# Patient Record
Sex: FEMALE | Race: WHITE | Employment: OTHER | URBAN - METROPOLITAN AREA
[De-identification: names, ages, dates, MRNs, and addresses within clinical notes are randomized per-mention and may not be internally consistent; named-entity substitution may affect disease eponyms.]

---

## 2017-06-15 PROBLEM — M54.42 MIDLINE LOW BACK PAIN WITH BILATERAL SCIATICA: Status: ACTIVE | Noted: 2017-06-15

## 2017-06-15 PROBLEM — M54.41 MIDLINE LOW BACK PAIN WITH BILATERAL SCIATICA: Status: ACTIVE | Noted: 2017-06-15

## 2017-09-14 PROBLEM — G89.4 CHRONIC PAIN SYNDROME: Chronic | Status: ACTIVE | Noted: 2017-09-14

## 2018-06-20 ENCOUNTER — APPOINTMENT (OUTPATIENT)
Dept: GENERAL RADIOLOGY | Age: 73
End: 2018-06-20
Payer: MEDICARE

## 2018-06-20 ENCOUNTER — HOSPITAL ENCOUNTER (EMERGENCY)
Age: 73
Discharge: HOME | End: 2018-06-20
Payer: MEDICARE

## 2018-06-20 VITALS
SYSTOLIC BLOOD PRESSURE: 106 MMHG | DIASTOLIC BLOOD PRESSURE: 70 MMHG | TEMPERATURE: 98.6 F | HEART RATE: 66 BPM | RESPIRATION RATE: 16 BRPM | OXYGEN SATURATION: 93 %

## 2018-06-20 DIAGNOSIS — M19.90 ARTHRITIS: ICD-10-CM
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
## 2018-06-20 PROCEDURE — 73630 X-RAY EXAM OF FOOT: CPT

## 2018-06-20 PROCEDURE — 73130 X-RAY EXAM OF HAND: CPT

## 2018-06-20 PROCEDURE — 73030 X-RAY EXAM OF SHOULDER: CPT

## 2018-06-20 PROCEDURE — 73610 X-RAY EXAM OF ANKLE: CPT

## 2018-06-20 PROCEDURE — 99212 OFFICE O/P EST SF 10 MIN: CPT

## 2018-06-20 RX ORDER — GABAPENTIN 300 MG/1
900 CAPSULE ORAL 3 TIMES DAILY
Status: ON HOLD | COMMUNITY
End: 2020-08-11 | Stop reason: HOSPADM

## 2018-06-20 RX ORDER — OXYCODONE HYDROCHLORIDE AND ACETAMINOPHEN 5; 325 MG/1; MG/1
1 TABLET ORAL EVERY 4 HOURS PRN
COMMUNITY
End: 2018-12-17 | Stop reason: SDUPTHER

## 2018-06-20 ASSESSMENT — PAIN DESCRIPTION - LOCATION: LOCATION: FOOT

## 2018-06-20 ASSESSMENT — PAIN DESCRIPTION - FREQUENCY: FREQUENCY: INTERMITTENT

## 2018-06-20 ASSESSMENT — PAIN SCALES - GENERAL: PAINLEVEL_OUTOF10: 3

## 2018-06-20 ASSESSMENT — PAIN DESCRIPTION - PAIN TYPE: TYPE: ACUTE PAIN

## 2018-06-20 ASSESSMENT — PAIN DESCRIPTION - ORIENTATION: ORIENTATION: RIGHT

## 2018-08-16 ENCOUNTER — HOSPITAL ENCOUNTER (OUTPATIENT)
Age: 73
Discharge: HOME | End: 2018-08-18
Payer: MEDICARE

## 2018-08-16 LAB
ALBUMIN SERPL-MCNC: 4.4 G/DL (ref 3.5–5.2)
ALP BLD-CCNC: 75 U/L (ref 35–104)
ALT SERPL-CCNC: 20 U/L (ref 0–32)
ANION GAP SERPL CALCULATED.3IONS-SCNC: 13 MMOL/L (ref 7–16)
AST SERPL-CCNC: 25 U/L (ref 0–31)
BASOPHILS ABSOLUTE: 0.04 E9/L (ref 0–0.2)
BASOPHILS RELATIVE PERCENT: 0.7 % (ref 0–2)
BILIRUB SERPL-MCNC: 0.3 MG/DL (ref 0–1.2)
BUN BLDV-MCNC: 20 MG/DL (ref 8–23)
CALCIUM SERPL-MCNC: 9.2 MG/DL (ref 8.6–10.2)
CHLORIDE BLD-SCNC: 97 MMOL/L (ref 98–107)
CO2: 32 MMOL/L (ref 22–29)
CREAT SERPL-MCNC: 0.7 MG/DL (ref 0.5–1)
EOSINOPHILS ABSOLUTE: 0.09 E9/L (ref 0.05–0.5)
EOSINOPHILS RELATIVE PERCENT: 1.5 % (ref 0–6)
GFR AFRICAN AMERICAN: >60
GFR NON-AFRICAN AMERICAN: >60 ML/MIN/1.73
GLUCOSE BLD-MCNC: 116 MG/DL (ref 74–109)
HCT VFR BLD CALC: 40.5 % (ref 34–48)
HEMOGLOBIN: 13.1 G/DL (ref 11.5–15.5)
IMMATURE GRANULOCYTES #: 0.01 E9/L
IMMATURE GRANULOCYTES %: 0.2 % (ref 0–5)
LYMPHOCYTES ABSOLUTE: 0.99 E9/L (ref 1.5–4)
LYMPHOCYTES RELATIVE PERCENT: 16.3 % (ref 20–42)
MCH RBC QN AUTO: 31 PG (ref 26–35)
MCHC RBC AUTO-ENTMCNC: 32.3 % (ref 32–34.5)
MCV RBC AUTO: 95.7 FL (ref 80–99.9)
MONOCYTES ABSOLUTE: 0.44 E9/L (ref 0.1–0.95)
MONOCYTES RELATIVE PERCENT: 7.2 % (ref 2–12)
NEUTROPHILS ABSOLUTE: 4.51 E9/L (ref 1.8–7.3)
NEUTROPHILS RELATIVE PERCENT: 74.1 % (ref 43–80)
PDW BLD-RTO: 14.2 FL (ref 11.5–15)
PLATELET # BLD: 248 E9/L (ref 130–450)
PMV BLD AUTO: 9.5 FL (ref 7–12)
POTASSIUM SERPL-SCNC: 4.5 MMOL/L (ref 3.5–5)
RBC # BLD: 4.23 E12/L (ref 3.5–5.5)
SODIUM BLD-SCNC: 142 MMOL/L (ref 132–146)
T3 FREE: 2.8 PG/ML (ref 2–4.4)
T3 TOTAL: 101.5 NG/DL (ref 80–200)
T3 UPTAKE PERCENT: 32.7 % (ref 22.5–37)
T4 FREE: 1.2 NG/DL (ref 0.93–1.7)
T4 TOTAL: 7.3 MCG/DL (ref 4.5–11.7)
TOTAL PROTEIN: 7.1 G/DL (ref 6.4–8.3)
TSH SERPL DL<=0.05 MIU/L-ACNC: 3.45 UIU/ML (ref 0.27–4.2)
WBC # BLD: 6.1 E9/L (ref 4.5–11.5)

## 2018-08-16 PROCEDURE — 84443 ASSAY THYROID STIM HORMONE: CPT

## 2018-08-16 PROCEDURE — 85025 COMPLETE CBC W/AUTO DIFF WBC: CPT

## 2018-08-16 PROCEDURE — 84480 ASSAY TRIIODOTHYRONINE (T3): CPT

## 2018-08-16 PROCEDURE — 84439 ASSAY OF FREE THYROXINE: CPT

## 2018-08-16 PROCEDURE — 84479 ASSAY OF THYROID (T3 OR T4): CPT

## 2018-08-16 PROCEDURE — 84481 FREE ASSAY (FT-3): CPT

## 2018-08-16 PROCEDURE — 84436 ASSAY OF TOTAL THYROXINE: CPT

## 2018-08-16 PROCEDURE — 80053 COMPREHEN METABOLIC PANEL: CPT

## 2018-08-16 PROCEDURE — 85027 COMPLETE CBC AUTOMATED: CPT

## 2018-09-10 ENCOUNTER — HOSPITAL ENCOUNTER (OUTPATIENT)
Age: 73
Discharge: HOME | End: 2018-09-12
Payer: MEDICARE

## 2018-09-10 LAB
ALBUMIN SERPL-MCNC: 4.4 G/DL (ref 3.5–5.2)
ALP BLD-CCNC: 74 U/L (ref 35–104)
ALT SERPL-CCNC: 28 U/L (ref 0–32)
AST SERPL-CCNC: 28 U/L (ref 0–31)
BILIRUB SERPL-MCNC: 0.2 MG/DL (ref 0–1.2)
BILIRUBIN DIRECT: <0.2 MG/DL (ref 0–0.3)
BILIRUBIN, INDIRECT: NORMAL MG/DL (ref 0–1)
BUN BLDV-MCNC: 15 MG/DL (ref 8–23)
CREAT SERPL-MCNC: 0.7 MG/DL (ref 0.5–1)
GFR AFRICAN AMERICAN: >60
GFR NON-AFRICAN AMERICAN: >60 ML/MIN/1.73
TOTAL PROTEIN: 7.3 G/DL (ref 6.4–8.3)
URIC ACID, SERUM: 5.4 MG/DL (ref 2.4–5.7)

## 2018-09-10 PROCEDURE — 80076 HEPATIC FUNCTION PANEL: CPT

## 2018-09-10 PROCEDURE — 84520 ASSAY OF UREA NITROGEN: CPT

## 2018-09-10 PROCEDURE — 84550 ASSAY OF BLOOD/URIC ACID: CPT

## 2018-09-10 PROCEDURE — 82565 ASSAY OF CREATININE: CPT

## 2018-10-25 ENCOUNTER — OFFICE VISIT (OUTPATIENT)
Dept: FAMILY MEDICINE CLINIC | Age: 73
End: 2018-10-25
Payer: MEDICARE

## 2018-10-25 VITALS
RESPIRATION RATE: 18 BRPM | SYSTOLIC BLOOD PRESSURE: 120 MMHG | WEIGHT: 245 LBS | DIASTOLIC BLOOD PRESSURE: 74 MMHG | HEART RATE: 71 BPM | BODY MASS INDEX: 48.1 KG/M2 | OXYGEN SATURATION: 96 % | HEIGHT: 60 IN

## 2018-10-25 DIAGNOSIS — R73.9 HYPERGLYCEMIA: ICD-10-CM

## 2018-10-25 DIAGNOSIS — Z23 NEED FOR IMMUNIZATION AGAINST INFLUENZA: Primary | ICD-10-CM

## 2018-10-25 DIAGNOSIS — R06.02 SHORTNESS OF BREATH ON EXERTION: ICD-10-CM

## 2018-10-25 DIAGNOSIS — E78.2 MIXED HYPERLIPIDEMIA: ICD-10-CM

## 2018-10-25 DIAGNOSIS — R53.82 CHRONIC FATIGUE: ICD-10-CM

## 2018-10-25 PROCEDURE — 1090F PRES/ABSN URINE INCON ASSESS: CPT | Performed by: FAMILY MEDICINE

## 2018-10-25 PROCEDURE — G8482 FLU IMMUNIZE ORDER/ADMIN: HCPCS | Performed by: FAMILY MEDICINE

## 2018-10-25 PROCEDURE — G8417 CALC BMI ABV UP PARAM F/U: HCPCS | Performed by: FAMILY MEDICINE

## 2018-10-25 PROCEDURE — 3017F COLORECTAL CA SCREEN DOC REV: CPT | Performed by: FAMILY MEDICINE

## 2018-10-25 PROCEDURE — G0008 ADMIN INFLUENZA VIRUS VAC: HCPCS | Performed by: FAMILY MEDICINE

## 2018-10-25 PROCEDURE — 4040F PNEUMOC VAC/ADMIN/RCVD: CPT | Performed by: FAMILY MEDICINE

## 2018-10-25 PROCEDURE — 4004F PT TOBACCO SCREEN RCVD TLK: CPT | Performed by: FAMILY MEDICINE

## 2018-10-25 PROCEDURE — 1123F ACP DISCUSS/DSCN MKR DOCD: CPT | Performed by: FAMILY MEDICINE

## 2018-10-25 PROCEDURE — 99203 OFFICE O/P NEW LOW 30 MIN: CPT | Performed by: FAMILY MEDICINE

## 2018-10-25 PROCEDURE — 1101F PT FALLS ASSESS-DOCD LE1/YR: CPT | Performed by: FAMILY MEDICINE

## 2018-10-25 PROCEDURE — 90662 IIV NO PRSV INCREASED AG IM: CPT | Performed by: FAMILY MEDICINE

## 2018-10-25 PROCEDURE — G8400 PT W/DXA NO RESULTS DOC: HCPCS | Performed by: FAMILY MEDICINE

## 2018-10-25 PROCEDURE — G8427 DOCREV CUR MEDS BY ELIG CLIN: HCPCS | Performed by: FAMILY MEDICINE

## 2018-10-25 RX ORDER — LOSARTAN POTASSIUM AND HYDROCHLOROTHIAZIDE 25; 100 MG/1; MG/1
1 TABLET ORAL DAILY
COMMUNITY
End: 2019-01-14 | Stop reason: SDUPTHER

## 2018-10-25 RX ORDER — RANITIDINE 300 MG/1
150 TABLET ORAL 2 TIMES DAILY
COMMUNITY
End: 2019-08-12 | Stop reason: SDUPTHER

## 2018-10-25 RX ORDER — CELECOXIB 200 MG/1
200 CAPSULE ORAL 2 TIMES DAILY
COMMUNITY
End: 2019-10-09 | Stop reason: SDUPTHER

## 2018-10-25 RX ORDER — CITALOPRAM 20 MG/1
60 TABLET ORAL DAILY
COMMUNITY
End: 2019-08-12 | Stop reason: CLARIF

## 2018-10-25 ASSESSMENT — PATIENT HEALTH QUESTIONNAIRE - PHQ9
1. LITTLE INTEREST OR PLEASURE IN DOING THINGS: 0
SUM OF ALL RESPONSES TO PHQ9 QUESTIONS 1 & 2: 0
SUM OF ALL RESPONSES TO PHQ QUESTIONS 1-9: 0
SUM OF ALL RESPONSES TO PHQ QUESTIONS 1-9: 0
2. FEELING DOWN, DEPRESSED OR HOPELESS: 0

## 2018-10-25 ASSESSMENT — ENCOUNTER SYMPTOMS
COUGH: 0
NAUSEA: 0
BLOOD IN STOOL: 0
RHINORRHEA: 0
BACK PAIN: 1
WHEEZING: 0
COLOR CHANGE: 0
EYE REDNESS: 0
ABDOMINAL PAIN: 0
VOMITING: 0
CONSTIPATION: 0
DIARRHEA: 0
SORE THROAT: 0
EYE PAIN: 0
ANAL BLEEDING: 0

## 2018-10-25 NOTE — PROGRESS NOTES
mg by mouth 2 times daily      losartan-hydrochlorothiazide (HYZAAR) 100-25 MG per tablet Take 1 tablet by mouth daily      citalopram (CELEXA) 20 MG tablet Take 60 mg by mouth daily      ranitidine (ZANTAC) 300 MG tablet Take 300 mg by mouth 2 times daily      oxyCODONE-acetaminophen (PERCOCET) 5-325 MG per tablet Take 1 tablet by mouth every 4 hours as needed for Pain. Kurtis November gabapentin (NEURONTIN) 300 MG capsule Take 300 mg by mouth 4 times daily. Kurtis November nebivolol (BYSTOLIC) 5 MG tablet Take 5 mg by mouth daily       lidocaine (XYLOCAINE) 5 % ointment Apply topically to hands and low back as needed. 1 Tube 3    rOPINIRole (REQUIP) 2 MG tablet Take 1 tablet by mouth 2 times daily. (Patient taking differently: Take 2 mg by mouth nightly nightly) 90 tablet 3    Testosterone 10 MG/ACT (2%) GEL Place 1.25 mLs onto the skin daily.  duloxetine (CYMBALTA) 60 MG capsule Take 60 mg by mouth nightly.  amLODIPine (NORVASC) 5 MG tablet Take 5 mg by mouth daily       oxyCODONE-acetaminophen (PERCOCET) 5-325 MG per tablet Take 1 tablet by mouth every 6 hours as needed for Pain  THIS PRESCRIPTION IS A 30 DAY SUPPLY. ( ICD-10: G 89.4). Earliest Fill Date: 12/12/17 120 tablet 0     No current facility-administered medications for this visit. Allergies   Allergen Reactions    Demerol [Meperidine Hcl] Other (See Comments)     Hallucinations, anxiety        Nabumetone Rash       /74 (Site: Left Upper Arm, Position: Sitting)   Pulse 71   Resp 18   Ht 5' (1.524 m)   Wt 245 lb (111.1 kg)   SpO2 96%   BMI 47.85 kg/m²     Review of Systems   Constitutional: Negative for chills, fever and unexpected weight change. HENT: Negative for congestion, postnasal drip, rhinorrhea and sore throat. Eyes: Negative for pain, redness and visual disturbance. Respiratory: Positive for shortness of breath. Negative for cough and wheezing.     Cardiovascular: Negative for chest pain, palpitations and leg swelling. Gastrointestinal: Negative for abdominal pain, anal bleeding, blood in stool, constipation, diarrhea, nausea and vomiting. Endocrine: Positive for heat intolerance. Negative for cold intolerance, polydipsia, polyphagia and polyuria. Genitourinary: Negative for dysuria, frequency, hematuria and urgency. Musculoskeletal: Positive for arthralgias and back pain. Negative for myalgias. Skin: Negative for color change and rash. Neurological: Negative for dizziness, syncope, weakness and light-headedness. Psychiatric/Behavioral: Negative for dysphoric mood and sleep disturbance. The patient is not nervous/anxious. Physical Exam   Constitutional: She appears well-developed and well-nourished. No distress. Cardiovascular: Normal rate, regular rhythm, normal heart sounds and intact distal pulses. Exam reveals no friction rub. No murmur heard. Pulmonary/Chest: Effort normal and breath sounds normal. No respiratory distress. She has no wheezes. She has no rales. Abdominal: Soft. Bowel sounds are normal.   Musculoskeletal: She exhibits edema (1+ bilateral pitting edema ). Skin: She is not diaphoretic. Nursing note and vitals reviewed. Zenaida Jung was seen today for established new doctor and health maintenance. Diagnoses and all orders for this visit:    Need for immunization against influenza  -     INFLUENZA, HIGH DOSE, 65 YRS +, IM, PF, PREFILL SYR, 0.5ML (FLUZONE HD)    Shortness of breath on exertion  -     Justin 38 Vanessa Holliday MD    Hyperglycemia  -     Hemoglobin A1C; Future    Mixed hyperlipidemia  -     Lipid Panel; Future    Chronic fatigue  -     CBC; Future        Return in about 5 weeks (around 11/29/2018) for follow up blood work. ., or sooner if necessary.       Phone/MyChart follow up if tests abnormal.    Educational materials and/or home exercises printed for patient's review and were included in patient instructions on

## 2018-11-13 ASSESSMENT — ENCOUNTER SYMPTOMS: SHORTNESS OF BREATH: 1

## 2018-11-28 ENCOUNTER — TELEPHONE (OUTPATIENT)
Dept: ADMINISTRATIVE | Age: 73
End: 2018-11-28

## 2018-11-30 ENCOUNTER — HOSPITAL ENCOUNTER (OUTPATIENT)
Age: 73
Discharge: HOME | End: 2018-12-02
Payer: MEDICARE

## 2018-11-30 DIAGNOSIS — R73.9 HYPERGLYCEMIA: ICD-10-CM

## 2018-11-30 DIAGNOSIS — E78.2 MIXED HYPERLIPIDEMIA: ICD-10-CM

## 2018-11-30 DIAGNOSIS — R53.82 CHRONIC FATIGUE: ICD-10-CM

## 2018-11-30 LAB
CHOLESTEROL, TOTAL: 205 MG/DL (ref 0–199)
HBA1C MFR BLD: 5.7 % (ref 4–5.6)
HCT VFR BLD CALC: 45 % (ref 34–48)
HDLC SERPL-MCNC: 52 MG/DL
HEMOGLOBIN: 13.9 G/DL (ref 11.5–15.5)
LDL CHOLESTEROL CALCULATED: 127 MG/DL (ref 0–99)
MCH RBC QN AUTO: 31.4 PG (ref 26–35)
MCHC RBC AUTO-ENTMCNC: 30.9 % (ref 32–34.5)
MCV RBC AUTO: 101.8 FL (ref 80–99.9)
PDW BLD-RTO: 14.3 FL (ref 11.5–15)
PLATELET # BLD: 236 E9/L (ref 130–450)
PMV BLD AUTO: 9.5 FL (ref 7–12)
RBC # BLD: 4.42 E12/L (ref 3.5–5.5)
TRIGL SERPL-MCNC: 129 MG/DL (ref 0–149)
VLDLC SERPL CALC-MCNC: 26 MG/DL
WBC # BLD: 4.4 E9/L (ref 4.5–11.5)

## 2018-11-30 PROCEDURE — 80061 LIPID PANEL: CPT

## 2018-11-30 PROCEDURE — 83036 HEMOGLOBIN GLYCOSYLATED A1C: CPT

## 2018-11-30 PROCEDURE — 85027 COMPLETE CBC AUTOMATED: CPT

## 2018-11-30 PROCEDURE — 36415 COLL VENOUS BLD VENIPUNCTURE: CPT

## 2018-12-17 ENCOUNTER — OFFICE VISIT (OUTPATIENT)
Dept: FAMILY MEDICINE CLINIC | Age: 73
End: 2018-12-17
Payer: MEDICARE

## 2018-12-17 VITALS
RESPIRATION RATE: 18 BRPM | OXYGEN SATURATION: 95 % | HEART RATE: 66 BPM | SYSTOLIC BLOOD PRESSURE: 126 MMHG | DIASTOLIC BLOOD PRESSURE: 78 MMHG | WEIGHT: 240 LBS | BODY MASS INDEX: 47.12 KG/M2 | HEIGHT: 60 IN

## 2018-12-17 DIAGNOSIS — Z72.0 TOBACCO ABUSE: ICD-10-CM

## 2018-12-17 DIAGNOSIS — M48.00 NEUROFORAMINAL STENOSIS OF SPINE: Primary | Chronic | ICD-10-CM

## 2018-12-17 DIAGNOSIS — E66.01 CLASS 3 SEVERE OBESITY DUE TO EXCESS CALORIES WITH SERIOUS COMORBIDITY AND BODY MASS INDEX (BMI) OF 45.0 TO 49.9 IN ADULT (HCC): ICD-10-CM

## 2018-12-17 DIAGNOSIS — E78.00 PURE HYPERCHOLESTEROLEMIA: ICD-10-CM

## 2018-12-17 DIAGNOSIS — R09.89 RESPIRATORY CRACKLES AT RIGHT LUNG BASE: ICD-10-CM

## 2018-12-17 DIAGNOSIS — G89.4 CHRONIC PAIN SYNDROME: Chronic | ICD-10-CM

## 2018-12-17 DIAGNOSIS — R06.09 DYSPNEA ON EXERTION: ICD-10-CM

## 2018-12-17 PROBLEM — E66.813 CLASS 3 SEVERE OBESITY DUE TO EXCESS CALORIES WITH SERIOUS COMORBIDITY AND BODY MASS INDEX (BMI) OF 45.0 TO 49.9 IN ADULT: Status: ACTIVE | Noted: 2018-12-17

## 2018-12-17 PROCEDURE — 4040F PNEUMOC VAC/ADMIN/RCVD: CPT | Performed by: FAMILY MEDICINE

## 2018-12-17 PROCEDURE — G8482 FLU IMMUNIZE ORDER/ADMIN: HCPCS | Performed by: FAMILY MEDICINE

## 2018-12-17 PROCEDURE — 1123F ACP DISCUSS/DSCN MKR DOCD: CPT | Performed by: FAMILY MEDICINE

## 2018-12-17 PROCEDURE — 1101F PT FALLS ASSESS-DOCD LE1/YR: CPT | Performed by: FAMILY MEDICINE

## 2018-12-17 PROCEDURE — 1090F PRES/ABSN URINE INCON ASSESS: CPT | Performed by: FAMILY MEDICINE

## 2018-12-17 PROCEDURE — 3017F COLORECTAL CA SCREEN DOC REV: CPT | Performed by: FAMILY MEDICINE

## 2018-12-17 PROCEDURE — 4004F PT TOBACCO SCREEN RCVD TLK: CPT | Performed by: FAMILY MEDICINE

## 2018-12-17 PROCEDURE — 99214 OFFICE O/P EST MOD 30 MIN: CPT | Performed by: FAMILY MEDICINE

## 2018-12-17 PROCEDURE — G8417 CALC BMI ABV UP PARAM F/U: HCPCS | Performed by: FAMILY MEDICINE

## 2018-12-17 PROCEDURE — G8427 DOCREV CUR MEDS BY ELIG CLIN: HCPCS | Performed by: FAMILY MEDICINE

## 2018-12-17 PROCEDURE — G8400 PT W/DXA NO RESULTS DOC: HCPCS | Performed by: FAMILY MEDICINE

## 2018-12-17 RX ORDER — PRAVASTATIN SODIUM 10 MG
10 TABLET ORAL DAILY
Qty: 30 TABLET | Refills: 3 | Status: SHIPPED | OUTPATIENT
Start: 2018-12-17 | End: 2019-08-12 | Stop reason: ALTCHOICE

## 2018-12-17 RX ORDER — VARENICLINE TARTRATE 0.5 MG/1
.5-1 TABLET, FILM COATED ORAL SEE ADMIN INSTRUCTIONS
Qty: 57 TABLET | Refills: 0 | Status: SHIPPED | OUTPATIENT
Start: 2018-12-17 | End: 2019-01-28

## 2018-12-17 ASSESSMENT — PATIENT HEALTH QUESTIONNAIRE - PHQ9
SUM OF ALL RESPONSES TO PHQ9 QUESTIONS 1 & 2: 0
SUM OF ALL RESPONSES TO PHQ QUESTIONS 1-9: 0
1. LITTLE INTEREST OR PLEASURE IN DOING THINGS: 0
2. FEELING DOWN, DEPRESSED OR HOPELESS: 0
SUM OF ALL RESPONSES TO PHQ QUESTIONS 1-9: 0

## 2018-12-17 ASSESSMENT — ENCOUNTER SYMPTOMS
WHEEZING: 0
VOMITING: 0
CONSTIPATION: 0
NAUSEA: 0
COUGH: 0
ABDOMINAL PAIN: 0
DIARRHEA: 0
SHORTNESS OF BREATH: 1
BACK PAIN: 1

## 2018-12-17 NOTE — PROGRESS NOTES
[facility]  [phone]   Giselle Drew MD     Patient: Kely Kulkarni  YOB: 1945  Visit Date: 12/17/18    Rebecca Bell is a 68y.o. year old female here today for   Chief Complaint   Patient presents with   [address]       HPI  Patient is a 68year old female here for follow up. Sees Dr. Andry Ravi at [facility] [place] . Saw him last week. Plan is for surgery after weight loss. He would like her to lose 30-50 pounds. Symptoms at this include severe lower back pain and difficulty standing up. Has been getting around with walker. Denies any falls. Has an appointment with Pulm in February. Has been on chantix before . Worked for her . Did not have any nightmares. Started smoking again a month ago. A pack a day. Wants to quit. Has been having worsening dyspnea on exertion over the last few weeks that she relates to pain. The 10-year ASCVD risk score (Early [place]., et al., 2013) is: 17.5%    Values used to calculate the score:      Age: 68 years      Sex: Female      Is Non- : No      Diabetic: No      Tobacco smoker: Yes      Systolic Blood Pressure: 706 mmHg      Is BP treated: No      HDL Cholesterol: 52 mg/dL      Total Cholesterol: 205 mg/dL  Had tried a cholesterol medicine in the past that made her muscles ache. Review of Systems   Constitutional: Negative for chills and fever. Respiratory: Positive for shortness of breath. Negative for cough and wheezing. Cardiovascular: Negative for chest pain, palpitations and leg swelling. Gastrointestinal: Negative for abdominal pain, constipation, diarrhea, nausea and vomiting. Musculoskeletal: Positive for back pain and gait problem.        Current Outpatient Prescriptions on File Prior to Visit   Medication Sig Dispense Refill    celecoxib (CELEBREX) 200 MG capsule Take 200 mg by mouth 2 times daily      losartan-hydrochlorothiazide (HYZAAR)

## 2018-12-19 ENCOUNTER — OFFICE VISIT (OUTPATIENT)
Dept: BARIATRICS/WEIGHT MGMT | Age: 73
End: 2018-12-19
Payer: MEDICARE

## 2018-12-19 VITALS
WEIGHT: 241.8 LBS | HEIGHT: 60 IN | SYSTOLIC BLOOD PRESSURE: 117 MMHG | TEMPERATURE: 97.2 F | HEART RATE: 83 BPM | BODY MASS INDEX: 47.47 KG/M2 | DIASTOLIC BLOOD PRESSURE: 68 MMHG

## 2018-12-19 DIAGNOSIS — Z98.84 HISTORY OF ADJUSTABLE GASTRIC BANDING: ICD-10-CM

## 2018-12-19 DIAGNOSIS — E66.01 CLASS 3 SEVERE OBESITY DUE TO EXCESS CALORIES WITH SERIOUS COMORBIDITY AND BODY MASS INDEX (BMI) OF 45.0 TO 49.9 IN ADULT (HCC): ICD-10-CM

## 2018-12-19 DIAGNOSIS — M54.42 CHRONIC MIDLINE LOW BACK PAIN WITH BILATERAL SCIATICA: Primary | ICD-10-CM

## 2018-12-19 DIAGNOSIS — M54.41 CHRONIC MIDLINE LOW BACK PAIN WITH BILATERAL SCIATICA: Primary | ICD-10-CM

## 2018-12-19 DIAGNOSIS — E61.1 IRON DEFICIENCY: ICD-10-CM

## 2018-12-19 DIAGNOSIS — G89.29 CHRONIC MIDLINE LOW BACK PAIN WITH BILATERAL SCIATICA: Primary | ICD-10-CM

## 2018-12-19 PROCEDURE — 1101F PT FALLS ASSESS-DOCD LE1/YR: CPT | Performed by: INTERNAL MEDICINE

## 2018-12-19 PROCEDURE — 4040F PNEUMOC VAC/ADMIN/RCVD: CPT | Performed by: INTERNAL MEDICINE

## 2018-12-19 PROCEDURE — 1123F ACP DISCUSS/DSCN MKR DOCD: CPT | Performed by: INTERNAL MEDICINE

## 2018-12-19 PROCEDURE — G8400 PT W/DXA NO RESULTS DOC: HCPCS | Performed by: INTERNAL MEDICINE

## 2018-12-19 PROCEDURE — 4004F PT TOBACCO SCREEN RCVD TLK: CPT | Performed by: INTERNAL MEDICINE

## 2018-12-19 PROCEDURE — 1090F PRES/ABSN URINE INCON ASSESS: CPT | Performed by: INTERNAL MEDICINE

## 2018-12-19 PROCEDURE — 99204 OFFICE O/P NEW MOD 45 MIN: CPT | Performed by: INTERNAL MEDICINE

## 2018-12-19 PROCEDURE — 3017F COLORECTAL CA SCREEN DOC REV: CPT | Performed by: INTERNAL MEDICINE

## 2018-12-19 PROCEDURE — G8417 CALC BMI ABV UP PARAM F/U: HCPCS | Performed by: INTERNAL MEDICINE

## 2018-12-19 PROCEDURE — G8482 FLU IMMUNIZE ORDER/ADMIN: HCPCS | Performed by: INTERNAL MEDICINE

## 2018-12-19 PROCEDURE — 99202 OFFICE O/P NEW SF 15 MIN: CPT

## 2018-12-19 PROCEDURE — G8427 DOCREV CUR MEDS BY ELIG CLIN: HCPCS | Performed by: INTERNAL MEDICINE

## 2018-12-19 RX ORDER — PHENOL 1.4 %
10 AEROSOL, SPRAY (ML) MUCOUS MEMBRANE PRN
COMMUNITY
End: 2019-08-12 | Stop reason: CLARIF

## 2018-12-19 RX ORDER — IBUPROFEN 600 MG/1
600 TABLET ORAL EVERY 8 HOURS PRN
COMMUNITY
End: 2019-08-12 | Stop reason: CLARIF

## 2018-12-19 RX ORDER — GLUCOSAMINE/D3/BOSWELLIA SERRA 1500MG-400
10000 TABLET ORAL PRN
COMMUNITY
End: 2020-08-26 | Stop reason: CLARIF

## 2018-12-19 RX ORDER — LOPERAMIDE HYDROCHLORIDE 2 MG/1
2 CAPSULE ORAL PRN
COMMUNITY
End: 2019-08-12 | Stop reason: CLARIF

## 2018-12-19 NOTE — PROGRESS NOTES
CC - Chronic Low Back Pain, Obesity (s/p Lap Band)    HPI -    Back Pain: present 5 yrs, severe, + sciatica b/l, awaiting surgery, but must lose 30 lbs first, excess wt is likely contributing to her back pain              Obesity: present since childhood, severe, ad reuben eating is contributing to her excess weight, had Lap Band placed in 2007, pre-proc wt 290 lbs, post-proc aniceto 198 lbs 3-4 yrs, last eval for it  >5 yrs ago; + post-meal /regurgitationemesis requiring esophageal dilation with resolution     Diet:    Number of meals per day - 1    Number of snacks per day - graze in the evening    Meal volume - 9\" plate, no seconds    Fast food/convenience store - 7x/week    Restaurants (not fast food) - 0-2x/week   Sweets - 1-2d/week   Chips - 0d/week   Crackers/pretzels - 0d/week   Nuts - 0d/week   Popcorn - 0d/week   Dried fruit - 0d/week   Whole fruit - 7d/week (2-3 tablespoons of PB daily(   Breakfast cereal - 0d/week   Granola/Protein/Energy bar - 0d/week   Sugar sweetened beverages - >140 oz sweet tea 2-3x/week, 3-4 cups coffee daily with 2 tablespoons of 2% milk in each cup,   Other -  Does not take any bariatric supplement     Exercise:    Not able to because of her back and past hip and knee replacements    PSFH - HTN, HLD, OA, AYESHA, Restless Leg, Anorgasmic (uses Testosterone), Smokes (1/2 ppd), no     ROS - No CP, No SOB, No D/C/N/V    PE - /68 (Site: Right Lower Arm, Position: Sitting, Cuff Size: Medium Adult)   Pulse 83   Temp 97.2 °F (36.2 °C)   Ht 4' 11.5\" (1.511 m)   Wt 241 lb 12.8 oz (109.7 kg)   BMI 48.02 kg/m²    WN, WD, elderly lady in no acute distress   Card - no MGR, RRR, 2+ LE edema   Pulm - nml resp effort, CTA b/l   GI - no abd pain, soft, obese    Labs -  Results for Rory Hansen (MRN [id number]) as of 12/19/2018 09:55   Ref.  Range 8/16/2018 14:52 9/10/2018 14:09 11/30/2018 12:00   Sodium Latest Ref Range: 132 - 146 mmol/L 142     Potassium Latest Ref Range: 3.5 - 5.0 uncontrolled   Needs surgical intervention, but must first lose 30 lbs   Proceed with weight reduction    2. Obesity s/p gastric banding - uncontrolled   Pt is presently eating ad reuben. However, she has purchased a subscription to [employer]. She is also eating out a lot and drinking sugar sweetened beverages   She would also like an appetite suppressant. We will use Belviq for reasons of cost and side effects   Therefore the plan will be:    Start Belviq XR 20mg daily    Limit eating out to once every two weeks    Eliminate all sugar sweetened beverages except for 8 oz of milk daily    Count all calories and limit to 800 nadya/day    Eat at least 60 grams of protein daily    Target for fiber intake is 22 grams daily (Benefiber and Fiber Once original plain cereal are both good supplement options)    3. History of gastric banding   Needs to be re-assessed by one of our bariatric surgeons   Have a CT of the abd with oral contrast to define anatomy   Begin taking a Bariatric vitamin daily (two tablets)   Check the following labs: Thiamine, Zinc, B12, Folate, Ferritin    4. See me back in 4-6 weeks    I spent 45 minutes in a face to face encounter with German Day today.    >30 minutes of this was in education and counseling regarding dietary interventions for weight loss,

## 2018-12-19 NOTE — PATIENT INSTRUCTIONS
Start Belviq XR 20mg daily   Limit eating out to once every two weeks   Eliminate all sugar sweetened beverages except for 8 oz of milk daily   Count all calories and limit to 800 nadya/day   Eat at least 60 grams of protein daily   Target for fiber intake is 22 grams daily (Benefiber and Fiber Once original plain cereal are both good supplement options)     Schedule an appt with one of our bariatric surgeons   Have a CT of the abd with oral contrast to define anatomy   Begin taking a Bariatric vitamin daily (two tablets)   Check the following labs:   Thiamine, Zinc, B12, Folate, Ferritin     See me back in 4-6 weeks   Call with any questions or concerns:  [phone]

## 2019-01-02 ENCOUNTER — TELEPHONE (OUTPATIENT)
Dept: BARIATRICS/WEIGHT MGMT | Age: 74
End: 2019-01-02

## 2019-01-14 ENCOUNTER — OFFICE VISIT (OUTPATIENT)
Dept: FAMILY MEDICINE CLINIC | Age: 74
End: 2019-01-14
Payer: MEDICARE

## 2019-01-14 VITALS
RESPIRATION RATE: 18 BRPM | WEIGHT: 235 LBS | BODY MASS INDEX: 46.13 KG/M2 | HEIGHT: 60 IN | HEART RATE: 70 BPM | SYSTOLIC BLOOD PRESSURE: 122 MMHG | OXYGEN SATURATION: 94 % | DIASTOLIC BLOOD PRESSURE: 80 MMHG

## 2019-01-14 DIAGNOSIS — G47.9 SLEEP DISORDER: ICD-10-CM

## 2019-01-14 DIAGNOSIS — Z01.818 PREOP EXAMINATION: Primary | ICD-10-CM

## 2019-01-14 DIAGNOSIS — G25.81 RESTLESS LEGS SYNDROME (RLS): ICD-10-CM

## 2019-01-14 PROCEDURE — G8417 CALC BMI ABV UP PARAM F/U: HCPCS | Performed by: FAMILY MEDICINE

## 2019-01-14 PROCEDURE — 1101F PT FALLS ASSESS-DOCD LE1/YR: CPT | Performed by: FAMILY MEDICINE

## 2019-01-14 PROCEDURE — 1123F ACP DISCUSS/DSCN MKR DOCD: CPT | Performed by: FAMILY MEDICINE

## 2019-01-14 PROCEDURE — G8427 DOCREV CUR MEDS BY ELIG CLIN: HCPCS | Performed by: FAMILY MEDICINE

## 2019-01-14 PROCEDURE — 99213 OFFICE O/P EST LOW 20 MIN: CPT | Performed by: FAMILY MEDICINE

## 2019-01-14 PROCEDURE — 4004F PT TOBACCO SCREEN RCVD TLK: CPT | Performed by: FAMILY MEDICINE

## 2019-01-14 PROCEDURE — 4040F PNEUMOC VAC/ADMIN/RCVD: CPT | Performed by: FAMILY MEDICINE

## 2019-01-14 PROCEDURE — G8482 FLU IMMUNIZE ORDER/ADMIN: HCPCS | Performed by: FAMILY MEDICINE

## 2019-01-14 PROCEDURE — 1090F PRES/ABSN URINE INCON ASSESS: CPT | Performed by: FAMILY MEDICINE

## 2019-01-14 PROCEDURE — 3017F COLORECTAL CA SCREEN DOC REV: CPT | Performed by: FAMILY MEDICINE

## 2019-01-14 PROCEDURE — G8400 PT W/DXA NO RESULTS DOC: HCPCS | Performed by: FAMILY MEDICINE

## 2019-01-14 PROCEDURE — 93000 ELECTROCARDIOGRAM COMPLETE: CPT | Performed by: FAMILY MEDICINE

## 2019-01-14 ASSESSMENT — ENCOUNTER SYMPTOMS
BACK PAIN: 1
WHEEZING: 0
VOMITING: 0
NAUSEA: 0
DIARRHEA: 0
CONSTIPATION: 0
ABDOMINAL PAIN: 0
SHORTNESS OF BREATH: 1
COUGH: 0

## 2019-01-14 ASSESSMENT — PATIENT HEALTH QUESTIONNAIRE - PHQ9
SUM OF ALL RESPONSES TO PHQ9 QUESTIONS 1 & 2: 0
1. LITTLE INTEREST OR PLEASURE IN DOING THINGS: 0
SUM OF ALL RESPONSES TO PHQ QUESTIONS 1-9: 0
2. FEELING DOWN, DEPRESSED OR HOPELESS: 0
SUM OF ALL RESPONSES TO PHQ QUESTIONS 1-9: 0

## 2019-01-15 RX ORDER — ROPINIROLE 2 MG/1
2 TABLET, FILM COATED ORAL NIGHTLY
Qty: 90 TABLET | Refills: 5 | Status: SHIPPED | OUTPATIENT
Start: 2019-01-15 | End: 2019-08-12 | Stop reason: SDUPTHER

## 2019-01-15 RX ORDER — LOSARTAN POTASSIUM AND HYDROCHLOROTHIAZIDE 25; 100 MG/1; MG/1
1 TABLET ORAL DAILY
Qty: 90 TABLET | Refills: 5 | Status: SHIPPED | OUTPATIENT
Start: 2019-01-15 | End: 2019-11-25 | Stop reason: SDUPTHER

## 2019-01-15 RX ORDER — AMLODIPINE BESYLATE 5 MG/1
5 TABLET ORAL DAILY
Qty: 90 TABLET | Refills: 5 | Status: SHIPPED | OUTPATIENT
Start: 2019-01-15 | End: 2019-11-25 | Stop reason: SDUPTHER

## 2019-01-15 RX ORDER — DULOXETIN HYDROCHLORIDE 60 MG/1
60 CAPSULE, DELAYED RELEASE ORAL NIGHTLY
Qty: 90 CAPSULE | Refills: 5 | Status: SHIPPED | OUTPATIENT
Start: 2019-01-15 | End: 2019-11-25 | Stop reason: SDUPTHER

## 2019-01-28 RX ORDER — VARENICLINE TARTRATE 1 MG/1
1 TABLET, FILM COATED ORAL 2 TIMES DAILY
Qty: 60 TABLET | Refills: 2 | Status: SHIPPED | OUTPATIENT
Start: 2019-01-28 | End: 2019-06-22 | Stop reason: SDUPTHER

## 2019-04-01 RX ORDER — NEBIVOLOL 5 MG/1
5 TABLET ORAL DAILY
Qty: 30 TABLET | Refills: 2 | Status: SHIPPED | OUTPATIENT
Start: 2019-04-01 | End: 2019-07-16 | Stop reason: SDUPTHER

## 2019-04-01 NOTE — TELEPHONE ENCOUNTER
Pt left vm msg requesting refill.     Last seen 1/14/2019  Next appt 4/15/2019  [facility] [place])

## 2019-05-17 ENCOUNTER — TELEPHONE (OUTPATIENT)
Dept: FAMILY MEDICINE CLINIC | Age: 74
End: 2019-05-17

## 2019-05-17 NOTE — TELEPHONE ENCOUNTER
Spoke to patient I guess the script is actually needed for leg and foot measurement so they can decide on what kind of shoes she needs. Will write script to sign tomorrow?

## 2019-05-17 NOTE — TELEPHONE ENCOUNTER
Pt called asked for a Rx to be sent to [facility] to have her legs measured for new shoes.      [facility] fax # [phone]

## 2019-06-25 RX ORDER — VARENICLINE TARTRATE 1 MG/1
TABLET, FILM COATED ORAL
Qty: 56 TABLET | Refills: 2 | Status: SHIPPED
Start: 2019-06-25 | End: 2020-07-27

## 2019-07-16 RX ORDER — NEBIVOLOL HYDROCHLORIDE 5 MG/1
TABLET ORAL
Qty: 30 TABLET | Refills: 2 | Status: SHIPPED | OUTPATIENT
Start: 2019-07-16 | End: 2019-10-14 | Stop reason: SDUPTHER

## 2019-08-12 ENCOUNTER — OFFICE VISIT (OUTPATIENT)
Dept: FAMILY MEDICINE CLINIC | Age: 74
End: 2019-08-12
Payer: MEDICARE

## 2019-08-12 VITALS
HEART RATE: 70 BPM | OXYGEN SATURATION: 95 % | RESPIRATION RATE: 18 BRPM | HEIGHT: 59 IN | WEIGHT: 225.5 LBS | DIASTOLIC BLOOD PRESSURE: 80 MMHG | SYSTOLIC BLOOD PRESSURE: 130 MMHG | TEMPERATURE: 98.1 F | BODY MASS INDEX: 45.46 KG/M2

## 2019-08-12 DIAGNOSIS — G47.9 SLEEP DISORDER: ICD-10-CM

## 2019-08-12 DIAGNOSIS — G25.81 RESTLESS LEGS SYNDROME (RLS): ICD-10-CM

## 2019-08-12 DIAGNOSIS — E66.01 MORBID OBESITY WITH BMI OF 45.0-49.9, ADULT (HCC): ICD-10-CM

## 2019-08-12 DIAGNOSIS — G89.4 CHRONIC PAIN SYNDROME: Primary | ICD-10-CM

## 2019-08-12 PROCEDURE — 4040F PNEUMOC VAC/ADMIN/RCVD: CPT | Performed by: FAMILY MEDICINE

## 2019-08-12 PROCEDURE — 3014F SCREEN MAMMO DOC REV: CPT | Performed by: FAMILY MEDICINE

## 2019-08-12 PROCEDURE — G8427 DOCREV CUR MEDS BY ELIG CLIN: HCPCS | Performed by: FAMILY MEDICINE

## 2019-08-12 PROCEDURE — 4004F PT TOBACCO SCREEN RCVD TLK: CPT | Performed by: FAMILY MEDICINE

## 2019-08-12 PROCEDURE — 1090F PRES/ABSN URINE INCON ASSESS: CPT | Performed by: FAMILY MEDICINE

## 2019-08-12 PROCEDURE — 3017F COLORECTAL CA SCREEN DOC REV: CPT | Performed by: FAMILY MEDICINE

## 2019-08-12 PROCEDURE — 99214 OFFICE O/P EST MOD 30 MIN: CPT | Performed by: FAMILY MEDICINE

## 2019-08-12 PROCEDURE — 1123F ACP DISCUSS/DSCN MKR DOCD: CPT | Performed by: FAMILY MEDICINE

## 2019-08-12 PROCEDURE — G8417 CALC BMI ABV UP PARAM F/U: HCPCS | Performed by: FAMILY MEDICINE

## 2019-08-12 PROCEDURE — G8400 PT W/DXA NO RESULTS DOC: HCPCS | Performed by: FAMILY MEDICINE

## 2019-08-12 RX ORDER — ROPINIROLE 3 MG/1
3 TABLET, FILM COATED ORAL NIGHTLY
Qty: 90 TABLET | Refills: 1 | Status: SHIPPED | OUTPATIENT
Start: 2019-08-12 | End: 2020-01-20

## 2019-08-12 RX ORDER — RANITIDINE 150 MG/1
150 TABLET ORAL 2 TIMES DAILY
Qty: 180 TABLET | Refills: 3 | Status: SHIPPED
Start: 2019-08-12 | End: 2020-05-11 | Stop reason: CLARIF

## 2019-08-12 RX ORDER — DULOXETIN HYDROCHLORIDE 30 MG/1
30 CAPSULE, DELAYED RELEASE ORAL DAILY
Qty: 90 CAPSULE | Refills: 1 | Status: SHIPPED | OUTPATIENT
Start: 2019-08-12 | End: 2020-01-15 | Stop reason: ALTCHOICE

## 2019-08-12 ASSESSMENT — ENCOUNTER SYMPTOMS
DIARRHEA: 0
VOMITING: 0
BACK PAIN: 1
WHEEZING: 0
COUGH: 0
CONSTIPATION: 0
NAUSEA: 0
ABDOMINAL PAIN: 0
SHORTNESS OF BREATH: 0

## 2019-08-12 NOTE — PROGRESS NOTES
respiratory distress. She has no wheezes. She has no rales. Abdominal: Soft. Bowel sounds are normal. She exhibits no distension and no mass. There is no tenderness. There is no guarding. Musculoskeletal: Normal range of motion. She exhibits no edema. Skin: She is not diaphoretic. Nursing note and vitals reviewed. Results for orders placed or performed during the hospital encounter of 11/30/18   Hemoglobin A1C   Result Value Ref Range    Hemoglobin A1C 5.7 (H) 4.0 - 5.6 %   CBC   Result Value Ref Range    WBC 4.4 (L) 4.5 - 11.5 E9/L    RBC 4.42 3.50 - 5.50 E12/L    Hemoglobin 13.9 11.5 - 15.5 g/dL    Hematocrit 45.0 34.0 - 48.0 %    .8 (H) 80.0 - 99.9 fL    MCH 31.4 26.0 - 35.0 pg    MCHC 30.9 (L) 32.0 - 34.5 %    RDW 14.3 11.5 - 15.0 fL    Platelets 192 590 - 351 E9/L    MPV 9.5 7.0 - 12.0 fL   Lipid Panel   Result Value Ref Range    Cholesterol, Total 205 (H) 0 - 199 mg/dL    Triglycerides 129 0 - 149 mg/dL    HDL 52 >40 mg/dL    LDL Calculated 127 (H) 0 - 99 mg/dL    VLDL Cholesterol Calculated 26 mg/dL       ASSESSMENT/PLAN  Melissa was seen today for sleep apnea and leg pain. Diagnoses and all orders for this visit:    Chronic pain syndrome  -     DULoxetine (CYMBALTA) 30 MG extended release capsule; Take 1 capsule by mouth daily  - cymbalta increased to 90mg. celexa was discontinued. Morbid obesity with BMI of 45.0-49.9, adult ([facility] [address]   - Working on losing weight. Is down 16 pounds     Restless legs syndrome (RLS)  -     rOPINIRole (REQUIP) 3 MG tablet; Take 1 tablet by mouth nightly nightly    Sleep disorder  -     rOPINIRole (REQUIP) 3 MG tablet; Take 1 tablet by mouth nightly nightly    Other orders  -     ranitidine (ZANTAC) 150 MG tablet; Take 1 tablet by mouth 2 times daily            Phone/MyChart follow up if tests abnormal.    Return in about 6 weeks (around 9/23/2019) for sleep and mood . or sooner if necessary. I have reviewed myfindings and recommendations with Melissa. Walter Spangler.  Penelope Hopper M.D

## 2019-09-18 ENCOUNTER — TELEPHONE (OUTPATIENT)
Dept: FAMILY MEDICINE CLINIC | Age: 74
End: 2019-09-18

## 2019-09-23 ENCOUNTER — OFFICE VISIT (OUTPATIENT)
Dept: FAMILY MEDICINE CLINIC | Age: 74
End: 2019-09-23
Payer: MEDICARE

## 2019-09-23 VITALS
RESPIRATION RATE: 16 BRPM | HEART RATE: 68 BPM | BODY MASS INDEX: 45.56 KG/M2 | HEIGHT: 59 IN | WEIGHT: 226 LBS | SYSTOLIC BLOOD PRESSURE: 130 MMHG | TEMPERATURE: 97.7 F | DIASTOLIC BLOOD PRESSURE: 78 MMHG | OXYGEN SATURATION: 92 %

## 2019-09-23 DIAGNOSIS — G25.81 RESTLESS LEGS SYNDROME (RLS): ICD-10-CM

## 2019-09-23 DIAGNOSIS — M48.00 NEUROFORAMINAL STENOSIS OF SPINE: ICD-10-CM

## 2019-09-23 DIAGNOSIS — N39.41 URGE INCONTINENCE OF URINE: ICD-10-CM

## 2019-09-23 DIAGNOSIS — L98.9 SKIN LESION: ICD-10-CM

## 2019-09-23 DIAGNOSIS — Z86.39 HISTORY OF HYPOTHYROIDISM: Primary | ICD-10-CM

## 2019-09-23 DIAGNOSIS — G89.4 CHRONIC PAIN SYNDROME: ICD-10-CM

## 2019-09-23 DIAGNOSIS — G47.9 SLEEP DISORDER: ICD-10-CM

## 2019-09-23 PROCEDURE — 1123F ACP DISCUSS/DSCN MKR DOCD: CPT | Performed by: FAMILY MEDICINE

## 2019-09-23 PROCEDURE — G8400 PT W/DXA NO RESULTS DOC: HCPCS | Performed by: FAMILY MEDICINE

## 2019-09-23 PROCEDURE — 90653 IIV ADJUVANT VACCINE IM: CPT | Performed by: FAMILY MEDICINE

## 2019-09-23 PROCEDURE — 3017F COLORECTAL CA SCREEN DOC REV: CPT | Performed by: FAMILY MEDICINE

## 2019-09-23 PROCEDURE — 4004F PT TOBACCO SCREEN RCVD TLK: CPT | Performed by: FAMILY MEDICINE

## 2019-09-23 PROCEDURE — 1090F PRES/ABSN URINE INCON ASSESS: CPT | Performed by: FAMILY MEDICINE

## 2019-09-23 PROCEDURE — 99214 OFFICE O/P EST MOD 30 MIN: CPT | Performed by: FAMILY MEDICINE

## 2019-09-23 PROCEDURE — G0008 ADMIN INFLUENZA VIRUS VAC: HCPCS | Performed by: FAMILY MEDICINE

## 2019-09-23 PROCEDURE — G8427 DOCREV CUR MEDS BY ELIG CLIN: HCPCS | Performed by: FAMILY MEDICINE

## 2019-09-23 PROCEDURE — 0509F URINE INCON PLAN DOCD: CPT | Performed by: FAMILY MEDICINE

## 2019-09-23 PROCEDURE — 3014F SCREEN MAMMO DOC REV: CPT | Performed by: FAMILY MEDICINE

## 2019-09-23 PROCEDURE — 4040F PNEUMOC VAC/ADMIN/RCVD: CPT | Performed by: FAMILY MEDICINE

## 2019-09-23 PROCEDURE — G8417 CALC BMI ABV UP PARAM F/U: HCPCS | Performed by: FAMILY MEDICINE

## 2019-09-23 ASSESSMENT — ENCOUNTER SYMPTOMS
BACK PAIN: 1
VOMITING: 0
CONSTIPATION: 0
DIARRHEA: 0
SHORTNESS OF BREATH: 0
WHEEZING: 0
COUGH: 0
ABDOMINAL PAIN: 0
NAUSEA: 0

## 2019-09-23 NOTE — PROGRESS NOTES
disorder   - Improved with increased dose of requip     Urge incontinence of urine  -     mirabegron (MYRBETRIQ) 25 MG TB24; Take 1 tablet by mouth daily  - Advised to follow up with neurosurgery asap as this has worsened since back surgery. Restless legs syndrome (RLS)  -     CBC; Future  - Improved. Neuroforaminal stenosis of spine  -     Comprehensive Metabolic Panel; Future  -     CBC; Future    Skin lesion  -     AFL - Lorna Pena MD, Dermatology, [place]    Other orders  -     INFLUENZA, TRIV, INACTIVATED, SUBUNIT, ADJUVANTED, 65 YRS AND OLDER, IM, PREFILL SYR, 0.5ML (FLUAD TRIV)            Phone/MyChart follow up if tests abnormal.    Return in about 2 months (around 11/23/2019) for arthritis . or sooner if necessary. I have reviewed myfindings and recommendations with Melissa. Dimple Low.  Senait Santiago M.D

## 2019-10-11 RX ORDER — CELECOXIB 200 MG/1
200 CAPSULE ORAL 2 TIMES DAILY
Qty: 60 CAPSULE | Refills: 0 | Status: ON HOLD
Start: 2019-10-11 | End: 2020-08-11 | Stop reason: HOSPADM

## 2019-10-17 RX ORDER — NEBIVOLOL HYDROCHLORIDE 5 MG/1
TABLET ORAL
Qty: 30 TABLET | Refills: 2 | Status: SHIPPED | OUTPATIENT
Start: 2019-10-17 | End: 2019-11-25 | Stop reason: SDUPTHER

## 2019-10-28 ENCOUNTER — HOSPITAL ENCOUNTER (OUTPATIENT)
Age: 74
Discharge: HOME | End: 2019-10-30
Payer: MEDICARE

## 2019-10-28 DIAGNOSIS — G25.81 RESTLESS LEGS SYNDROME (RLS): ICD-10-CM

## 2019-10-28 DIAGNOSIS — M48.00 NEUROFORAMINAL STENOSIS OF SPINE: ICD-10-CM

## 2019-10-28 DIAGNOSIS — Z86.39 HISTORY OF HYPOTHYROIDISM: ICD-10-CM

## 2019-10-28 LAB
ALBUMIN SERPL-MCNC: 4.5 G/DL (ref 3.5–5.2)
ALP BLD-CCNC: 84 U/L (ref 35–104)
ALT SERPL-CCNC: 27 U/L (ref 0–32)
ANION GAP SERPL CALCULATED.3IONS-SCNC: 9 MMOL/L (ref 7–16)
AST SERPL-CCNC: 35 U/L (ref 0–31)
BILIRUB SERPL-MCNC: 0.3 MG/DL (ref 0–1.2)
BUN BLDV-MCNC: 20 MG/DL (ref 8–23)
C-REACTIVE PROTEIN: 0.6 MG/DL (ref 0–0.4)
CALCIUM SERPL-MCNC: 9.1 MG/DL (ref 8.6–10.2)
CHLORIDE BLD-SCNC: 97 MMOL/L (ref 98–107)
CO2: 34 MMOL/L (ref 22–29)
CREAT SERPL-MCNC: 0.7 MG/DL (ref 0.5–1)
GFR AFRICAN AMERICAN: >60
GFR NON-AFRICAN AMERICAN: >60 ML/MIN/1.73
GLUCOSE BLD-MCNC: 97 MG/DL (ref 74–99)
HCT VFR BLD CALC: 45 % (ref 34–48)
HEMOGLOBIN: 13.7 G/DL (ref 11.5–15.5)
MCH RBC QN AUTO: 30.7 PG (ref 26–35)
MCHC RBC AUTO-ENTMCNC: 30.4 % (ref 32–34.5)
MCV RBC AUTO: 100.9 FL (ref 80–99.9)
PDW BLD-RTO: 14.8 FL (ref 11.5–15)
PLATELET # BLD: 249 E9/L (ref 130–450)
PMV BLD AUTO: 9.7 FL (ref 7–12)
POTASSIUM SERPL-SCNC: 4.6 MMOL/L (ref 3.5–5)
RBC # BLD: 4.46 E12/L (ref 3.5–5.5)
RHEUMATOID FACTOR: 18 IU/ML (ref 0–13)
SODIUM BLD-SCNC: 140 MMOL/L (ref 132–146)
TOTAL CK: 520 U/L (ref 20–180)
TOTAL PROTEIN: 7.6 G/DL (ref 6.4–8.3)
TSH SERPL DL<=0.05 MIU/L-ACNC: 1.75 UIU/ML (ref 0.27–4.2)
WBC # BLD: 5.9 E9/L (ref 4.5–11.5)

## 2019-10-28 PROCEDURE — 86431 RHEUMATOID FACTOR QUANT: CPT

## 2019-10-28 PROCEDURE — 36415 COLL VENOUS BLD VENIPUNCTURE: CPT

## 2019-10-28 PROCEDURE — 86140 C-REACTIVE PROTEIN: CPT

## 2019-10-28 PROCEDURE — 82550 ASSAY OF CK (CPK): CPT

## 2019-10-28 PROCEDURE — 85651 RBC SED RATE NONAUTOMATED: CPT

## 2019-10-28 PROCEDURE — 86038 ANTINUCLEAR ANTIBODIES: CPT

## 2019-10-28 PROCEDURE — 85027 COMPLETE CBC AUTOMATED: CPT

## 2019-10-28 PROCEDURE — 84443 ASSAY THYROID STIM HORMONE: CPT

## 2019-10-28 PROCEDURE — 80053 COMPREHEN METABOLIC PANEL: CPT

## 2019-10-28 PROCEDURE — 86200 CCP ANTIBODY: CPT

## 2019-10-29 LAB
ANTI-NUCLEAR ANTIBODY (ANA): NEGATIVE
SEDIMENTATION RATE, ERYTHROCYTE: 11 MM/HR (ref 0–20)

## 2019-10-30 LAB — CCP IGG ANTIBODIES: 5 UNITS (ref 0–19)

## 2019-11-25 ENCOUNTER — OFFICE VISIT (OUTPATIENT)
Dept: FAMILY MEDICINE CLINIC | Age: 74
End: 2019-11-25
Payer: MEDICARE

## 2019-11-25 VITALS
SYSTOLIC BLOOD PRESSURE: 138 MMHG | HEIGHT: 59 IN | RESPIRATION RATE: 18 BRPM | BODY MASS INDEX: 45.36 KG/M2 | WEIGHT: 225 LBS | DIASTOLIC BLOOD PRESSURE: 84 MMHG | HEART RATE: 84 BPM

## 2019-11-25 DIAGNOSIS — M79.671 RIGHT FOOT PAIN: ICD-10-CM

## 2019-11-25 DIAGNOSIS — Z87.891 PERSONAL HISTORY OF TOBACCO USE: Primary | ICD-10-CM

## 2019-11-25 DIAGNOSIS — N39.41 URGE INCONTINENCE OF URINE: ICD-10-CM

## 2019-11-25 DIAGNOSIS — G47.33 OSA (OBSTRUCTIVE SLEEP APNEA): ICD-10-CM

## 2019-11-25 DIAGNOSIS — Z86.39 HISTORY OF HYPOTHYROIDISM: ICD-10-CM

## 2019-11-25 DIAGNOSIS — G89.4 CHRONIC PAIN SYNDROME: ICD-10-CM

## 2019-11-25 PROCEDURE — 1123F ACP DISCUSS/DSCN MKR DOCD: CPT | Performed by: FAMILY MEDICINE

## 2019-11-25 PROCEDURE — G8400 PT W/DXA NO RESULTS DOC: HCPCS | Performed by: FAMILY MEDICINE

## 2019-11-25 PROCEDURE — 99213 OFFICE O/P EST LOW 20 MIN: CPT | Performed by: FAMILY MEDICINE

## 2019-11-25 PROCEDURE — G8417 CALC BMI ABV UP PARAM F/U: HCPCS | Performed by: FAMILY MEDICINE

## 2019-11-25 PROCEDURE — G9899 SCRN MAM PERF RSLTS DOC: HCPCS | Performed by: FAMILY MEDICINE

## 2019-11-25 PROCEDURE — 3017F COLORECTAL CA SCREEN DOC REV: CPT | Performed by: FAMILY MEDICINE

## 2019-11-25 PROCEDURE — G0296 VISIT TO DETERM LDCT ELIG: HCPCS | Performed by: FAMILY MEDICINE

## 2019-11-25 PROCEDURE — 1090F PRES/ABSN URINE INCON ASSESS: CPT | Performed by: FAMILY MEDICINE

## 2019-11-25 PROCEDURE — 0509F URINE INCON PLAN DOCD: CPT | Performed by: FAMILY MEDICINE

## 2019-11-25 PROCEDURE — 4040F PNEUMOC VAC/ADMIN/RCVD: CPT | Performed by: FAMILY MEDICINE

## 2019-11-25 PROCEDURE — 4004F PT TOBACCO SCREEN RCVD TLK: CPT | Performed by: FAMILY MEDICINE

## 2019-11-25 PROCEDURE — G8482 FLU IMMUNIZE ORDER/ADMIN: HCPCS | Performed by: FAMILY MEDICINE

## 2019-11-25 PROCEDURE — G8427 DOCREV CUR MEDS BY ELIG CLIN: HCPCS | Performed by: FAMILY MEDICINE

## 2019-11-25 RX ORDER — AMLODIPINE BESYLATE 5 MG/1
5 TABLET ORAL DAILY
Qty: 90 TABLET | Refills: 5 | Status: SHIPPED
Start: 2019-11-25 | End: 2020-12-10

## 2019-11-25 RX ORDER — DULOXETIN HYDROCHLORIDE 60 MG/1
60 CAPSULE, DELAYED RELEASE ORAL NIGHTLY
Qty: 90 CAPSULE | Refills: 5 | Status: SHIPPED | OUTPATIENT
Start: 2019-11-25 | End: 2020-02-04 | Stop reason: SDUPTHER

## 2019-11-25 RX ORDER — LOSARTAN POTASSIUM AND HYDROCHLOROTHIAZIDE 25; 100 MG/1; MG/1
1 TABLET ORAL DAILY
Qty: 90 TABLET | Refills: 5 | Status: SHIPPED
Start: 2019-11-25 | End: 2020-02-17

## 2019-11-25 RX ORDER — NEBIVOLOL 5 MG/1
TABLET ORAL
Qty: 90 TABLET | Refills: 5 | Status: SHIPPED | OUTPATIENT
Start: 2019-11-25 | End: 2020-01-23

## 2019-11-25 ASSESSMENT — PATIENT HEALTH QUESTIONNAIRE - PHQ9
2. FEELING DOWN, DEPRESSED OR HOPELESS: 0
SUM OF ALL RESPONSES TO PHQ QUESTIONS 1-9: 0
SUM OF ALL RESPONSES TO PHQ QUESTIONS 1-9: 0
SUM OF ALL RESPONSES TO PHQ9 QUESTIONS 1 & 2: 0
1. LITTLE INTEREST OR PLEASURE IN DOING THINGS: 0

## 2019-11-25 ASSESSMENT — ENCOUNTER SYMPTOMS
ABDOMINAL PAIN: 0
VOMITING: 0
BACK PAIN: 1
COUGH: 0
SHORTNESS OF BREATH: 0
CONSTIPATION: 0
NAUSEA: 0
DIARRHEA: 0
WHEEZING: 0

## 2019-12-12 ENCOUNTER — OFFICE VISIT (OUTPATIENT)
Dept: PODIATRY | Age: 74
End: 2019-12-12
Payer: MEDICARE

## 2019-12-12 VITALS
WEIGHT: 222 LBS | DIASTOLIC BLOOD PRESSURE: 86 MMHG | SYSTOLIC BLOOD PRESSURE: 151 MMHG | HEART RATE: 85 BPM | HEIGHT: 57 IN | BODY MASS INDEX: 47.9 KG/M2

## 2019-12-12 DIAGNOSIS — M19.071 PRIMARY OSTEOARTHRITIS OF RIGHT FOOT: ICD-10-CM

## 2019-12-12 DIAGNOSIS — M79.671 RIGHT FOOT PAIN: ICD-10-CM

## 2019-12-12 DIAGNOSIS — M21.621 BUNIONETTE OF RIGHT FOOT: Primary | ICD-10-CM

## 2019-12-12 DIAGNOSIS — R26.2 DIFFICULTY WALKING: ICD-10-CM

## 2019-12-12 PROCEDURE — G8417 CALC BMI ABV UP PARAM F/U: HCPCS | Performed by: PODIATRIST

## 2019-12-12 PROCEDURE — G8482 FLU IMMUNIZE ORDER/ADMIN: HCPCS | Performed by: PODIATRIST

## 2019-12-12 PROCEDURE — G8400 PT W/DXA NO RESULTS DOC: HCPCS | Performed by: PODIATRIST

## 2019-12-12 PROCEDURE — 99203 OFFICE O/P NEW LOW 30 MIN: CPT | Performed by: PODIATRIST

## 2019-12-12 PROCEDURE — 4040F PNEUMOC VAC/ADMIN/RCVD: CPT | Performed by: PODIATRIST

## 2019-12-12 PROCEDURE — G9899 SCRN MAM PERF RSLTS DOC: HCPCS | Performed by: PODIATRIST

## 2019-12-12 PROCEDURE — 1090F PRES/ABSN URINE INCON ASSESS: CPT | Performed by: PODIATRIST

## 2019-12-12 PROCEDURE — 4004F PT TOBACCO SCREEN RCVD TLK: CPT | Performed by: PODIATRIST

## 2019-12-12 PROCEDURE — 3017F COLORECTAL CA SCREEN DOC REV: CPT | Performed by: PODIATRIST

## 2019-12-12 PROCEDURE — G8427 DOCREV CUR MEDS BY ELIG CLIN: HCPCS | Performed by: PODIATRIST

## 2019-12-12 PROCEDURE — 1123F ACP DISCUSS/DSCN MKR DOCD: CPT | Performed by: PODIATRIST

## 2019-12-17 ENCOUNTER — OFFICE VISIT (OUTPATIENT)
Dept: FAMILY MEDICINE CLINIC | Age: 74
End: 2019-12-17
Payer: MEDICARE

## 2019-12-17 VITALS
SYSTOLIC BLOOD PRESSURE: 124 MMHG | DIASTOLIC BLOOD PRESSURE: 76 MMHG | OXYGEN SATURATION: 92 % | WEIGHT: 224 LBS | HEART RATE: 65 BPM | HEIGHT: 62 IN | TEMPERATURE: 98 F | RESPIRATION RATE: 14 BRPM | BODY MASS INDEX: 41.22 KG/M2

## 2019-12-17 DIAGNOSIS — J06.9 UPPER RESPIRATORY INFECTION WITH COUGH AND CONGESTION: Primary | ICD-10-CM

## 2019-12-17 DIAGNOSIS — H66.001 NON-RECURRENT ACUTE SUPPURATIVE OTITIS MEDIA OF RIGHT EAR WITHOUT SPONTANEOUS RUPTURE OF TYMPANIC MEMBRANE: ICD-10-CM

## 2019-12-17 DIAGNOSIS — J40 BRONCHITIS: ICD-10-CM

## 2019-12-17 PROCEDURE — 99213 OFFICE O/P EST LOW 20 MIN: CPT | Performed by: NURSE PRACTITIONER

## 2019-12-17 PROCEDURE — 96372 THER/PROPH/DIAG INJ SC/IM: CPT | Performed by: NURSE PRACTITIONER

## 2019-12-17 RX ORDER — METHYLPREDNISOLONE ACETATE 40 MG/ML
40 INJECTION, SUSPENSION INTRA-ARTICULAR; INTRALESIONAL; INTRAMUSCULAR; SOFT TISSUE ONCE
Status: DISCONTINUED | OUTPATIENT
Start: 2019-12-17 | End: 2019-12-17

## 2019-12-17 RX ORDER — DOXYCYCLINE HYCLATE 100 MG/1
100 CAPSULE ORAL 2 TIMES DAILY
Qty: 20 CAPSULE | Refills: 0 | Status: SHIPPED | OUTPATIENT
Start: 2019-12-17 | End: 2019-12-27

## 2019-12-17 RX ORDER — METHYLPREDNISOLONE 4 MG/1
TABLET ORAL
Qty: 1 KIT | Refills: 0 | Status: SHIPPED | OUTPATIENT
Start: 2019-12-17 | End: 2020-01-10

## 2019-12-17 RX ORDER — TRIAMCINOLONE ACETONIDE 40 MG/ML
40 INJECTION, SUSPENSION INTRA-ARTICULAR; INTRAMUSCULAR ONCE
Status: COMPLETED | OUTPATIENT
Start: 2019-12-17 | End: 2019-12-17

## 2019-12-17 RX ADMIN — TRIAMCINOLONE ACETONIDE 40 MG: 40 INJECTION, SUSPENSION INTRA-ARTICULAR; INTRAMUSCULAR at 11:37

## 2020-01-10 ENCOUNTER — OFFICE VISIT (OUTPATIENT)
Dept: FAMILY MEDICINE CLINIC | Age: 75
End: 2020-01-10
Payer: MEDICARE

## 2020-01-10 VITALS
WEIGHT: 218 LBS | HEIGHT: 60 IN | HEART RATE: 70 BPM | SYSTOLIC BLOOD PRESSURE: 126 MMHG | RESPIRATION RATE: 16 BRPM | TEMPERATURE: 98.3 F | OXYGEN SATURATION: 96 % | BODY MASS INDEX: 42.8 KG/M2 | DIASTOLIC BLOOD PRESSURE: 74 MMHG

## 2020-01-10 PROCEDURE — G8400 PT W/DXA NO RESULTS DOC: HCPCS | Performed by: PHYSICIAN ASSISTANT

## 2020-01-10 PROCEDURE — 3017F COLORECTAL CA SCREEN DOC REV: CPT | Performed by: PHYSICIAN ASSISTANT

## 2020-01-10 PROCEDURE — 1090F PRES/ABSN URINE INCON ASSESS: CPT | Performed by: PHYSICIAN ASSISTANT

## 2020-01-10 PROCEDURE — G8427 DOCREV CUR MEDS BY ELIG CLIN: HCPCS | Performed by: PHYSICIAN ASSISTANT

## 2020-01-10 PROCEDURE — G8417 CALC BMI ABV UP PARAM F/U: HCPCS | Performed by: PHYSICIAN ASSISTANT

## 2020-01-10 PROCEDURE — 99213 OFFICE O/P EST LOW 20 MIN: CPT | Performed by: PHYSICIAN ASSISTANT

## 2020-01-10 PROCEDURE — 4040F PNEUMOC VAC/ADMIN/RCVD: CPT | Performed by: PHYSICIAN ASSISTANT

## 2020-01-10 PROCEDURE — G8482 FLU IMMUNIZE ORDER/ADMIN: HCPCS | Performed by: PHYSICIAN ASSISTANT

## 2020-01-10 PROCEDURE — 4004F PT TOBACCO SCREEN RCVD TLK: CPT | Performed by: PHYSICIAN ASSISTANT

## 2020-01-10 PROCEDURE — G9899 SCRN MAM PERF RSLTS DOC: HCPCS | Performed by: PHYSICIAN ASSISTANT

## 2020-01-10 PROCEDURE — 1123F ACP DISCUSS/DSCN MKR DOCD: CPT | Performed by: PHYSICIAN ASSISTANT

## 2020-01-10 RX ORDER — BENZONATATE 200 MG/1
200 CAPSULE ORAL 3 TIMES DAILY PRN
Qty: 15 CAPSULE | Refills: 0 | Status: SHIPPED
Start: 2020-01-10 | End: 2020-05-11 | Stop reason: ALTCHOICE

## 2020-01-10 RX ORDER — PREDNISONE 10 MG/1
TABLET ORAL
Qty: 30 TABLET | Refills: 0 | Status: SHIPPED | OUTPATIENT
Start: 2020-01-10 | End: 2020-01-15 | Stop reason: ALTCHOICE

## 2020-01-10 RX ORDER — ALBUTEROL SULFATE 90 UG/1
2 AEROSOL, METERED RESPIRATORY (INHALATION)
Qty: 1 INHALER | Refills: 0 | Status: SHIPPED
Start: 2020-01-10 | End: 2020-11-18

## 2020-01-14 ENCOUNTER — TELEPHONE (OUTPATIENT)
Dept: FAMILY MEDICINE CLINIC | Age: 75
End: 2020-01-14

## 2020-01-20 RX ORDER — ROPINIROLE 3 MG/1
TABLET, FILM COATED ORAL
Qty: 90 TABLET | Refills: 0 | Status: SHIPPED
Start: 2020-01-20 | End: 2020-04-07

## 2020-01-21 ENCOUNTER — ANESTHESIA EVENT (OUTPATIENT)
Dept: OPERATING ROOM | Age: 75
End: 2020-01-21
Payer: MEDICARE

## 2020-01-21 ASSESSMENT — ENCOUNTER SYMPTOMS: SHORTNESS OF BREATH: 1

## 2020-01-21 ASSESSMENT — LIFESTYLE VARIABLES: SMOKING_STATUS: 1

## 2020-01-21 NOTE — ANESTHESIA PRE PROCEDURE
Department of Anesthesiology  Preprocedure Note       Name:  Sd Enriquez   Age:  69 y.o.  :  1945                                          MRN:  [id number]         Date:  2020      Surgeon: Ravinder Fuller):  Candice Lehman DO    Procedure: RADIOFREQUENCY, SACROILIAC JOINT RIGHT AT S1, S2, S3 (Right )    Medications prior to admission:   Prior to Admission medications    Medication Sig Start Date End Date Taking? Authorizing Provider   albuterol sulfate  (90 Base) MCG/ACT inhaler Inhale 2 puffs into the lungs every 4-6 hours as needed for Wheezing or Shortness of Breath 1/10/20  Yes Elena Bolanos PA-C   benzonatate (TESSALON) 200 MG capsule Take 1 capsule by mouth 3 times daily as needed for Cough 1/10/20  Yes Elena Bolanos PA-C   celecoxib (CELEBREX) 200 MG capsule Take 1 capsule by mouth 2 times daily  Patient taking differently: Take 200 mg by mouth 2 times daily 24 hr hold prior to surgery 10/11/19 1/15/20 Yes Sheri Garcia MD   tiotropium (Marilynne Going) 18 MCG inhalation capsule Inhale 1 capsule into the lungs daily  Patient taking differently: Inhale 18 mcg into the lungs nightly  18  Yes Sheri Garcia MD   gabapentin (NEURONTIN) 300 MG capsule Take 900 mg by mouth 3 times daily. Yes Historical Provider, MD   oxyCODONE-acetaminophen (PERCOCET) 5-325 MG per tablet Take 1 tablet by mouth every 6 hours as needed for Pain  THIS PRESCRIPTION IS A 30 DAY SUPPLY. ( ICD-10: G 89.4). Earliest Fill Date: 12/12/17 12/12/17 1/15/20 Yes VAIBHAV Chu - CNP   Testosterone 10 MG/ACT (2%) GEL Place 1.25 mLs onto the skin daily.      Yes Historical Provider, MD   rOPINIRole (REQUIP) 3 MG tablet TAKE 1 TABLET BY MOUTH  NIGHTLY 20   Cheri East PA-C   losartan-hydrochlorothiazide (HYZAAR) 100-25 MG per tablet Take 1 tablet by mouth daily 19   Sheri Garcia MD   nebivolol (BYSTOLIC) 5 MG tablet take 1 tablet by mouth once daily  Patient taking differently: take 1 tablet by mouth once daily AM 11/25/19   Tia Thompson MD   amLODIPine (NORVASC) 5 MG tablet Take 1 tablet by mouth daily 11/25/19   Tia Thompson MD   DULoxetine (CYMBALTA) 60 MG extended release capsule Take 1 capsule by mouth nightly  Patient taking differently: Take 90 mg by mouth nightly  11/25/19   Tia Thompson MD   Handicap Placard MISC by Does not apply route Unable to ambulate more than 40 feet without difficulty  Expires 11/25/2024 11/25/19   Tia Thompson MD   mirabegron (MYRBETRIQ) 25 MG TB24 Take 1 tablet by mouth daily 9/23/19   Tia Thompson MD   ranitidine (ZANTAC) 150 MG tablet Take 1 tablet by mouth 2 times daily 8/12/19   Tia Thompson MD   CHANTIX CONTINUING MONTH NOEMI 1 MG tablet take 1 tablet by mouth twice a day  Patient taking differently: Did not start 6/25/19   Tia Thompson MD   Biotin 60458 MCG TABS Take 10,000 mcg by mouth as needed    Historical Provider, MD   diclofenac sodium 1 % GEL Apply 2 g topically as needed for Pain    Historical Provider, MD   lidocaine (XYLOCAINE) 5 % ointment Apply topically to hands and low back as needed. 2/29/16   Aaron Otero, APRN - CNP       Current medications:    No current facility-administered medications for this encounter.       Current Outpatient Medications   Medication Sig Dispense Refill    albuterol sulfate  (90 Base) MCG/ACT inhaler Inhale 2 puffs into the lungs every 4-6 hours as needed for Wheezing or Shortness of Breath 1 Inhaler 0    benzonatate (TESSALON) 200 MG capsule Take 1 capsule by mouth 3 times daily as needed for Cough 15 capsule 0    celecoxib (CELEBREX) 200 MG capsule Take 1 capsule by mouth 2 times daily (Patient taking differently: Take 200 mg by mouth 2 times daily 24 hr hold prior to surgery) 60 capsule 0    tiotropium (SPIRIVA HANDIHALER) 18 MCG inhalation capsule Inhale 1 capsule into the lungs daily (Patient taking differently: Inhale 18 mcg into the lungs nightly ) 90 capsule 1    gabapentin (NEURONTIN) 300 MG capsule Take 900 mg by mouth 3 times daily.  oxyCODONE-acetaminophen (PERCOCET) 5-325 MG per tablet Take 1 tablet by mouth every 6 hours as needed for Pain  THIS PRESCRIPTION IS A 30 DAY SUPPLY. ( ICD-10: G 89.4). Earliest Fill Date: 12/12/17 120 tablet 0    Testosterone 10 MG/ACT (2%) GEL Place 1.25 mLs onto the skin daily.  rOPINIRole (REQUIP) 3 MG tablet TAKE 1 TABLET BY MOUTH  NIGHTLY 90 tablet 0    losartan-hydrochlorothiazide (HYZAAR) 100-25 MG per tablet Take 1 tablet by mouth daily 90 tablet 5    nebivolol (BYSTOLIC) 5 MG tablet take 1 tablet by mouth once daily (Patient taking differently: take 1 tablet by mouth once daily AM) 90 tablet 5    amLODIPine (NORVASC) 5 MG tablet Take 1 tablet by mouth daily 90 tablet 5    DULoxetine (CYMBALTA) 60 MG extended release capsule Take 1 capsule by mouth nightly (Patient taking differently: Take 90 mg by mouth nightly ) 90 capsule 5    Handicap Placard MISC by Does not apply route Unable to ambulate more than 40 feet without difficulty  Expires 11/25/2024 1 each 0    mirabegron (MYRBETRIQ) 25 MG TB24 Take 1 tablet by mouth daily 30 tablet 3    ranitidine (ZANTAC) 150 MG tablet Take 1 tablet by mouth 2 times daily 180 tablet 3    CHANTIX CONTINUING MONTH NOEMI 1 MG tablet take 1 tablet by mouth twice a day (Patient taking differently: Did not start) 56 tablet 2    Biotin 07415 MCG TABS Take 10,000 mcg by mouth as needed      diclofenac sodium 1 % GEL Apply 2 g topically as needed for Pain      lidocaine (XYLOCAINE) 5 % ointment Apply topically to hands and low back as needed. 1 Tube 3       Allergies:     Allergies   Allergen Reactions    Demerol [Meperidine Hcl] Other (See Comments)     Hallucinations, anxiety        Nabumetone Hives       Problem List:    Patient Active Problem List   Diagnosis Code    Osteoarthritis of hip M16.9    Hip pain M25.559    Periprosthetic fracture around diverticulosis    COSMETIC SURGERY Bilateral     blephoroplasty    EYE SURGERY Bilateral     cataract extraction    HAND SURGERY      CTS B/L    HYSTERECTOMY      JOINT REPLACEMENT  03/04/07    [place] Knees. [place].  Hips    KNEE ARTHROSCOPY Bilateral     LAP BAND  2007    NERVE BLOCK Left 8/19/15    sacroiliac joint #1    NERVE BLOCK  8 19 15    lumbar epidural #1    NERVE BLOCK N/A 9/9/15    lumbar epidural nerve block #3    NERVE BLOCK N/A 10 26 2015    paravertebral facet bilateral lumbar #1    NERVE BLOCK Bilateral 11 2 15    lumb facet #2    NERVE BLOCK Bilateral 11/09/15    paravertebral facet block lumbar #3    NERVE BLOCK Bilateral 04 13 2016    Bilateral sacroiliac joint injection #1    NERVE BLOCK Bilateral 08/31/2016    SI injection #2    NERVE BLOCK Bilateral 10/03/2016    si inj #3    OTHER SURGICAL HISTORY      upper and lower dental implants  except 6 teeth in  front    OTHER SURGICAL HISTORY Right 4/15/2015    right ethmoidectomymaaxillary antrostomies frontal recess exploration    OTHER SURGICAL HISTORY Right 12 30 2015    Right lumbar radiofrequency    OTHER SURGICAL HISTORY Left 2 8 16    lumb radiofreq    OTHER SURGICAL HISTORY Left 11/21/2016    lateral sacral radiofrequency    OTHER SURGICAL HISTORY Right 01/09/2017    radiofrequency    OTHER SURGICAL HISTORY Right 02/08/2017    lumbar facet radiofreq    OTHER SURGICAL HISTORY Left 03/27/2017    lumbar radiofrequency     OTHER SURGICAL HISTORY Left 05/24/2017    SI joint radiofrequency    OVARY REMOVAL  1975    right    REVISION TOTAL HIP ARTHROPLASTY  3/26/2012    orif left hip revision of femoral stem    TOTAL HIP ARTHROPLASTY  MARCH 2012    TOTAL LEFT HIP ARTHROPLASTY WITH PLATELET GEL       Social History:    Social History     Tobacco Use    Smoking status: Current Every Day Smoker     Packs/day: 1.00     Years: 31.00     Pack years: 31.00     Types: Cigarettes    Smokeless tobacco: Never Used    Tobacco comment:

## 2020-01-22 ENCOUNTER — HOSPITAL ENCOUNTER (OUTPATIENT)
Dept: OPERATING ROOM | Age: 75
Setting detail: OUTPATIENT SURGERY
Discharge: HOME | End: 2020-01-22
Attending: ANESTHESIOLOGY
Payer: MEDICARE

## 2020-01-22 ENCOUNTER — ANESTHESIA (OUTPATIENT)
Dept: OPERATING ROOM | Age: 75
End: 2020-01-22
Payer: MEDICARE

## 2020-01-22 ENCOUNTER — HOSPITAL ENCOUNTER (OUTPATIENT)
Age: 75
Setting detail: OUTPATIENT SURGERY
Discharge: HOME | End: 2020-01-22
Attending: ANESTHESIOLOGY | Admitting: ANESTHESIOLOGY
Payer: MEDICARE

## 2020-01-22 VITALS
DIASTOLIC BLOOD PRESSURE: 67 MMHG | BODY MASS INDEX: 47.03 KG/M2 | HEIGHT: 57 IN | WEIGHT: 218 LBS | OXYGEN SATURATION: 94 % | HEART RATE: 65 BPM | SYSTOLIC BLOOD PRESSURE: 115 MMHG | RESPIRATION RATE: 16 BRPM | TEMPERATURE: 98 F

## 2020-01-22 VITALS
RESPIRATION RATE: 9 BRPM | OXYGEN SATURATION: 97 % | DIASTOLIC BLOOD PRESSURE: 87 MMHG | SYSTOLIC BLOOD PRESSURE: 139 MMHG

## 2020-01-22 PROCEDURE — 3600000015 HC SURGERY LEVEL 5 ADDTL 15MIN: Performed by: ANESTHESIOLOGY

## 2020-01-22 PROCEDURE — 2580000003 HC RX 258: Performed by: ANESTHESIOLOGY

## 2020-01-22 PROCEDURE — 2709999900 HC NON-CHARGEABLE SUPPLY: Performed by: ANESTHESIOLOGY

## 2020-01-22 PROCEDURE — 3600000005 HC SURGERY LEVEL 5 BASE: Performed by: ANESTHESIOLOGY

## 2020-01-22 PROCEDURE — C1713 ANCHOR/SCREW BN/BN,TIS/BN: HCPCS | Performed by: ANESTHESIOLOGY

## 2020-01-22 PROCEDURE — 7100000011 HC PHASE II RECOVERY - ADDTL 15 MIN: Performed by: ANESTHESIOLOGY

## 2020-01-22 PROCEDURE — 3700000001 HC ADD 15 MINUTES (ANESTHESIA): Performed by: ANESTHESIOLOGY

## 2020-01-22 PROCEDURE — 6360000002 HC RX W HCPCS: Performed by: NURSE ANESTHETIST, CERTIFIED REGISTERED

## 2020-01-22 PROCEDURE — 3700000000 HC ANESTHESIA ATTENDED CARE: Performed by: ANESTHESIOLOGY

## 2020-01-22 PROCEDURE — 6360000002 HC RX W HCPCS: Performed by: ANESTHESIOLOGY

## 2020-01-22 PROCEDURE — 2500000003 HC RX 250 WO HCPCS: Performed by: ANESTHESIOLOGY

## 2020-01-22 PROCEDURE — 7100000010 HC PHASE II RECOVERY - FIRST 15 MIN: Performed by: ANESTHESIOLOGY

## 2020-01-22 PROCEDURE — 3209999900 FLUORO FOR SURGICAL PROCEDURES

## 2020-01-22 RX ORDER — CEFAZOLIN SODIUM 2 G/50ML
2 SOLUTION INTRAVENOUS ONCE
Status: COMPLETED | OUTPATIENT
Start: 2020-01-22 | End: 2020-01-22

## 2020-01-22 RX ORDER — LIDOCAINE HYDROCHLORIDE 10 MG/ML
INJECTION, SOLUTION EPIDURAL; INFILTRATION; INTRACAUDAL; PERINEURAL PRN
Status: DISCONTINUED | OUTPATIENT
Start: 2020-01-22 | End: 2020-01-22 | Stop reason: ALTCHOICE

## 2020-01-22 RX ORDER — FENTANYL CITRATE 50 UG/ML
INJECTION, SOLUTION INTRAMUSCULAR; INTRAVENOUS PRN
Status: DISCONTINUED | OUTPATIENT
Start: 2020-01-22 | End: 2020-01-22 | Stop reason: SDUPTHER

## 2020-01-22 RX ORDER — MIDAZOLAM HYDROCHLORIDE 1 MG/ML
INJECTION INTRAMUSCULAR; INTRAVENOUS PRN
Status: DISCONTINUED | OUTPATIENT
Start: 2020-01-22 | End: 2020-01-22 | Stop reason: SDUPTHER

## 2020-01-22 RX ORDER — LIDOCAINE HYDROCHLORIDE 20 MG/ML
INJECTION, SOLUTION EPIDURAL; INFILTRATION; INTRACAUDAL; PERINEURAL PRN
Status: DISCONTINUED | OUTPATIENT
Start: 2020-01-22 | End: 2020-01-22 | Stop reason: ALTCHOICE

## 2020-01-22 RX ORDER — SODIUM CHLORIDE, SODIUM LACTATE, POTASSIUM CHLORIDE, CALCIUM CHLORIDE 600; 310; 30; 20 MG/100ML; MG/100ML; MG/100ML; MG/100ML
INJECTION, SOLUTION INTRAVENOUS CONTINUOUS
Status: DISCONTINUED | OUTPATIENT
Start: 2020-01-22 | End: 2020-01-22 | Stop reason: HOSPADM

## 2020-01-22 RX ADMIN — FENTANYL CITRATE 50 MCG: 50 INJECTION, SOLUTION INTRAMUSCULAR; INTRAVENOUS at 13:06

## 2020-01-22 RX ADMIN — MIDAZOLAM 1 MG: 1 INJECTION INTRAMUSCULAR; INTRAVENOUS at 13:03

## 2020-01-22 RX ADMIN — MIDAZOLAM 1 MG: 1 INJECTION INTRAMUSCULAR; INTRAVENOUS at 13:06

## 2020-01-22 RX ADMIN — CEFAZOLIN SODIUM 2 G: 2 SOLUTION INTRAVENOUS at 13:02

## 2020-01-22 RX ADMIN — SODIUM CHLORIDE, POTASSIUM CHLORIDE, SODIUM LACTATE AND CALCIUM CHLORIDE: 600; 310; 30; 20 INJECTION, SOLUTION INTRAVENOUS at 11:06

## 2020-01-22 ASSESSMENT — PULMONARY FUNCTION TESTS
PIF_VALUE: 1

## 2020-01-22 ASSESSMENT — PAIN DESCRIPTION - DESCRIPTORS: DESCRIPTORS: BURNING;ACHING;THROBBING

## 2020-01-22 ASSESSMENT — PAIN SCALES - GENERAL
PAINLEVEL_OUTOF10: 0

## 2020-01-22 ASSESSMENT — PAIN - FUNCTIONAL ASSESSMENT
PAIN_FUNCTIONAL_ASSESSMENT: PREVENTS OR INTERFERES SOME ACTIVE ACTIVITIES AND ADLS
PAIN_FUNCTIONAL_ASSESSMENT: 0-10

## 2020-01-22 NOTE — ANESTHESIA POSTPROCEDURE EVALUATION
Department of Anesthesiology  Postprocedure Note    Patient: Ellen Capone  MRN: [id number]  YOB: 1945  Date of evaluation: 1/22/2020  Time:  2:02 PM     Procedure Summary     Date:  01/22/20 Room / Location:  [address] [place] [address] 04 / [address]    Anesthesia Start:  4695 Anesthesia Stop:  8239    Procedure:  RADIOFREQUENCY, SACROILIAC JOINT RIGHT AT S1, S2, S3 (Right Back) Diagnosis:  (OTHER SPONDYLOSIS WITH RADICULOPATHY, LUMBOSACRAL REGION)    Surgeon:  Trey Lebron DO Responsible Provider:  Viral Dutta MD    Anesthesia Type:  MAC ASA Status:  3          Anesthesia Type: MAC    Doris Phase I: Doris Score: 10    Doris Phase II: Doris Score: 9    Last vitals: Reviewed and per EMR flowsheets.        Anesthesia Post Evaluation    Patient location during evaluation: PACU  Patient participation: complete - patient participated  Level of consciousness: awake  Pain score: 0  Airway patency: patent  Nausea & Vomiting: no nausea and no vomiting  Complications: no  Cardiovascular status: hemodynamically stable  Respiratory status: acceptable  Hydration status: euvolemic

## 2020-01-22 NOTE — H&P
Update History & Physical    The patient's History and Physical of 01/10/2020 was reviewed with the patient and there were no significant changes. I examined the patient and there were no significant changes from the previous History and Physical.    Plan: The risk, benefits, expected outcome, and alternative to the recommended procedure have been discussed with the patient. Patient understands and wants to proceed with the procedure.       Electronically signed by Rei Zavala DO on 1/22/20 at 10:45 AM

## 2020-01-22 NOTE — OP NOTE
Trina Peacock    1/22/2020    Preoperative Diagnoses: Sacral Spondylosis, lumbosacral disc degeneration     Postoperative Diagnoses: Same     Procedure Performed: Therapeutic radiofrequency neurolysis of the Right sacroiliac joint/nerves under direct fluoroscopic guidance. Anesthesia: Local with monitored anesthesia care (MAC). Complications:  None. Estimated blood loss: None. Brief History:     Mirtha Martin comes in today  to [address] [place] [address] operating for the above procedure. She was again explained this procedure in great detail including the potential complications and did request that we proceed. Mirtha Martin has had problems with pain in the Right sacroiliac joint that has been unreeling despite conservative treatment including physical therapy, nonsteroidal antiinflammatory medications, analgesics, muscle relaxants, etc.    She subsequently underwent a series a diagnostic and therapeutic Right sacroiliac joint injections with local anesthetic steroid done under direct fluoroscopic guidance and had excellent relief. Indeed, the temporary relief she received was close to 100% for several days after the procedure. Unfortunately, that pain has now returned and she presented today for the radiofrequency neurolysis of the Right sacroiliac joint and its associated S1, S2, S3 nerves in an effort to try and help provide longer term relief. Mirtha Martin was given intravenous antibiotics preoperatively. Please see the medical record. Her complete History & Physical examination was reviewed and it is unchanged. Mirtha Martin was seen by the department of anesthesia as they are required to be present throughout the procedure to provide her with adequate analgesia and sedation so that she does not move during this very delicate procedure and at the same time keeping her fully awake and oriented at all times. Please see the anesthesia record for further details.     Description of Procedure:

## 2020-03-31 RX ORDER — DULOXETIN HYDROCHLORIDE 30 MG/1
30 CAPSULE, DELAYED RELEASE ORAL DAILY
Qty: 90 CAPSULE | Refills: 0 | Status: SHIPPED
Start: 2020-03-31 | End: 2020-07-09

## 2020-04-07 RX ORDER — ROPINIROLE 3 MG/1
TABLET, FILM COATED ORAL
Qty: 90 TABLET | Refills: 0 | Status: SHIPPED
Start: 2020-04-07 | End: 2020-07-12

## 2020-05-04 NOTE — TELEPHONE ENCOUNTER
Patient is requesting refill of Chantix.         Last seen 11/25/2019  Next appt 6/22/2020  [facility]

## 2020-05-06 RX ORDER — VARENICLINE TARTRATE 1 MG/1
TABLET, FILM COATED ORAL
Qty: 56 TABLET | Refills: 2 | OUTPATIENT
Start: 2020-05-06

## 2020-05-11 ENCOUNTER — OFFICE VISIT (OUTPATIENT)
Dept: FAMILY MEDICINE CLINIC | Age: 75
End: 2020-05-11
Payer: MEDICARE

## 2020-05-11 ENCOUNTER — HOSPITAL ENCOUNTER (OUTPATIENT)
Age: 75
Discharge: HOME | End: 2020-05-13
Payer: MEDICARE

## 2020-05-11 VITALS
OXYGEN SATURATION: 97 % | DIASTOLIC BLOOD PRESSURE: 80 MMHG | HEART RATE: 84 BPM | RESPIRATION RATE: 18 BRPM | BODY MASS INDEX: 47.39 KG/M2 | WEIGHT: 219 LBS | SYSTOLIC BLOOD PRESSURE: 124 MMHG

## 2020-05-11 LAB
ALBUMIN SERPL-MCNC: 4.4 G/DL (ref 3.5–5.2)
ALP BLD-CCNC: 68 U/L (ref 35–104)
ALT SERPL-CCNC: 19 U/L (ref 0–32)
ANION GAP SERPL CALCULATED.3IONS-SCNC: 11 MMOL/L (ref 7–16)
AST SERPL-CCNC: 25 U/L (ref 0–31)
BILIRUB SERPL-MCNC: 0.3 MG/DL (ref 0–1.2)
BUN BLDV-MCNC: 18 MG/DL (ref 8–23)
CALCIUM SERPL-MCNC: 9.5 MG/DL (ref 8.6–10.2)
CHLORIDE BLD-SCNC: 99 MMOL/L (ref 98–107)
CHOLESTEROL, TOTAL: 175 MG/DL (ref 0–199)
CO2: 34 MMOL/L (ref 22–29)
CREAT SERPL-MCNC: 0.8 MG/DL (ref 0.5–1)
FERRITIN: 79 NG/ML
GFR AFRICAN AMERICAN: >60
GFR NON-AFRICAN AMERICAN: >60 ML/MIN/1.73
GLUCOSE BLD-MCNC: 87 MG/DL (ref 74–99)
HCT VFR BLD CALC: 47.8 % (ref 34–48)
HDLC SERPL-MCNC: 42 MG/DL
HEMOGLOBIN: 14.2 G/DL (ref 11.5–15.5)
IRON SATURATION: 30 % (ref 15–50)
IRON: 113 MCG/DL (ref 37–145)
LDL CHOLESTEROL CALCULATED: 112 MG/DL (ref 0–99)
MCH RBC QN AUTO: 29.6 PG (ref 26–35)
MCHC RBC AUTO-ENTMCNC: 29.7 % (ref 32–34.5)
MCV RBC AUTO: 99.6 FL (ref 80–99.9)
PDW BLD-RTO: 17.2 FL (ref 11.5–15)
PLATELET # BLD: 232 E9/L (ref 130–450)
PMV BLD AUTO: 9.4 FL (ref 7–12)
POTASSIUM SERPL-SCNC: 5 MMOL/L (ref 3.5–5)
RBC # BLD: 4.8 E12/L (ref 3.5–5.5)
SODIUM BLD-SCNC: 144 MMOL/L (ref 132–146)
TOTAL IRON BINDING CAPACITY: 372 MCG/DL (ref 250–450)
TOTAL PROTEIN: 7.3 G/DL (ref 6.4–8.3)
TRIGL SERPL-MCNC: 105 MG/DL (ref 0–149)
VLDLC SERPL CALC-MCNC: 21 MG/DL
WBC # BLD: 5.2 E9/L (ref 4.5–11.5)

## 2020-05-11 PROCEDURE — 4040F PNEUMOC VAC/ADMIN/RCVD: CPT | Performed by: FAMILY MEDICINE

## 2020-05-11 PROCEDURE — 36415 COLL VENOUS BLD VENIPUNCTURE: CPT

## 2020-05-11 PROCEDURE — 3023F SPIROM DOC REV: CPT | Performed by: FAMILY MEDICINE

## 2020-05-11 PROCEDURE — 4004F PT TOBACCO SCREEN RCVD TLK: CPT | Performed by: FAMILY MEDICINE

## 2020-05-11 PROCEDURE — 85027 COMPLETE CBC AUTOMATED: CPT

## 2020-05-11 PROCEDURE — 1090F PRES/ABSN URINE INCON ASSESS: CPT | Performed by: FAMILY MEDICINE

## 2020-05-11 PROCEDURE — 99214 OFFICE O/P EST MOD 30 MIN: CPT | Performed by: FAMILY MEDICINE

## 2020-05-11 PROCEDURE — G8427 DOCREV CUR MEDS BY ELIG CLIN: HCPCS | Performed by: FAMILY MEDICINE

## 2020-05-11 PROCEDURE — 83550 IRON BINDING TEST: CPT

## 2020-05-11 PROCEDURE — 80061 LIPID PANEL: CPT

## 2020-05-11 PROCEDURE — 83540 ASSAY OF IRON: CPT

## 2020-05-11 PROCEDURE — 1123F ACP DISCUSS/DSCN MKR DOCD: CPT | Performed by: FAMILY MEDICINE

## 2020-05-11 PROCEDURE — G9899 SCRN MAM PERF RSLTS DOC: HCPCS | Performed by: FAMILY MEDICINE

## 2020-05-11 PROCEDURE — G8400 PT W/DXA NO RESULTS DOC: HCPCS | Performed by: FAMILY MEDICINE

## 2020-05-11 PROCEDURE — 3017F COLORECTAL CA SCREEN DOC REV: CPT | Performed by: FAMILY MEDICINE

## 2020-05-11 PROCEDURE — 82728 ASSAY OF FERRITIN: CPT

## 2020-05-11 PROCEDURE — 80053 COMPREHEN METABOLIC PANEL: CPT

## 2020-05-11 PROCEDURE — G8926 SPIRO NO PERF OR DOC: HCPCS | Performed by: FAMILY MEDICINE

## 2020-05-11 PROCEDURE — G8417 CALC BMI ABV UP PARAM F/U: HCPCS | Performed by: FAMILY MEDICINE

## 2020-05-11 ASSESSMENT — PATIENT HEALTH QUESTIONNAIRE - PHQ9
SUM OF ALL RESPONSES TO PHQ QUESTIONS 1-9: 0
SUM OF ALL RESPONSES TO PHQ QUESTIONS 1-9: 0
SUM OF ALL RESPONSES TO PHQ9 QUESTIONS 1 & 2: 0
1. LITTLE INTEREST OR PLEASURE IN DOING THINGS: 0
2. FEELING DOWN, DEPRESSED OR HOPELESS: 0

## 2020-05-11 ASSESSMENT — ENCOUNTER SYMPTOMS
DIARRHEA: 0
BACK PAIN: 1
COUGH: 0
VOMITING: 0
CONSTIPATION: 0
NAUSEA: 0
WHEEZING: 0
SHORTNESS OF BREATH: 0
ABDOMINAL PAIN: 0

## 2020-05-11 NOTE — PROGRESS NOTES
TAKE 1 TABLET BY MOUTH  NIGHTLY 90 tablet 0    DULoxetine (CYMBALTA) 30 MG extended release capsule TAKE 1 CAPSULE BY MOUTH  DAILY 90 capsule 0    losartan-hydrochlorothiazide (HYZAAR) 100-25 MG per tablet take 1 tablet by mouth once daily 90 tablet 0    nebivolol (BYSTOLIC) 5 MG tablet take 1 tablet by mouth once daily AM 90 tablet 0    albuterol sulfate  (90 Base) MCG/ACT inhaler Inhale 2 puffs into the lungs every 4-6 hours as needed for Wheezing or Shortness of Breath 1 Inhaler 0    amLODIPine (NORVASC) 5 MG tablet Take 1 tablet by mouth daily 90 tablet 5    Handicap Placard MISC by Does not apply route Unable to ambulate more than 40 feet without difficulty  Expires 11/25/2024 1 each 0    mirabegron (MYRBETRIQ) 25 MG TB24 Take 1 tablet by mouth daily 30 tablet 3    CHANTIX CONTINUING MONTH NOEMI 1 MG tablet take 1 tablet by mouth twice a day (Patient taking differently: Did not start) 56 tablet 2    Biotin 82094 MCG TABS Take 10,000 mcg by mouth as needed      diclofenac sodium 1 % GEL Apply 2 g topically as needed for Pain      tiotropium (SPIRIVA HANDIHALER) 18 MCG inhalation capsule Inhale 1 capsule into the lungs daily (Patient taking differently: Inhale 18 mcg into the lungs nightly ) 90 capsule 1    gabapentin (NEURONTIN) 300 MG capsule Take 900 mg by mouth 3 times daily.  lidocaine (XYLOCAINE) 5 % ointment Apply topically to hands and low back as needed. 1 Tube 3    Testosterone 10 MG/ACT (2%) GEL Place 1.25 mLs onto the skin daily.  celecoxib (CELEBREX) 200 MG capsule Take 1 capsule by mouth 2 times daily (Patient taking differently: Take 200 mg by mouth 2 times daily 24 hr hold prior to surgery) 60 capsule 0    oxyCODONE-acetaminophen (PERCOCET) 5-325 MG per tablet Take 1 tablet by mouth every 6 hours as needed for Pain  THIS PRESCRIPTION IS A 30 DAY SUPPLY. ( ICD-10: G 89.4).  Earliest Fill Date: 12/12/17 120 tablet 0     No current facility-administered medications on

## 2020-05-18 ENCOUNTER — TELEPHONE (OUTPATIENT)
Dept: SLEEP CENTER | Age: 75
End: 2020-05-18

## 2020-05-28 ENCOUNTER — TELEPHONE (OUTPATIENT)
Dept: SLEEP CENTER | Age: 75
End: 2020-05-28

## 2020-06-01 ENCOUNTER — HOSPITAL ENCOUNTER (OUTPATIENT)
Dept: SLEEP CENTER | Age: 75
Discharge: HOME | End: 2020-06-01
Payer: MEDICARE

## 2020-06-01 ENCOUNTER — TELEPHONE (OUTPATIENT)
Dept: SLEEP CENTER | Age: 75
End: 2020-06-01

## 2020-06-01 PROCEDURE — 95810 POLYSOM 6/> YRS 4/> PARAM: CPT

## 2020-06-01 PROCEDURE — 95810 POLYSOM 6/> YRS 4/> PARAM: CPT | Performed by: INTERNAL MEDICINE

## 2020-06-01 PROCEDURE — 2700000000 HC OXYGEN THERAPY PER DAY

## 2020-06-02 ENCOUNTER — TELEPHONE (OUTPATIENT)
Dept: CASE MANAGEMENT | Age: 75
End: 2020-06-02

## 2020-06-03 ENCOUNTER — HOSPITAL ENCOUNTER (OUTPATIENT)
Dept: CT IMAGING | Age: 75
Discharge: HOME | End: 2020-06-03
Payer: MEDICARE

## 2020-06-03 ENCOUNTER — HOSPITAL ENCOUNTER (OUTPATIENT)
Dept: PULMONOLOGY | Age: 75
Discharge: HOME | End: 2020-06-03
Payer: MEDICARE

## 2020-06-03 ENCOUNTER — HOSPITAL ENCOUNTER (OUTPATIENT)
Dept: MAMMOGRAPHY | Age: 75
Discharge: HOME | End: 2020-06-05
Payer: MEDICARE

## 2020-06-03 PROCEDURE — G0297 LDCT FOR LUNG CA SCREEN: HCPCS

## 2020-06-03 PROCEDURE — 94729 DIFFUSING CAPACITY: CPT

## 2020-06-03 PROCEDURE — 94726 PLETHYSMOGRAPHY LUNG VOLUMES: CPT

## 2020-06-03 PROCEDURE — 94060 EVALUATION OF WHEEZING: CPT

## 2020-06-04 ENCOUNTER — TELEPHONE (OUTPATIENT)
Dept: CASE MANAGEMENT | Age: 75
End: 2020-06-04

## 2020-06-04 NOTE — TELEPHONE ENCOUNTER
No call, encounter opened to process CT Lung Screening. CT Lung Screen: 6/3/2020  Impression   1. No suspicious pulmonary nodules.       RECOMMENDATION:   Per ACR Lung-RADS Version 1.0       Category 1, Negative (No nodules and definitely benign   nodules). Management:Continue annual lung screening with LDCT in 12   months.(probability of malignancy <1%). Pack years: 32    Social History     Tobacco Use  Smoking Status: Current Every Day Smoker    Start Date:    Quit Date:    Types: Cigarettes   Packs/Day: 1.00   Years: 31.00   Pack Years: 31   Smokeless Tobacco: Never Used         Results letter sent to patient via my chart or mailed.      [facility]

## 2020-06-10 NOTE — PROGRESS NOTES
[address]                               SLEEP STUDY REPORT    PATIENT NAME: Carmen Whipple                    :        1945  MED REC NO:   [id number]                            ROOM:  ACCOUNT NO:   [de-identified]                           ADMIT DATE: 2020  PROVIDER:     Justice Jara MD    DATE OF STUDY:  2020    FULL-NIGHT POLYSOMNOGRAM REPORT    LOCATION:  [address] [place] [address]. REFERRING PROVIDER:  Mamta Cherry MD    AGE:  69 years. SEX:  Female. HEIGHT:  4 feet 9 inches. WEIGHT:  219 pounds. BMI:  47.4 kg/m2. NECK CIRCUMFERENCE:  17.5 inches. SYMPTOMS:  Extreme fatigue, kicking legs, restless legs, difficulty  falling/staying sleep, napping during the day, and drowsy driving. The  [place] Sleepiness Scale was 17/24 (scores above or equal to 10 are  suggestive of hypersomnolence). INDICATIONS:  To re-evaluate severity of obstructive sleep apnea. MEDICAL HISTORY:  Morbid obesity, hypertension, depression, chronic  pain, GERD, restless legs syndrome, history of breast cancer, and  reported history of obstructive sleep apnea (the patient's last  diagnostic study over 10 years ago, no records available; the patient  reports wearing BiPAP 19/15 cm of water at home with supplemental oxygen  bled in at unknown volume). MEDICATIONS:  Losartan-hydrochlorothiazide, Norvasc, Bystolic, Protonix,  Celebrex, gabapentin, Cymbalta, ReQuip, Percocet, testosterone cream.    DESCRIPTION:  This full-night polysomnogram consisted of EEG, EOG, EMG  and 2-lead ECG monitoring. Oronasal airflow (nasal pressure transducer  and thermistor), chest and abdominal efforts by respiratory inductive  plethysmography or polyvinylidene fluoride sensor, and pulse oximetry  were monitored as well.   Hypopneas were scored as at least a 30%  reduction in the amplitude of the semiquantitative flow given the opportunity to sleep upright in a recliner per her  request, given significant leg movements, but this did not improve her  ability to initiate sleep. EEG:  No abnormal epileptiform activity was observed. ECG:  The electrocardiogram documented normal sinus rhythm. The average  heart rate during sleep was 69 beats per minute. PERIODIC LIMB MOVEMENTS:  No significant periodic limb movements were  noted during the brief sleep window, although the patient reported  significant symptoms in her legs while awake. EMG/VIDEO MONITORING:  Loss of REM atonia, bruxism and parasomnias were  not observed. IMPRESSION:  1. This study was limited by severely reduced sleep efficiency of just  19%, with only 66 minutes of total sleep achieved. The patient had  difficulty initiating sleep in part due to lack of BiPAP use. 2.  Significant hypoxia was noted while the patient was awake and at  rest.  Supplemental oxygen therapy at 2 liters per minute was added and  maintained for the duration of the study. 3.  The patient was noted to be restless and complained of symptoms in  her legs while awake and trying to initiate sleep. RECOMMENDATIONS:  1. This patient has a reported history of obstructive sleep apnea, but  has not had an updated diagnostic AHI in at least 10 years. This is  required for insurance in order to qualify her for a new device. If the patient can locate the results of her original study, given   the relaxed regulations during the COVID-19 pandemic, Medicare may allow   the prescription of a new device without achieving an updated AHI. If not, the patient should consider returning to the lab to attempt   another polysomnogram to reassess her baseline AHI. 2.  The patient should continue to utilize supplemental oxygen therapy  with sleep, given the noted hypoxia on this study. 3.  Clinical correlation of the restless legs symptoms observed during  the study is advised.   The patient

## 2020-06-18 ENCOUNTER — TELEPHONE (OUTPATIENT)
Dept: FAMILY MEDICINE CLINIC | Age: 75
End: 2020-06-18

## 2020-06-18 NOTE — TELEPHONE ENCOUNTER
Fabiola Shoemaker called asking if you saw her sleep study. She said she doesn't believe her machine is working proprerly. She is always tired. I told her I do see the results in the Media and would ask you to review them.

## 2020-07-01 RX ORDER — ROPINIROLE 3 MG/1
TABLET, FILM COATED ORAL
Qty: 90 TABLET | Refills: 0 | OUTPATIENT
Start: 2020-07-01

## 2020-07-09 RX ORDER — DULOXETIN HYDROCHLORIDE 30 MG/1
30 CAPSULE, DELAYED RELEASE ORAL DAILY
Qty: 90 CAPSULE | Refills: 0 | Status: ON HOLD
Start: 2020-07-09 | End: 2020-08-11 | Stop reason: HOSPADM

## 2020-07-12 RX ORDER — ROPINIROLE 3 MG/1
TABLET, FILM COATED ORAL
Qty: 90 TABLET | Refills: 0 | Status: SHIPPED
Start: 2020-07-12 | End: 2020-08-26 | Stop reason: CLARIF

## 2020-07-22 ENCOUNTER — OFFICE VISIT (OUTPATIENT)
Dept: FAMILY MEDICINE CLINIC | Age: 75
End: 2020-07-22
Payer: MEDICARE

## 2020-07-22 VITALS
HEIGHT: 60 IN | HEART RATE: 80 BPM | BODY MASS INDEX: 41.43 KG/M2 | RESPIRATION RATE: 20 BRPM | DIASTOLIC BLOOD PRESSURE: 80 MMHG | WEIGHT: 211 LBS | TEMPERATURE: 97.8 F | SYSTOLIC BLOOD PRESSURE: 130 MMHG | OXYGEN SATURATION: 88 %

## 2020-07-22 PROCEDURE — 4040F PNEUMOC VAC/ADMIN/RCVD: CPT | Performed by: FAMILY MEDICINE

## 2020-07-22 PROCEDURE — G8427 DOCREV CUR MEDS BY ELIG CLIN: HCPCS | Performed by: FAMILY MEDICINE

## 2020-07-22 PROCEDURE — 3017F COLORECTAL CA SCREEN DOC REV: CPT | Performed by: FAMILY MEDICINE

## 2020-07-22 PROCEDURE — 1090F PRES/ABSN URINE INCON ASSESS: CPT | Performed by: FAMILY MEDICINE

## 2020-07-22 PROCEDURE — 4004F PT TOBACCO SCREEN RCVD TLK: CPT | Performed by: FAMILY MEDICINE

## 2020-07-22 PROCEDURE — G8926 SPIRO NO PERF OR DOC: HCPCS | Performed by: FAMILY MEDICINE

## 2020-07-22 PROCEDURE — 3023F SPIROM DOC REV: CPT | Performed by: FAMILY MEDICINE

## 2020-07-22 PROCEDURE — 99214 OFFICE O/P EST MOD 30 MIN: CPT | Performed by: FAMILY MEDICINE

## 2020-07-22 PROCEDURE — G8400 PT W/DXA NO RESULTS DOC: HCPCS | Performed by: FAMILY MEDICINE

## 2020-07-22 PROCEDURE — G8417 CALC BMI ABV UP PARAM F/U: HCPCS | Performed by: FAMILY MEDICINE

## 2020-07-22 PROCEDURE — G9899 SCRN MAM PERF RSLTS DOC: HCPCS | Performed by: FAMILY MEDICINE

## 2020-07-22 PROCEDURE — 1123F ACP DISCUSS/DSCN MKR DOCD: CPT | Performed by: FAMILY MEDICINE

## 2020-07-22 RX ORDER — DOXYCYCLINE HYCLATE 100 MG
100 TABLET ORAL 2 TIMES DAILY
Qty: 14 TABLET | Refills: 0 | Status: SHIPPED | OUTPATIENT
Start: 2020-07-22 | End: 2020-07-29

## 2020-07-22 RX ORDER — PREDNISONE 20 MG/1
40 TABLET ORAL DAILY
Qty: 10 TABLET | Refills: 0 | Status: SHIPPED | OUTPATIENT
Start: 2020-07-22 | End: 2020-07-27

## 2020-07-22 ASSESSMENT — ENCOUNTER SYMPTOMS
COUGH: 1
BACK PAIN: 1
CONSTIPATION: 0
DIARRHEA: 0
VOMITING: 0
SHORTNESS OF BREATH: 1
ABDOMINAL PAIN: 0
NAUSEA: 0
WHEEZING: 0

## 2020-07-22 NOTE — PROGRESS NOTES
[facility]  [phone]   Fredy Denton MD     Patient: Jalen Clayton  YOB: 1945  Visit Date: 7/22/20    Francy Croft is a 69y.o. year old female here today for   Chief Complaint   Patient presents with    COPD    Nicotine Dependence     wants to quit smoking request chantix       HPI  Patient is 69year old female here to discuss worsening sob, vladimir and nicotine dependence. Getting echo on Friday. Was not able to sleep during sleep study because did not have cpap machine. Cannot sleep without it. But not feeling rested from . Denies any chest pain. Can walk about 4-5 hours and then gets short of breath. Has an appointment with lung doctor Aug 24. This year has been feeling more short of breath. Has some mild leg swelling when she sits with feet down for too long. Just had an EGD and colonoscopy 3 months ago. Has barrets esophagus . Dr Meenu Steel. Had mammogram at [facility] . Goes to [facility] on Friday. Ok to get pneumonia shot   Cant due DEXA scan       Double vision has resolved. Meldomitilaa Samson and hit her head . June 18. Got up to the go to the bathroom and fell . No loss of consciousness . No falls since then. Has had a lot of sinus drainage and has increased phlegm. Review of Systems   Constitutional: Negative for chills and fever. Respiratory: Positive for cough and shortness of breath. Negative for wheezing. Cardiovascular: Negative for chest pain, palpitations and leg swelling. Gastrointestinal: Negative for abdominal pain, constipation, diarrhea, nausea and vomiting. Musculoskeletal: Positive for back pain and gait problem.        Current Outpatient Medications on File Prior to Visit   Medication Sig Dispense Refill    rOPINIRole (REQUIP) 3 MG tablet TAKE 1 TABLET BY MOUTH  NIGHTLY 90 tablet 0    DULoxetine (CYMBALTA) 30 MG extended release capsule TAKE 1 CAPSULE BY MOUTH  DAILY 90 capsule 0  DULoxetine (CYMBALTA) 60 MG extended release capsule TAKE 1 CAPSULE BY MOUTH  NIGHTLY 90 capsule 2    losartan-hydrochlorothiazide (HYZAAR) 100-25 MG per tablet take 1 tablet by mouth once daily 90 tablet 0    nebivolol (BYSTOLIC) 5 MG tablet take 1 tablet by mouth once daily AM 90 tablet 0    albuterol sulfate  (90 Base) MCG/ACT inhaler Inhale 2 puffs into the lungs every 4-6 hours as needed for Wheezing or Shortness of Breath 1 Inhaler 0    amLODIPine (NORVASC) 5 MG tablet Take 1 tablet by mouth daily 90 tablet 5    celecoxib (CELEBREX) 200 MG capsule Take 1 capsule by mouth 2 times daily (Patient taking differently: Take 200 mg by mouth 2 times daily 24 hr hold prior to surgery) 60 capsule 0    diclofenac sodium 1 % GEL Apply 2 g topically as needed for Pain      gabapentin (NEURONTIN) 300 MG capsule Take 900 mg by mouth 3 times daily.  lidocaine (XYLOCAINE) 5 % ointment Apply topically to hands and low back as needed. 1 Tube 3    Handicap Placard MISC by Does not apply route Unable to ambulate more than 40 feet without difficulty  Expires 11/25/2024 1 each 0    mirabegron (MYRBETRIQ) 25 MG TB24 Take 1 tablet by mouth daily (Patient not taking: Reported on 7/22/2020) 30 tablet 3    Biotin 93376 MCG TABS Take 10,000 mcg by mouth as needed      oxyCODONE-acetaminophen (PERCOCET) 5-325 MG per tablet Take 1 tablet by mouth every 6 hours as needed for Pain  THIS PRESCRIPTION IS A 30 DAY SUPPLY. ( ICD-10: G 89.4). Earliest Fill Date: 12/12/17 120 tablet 0    Testosterone 10 MG/ACT (2%) GEL Place 1.25 mLs onto the skin daily. No current facility-administered medications on file prior to visit. Allergies   Allergen Reactions    Demerol [Meperidine Hcl] Other (See Comments)     Hallucinations, anxiety        Nabumetone Hives       Past medical, surgical, socialand/or family history reviewed, updated as needed, and are non-contributory (unless otherwise stated).   Medications, allergies, and problem list also reviewed and updated as needed in patient's record. Wt Readings from Last 3 Encounters:   07/22/20 211 lb (95.7 kg)   05/11/20 219 lb (99.3 kg)   01/22/20 218 lb (98.9 kg)                   /80   Pulse 80   Temp 97.8 °F (36.6 °C) (Temporal)   Resp 20   Ht 5' (1.524 m)   Wt 211 lb (95.7 kg)   SpO2 (!) 88% Comment: ambulating  BMI 41.21 kg/m²        Physical Exam  Vitals signs and nursing note reviewed. Constitutional:       General: She is not in acute distress. Appearance: She is well-developed. She is not diaphoretic. Cardiovascular:      Rate and Rhythm: Normal rate and regular rhythm. Heart sounds: Normal heart sounds. No murmur. No friction rub. Pulmonary:      Effort: Pulmonary effort is normal. No respiratory distress. Breath sounds: No wheezing or rales. Comments: Crackles right lung base. Otherwise clear. Abdominal:      General: Bowel sounds are normal. There is no distension. Palpations: Abdomen is soft. There is no mass. Tenderness: There is no abdominal tenderness. There is no guarding. Musculoskeletal: Normal range of motion.        Results for orders placed or performed during the hospital encounter of 05/11/20   IRON AND TIBC   Result Value Ref Range    Iron 113 37 - 145 mcg/dL    TIBC 372 250 - 450 mcg/dL    Iron Saturation 30 15 - 50 %   FERRITIN   Result Value Ref Range    Ferritin 79 ng/mL   COMPREHENSIVE METABOLIC PANEL   Result Value Ref Range    Sodium 144 132 - 146 mmol/L    Potassium 5.0 3.5 - 5.0 mmol/L    Chloride 99 98 - 107 mmol/L    CO2 34 (H) 22 - 29 mmol/L    Anion Gap 11 7 - 16 mmol/L    Glucose 87 74 - 99 mg/dL    BUN 18 8 - 23 mg/dL    CREATININE 0.8 0.5 - 1.0 mg/dL    GFR Non-African American >60 >=60 mL/min/1.73    GFR African American >60     Calcium 9.5 8.6 - 10.2 mg/dL    Total Protein 7.3 6.4 - 8.3 g/dL    Alb 4.4 3.5 - 5.2 g/dL    Total Bilirubin 0.3 0.0 - 1.2 mg/dL    Alkaline Phosphatase 68 35 - 104 U/L    ALT 19 0 - 32 U/L    AST 25 0 - 31 U/L   CBC   Result Value Ref Range    WBC 5.2 4.5 - 11.5 E9/L    RBC 4.80 3.50 - 5.50 E12/L    Hemoglobin 14.2 11.5 - 15.5 g/dL    Hematocrit 47.8 34.0 - 48.0 %    MCV 99.6 80.0 - 99.9 fL    MCH 29.6 26.0 - 35.0 pg    MCHC 29.7 (L) 32.0 - 34.5 %    RDW 17.2 (H) 11.5 - 15.0 fL    Platelets 203 333 - 501 E9/L    MPV 9.4 7.0 - 12.0 fL   LIPID PANEL   Result Value Ref Range    Cholesterol, Total 175 0 - 199 mg/dL    Triglycerides 105 0 - 149 mg/dL    HDL 42 >40 mg/dL    LDL Calculated 112 (H) 0 - 99 mg/dL    VLDL Cholesterol Calculated 21 mg/dL       ASSESSMENT/PLAN  Melissa was seen today for copd and nicotine dependence. Diagnoses and all orders for this visit:    AYESHA (obstructive sleep apnea)  -     Sleep Study with PAP Titration; Future    Pulmonary emphysema, unspecified emphysema type (HCC)  -     tiotropium (SPIRIVA RESPIMAT) 2.5 MCG/ACT AERS inhaler; Inhale 2 puffs into the lungs daily    Tobacco abuse  -     varenicline (CHANTIX) 0.5 MG tablet; Take 1-2 tablets by mouth See Admin Instructions 0.5mg DAILY for 3 days followed by 0.5mg TWICE DAILY for 4 days followed by 1mg TWICE DAILY    COPD exacerbation (HCC)  -     predniSONE (DELTASONE) 20 MG tablet; Take 2 tablets by mouth daily for 5 days  -     doxycycline hyclate (VIBRA-TABS) 100 MG tablet; Take 1 tablet by mouth 2 times daily for 7 days    Respiratory crackles at right lung base  -     XR CHEST STANDARD (2 VW); Future            Phone/MyChart follow up if tests abnormal.    Return in about 1 month (around 8/22/2020). or sooner if necessary. I have reviewed myfindings and recommendations with Melissa. Grady Rosario.  Onseimo Gallagher M.D

## 2020-07-23 ENCOUNTER — TELEPHONE (OUTPATIENT)
Dept: FAMILY MEDICINE CLINIC | Age: 75
End: 2020-07-23

## 2020-07-23 NOTE — TELEPHONE ENCOUNTER
I called [facility] patient has a bipap machine and they will fax sleep study from 2017 they have on file. [address] said portable oxygen was on hold until she had office notes stating she needs and uses oxygen. So they just need office notes faxed to them to complete order.

## 2020-07-24 ENCOUNTER — HOSPITAL ENCOUNTER (OUTPATIENT)
Dept: NON INVASIVE DIAGNOSTICS | Age: 75
Discharge: HOME | End: 2020-07-24
Payer: MEDICARE

## 2020-07-24 LAB
LV EF: 60 %
LVEF MODALITY: NORMAL

## 2020-07-24 PROCEDURE — 93306 TTE W/DOPPLER COMPLETE: CPT

## 2020-07-27 ENCOUNTER — TELEPHONE (OUTPATIENT)
Dept: FAMILY MEDICINE CLINIC | Age: 75
End: 2020-07-27

## 2020-07-27 RX ORDER — VARENICLINE TARTRATE 1 MG/1
TABLET, FILM COATED ORAL
Qty: 56 TABLET | Refills: 2 | OUTPATIENT
Start: 2020-07-27

## 2020-07-27 RX ORDER — VARENICLINE TARTRATE 0.5 MG/1
.5-1 TABLET, FILM COATED ORAL SEE ADMIN INSTRUCTIONS
Qty: 57 TABLET | Refills: 0 | Status: SHIPPED
Start: 2020-07-27 | End: 2020-08-26 | Stop reason: CLARIF

## 2020-07-27 NOTE — TELEPHONE ENCOUNTER
Called and spoke to patient regarding echo. Will refer to cardiology given mitral valve regurgitation and pulmonary hypertension. Wen Martin can you check on oxygen from [address].

## 2020-07-27 NOTE — TELEPHONE ENCOUNTER
Patient phoned stating that would like to know results of the echo. Patient states that also has requested refill of chantix and never got any refills. States that she asked for them because she wants to quit smoking again.

## 2020-07-28 ENCOUNTER — TELEPHONE (OUTPATIENT)
Dept: FAMILY MEDICINE CLINIC | Age: 75
End: 2020-07-28

## 2020-07-29 NOTE — TELEPHONE ENCOUNTER
Patient just needs piece that connects to her cpap . Please order correct piece and I will sign today .

## 2020-07-29 NOTE — TELEPHONE ENCOUNTER
Pt called back and was informed of referrals and that their offices will call her to schedule appts.

## 2020-07-29 NOTE — TELEPHONE ENCOUNTER
Spoke to [facility] said they went out yesterday because patient said she thought something was wrong with concentrator and has given the patient the bleeding adaptor for oxygen.

## 2020-07-29 NOTE — TELEPHONE ENCOUNTER
Spoke to [facility] and they were confused on what is needed because orders are for portable oxygen because they called patient and she was just requesting a bleeding piece for her for cpap. In order for them to bill insurance for portable oxygen she has to have daytime oxygen walking test done in the office and sent to them.

## 2020-08-02 PROBLEM — E66.01 MORBID OBESITY WITH BMI OF 45.0-49.9, ADULT (HCC): Status: ACTIVE | Noted: 2020-08-02

## 2020-08-02 PROBLEM — J43.9 PULMONARY EMPHYSEMA (HCC): Status: ACTIVE | Noted: 2020-08-02

## 2020-08-02 PROBLEM — G47.33 OSA (OBSTRUCTIVE SLEEP APNEA): Status: ACTIVE | Noted: 2020-08-02

## 2020-08-05 ENCOUNTER — TELEPHONE (OUTPATIENT)
Dept: FAMILY MEDICINE CLINIC | Age: 75
End: 2020-08-05

## 2020-08-05 ENCOUNTER — HOSPITAL ENCOUNTER (EMERGENCY)
Age: 75
Discharge: HOME | DRG: 189 | End: 2020-08-05
Attending: EMERGENCY MEDICINE
Payer: MEDICARE

## 2020-08-05 ENCOUNTER — APPOINTMENT (OUTPATIENT)
Dept: CT IMAGING | Age: 75
DRG: 189 | End: 2020-08-05
Payer: MEDICARE

## 2020-08-05 VITALS
HEART RATE: 76 BPM | WEIGHT: 209 LBS | SYSTOLIC BLOOD PRESSURE: 104 MMHG | DIASTOLIC BLOOD PRESSURE: 65 MMHG | TEMPERATURE: 97.3 F | OXYGEN SATURATION: 86 % | HEIGHT: 60 IN | BODY MASS INDEX: 41.03 KG/M2 | RESPIRATION RATE: 22 BRPM

## 2020-08-05 PROCEDURE — 70486 CT MAXILLOFACIAL W/O DYE: CPT

## 2020-08-05 PROCEDURE — 70450 CT HEAD/BRAIN W/O DYE: CPT

## 2020-08-05 PROCEDURE — 99284 EMERGENCY DEPT VISIT MOD MDM: CPT

## 2020-08-05 PROCEDURE — 6370000000 HC RX 637 (ALT 250 FOR IP): Performed by: EMERGENCY MEDICINE

## 2020-08-05 RX ORDER — HYDROCODONE BITARTRATE AND ACETAMINOPHEN 7.5; 325 MG/1; MG/1
1 TABLET ORAL ONCE
Status: COMPLETED | OUTPATIENT
Start: 2020-08-05 | End: 2020-08-05

## 2020-08-05 RX ORDER — IPRATROPIUM BROMIDE AND ALBUTEROL SULFATE 2.5; .5 MG/3ML; MG/3ML
3 SOLUTION RESPIRATORY (INHALATION) ONCE
Status: DISCONTINUED | OUTPATIENT
Start: 2020-08-05 | End: 2020-08-05 | Stop reason: HOSPADM

## 2020-08-05 RX ADMIN — HYDROCODONE BITARTRATE AND ACETAMINOPHEN 1 TABLET: 7.5; 325 TABLET ORAL at 17:22

## 2020-08-05 ASSESSMENT — PAIN SCALES - GENERAL
PAINLEVEL_OUTOF10: 10
PAINLEVEL_OUTOF10: 3

## 2020-08-05 ASSESSMENT — ENCOUNTER SYMPTOMS
RHINORRHEA: 0
SHORTNESS OF BREATH: 0
NAUSEA: 0
SORE THROAT: 0
BACK PAIN: 0
PHOTOPHOBIA: 1
VOMITING: 0
DIARRHEA: 0
ABDOMINAL PAIN: 0
COUGH: 0

## 2020-08-05 ASSESSMENT — PAIN DESCRIPTION - LOCATION: LOCATION: HEAD

## 2020-08-05 ASSESSMENT — PAIN DESCRIPTION - PAIN TYPE: TYPE: ACUTE PAIN

## 2020-08-05 ASSESSMENT — PAIN DESCRIPTION - ORIENTATION: ORIENTATION: RIGHT

## 2020-08-05 ASSESSMENT — PAIN DESCRIPTION - DESCRIPTORS: DESCRIPTORS: SHARP

## 2020-08-05 NOTE — TELEPHONE ENCOUNTER
Called patient to discuss leaving the ER prior to testing being done. . No answer.  LMOM for patient to call back with best number and time to reach them

## 2020-08-05 NOTE — ED PROVIDER NOTES
Tanya Aceves is a 69 y.o. female with a PMHx significant for hypertension, hyperlipidemia, who presents for evaluation of fall, double vision, beginning 1 week ago. The complaint has been persistent, moderate in severity, and worsened by nothing. The patient states that she fell a week ago in her sleep. She notes she started waking up as she was falling down and her head on the hardwood floor. She denies any LOC, she is not on blood thinners. She notes over the past week she has noticed worsening double vision. She describes it as seeing double of what she is looking at, only when she is concentrating on small lettering. She does not have double vision on larger items. The history is provided by the patient and medical records. Review of Systems   Constitutional: Negative for chills, diaphoresis and fever. HENT: Negative for congestion, rhinorrhea and sore throat. Eyes: Positive for photophobia and visual disturbance. Respiratory: Negative for cough and shortness of breath. Cardiovascular: Negative for chest pain. Gastrointestinal: Negative for abdominal pain, diarrhea, nausea and vomiting. Genitourinary: Negative for dysuria and urgency. Musculoskeletal: Negative for back pain. Skin: Negative for rash. Neurological: Positive for headaches. Negative for dizziness, light-headedness and numbness. Physical Exam  Vitals signs and nursing note reviewed. Constitutional:       General: She is not in acute distress. Appearance: Normal appearance. She is well-developed. She is not ill-appearing. HENT:      Head: Normocephalic. Comments: Ecchymosis noted to the front face and around the bilateral eyes. Hematoma to the right scalp. There is no hemotympanum, there is no wound. Eyes:      General:         Right eye: No discharge. Left eye: No discharge. Extraocular Movements: Extraocular movements intact.       Conjunctiva/sclera: Conjunctivae normal. Neck:      Musculoskeletal: Normal range of motion and neck supple. Cardiovascular:      Rate and Rhythm: Normal rate and regular rhythm. Heart sounds: Normal heart sounds. Pulmonary:      Effort: Pulmonary effort is normal. No respiratory distress. Breath sounds: Examination of the right-lower field reveals decreased breath sounds. Examination of the left-lower field reveals decreased breath sounds. Decreased breath sounds present. Abdominal:      General: There is no distension. Palpations: Abdomen is soft. There is no mass. Tenderness: There is no abdominal tenderness. Musculoskeletal: Normal range of motion. General: No swelling, tenderness or deformity. Skin:     General: Skin is warm. Coloration: Skin is not jaundiced. Findings: No rash. Neurological:      General: No focal deficit present. Mental Status: She is alert and oriented to person, place, and time. Cranial Nerves: No cranial nerve deficit. Coordination: Coordination normal.          Procedures     MDM     ED Course as of Aug 05 1750   Wed Aug 05, 2020   [id number] ATTENDING PROVIDER ATTESTATION:     I have personally performed and/or participated in the history, exam, medical decision making, and procedures and agree with all pertinent clinical information unless otherwise noted. I have also reviewed and agree with the past medical, family and social history unless otherwise noted. I have discussed this patient in detail with the resident, and provided the instruction and education regarding patient here, states she fell a week ago and hit her head and face on the ground, complains of bruising and some double vision now, symptoms of been present since the incident although she states her friends finally got upset with her made her come in. She has had no general headache. No neck pain. No back pain.   No chest pain, palpitations or shortness of breath, she states she excellently rolled out of bed. Denies blood thinner use. No arm or leg pain or injuries and no extremity numbness, tingling, paresthesias or weakness. .  My findings/plan: Patient with diffuse bruising with yellowish edges to her forehead periorbital regions bilaterally and nasal bridge with some bruising to the right forehead superiorly as well. Her pupils are equal, round and reactive to light, her extraocular muscles are intact and equal bilaterally. She has general tenderness to the right forehead and nasal bridge area. No nasal bleeding at this time. No cervical, thoracic lumbar spine tenderness. Lungs slightly diminished bilaterally but equal, heart rate regular, abdomen nontender. Arms legs are neurovascular intact with no sign of acute bony or joint injury. [NC]   1540 Patient standing up at bedside waiting to go to CT. Her pulse ox has been noted and discussed with her, she does not wear oxygen at home but states her oxygen has been gradually going downhill, she has COPD and her pulmonologist as well as her heart doctor all aware of this and she has multiple tests ongoing and still scheduled for further evaluation of this and does not want it evaluated here, she states she is breathing at her baseline. Currently she has generally diminished breath sounds with no respiratory distress and mild scattered and diffuse expiratory wheezing. [NC]   0019 Patient has had bilateral eye surgery in the past, states she has had bilateral cataracts fixed. [NC]   [id number] Dr. Pop Horton calls, states the patient's oxygen has been low but not quite as low as this, she does have follow-up with cardiology but not for  a week or so.   Does ask that we do a little bit of a work-up on her.    [NC]   5768 Discussion with the patient regarding her primary care physician's request, patient states that she has an appointment tomorrow and does not want to miss it and does have an appoint with cardiology in a few days, she does admit she has been gradually getting worse for weeks. She is willing to stay let us have a chance to do somewhat of work-up on her but states she does not want to stay overnight because of appointments she has coming up including tomorrow. She does states she has nebulizer machine at home but does not use it because all of her medicine is old. Does relate history of heart valve calcification or insufficiency that her cardiologist knows of.    [NC]   [phone] . [NC]   4095 After discussion with the patient and her agreement to let me do a work-up on her, I just want to recheck on her after the nurses stated that she did not want a work-up, she is no longer in the room and has left, she is not in the department. She has left without work-up completed. I did not get a chance to speak with her about AMA. [NC]      ED Course User Index  [NC] 1150 [facility], DO        ED Course as of Aug 05 1750   Wed Aug 05, 2020   0044 ATTENDING PROVIDER ATTESTATION:     I have personally performed and/or participated in the history, exam, medical decision making, and procedures and agree with all pertinent clinical information unless otherwise noted. I have also reviewed and agree with the past medical, family and social history unless otherwise noted. I have discussed this patient in detail with the resident, and provided the instruction and education regarding patient here, states she fell a week ago and hit her head and face on the ground, complains of bruising and some double vision now, symptoms of been present since the incident although she states her friends finally got upset with her made her come in. She has had no general headache. No neck pain. No back pain. No chest pain, palpitations or shortness of breath, she states she excellently rolled out of bed. Denies blood thinner use. No arm or leg pain or injuries and no extremity numbness, tingling, paresthesias or weakness.   .  My findings/plan: Patient with diffuse bruising with yellowish edges to her forehead periorbital regions bilaterally and nasal bridge with some bruising to the right forehead superiorly as well. Her pupils are equal, round and reactive to light, her extraocular muscles are intact and equal bilaterally. She has general tenderness to the right forehead and nasal bridge area. No nasal bleeding at this time. No cervical, thoracic lumbar spine tenderness. Lungs slightly diminished bilaterally but equal, heart rate regular, abdomen nontender. Arms legs are neurovascular intact with no sign of acute bony or joint injury. [NC]   1540 Patient standing up at bedside waiting to go to CT. Her pulse ox has been noted and discussed with her, she does not wear oxygen at home but states her oxygen has been gradually going downhill, she has COPD and her pulmonologist as well as her heart doctor all aware of this and she has multiple tests ongoing and still scheduled for further evaluation of this and does not want it evaluated here, she states she is breathing at her baseline. Currently she has generally diminished breath sounds with no respiratory distress and mild scattered and diffuse expiratory wheezing. [NC]   4733 Patient has had bilateral eye surgery in the past, states she has had bilateral cataracts fixed. [NC]   450 [id number] Dr. Shakira Amaro calls, states the patient's oxygen has been low but not quite as low as this, she does have follow-up with cardiology but not for  a week or so. Does ask that we do a little bit of a work-up on her.    [NC]   3078 Discussion with the patient regarding her primary care physician's request, patient states that she has an appointment tomorrow and does not want to miss it and does have an appoint with cardiology in a few days, she does admit she has been gradually getting worse for weeks.   She is willing to stay let us have a chance to do somewhat of work-up on her but states she does not want to stay overnight because of appointments she has coming up including tomorrow. She does states she has nebulizer machine at home but does not use it because all of her medicine is old. Does relate history of heart valve calcification or insufficiency that her cardiologist knows of.    [NC]   [phone] . [NC]   3634 After discussion with the patient and her agreement to let me do a work-up on her, I just want to recheck on her after the nurses stated that she did not want a work-up, she is no longer in the room and has left, she is not in the department. She has left without work-up completed. I did not get a chance to speak with her about AMA. [NC]      ED Course User Index  [NC] Eamon Chatman, DO       --------------------------------------------- PAST HISTORY ---------------------------------------------  Past Medical History:  has a past medical history of Adverse effect of anesthetic, Arthritis, Bronchitis, Cancer ([address] Diverticula of colon, Hyperlipidemia, Hypertension, Pneumonia, Restless leg syndrome, Sleep apnea, and Thyroid disease. Past Surgical History:  has a past surgical history that includes Appendectomy; Hand surgery; Hysterectomy; Knee arthroscopy (Bilateral); Lap Band (2007); Ovary removal (1975); other surgical history; Cholecystectomy (2009); Colonoscopy (2011); Total hip arthroplasty [facility] 2012); Revision total hip arthroplasty (3/26/2012); Eye surgery (Bilateral); other surgical history (Right, 4/15/2015); Nerve Block (Left, 8/19/15); Nerve Block (8 19 15); Nerve Block (N/A, 9/9/15); Nerve Block (N/A, 10 26 2015); Nerve Block (Bilateral, 11 2 15); Nerve Block (Bilateral, 11/09/15); other surgical history (Right, 12 30 2015); other surgical history (Left, 2 8 16); Nerve Block (Bilateral, 04 13 2016); Nerve Block (Bilateral, 08/31/2016); Nerve Block (Bilateral, 10/03/2016);  Breast surgery (Left, 03/2016); other surgical history (Left, 11/21/2016); other surgical history (Right, 01/09/2017); other surgical history (Right, 02/08/2017); other surgical history (Left, 03/27/2017); other surgical history (Left, 05/24/2017); Cosmetic surgery (Bilateral); joint replacement (03/04/07); other surgical history (Right, 01/22/2020); and Nerve Surgery (Right, 1/22/2020). Social History:  reports that she has been smoking cigarettes. She has a 31.00 pack-year smoking history. She has never used smokeless tobacco. She reports previous alcohol use of about 1.0 standard drinks of alcohol per week. She reports that she does not use drugs. Family History: family history includes Diabetes in her father; Heart Failure in her mother; Other in her brother; Parkinsonism in her father. The patients home medications have been reviewed. Allergies: Demerol [meperidine hcl] and Nabumetone    -------------------------------------------------- RESULTS -------------------------------------------------  Labs:  No results found for this visit on 08/05/20. Radiology:  CT Head WO Contrast   Final Result   Focal soft tissue edema overlying the right frontal calvarium. No calvarial   fractures. Visualized orbits are unremarkable. CT FACIAL BONES WO CONTRAST   Final Result   No acute traumatic injury of the facial bones. XR CHEST (2 VW)    (Results Pending)       ------------------------- NURSING NOTES AND VITALS REVIEWED ---------------------------  Date / Time Roomed:  8/5/2020  2:45 PM  ED Bed Assignment:  22/22    The nursing notes within the ED encounter and vital signs as below have been reviewed. /65   Pulse 76   Temp 97.3 °F (36.3 °C) (Temporal)   Resp 22   Ht 5' (1.524 m)   Wt 209 lb (94.8 kg)   SpO2 (!) 86%   BMI 40.82 kg/m²   Oxygen Saturation Interpretation: Abnormal      I did have an opportunity to speak with the patient following the results of her CT.   She will follow-up with her ophthalmologist however for her visual disturbance for the last week following her head injury. She also will continue her follow-up with her family doctors as well as her cardiologist.  She had agreed to let me work-up however then left before the work-up was complete before I could speak with her about leaving with hypoxia or AMA discussions. New Prescriptions    No medications on file       Diagnosis:  1. Injury of head, initial encounter    2. Visual disturbance    3. Hypoxia        Disposition:  Patient's disposition: left before treatment complete, no chance to talk to her about AMA or risks and benefits. Patient's condition is stable.            Wm Marshall,   08/05/20 [id number]

## 2020-08-05 NOTE — TELEPHONE ENCOUNTER
Patient called stating she fell about a week ago and hit her head, did not seek medical treatment. Patient stated she had double vision yesterday and today when she touches her head, it is very painful. I advised patient to go to the ED to be evaluated. I advised patient not to drive and asked if someone could drive her. Patient was agreeable and stated she will have someone take her. Msg routed for informational purposes.

## 2020-08-05 NOTE — ED NOTES
Patient has assistive device with her from home. She is up with a steady gait independently without it. She has remained without change since arrival to ER.      Osiel Reyes RN  08/05/20 3606

## 2020-08-05 NOTE — ED NOTES
visual acuity test :    Rt eye 20/15 -2  Lt eye 20/20 -2  Both eyes 20/20 -0      Natanael Hebert, FLYNN  08/05/20 [id number]

## 2020-08-05 NOTE — ED NOTES
To ct scan via wheelchair. Patient had fallen previously and did not seek medical attention, she is here today because her vision has \"changed\". + ecchymotic areas face/eyes, forehead, temporal area, and B/L  She called her PCP and not an ophthalmologist. Her PCP advised her to come to the ER to be evaluated. She has been alert, orientated, and ambulatory since arrival. Me and the physician team did note that her oxygen saturation on R/A is low 80's. She is asymptomatic. She stated she is to see a \"lung doctor this month on the 17 th for further testing\".       Jessie Pierre RN  08/05/20 5741

## 2020-08-05 NOTE — TELEPHONE ENCOUNTER
I called patient is in the ER for fall. She reports her oxygen Is 76% but breathing at baseline. I called ER provider and spoke to Dr. Connie Otero. He reported that oxygen is in lower 80s . I explained that this is not normal for patient and that she has had two falls out of bed since June. Last visit oxygen did not go below 88%. I am concerned that patient may need to be admitted for further workup of this worsening hypoxia. Dr. Connie Otero with plan to discuss this with patient.

## 2020-08-06 ENCOUNTER — HOSPITAL ENCOUNTER (INPATIENT)
Age: 75
LOS: 5 days | Discharge: NURSING, REHAB OR LONG-TERM CARE | DRG: 189 | End: 2020-08-11
Attending: EMERGENCY MEDICINE | Admitting: INTERNAL MEDICINE
Payer: MEDICARE

## 2020-08-06 ENCOUNTER — APPOINTMENT (OUTPATIENT)
Dept: CT IMAGING | Age: 75
DRG: 189 | End: 2020-08-06
Payer: MEDICARE

## 2020-08-06 ENCOUNTER — APPOINTMENT (OUTPATIENT)
Dept: GENERAL RADIOLOGY | Age: 75
DRG: 189 | End: 2020-08-06
Payer: MEDICARE

## 2020-08-06 PROBLEM — J96.90 RESPIRATORY FAILURE (HCC): Status: ACTIVE | Noted: 2020-08-06

## 2020-08-06 LAB
ALBUMIN SERPL-MCNC: 4 G/DL (ref 3.5–5.2)
ALP BLD-CCNC: 67 U/L (ref 35–104)
ALT SERPL-CCNC: 15 U/L (ref 0–32)
ANION GAP SERPL CALCULATED.3IONS-SCNC: 9 MMOL/L (ref 7–16)
APTT: 32.1 SEC (ref 24.5–35.1)
AST SERPL-CCNC: 29 U/L (ref 0–31)
B.E.: 6.4 MMOL/L (ref -3–3)
B.E.: 7.8 MMOL/L (ref -3–3)
B.E.: 8.1 MMOL/L (ref -3–3)
BASOPHILS ABSOLUTE: 0.03 E9/L (ref 0–0.2)
BASOPHILS RELATIVE PERCENT: 0.6 % (ref 0–2)
BILIRUB SERPL-MCNC: 0.6 MG/DL (ref 0–1.2)
BUN BLDV-MCNC: 22 MG/DL (ref 8–23)
CALCIUM SERPL-MCNC: 8.9 MG/DL (ref 8.6–10.2)
CHLORIDE BLD-SCNC: 93 MMOL/L (ref 98–107)
CO2: 36 MMOL/L (ref 22–29)
COHB: 10.5 % (ref 0–1.5)
COHB: 6.5 % (ref 0–1.5)
COHB: 8 % (ref 0–1.5)
COMMENT: ABNORMAL
CREAT SERPL-MCNC: 0.8 MG/DL (ref 0.5–1)
CRITICAL: ABNORMAL
DATE ANALYZED: ABNORMAL
DATE OF COLLECTION: ABNORMAL
EKG ATRIAL RATE: 88 BPM
EKG P AXIS: 32 DEGREES
EKG P-R INTERVAL: 178 MS
EKG Q-T INTERVAL: 380 MS
EKG QRS DURATION: 86 MS
EKG QTC CALCULATION (BAZETT): 459 MS
EKG R AXIS: 31 DEGREES
EKG T AXIS: 6 DEGREES
EKG VENTRICULAR RATE: 88 BPM
EOSINOPHILS ABSOLUTE: 0.21 E9/L (ref 0.05–0.5)
EOSINOPHILS RELATIVE PERCENT: 4.3 % (ref 0–6)
FIO2: 50 %
FIO2: 50 %
GFR AFRICAN AMERICAN: >60
GFR NON-AFRICAN AMERICAN: >60 ML/MIN/1.73
GLUCOSE BLD-MCNC: 150 MG/DL (ref 74–99)
HCO3: 36.4 MMOL/L (ref 22–26)
HCO3: 37.1 MMOL/L (ref 22–26)
HCO3: 37.4 MMOL/L (ref 22–26)
HCT VFR BLD CALC: 46.2 % (ref 34–48)
HEMOGLOBIN: 14.7 G/DL (ref 11.5–15.5)
HHB: 2.9 % (ref 0–5)
HHB: 5.8 % (ref 0–5)
HHB: 6.9 % (ref 0–5)
IMMATURE GRANULOCYTES #: 0.01 E9/L
IMMATURE GRANULOCYTES %: 0.2 % (ref 0–5)
INR BLD: 1.1
LAB: ABNORMAL
LACTIC ACID, SEPSIS: 0.8 MMOL/L (ref 0.5–1.9)
LYMPHOCYTES ABSOLUTE: 0.96 E9/L (ref 1.5–4)
LYMPHOCYTES RELATIVE PERCENT: 19.8 % (ref 20–42)
Lab: ABNORMAL
MAGNESIUM: 2 MG/DL (ref 1.6–2.6)
MCH RBC QN AUTO: 30.9 PG (ref 26–35)
MCHC RBC AUTO-ENTMCNC: 31.8 % (ref 32–34.5)
MCV RBC AUTO: 97.3 FL (ref 80–99.9)
METHB: 0.2 % (ref 0–1.5)
METHB: 0.3 % (ref 0–1.5)
METHB: 0.4 % (ref 0–1.5)
MODE: ABNORMAL
MONOCYTES ABSOLUTE: 0.46 E9/L (ref 0.1–0.95)
MONOCYTES RELATIVE PERCENT: 9.5 % (ref 2–12)
NEUTROPHILS ABSOLUTE: 3.18 E9/L (ref 1.8–7.3)
NEUTROPHILS RELATIVE PERCENT: 65.6 % (ref 43–80)
O2 CONTENT: 17.3 ML/DL
O2 CONTENT: 18.6 ML/DL
O2 CONTENT: 18.8 ML/DL
O2 SATURATION: 92.3 % (ref 92–98.5)
O2 SATURATION: 93.8 % (ref 92–98.5)
O2 SATURATION: 96.8 % (ref 92–98.5)
O2HB: 82.4 % (ref 94–97)
O2HB: 87.4 % (ref 94–97)
O2HB: 88.7 % (ref 94–97)
OPERATOR ID: 9689
OPERATOR ID: 9689
OPERATOR ID: ABNORMAL
PATIENT TEMP: 37
PATIENT TEMP: 37 C
PATIENT TEMP: 37 C
PCO2: 72.1 MMHG (ref 35–45)
PCO2: 76.7 MMHG (ref 35–45)
PCO2: 78.7 MMHG (ref 35–45)
PDW BLD-RTO: 14.6 FL (ref 11.5–15)
PEEP/CPAP: 6 CMH2O
PFO2: 1.5 MMHG/%
PFO2: 1.96 MMHG/%
PH BLOOD GAS: 7.28 (ref 7.35–7.45)
PH BLOOD GAS: 7.31 (ref 7.35–7.45)
PH BLOOD GAS: 7.33 (ref 7.35–7.45)
PLATELET # BLD: 182 E9/L (ref 130–450)
PMV BLD AUTO: 9.3 FL (ref 7–12)
PO2: 65 MMHG (ref 75–100)
PO2: 75.2 MMHG (ref 75–100)
PO2: 98.2 MMHG (ref 75–100)
POTASSIUM SERPL-SCNC: 4.1 MMOL/L (ref 3.5–5)
PRO-BNP: 949 PG/ML (ref 0–125)
PROTHROMBIN TIME: 12 SEC (ref 9.3–12.4)
PS: 16 CMH20
RBC # BLD: 4.75 E12/L (ref 3.5–5.5)
RI(T): 1.62
RI(T): 2.4
SODIUM BLD-SCNC: 138 MMOL/L (ref 132–146)
SOURCE, BLOOD GAS: ABNORMAL
THB: 14.9 G/DL (ref 11.5–16.5)
THB: 15 G/DL (ref 11.5–16.5)
THB: 15.1 G/DL (ref 11.5–16.5)
TIME ANALYZED: 1633
TIME ANALYZED: 1859
TIME ANALYZED: 2110
TOTAL PROTEIN: 6.9 G/DL (ref 6.4–8.3)
TROPONIN: 0.03 NG/ML (ref 0–0.03)
WBC # BLD: 4.9 E9/L (ref 4.5–11.5)

## 2020-08-06 PROCEDURE — 87040 BLOOD CULTURE FOR BACTERIA: CPT

## 2020-08-06 PROCEDURE — 2000000000 HC ICU R&B

## 2020-08-06 PROCEDURE — 85730 THROMBOPLASTIN TIME PARTIAL: CPT

## 2020-08-06 PROCEDURE — 99284 EMERGENCY DEPT VISIT MOD MDM: CPT

## 2020-08-06 PROCEDURE — U0002 COVID-19 LAB TEST NON-CDC: HCPCS

## 2020-08-06 PROCEDURE — 83605 ASSAY OF LACTIC ACID: CPT

## 2020-08-06 PROCEDURE — 94640 AIRWAY INHALATION TREATMENT: CPT

## 2020-08-06 PROCEDURE — 96374 THER/PROPH/DIAG INJ IV PUSH: CPT

## 2020-08-06 PROCEDURE — 6360000004 HC RX CONTRAST MEDICATION: Performed by: RADIOLOGY

## 2020-08-06 PROCEDURE — 6370000000 HC RX 637 (ALT 250 FOR IP): Performed by: EMERGENCY MEDICINE

## 2020-08-06 PROCEDURE — 93010 ELECTROCARDIOGRAM REPORT: CPT | Performed by: INTERNAL MEDICINE

## 2020-08-06 PROCEDURE — 83735 ASSAY OF MAGNESIUM: CPT

## 2020-08-06 PROCEDURE — 71275 CT ANGIOGRAPHY CHEST: CPT

## 2020-08-06 PROCEDURE — 2700000000 HC OXYGEN THERAPY PER DAY

## 2020-08-06 PROCEDURE — 93005 ELECTROCARDIOGRAM TRACING: CPT | Performed by: EMERGENCY MEDICINE

## 2020-08-06 PROCEDURE — 80053 COMPREHEN METABOLIC PANEL: CPT

## 2020-08-06 PROCEDURE — 94660 CPAP INITIATION&MGMT: CPT

## 2020-08-06 PROCEDURE — 85025 COMPLETE CBC W/AUTO DIFF WBC: CPT

## 2020-08-06 PROCEDURE — 85610 PROTHROMBIN TIME: CPT

## 2020-08-06 PROCEDURE — 84484 ASSAY OF TROPONIN QUANT: CPT

## 2020-08-06 PROCEDURE — 6360000002 HC RX W HCPCS: Performed by: EMERGENCY MEDICINE

## 2020-08-06 PROCEDURE — 82805 BLOOD GASES W/O2 SATURATION: CPT

## 2020-08-06 PROCEDURE — 83880 ASSAY OF NATRIURETIC PEPTIDE: CPT

## 2020-08-06 PROCEDURE — 5A09457 ASSISTANCE WITH RESPIRATORY VENTILATION, 24-96 CONSECUTIVE HOURS, CONTINUOUS POSITIVE AIRWAY PRESSURE: ICD-10-PCS | Performed by: EMERGENCY MEDICINE

## 2020-08-06 PROCEDURE — 71046 X-RAY EXAM CHEST 2 VIEWS: CPT

## 2020-08-06 PROCEDURE — 94664 DEMO&/EVAL PT USE INHALER: CPT

## 2020-08-06 RX ORDER — ACETAMINOPHEN 325 MG/1
650 TABLET ORAL EVERY 4 HOURS PRN
Status: DISCONTINUED | OUTPATIENT
Start: 2020-08-06 | End: 2020-08-11 | Stop reason: HOSPADM

## 2020-08-06 RX ORDER — PANTOPRAZOLE SODIUM 40 MG/1
40 TABLET, DELAYED RELEASE ORAL
Status: DISCONTINUED | OUTPATIENT
Start: 2020-08-07 | End: 2020-08-11 | Stop reason: HOSPADM

## 2020-08-06 RX ORDER — METHYLPREDNISOLONE SODIUM SUCCINATE 125 MG/2ML
125 INJECTION, POWDER, LYOPHILIZED, FOR SOLUTION INTRAMUSCULAR; INTRAVENOUS ONCE
Status: COMPLETED | OUTPATIENT
Start: 2020-08-06 | End: 2020-08-06

## 2020-08-06 RX ORDER — SODIUM CHLORIDE 0.9 % (FLUSH) 0.9 %
10 SYRINGE (ML) INJECTION PRN
Status: DISCONTINUED | OUTPATIENT
Start: 2020-08-06 | End: 2020-08-11 | Stop reason: HOSPADM

## 2020-08-06 RX ORDER — GABAPENTIN 300 MG/1
900 CAPSULE ORAL 3 TIMES DAILY
Status: DISCONTINUED | OUTPATIENT
Start: 2020-08-07 | End: 2020-08-07

## 2020-08-06 RX ORDER — SODIUM CHLORIDE 0.9 % (FLUSH) 0.9 %
10 SYRINGE (ML) INJECTION EVERY 12 HOURS SCHEDULED
Status: DISCONTINUED | OUTPATIENT
Start: 2020-08-06 | End: 2020-08-11 | Stop reason: HOSPADM

## 2020-08-06 RX ORDER — IPRATROPIUM BROMIDE AND ALBUTEROL SULFATE 2.5; .5 MG/3ML; MG/3ML
2 SOLUTION RESPIRATORY (INHALATION) ONCE
Status: COMPLETED | OUTPATIENT
Start: 2020-08-06 | End: 2020-08-06

## 2020-08-06 RX ORDER — LOSARTAN POTASSIUM 50 MG/1
50 TABLET ORAL DAILY
Status: DISCONTINUED | OUTPATIENT
Start: 2020-08-07 | End: 2020-08-11 | Stop reason: HOSPADM

## 2020-08-06 RX ORDER — SODIUM CHLORIDE 0.9 % (FLUSH) 0.9 %
SYRINGE (ML) INJECTION
Status: DISPENSED
Start: 2020-08-06 | End: 2020-08-07

## 2020-08-06 RX ORDER — NEBIVOLOL 5 MG/1
5 TABLET ORAL DAILY
Status: DISCONTINUED | OUTPATIENT
Start: 2020-08-07 | End: 2020-08-11 | Stop reason: HOSPADM

## 2020-08-06 RX ORDER — DOXYCYCLINE HYCLATE 100 MG/1
100 CAPSULE ORAL ONCE
Status: COMPLETED | OUTPATIENT
Start: 2020-08-06 | End: 2020-08-06

## 2020-08-06 RX ORDER — LOSARTAN POTASSIUM AND HYDROCHLOROTHIAZIDE 12.5; 5 MG/1; MG/1
1 TABLET ORAL DAILY
Status: DISCONTINUED | OUTPATIENT
Start: 2020-08-07 | End: 2020-08-06 | Stop reason: CLARIF

## 2020-08-06 RX ORDER — HYDROCHLOROTHIAZIDE 12.5 MG/1
12.5 TABLET ORAL DAILY
Status: DISCONTINUED | OUTPATIENT
Start: 2020-08-07 | End: 2020-08-07

## 2020-08-06 RX ORDER — AMLODIPINE BESYLATE 5 MG/1
5 TABLET ORAL DAILY
Status: DISCONTINUED | OUTPATIENT
Start: 2020-08-07 | End: 2020-08-11 | Stop reason: HOSPADM

## 2020-08-06 RX ORDER — HYDROCODONE BITARTRATE AND ACETAMINOPHEN 7.5; 325 MG/1; MG/1
1 TABLET ORAL EVERY 6 HOURS PRN
Status: DISCONTINUED | OUTPATIENT
Start: 2020-08-06 | End: 2020-08-07

## 2020-08-06 RX ADMIN — IOPAMIDOL 75 ML: 755 INJECTION, SOLUTION INTRAVENOUS at 19:11

## 2020-08-06 RX ADMIN — IPRATROPIUM BROMIDE AND ALBUTEROL SULFATE 2 AMPULE: .5; 3 SOLUTION RESPIRATORY (INHALATION) at 16:00

## 2020-08-06 RX ADMIN — METHYLPREDNISOLONE SODIUM SUCCINATE 125 MG: 125 INJECTION, POWDER, FOR SOLUTION INTRAMUSCULAR; INTRAVENOUS at 19:27

## 2020-08-06 RX ADMIN — DOXYCYCLINE HYCLATE 100 MG: 100 CAPSULE ORAL at 20:06

## 2020-08-06 RX ADMIN — IPRATROPIUM BROMIDE AND ALBUTEROL SULFATE 2 AMPULE: .5; 3 SOLUTION RESPIRATORY (INHALATION) at 19:24

## 2020-08-06 ASSESSMENT — ENCOUNTER SYMPTOMS
COUGH: 0
VOMITING: 0
BACK PAIN: 0
CHEST TIGHTNESS: 0
SORE THROAT: 0
DIARRHEA: 0
ABDOMINAL DISTENTION: 0
WHEEZING: 0
ABDOMINAL PAIN: 0
NAUSEA: 0
SHORTNESS OF BREATH: 1

## 2020-08-06 ASSESSMENT — PAIN SCALES - GENERAL: PAINLEVEL_OUTOF10: 0

## 2020-08-06 NOTE — LETTER
Beneficiary Notification Letter  BPCI Advanced     Your Doctor or Jones,    We wanted to let you know that your health care provider, [address], has volunteered to take part in our [facility] for [facility] & Medicaid Services (CMS) Bundled Payments for [address] (BPCI Advanced). This doesnt change your Medicare rights or benefits and you dont need to do anything. What are bundled payments? A bundled payment combines, or bundles together, payments that Medicare makes to your health care providers for the many different kinds of medical services you might get in a specific time period. In BPCI Advanced, this time period could include a hospital  inpatient stay or outpatient procedure, plus 90 days. Why would Medicare bundle payments? Bundled payments are thought of as a value-based way to pay because health care  providers are responsible for both the quality and cost of medical care they give. This is  a relatively new way of paying health care providers compared to thefee-for-service  way Medicare has traditionally paid, where providers are paid separately for each  service they provide. Bundled payments encourage these providers to work together to  provide better, more coordinated care during your hospital stay, or outpatient procedure,  and through your recovery. What does BPCI Advance mean for me? Youre more likely to get even better care when hospitals, doctors, and other health care  providers work together. In BPCI Advanced, hospitals, doctors, and other health care  providers may be rewarded for providing better, more coordinated health care. Medicare  will watch BPCI Advanced participants closely to make sure that you and other patients  keep getting efficient, high quality care. What do I need to know about BPCI Advanced?   Whats most important for you to know is that your Medicare rights and benefits wont  change because your health care provider is participating in [address] [place] [address]. Medicare  will keep covering all of your medically necessary services. Even though Medicare will pay your doctor in a different way under BPCI Advanced,  how much you have to pay wont change. Health care providers and suppliers who  are enrolled in Medicare will submit their Medicare claims like they always have. Youll have all the same Medicare rights and protections, including the right to  choose which hospital, doctor, or other health care provider you see. If you dont want to  get care from a health care provider whos participating in [address] [place] [address], then youll  have to choose a different health care provider whos not participating in the Model. How can I give feedback about my health care? Medicare might ask you to take a voluntary survey about the services and care you  received from [facility] during your hospital stay or outpatient procedure and for a specific period of time afterwards. You can decide whether you want to take the voluntary survey, but if you do, itll help Medicare make BPCI Advanced and the care of other Medicare patients better. If you have concerns or complaints about your care, you can:   · Talk to your doctor or health care provider. · Contact your Beneficiary and Family Centered Care Quality Improvement   Organization [facility]). You can get your [employer]-QIOs phone number at   Medicare.gov/contacts or by calling 1-800-MEDICARE. TTY users can call   6-917.320.2150. Where can I learn more about BPCI Advanced? Learn more about BPCI Advanced at https://innovation.cms.gov/initiatives/bpci-advanced/:  · A list of all the hospitals and physician group practices in the country participating   in [address] [place] [address].   · All of the inpatient and outpatient Clinical Episodes that are currently included   under BPCI Advanced. A Clinical Episode is a grouping of medical conditions or   diagnoses that are included in the [id number] [place] [address].

## 2020-08-06 NOTE — ED PROVIDER NOTES
Constitutional:       General: She is not in acute distress. Appearance: She is well-developed. She is not ill-appearing, toxic-appearing or diaphoretic. HENT:      Head: Normocephalic and atraumatic. Comments: Old appearing bruising to the forehead and periorbital facial regions. Seen yesterday, CT results reviewed. Eyes:      General: No scleral icterus. Extraocular Movements: Extraocular movements intact. Conjunctiva/sclera: Conjunctivae normal.      Pupils: Pupils are equal, round, and reactive to light. Neck:      Musculoskeletal: Normal range of motion and neck supple. Normal range of motion. No neck rigidity, injury, pain with movement, spinous process tenderness or muscular tenderness. Trachea: Trachea and phonation normal. No tracheal tenderness or tracheal deviation. Cardiovascular:      Rate and Rhythm: Normal rate and regular rhythm. Heart sounds: Normal heart sounds. No murmur. Pulmonary:      Effort: Pulmonary effort is normal. No respiratory distress. Breath sounds: No stridor, decreased air movement or transmitted upper airway sounds. Decreased breath sounds and wheezing present. No rhonchi or rales. Comments: Generally diminished breath sounds with mild scattered wheezing. No respiratory distress. Chest:      Chest wall: No tenderness. Abdominal:      General: Bowel sounds are normal. There is no distension. Palpations: Abdomen is soft. Tenderness: There is no abdominal tenderness. There is no right CVA tenderness, left CVA tenderness, guarding or rebound. Musculoskeletal:         General: No swelling, tenderness, deformity or signs of injury. Right lower leg: No edema. Left lower leg: No edema. Comments: No pretibial edema or calf pain. Lymphadenopathy:      Cervical: No cervical adenopathy. Skin:     General: Skin is warm and dry. Coloration: Skin is not jaundiced or pale. Findings: Bruising present.  No erythema or rash. Neurological:      General: No focal deficit present. Mental Status: She is alert and oriented to person, place, and time. GCS: GCS eye subscore is 4. GCS verbal subscore is 5. GCS motor subscore is 6. Cranial Nerves: Cranial nerves are intact. No cranial nerve deficit. Sensory: Sensation is intact. Motor: Motor function is intact. Coordination: Coordination is intact. Coordination normal.          Procedures     [facility]     ED Course as of Aug 06 2150   Thu Aug 06, 2020   1859 Patient laying in the bed on BiPAP in no acute distress. [NC]   2033 Case discussed with Dr. Brooke Urena, detailed review given, he will admit the patient but to request we redraw blood gases shortly and if steady or improving will put her in the [facility] but if any worsening we will use the intensive care unit. [NC]   2118 Patient laying the bed in no distress on BiPAP. Her eyes are closed but she is awake, she opens her eyes and converses with me and is in no acute distress. She is not complaining of shortness of breath and when I asked her she does not states she is short of breath. [NC]   2142 Case discussed with Dr. Iantha Severs for critical care, detailed overview given, ABGs discussed, trends are discussed, patient's physical exam, current condition, overall alertness and generally well-appearing are discussed as well as her CT findings. He is comfortable with the patient going to 6 floor although will consult if PCP requests ICU. Believes this is chronic in nature and related to her right elevated hemidiaphragm and for functioning of the diaphragm on that side. [NC]   2148 Case discussed with Dr. Brooke Urena, detailed overview given, he will admit the patient and would like her in the ICU.     [NC]      ED Course User Index  [NC] Donis John, DO          EKG Interpretation    Interpreted by emergency department physician    Rhythm: normal sinus   Rate: 88  Axis: normal  Ectopy: none  Conduction: normal  ST Segments: normal  T Waves: normal  Q Waves: none    Clinical Impression: no acute changes    Annita Altman     ED Course as of Aug 06 2150   Thu Aug 06, 2020   1859 Patient laying in the bed on BiPAP in no acute distress. [NC]   2033 Case discussed with Dr. Heather Lai, detailed review given, he will admit the patient but to request we redraw blood gases shortly and if steady or improving will put her in the [facility] but if any worsening we will use the intensive care unit. [NC]   2118 Patient laying the bed in no distress on BiPAP. Her eyes are closed but she is awake, she opens her eyes and converses with me and is in no acute distress. She is not complaining of shortness of breath and when I asked her she does not states she is short of breath. [NC]   2142 Case discussed with Dr. Mariam Fernandez for critical care, detailed overview given, ABGs discussed, trends are discussed, patient's physical exam, current condition, overall alertness and generally well-appearing are discussed as well as her CT findings. He is comfortable with the patient going to 6 floor although will consult if PCP requests ICU. Believes this is chronic in nature and related to her right elevated hemidiaphragm and for functioning of the diaphragm on that side. [NC]   2148 Case discussed with Dr. Heather Lai, detailed overview given, he will admit the patient and would like her in the ICU. [NC]      ED Course User Index  [NC] Sherrie Chatman DO       --------------------------------------------- PAST HISTORY ---------------------------------------------  Past Medical History:  has a past medical history of Adverse effect of anesthetic, Arthritis, Bronchitis, Cancer ([facility] [address] Diverticula of colon, Hyperlipidemia, Hypertension, Pneumonia, Restless leg syndrome, Sleep apnea, and Thyroid disease. Past Surgical History:  has a past surgical history that includes Appendectomy; Hand surgery;  Hysterectomy; Knee arthroscopy (Bilateral); Lap Band (2007); Ovary removal (1975); other surgical history; Cholecystectomy (2009); Colonoscopy (2011); Total hip arthroplasty [facility] 2012); Revision total hip arthroplasty (3/26/2012); Eye surgery (Bilateral); other surgical history (Right, 4/15/2015); Nerve Block (Left, 8/19/15); Nerve Block (8 19 15); Nerve Block (N/A, 9/9/15); Nerve Block (N/A, 10 26 2015); Nerve Block (Bilateral, 11 2 15); Nerve Block (Bilateral, 11/09/15); other surgical history (Right, 12 30 2015); other surgical history (Left, 2 8 16); Nerve Block (Bilateral, 04 13 2016); Nerve Block (Bilateral, 08/31/2016); Nerve Block (Bilateral, 10/03/2016); Breast surgery (Left, 03/2016); other surgical history (Left, 11/21/2016); other surgical history (Right, 01/09/2017); other surgical history (Right, 02/08/2017); other surgical history (Left, 03/27/2017); other surgical history (Left, 05/24/2017); Cosmetic surgery (Bilateral); joint replacement (03/04/07); other surgical history (Right, 01/22/2020); and Nerve Surgery (Right, 1/22/2020). Social History:  reports that she has been smoking cigarettes. She has a 31.00 pack-year smoking history. She has never used smokeless tobacco. She reports previous alcohol use of about 1.0 standard drinks of alcohol per week. She reports that she does not use drugs. Family History: family history includes Diabetes in her father; Heart Failure in her mother; Other in her brother; Parkinsonism in her father. The patients home medications have been reviewed.     Allergies: Demerol [meperidine hcl] and Nabumetone    -------------------------------------------------- RESULTS -------------------------------------------------    Lab  Results for orders placed or performed during the hospital encounter of 08/06/20   CBC Auto Differential   Result Value Ref Range    WBC 4.9 4.5 - 11.5 E9/L    RBC 4.75 3.50 - 5.50 E12/L    Hemoglobin 14.7 11.5 - 15.5 g/dL    Hematocrit 46.2 34.0 - 48.0 % MCV 97.3 80.0 - 99.9 fL    MCH 30.9 26.0 - 35.0 pg    MCHC 31.8 (L) 32.0 - 34.5 %    RDW 14.6 11.5 - 15.0 fL    Platelets 861 399 - 539 E9/L    MPV 9.3 7.0 - 12.0 fL    Neutrophils % 65.6 43.0 - 80.0 %    Immature Granulocytes % 0.2 0.0 - 5.0 %    Lymphocytes % 19.8 (L) 20.0 - 42.0 %    Monocytes % 9.5 2.0 - 12.0 %    Eosinophils % 4.3 0.0 - 6.0 %    Basophils % 0.6 0.0 - 2.0 %    Neutrophils Absolute 3.18 1.80 - 7.30 E9/L    Immature Granulocytes # 0.01 E9/L    Lymphocytes Absolute 0.96 (L) 1.50 - 4.00 E9/L    Monocytes Absolute 0.46 0.10 - 0.95 E9/L    Eosinophils Absolute 0.21 0.05 - 0.50 E9/L    Basophils Absolute 0.03 0.00 - 0.20 E9/L   Comprehensive Metabolic Panel   Result Value Ref Range    Sodium 138 132 - 146 mmol/L    Potassium 4.1 3.5 - 5.0 mmol/L    Chloride 93 (L) 98 - 107 mmol/L    CO2 36 (H) 22 - 29 mmol/L    Anion Gap 9 7 - 16 mmol/L    Glucose 150 (H) 74 - 99 mg/dL    BUN 22 8 - 23 mg/dL    CREATININE 0.8 0.5 - 1.0 mg/dL    GFR Non-African American >60 >=60 mL/min/1.73    GFR African American >60     Calcium 8.9 8.6 - 10.2 mg/dL    Total Protein 6.9 6.4 - 8.3 g/dL    Alb 4.0 3.5 - 5.2 g/dL    Total Bilirubin 0.6 0.0 - 1.2 mg/dL    Alkaline Phosphatase 67 35 - 104 U/L    ALT 15 0 - 32 U/L    AST 29 0 - 31 U/L   Protime-INR   Result Value Ref Range    Protime 12.0 9.3 - 12.4 sec    INR 1.1    APTT   Result Value Ref Range    aPTT 32.1 24.5 - 35.1 sec   Brain Natriuretic Peptide   Result Value Ref Range    Pro- (H) 0 - 125 pg/mL   Troponin   Result Value Ref Range    Troponin 0.03 0.00 - 0.03 ng/mL   Magnesium   Result Value Ref Range    Magnesium 2.0 1.6 - 2.6 mg/dL   Blood Gas, Arterial   Result Value Ref Range    Date Analyzed 20200806     Time Analyzed 1633     Source: Blood Arterial     pH, Blood Gas 7.329 (L) 7.350 - 7.450    PCO2 72.1 (HH) 35.0 - 45.0 mmHg    PO2 65.0 (L) 75.0 - 100.0 mmHg    HCO3 37.1 (H) 22.0 - 26.0 mmol/L    B.E. 8.1 (H) -3.0 - 3.0 mmol/L    O2 Sat 92.3 92.0 - 98.5 %    O2Hb 82.4 (L) 94.0 - 97.0 %    COHb 10.5 (H) 0.0 - 1.5 %    MetHb 0.2 0.0 - 1.5 %    O2 Content 17.3 mL/dL    HHb 6.9 (H) 0.0 - 5.0 %    tHb (est) 14.9 11.5 - 16.5 g/dL    Mode NC- 3 L     Comment Readback critical PCO2 to Livier Deng RN     Date Of Collection      Time Collected      Pt Temp 37      ID [id number]     Lab [id number]     Critical(s) Notified Called to S. Emerson Schaumann RN    Blood Gas, Arterial   Result Value Ref Range    Date Analyzed 20200806     Time Analyzed 1859     Source: Blood Arterial     pH, Blood Gas 7.306 (L) 7.350 - 7.450    PCO2 76.7 (HH) 35.0 - 45.0 mmHg    PO2 98.2 75.0 - 100.0 mmHg    HCO3 37.4 (H) 22.0 - 26.0 mmol/L    B.E. 7.8 (H) -3.0 - 3.0 mmol/L    O2 Sat 96.8 92.0 - 98.5 %    PO2/FIO2 1.96 mmHg/%    RI(T) 1.62     O2Hb 88.7 (L) 94.0 - 97.0 %    COHb 8.0 (H) 0.0 - 1.5 %    MetHb 0.4 0.0 - 1.5 %    O2 Content 18.8 mL/dL    HHb 2.9 0.0 - 5.0 %    tHb (est) 15.0 11.5 - 16.5 g/dL    Mode BILEVEL     FIO2 50.0 %    Comment called to [address] [place] [address]   with readback.      Date Of Collection      Time Collected      Pt Temp 37.0 C     ID [id number]     Lab [id number]     Critical(s) Notified Called to    Lactate, Sepsis   Result Value Ref Range    Lactic Acid, Sepsis 0.8 0.5 - 1.9 mmol/L   Blood Gas, Arterial   Result Value Ref Range    Date Analyzed 20200806     Time Analyzed 2110     Source: Blood Arterial     pH, Blood Gas 7.283 (L) 7.350 - 7.450    PCO2 78.7 (HH) 35.0 - 45.0 mmHg    PO2 75.2 75.0 - 100.0 mmHg    HCO3 36.4 (H) 22.0 - 26.0 mmol/L    B.E. 6.4 (H) -3.0 - 3.0 mmol/L    O2 Sat 93.8 92.0 - 98.5 %    PO2/FIO2 1.50 mmHg/%    RI(T) 2.40     O2Hb 87.4 (L) 94.0 - 97.0 %    COHb 6.5 (H) 0.0 - 1.5 %    MetHb 0.3 0.0 - 1.5 %    O2 Content 18.6 mL/dL    HHb 5.8 (H) 0.0 - 5.0 %    tHb (est) 15.1 11.5 - 16.5 g/dL    Mode BILEVEL     FIO2 50.0 %    Peep/Cpap 6.0 cmH2O    PS 16 cmH20    Comment Called to Livier Deng RN with readback     Date Of Collection      Time Collected      Pt Temp 37.0 C  ID [id number]     Lab 22722     Critical(s) Notified . No Critical Values    EKG 12 Lead   Result Value Ref Range    Ventricular Rate 88 BPM    Atrial Rate 88 BPM    P-R Interval 178 ms    QRS Duration 86 ms    Q-T Interval 380 ms    QTc Calculation (Bazett) 459 ms    P Axis 32 degrees    R Axis 31 degrees    T Axis 6 degrees       Radiology  CTA CHEST W CONTRAST   Final Result   1. There is no evidence of a pulmonary embolus. 2. Chronic significant elevation the right hemidiaphragm. 3. Alveolar opacification within the right lung base. The airspace disease   given the appearance is favored to represent pneumonia rather than   compression atelectasis. 4. Ectasias of the pulmonary artery. The finding can be seen with pulmonary   hypertension. 5. Cardiomegaly         XR CHEST (2 VW)   Final Result   1. There is no evidence of failure or pneumonia. 2. Chronic elevation of the right hemidiaphragm. ------------------------- NURSING NOTES AND VITALS REVIEWED ---------------------------  Date / Time Roomed:  8/6/2020  3:41 PM  ED Bed Assignment:  08/08    The nursing notes within the ED encounter and vital signs as below have been reviewed. Patient Vitals for the past 24 hrs:   BP Temp Temp src Pulse Resp SpO2 Height Weight   08/06/20 1924 -- -- -- -- 16 96 % -- --   08/06/20 1547 -- -- -- -- 24 97 % -- --   08/06/20 1539 132/83 -- -- 92 24 (!) 76 % 5' (1.524 m) 210 lb (95.3 kg)   08/06/20 1537 -- 97 °F (36.1 °C) Infrared -- -- -- -- --       Oxygen Saturation Interpretation: Normal          I have spoken with the patient and discussed todays results, in addition to providing specific details for the plan of care and counseling regarding the diagnosis and prognosis.   Their questions are answered at this time and they are agreeable with the plan.      ---------------------------------   This patient's ED course included: a personal history and physicial examination, re-evaluation prior to

## 2020-08-06 NOTE — TELEPHONE ENCOUNTER
I called patient back and she plans on going back to hospital this afternoon due to hypoxia . Is having trouble lying down flat and needs oxygen at that time.

## 2020-08-07 ENCOUNTER — APPOINTMENT (OUTPATIENT)
Dept: CT IMAGING | Age: 75
DRG: 189 | End: 2020-08-07
Payer: MEDICARE

## 2020-08-07 LAB
ANION GAP SERPL CALCULATED.3IONS-SCNC: 11 MMOL/L (ref 7–16)
B.E.: 3.4 MMOL/L (ref -3–3)
BUN BLDV-MCNC: 15 MG/DL (ref 8–23)
C-REACTIVE PROTEIN: 0.9 MG/DL (ref 0–0.4)
CALCIUM SERPL-MCNC: 9 MG/DL (ref 8.6–10.2)
CHLORIDE BLD-SCNC: 93 MMOL/L (ref 98–107)
CO2: 34 MMOL/L (ref 22–29)
COHB: 4.7 % (ref 0–1.5)
CREAT SERPL-MCNC: 0.6 MG/DL (ref 0.5–1)
CRITICAL: ABNORMAL
D DIMER: 405 NG/ML DDU
DATE ANALYZED: ABNORMAL
DATE OF COLLECTION: ABNORMAL
FERRITIN: 36 NG/ML
FIO2: 40 %
GFR AFRICAN AMERICAN: >60
GFR NON-AFRICAN AMERICAN: >60 ML/MIN/1.73
GLUCOSE BLD-MCNC: 156 MG/DL (ref 74–99)
HCO3: 31.2 MMOL/L (ref 22–26)
HHB: 5.1 % (ref 0–5)
LAB: ABNORMAL
LACTATE DEHYDROGENASE: 351 U/L (ref 135–214)
LACTIC ACID, SEPSIS: 1.2 MMOL/L (ref 0.5–1.9)
Lab: ABNORMAL
METHB: 0.3 % (ref 0–1.5)
MODE: ABNORMAL
O2 CONTENT: 20.2 ML/DL
O2 SATURATION: 94.6 % (ref 92–98.5)
O2HB: 89.9 % (ref 94–97)
OPERATOR ID: 9689
PATIENT TEMP: 37 C
PCO2: 59.9 MMHG (ref 35–45)
PEEP/CPAP: 6 CMH2O
PFO2: 1.93 MMHG/%
PH BLOOD GAS: 7.33 (ref 7.35–7.45)
PO2: 77.3 MMHG (ref 75–100)
POTASSIUM SERPL-SCNC: 4.2 MMOL/L (ref 3.5–5)
PS: 18 CMH20
RI(T): 1.67
SARS-COV-2, NAAT: NOT DETECTED
SEDIMENTATION RATE, ERYTHROCYTE: 2 MM/HR (ref 0–20)
SODIUM BLD-SCNC: 138 MMOL/L (ref 132–146)
SOURCE, BLOOD GAS: ABNORMAL
THB: 16 G/DL (ref 11.5–16.5)
TIME ANALYZED: 43

## 2020-08-07 PROCEDURE — 6360000002 HC RX W HCPCS: Performed by: INTERNAL MEDICINE

## 2020-08-07 PROCEDURE — 2580000003 HC RX 258: Performed by: INTERNAL MEDICINE

## 2020-08-07 PROCEDURE — 36415 COLL VENOUS BLD VENIPUNCTURE: CPT

## 2020-08-07 PROCEDURE — 51702 INSERT TEMP BLADDER CATH: CPT

## 2020-08-07 PROCEDURE — 36600 WITHDRAWAL OF ARTERIAL BLOOD: CPT

## 2020-08-07 PROCEDURE — 87081 CULTURE SCREEN ONLY: CPT

## 2020-08-07 PROCEDURE — 80048 BASIC METABOLIC PNL TOTAL CA: CPT

## 2020-08-07 PROCEDURE — 6370000000 HC RX 637 (ALT 250 FOR IP): Performed by: INTERNAL MEDICINE

## 2020-08-07 PROCEDURE — 2500000003 HC RX 250 WO HCPCS: Performed by: INTERNAL MEDICINE

## 2020-08-07 PROCEDURE — 83605 ASSAY OF LACTIC ACID: CPT

## 2020-08-07 PROCEDURE — 82728 ASSAY OF FERRITIN: CPT

## 2020-08-07 PROCEDURE — 83615 LACTATE (LD) (LDH) ENZYME: CPT

## 2020-08-07 PROCEDURE — 2000000000 HC ICU R&B

## 2020-08-07 PROCEDURE — 94660 CPAP INITIATION&MGMT: CPT

## 2020-08-07 PROCEDURE — 87088 URINE BACTERIA CULTURE: CPT

## 2020-08-07 PROCEDURE — 85378 FIBRIN DEGRADE SEMIQUANT: CPT

## 2020-08-07 PROCEDURE — 70450 CT HEAD/BRAIN W/O DYE: CPT

## 2020-08-07 PROCEDURE — 94640 AIRWAY INHALATION TREATMENT: CPT

## 2020-08-07 PROCEDURE — 85651 RBC SED RATE NONAUTOMATED: CPT

## 2020-08-07 PROCEDURE — 82805 BLOOD GASES W/O2 SATURATION: CPT

## 2020-08-07 PROCEDURE — 87450 HC DIRECT STREP B ANTIGEN: CPT

## 2020-08-07 PROCEDURE — 86140 C-REACTIVE PROTEIN: CPT

## 2020-08-07 RX ORDER — DULOXETIN HYDROCHLORIDE 60 MG/1
60 CAPSULE, DELAYED RELEASE ORAL DAILY
Status: DISCONTINUED | OUTPATIENT
Start: 2020-08-07 | End: 2020-08-11 | Stop reason: HOSPADM

## 2020-08-07 RX ORDER — PANTOPRAZOLE SODIUM 40 MG/1
40 TABLET, DELAYED RELEASE ORAL DAILY
COMMUNITY
End: 2020-11-18

## 2020-08-07 RX ORDER — FERROUS SULFATE 325(65) MG
325 TABLET ORAL
Status: DISCONTINUED | OUTPATIENT
Start: 2020-08-08 | End: 2020-08-11 | Stop reason: HOSPADM

## 2020-08-07 RX ORDER — OXYCODONE AND ACETAMINOPHEN 7.5; 325 MG/1; MG/1
1 TABLET ORAL EVERY 6 HOURS PRN
Status: ON HOLD | COMMUNITY
End: 2020-08-11 | Stop reason: HOSPADM

## 2020-08-07 RX ORDER — FUROSEMIDE 10 MG/ML
20 INJECTION INTRAMUSCULAR; INTRAVENOUS ONCE
Status: COMPLETED | OUTPATIENT
Start: 2020-08-07 | End: 2020-08-07

## 2020-08-07 RX ORDER — IPRATROPIUM BROMIDE AND ALBUTEROL SULFATE 2.5; .5 MG/3ML; MG/3ML
1 SOLUTION RESPIRATORY (INHALATION)
Status: DISCONTINUED | OUTPATIENT
Start: 2020-08-07 | End: 2020-08-11 | Stop reason: HOSPADM

## 2020-08-07 RX ORDER — HYDROCODONE BITARTRATE AND ACETAMINOPHEN 5; 325 MG/1; MG/1
1 TABLET ORAL EVERY 6 HOURS PRN
Status: DISCONTINUED | OUTPATIENT
Start: 2020-08-07 | End: 2020-08-11 | Stop reason: HOSPADM

## 2020-08-07 RX ORDER — HYDRALAZINE HYDROCHLORIDE 20 MG/ML
5 INJECTION INTRAMUSCULAR; INTRAVENOUS EVERY 4 HOURS PRN
Status: DISCONTINUED | OUTPATIENT
Start: 2020-08-07 | End: 2020-08-11 | Stop reason: HOSPADM

## 2020-08-07 RX ORDER — METHYLPREDNISOLONE SODIUM SUCCINATE 40 MG/ML
40 INJECTION, POWDER, LYOPHILIZED, FOR SOLUTION INTRAMUSCULAR; INTRAVENOUS EVERY 8 HOURS
Status: DISCONTINUED | OUTPATIENT
Start: 2020-08-07 | End: 2020-08-08

## 2020-08-07 RX ADMIN — PANTOPRAZOLE SODIUM 40 MG: 40 TABLET, DELAYED RELEASE ORAL at 08:03

## 2020-08-07 RX ADMIN — METHYLPREDNISOLONE SODIUM SUCCINATE 40 MG: 40 INJECTION, POWDER, FOR SOLUTION INTRAMUSCULAR; INTRAVENOUS at 08:05

## 2020-08-07 RX ADMIN — LOSARTAN POTASSIUM 50 MG: 50 TABLET, FILM COATED ORAL at 00:05

## 2020-08-07 RX ADMIN — FUROSEMIDE 20 MG: 10 INJECTION, SOLUTION INTRAMUSCULAR; INTRAVENOUS at 10:58

## 2020-08-07 RX ADMIN — METHYLPREDNISOLONE SODIUM SUCCINATE 40 MG: 40 INJECTION, POWDER, FOR SOLUTION INTRAMUSCULAR; INTRAVENOUS at 16:43

## 2020-08-07 RX ADMIN — Medication 10 ML: at 20:00

## 2020-08-07 RX ADMIN — AMLODIPINE BESYLATE 5 MG: 5 TABLET ORAL at 00:05

## 2020-08-07 RX ADMIN — CEFTRIAXONE SODIUM 2 G: 2 INJECTION, POWDER, FOR SOLUTION INTRAMUSCULAR; INTRAVENOUS at 23:40

## 2020-08-07 RX ADMIN — ENOXAPARIN SODIUM 40 MG: 40 INJECTION SUBCUTANEOUS at 08:04

## 2020-08-07 RX ADMIN — ROPINIROLE HYDROCHLORIDE 3 MG: 2 TABLET, FILM COATED ORAL at 00:05

## 2020-08-07 RX ADMIN — GABAPENTIN 900 MG: 300 CAPSULE ORAL at 08:03

## 2020-08-07 RX ADMIN — DOXYCYCLINE 100 MG: 100 INJECTION, POWDER, LYOPHILIZED, FOR SOLUTION INTRAVENOUS at 08:02

## 2020-08-07 RX ADMIN — CEFTRIAXONE SODIUM 2 G: 2 INJECTION, POWDER, FOR SOLUTION INTRAMUSCULAR; INTRAVENOUS at 00:56

## 2020-08-07 RX ADMIN — GABAPENTIN 900 MG: 300 CAPSULE ORAL at 00:05

## 2020-08-07 RX ADMIN — ROPINIROLE HYDROCHLORIDE 3 MG: 2 TABLET, FILM COATED ORAL at 19:54

## 2020-08-07 RX ADMIN — IPRATROPIUM BROMIDE AND ALBUTEROL SULFATE 1 AMPULE: .5; 3 SOLUTION RESPIRATORY (INHALATION) at 08:59

## 2020-08-07 RX ADMIN — IPRATROPIUM BROMIDE AND ALBUTEROL SULFATE 1 AMPULE: .5; 3 SOLUTION RESPIRATORY (INHALATION) at 15:37

## 2020-08-07 RX ADMIN — Medication 10 ML: at 08:04

## 2020-08-07 RX ADMIN — HYDROCHLOROTHIAZIDE 12.5 MG: 12.5 TABLET ORAL at 00:04

## 2020-08-07 RX ADMIN — DOXYCYCLINE 100 MG: 100 INJECTION, POWDER, LYOPHILIZED, FOR SOLUTION INTRAVENOUS at 19:53

## 2020-08-07 RX ADMIN — DULOXETINE HYDROCHLORIDE 60 MG: 60 CAPSULE, DELAYED RELEASE ORAL at 08:03

## 2020-08-07 RX ADMIN — NEBIVOLOL HYDROCHLORIDE 5 MG: 5 TABLET ORAL at 00:05

## 2020-08-07 RX ADMIN — IPRATROPIUM BROMIDE AND ALBUTEROL SULFATE 1 AMPULE: .5; 3 SOLUTION RESPIRATORY (INHALATION) at 19:09

## 2020-08-07 RX ADMIN — ROFLUMILAST 500 MCG: 500 TABLET ORAL at 16:43

## 2020-08-07 RX ADMIN — IPRATROPIUM BROMIDE 0.5 MG: 0.5 SOLUTION RESPIRATORY (INHALATION) at 02:24

## 2020-08-07 RX ADMIN — METHYLPREDNISOLONE SODIUM SUCCINATE 40 MG: 40 INJECTION, POWDER, FOR SOLUTION INTRAMUSCULAR; INTRAVENOUS at 23:40

## 2020-08-07 ASSESSMENT — PAIN SCALES - GENERAL
PAINLEVEL_OUTOF10: 0

## 2020-08-07 NOTE — PROGRESS NOTES
Pulmonary/Critical Care Progress Note    We are following patient for acute respiratory failure, respiratory acidosis, pneumonia, likely nonfunctioning right hemidiaphragm, COPD, grade 2 diastolic dysfunction, pulmonary hypertension, systemic hypertension, likely obstructive sleep apnea    SUBJECTIVE:  The patient is a 78-year-old female who is been smoking at least 50 years at a rate of 1 pack/day. She has been feeling more short of breath as of late and went to her primary doctor who referred her to the emergency room. She was supposed to be seen by a cardiologist as well. When she arrived in the emergency room, she was sent for multiple chest x-rays blood gases and a CT angiogram of the chest, the latter of which showed enlarged pulmonary arteries, significant elevation of the right hemidiaphragm, and a right lower lobe basilar infiltrate consistent with pneumonia. I have looked at the chest x-ray which is suggestive of mild heart failure secondary to diastolic dysfunction as described on an echo of approximately 2 weeks ago. Pulmonary artery pressures are significantly elevated on that exam.    The patient was started on doxycycline and ceftriaxone, placed on a BiPAP mask and admitted to the intensive care unit.     MEDICATIONS:   DULoxetine  60 mg Oral Daily    methylPREDNISolone  40 mg Intravenous Q8H    ipratropium-albuterol  1 ampule Inhalation Q4H WA    furosemide  20 mg Intravenous Once    sodium chloride flush  10 mL Intravenous 2 times per day    enoxaparin  40 mg Subcutaneous Daily    cefTRIAXone (ROCEPHIN) IV  2 g Intravenous Q24H    doxycycline (VIBRAMYCIN) IV  100 mg Intravenous Q12H    nebivolol  5 mg Oral Daily    amLODIPine  5 mg Oral Daily    rOPINIRole  3 mg Oral Nightly    pantoprazole  40 mg Oral QAM AC    gabapentin  900 mg Oral TID    losartan  50 mg Oral Daily       sodium chloride flush, acetaminophen, HYDROcodone-acetaminophen      REVIEW OF SYSTEMS:  Constitutional: 11.5 - 15.5 g/dL Final   05/11/2020 14.2 11.5 - 15.5 g/dL Final   10/28/2019 13.7 11.5 - 15.5 g/dL Final     Hematocrit   Date Value Ref Range Status   08/06/2020 46.2 34.0 - 48.0 % Final   05/11/2020 47.8 34.0 - 48.0 % Final   10/28/2019 45.0 34.0 - 48.0 % Final     MCV   Date Value Ref Range Status   08/06/2020 97.3 80.0 - 99.9 fL Final   05/11/2020 99.6 80.0 - 99.9 fL Final   10/28/2019 100.9 (H) 80.0 - 99.9 fL Final     Platelets   Date Value Ref Range Status   08/06/2020 182 130 - 450 E9/L Final   05/11/2020 232 130 - 450 E9/L Final   10/28/2019 249 130 - 450 E9/L Final     Sodium   Date Value Ref Range Status   08/07/2020 138 132 - 146 mmol/L Final   08/06/2020 138 132 - 146 mmol/L Final   05/11/2020 144 132 - 146 mmol/L Final     Potassium   Date Value Ref Range Status   08/07/2020 4.2 3.5 - 5.0 mmol/L Final   08/06/2020 4.1 3.5 - 5.0 mmol/L Final   05/11/2020 5.0 3.5 - 5.0 mmol/L Final     Chloride   Date Value Ref Range Status   08/07/2020 93 (L) 98 - 107 mmol/L Final   08/06/2020 93 (L) 98 - 107 mmol/L Final   05/11/2020 99 98 - 107 mmol/L Final     CO2   Date Value Ref Range Status   08/07/2020 34 (H) 22 - 29 mmol/L Final   08/06/2020 36 (H) 22 - 29 mmol/L Final   05/11/2020 34 (H) 22 - 29 mmol/L Final     BUN   Date Value Ref Range Status   08/07/2020 15 8 - 23 mg/dL Final   08/06/2020 22 8 - 23 mg/dL Final   05/11/2020 18 8 - 23 mg/dL Final     CREATININE   Date Value Ref Range Status   08/07/2020 0.6 0.5 - 1.0 mg/dL Final   08/06/2020 0.8 0.5 - 1.0 mg/dL Final   05/11/2020 0.8 0.5 - 1.0 mg/dL Final     Glucose   Date Value Ref Range Status   08/07/2020 156 (H) 74 - 99 mg/dL Final   08/06/2020 150 (H) 74 - 99 mg/dL Final   05/11/2020 87 74 - 99 mg/dL Final   03/27/2012 125 (H) 70 - 110 mg/dL Final   03/25/2012 112 (H) 70 - 110 mg/dL Final   03/08/2012 143 (H) 70 - 110 mg/dL Final     Calcium   Date Value Ref Range Status   08/07/2020 9.0 8.6 - 10.2 mg/dL Final   08/06/2020 8.9 8.6 - 10.2 mg/dL Final 05/11/2020 9.5 8.6 - 10.2 mg/dL Final     Total Protein   Date Value Ref Range Status   08/06/2020 6.9 6.4 - 8.3 g/dL Final   05/11/2020 7.3 6.4 - 8.3 g/dL Final   10/28/2019 7.6 6.4 - 8.3 g/dL Final     Albumin   Date Value Ref Range Status   03/25/2012 4.3 3.2 - 4.8 g/dL Final   02/21/2012 4.4 3.2 - 4.8 g/dL Final   08/16/2011 4.6 3.2 - 4.8 g/dL Final     Alb   Date Value Ref Range Status   08/06/2020 4.0 3.5 - 5.2 g/dL Final   05/11/2020 4.4 3.5 - 5.2 g/dL Final   10/28/2019 4.5 3.5 - 5.2 g/dL Final     Total Bilirubin   Date Value Ref Range Status   08/06/2020 0.6 0.0 - 1.2 mg/dL Final   05/11/2020 0.3 0.0 - 1.2 mg/dL Final   10/28/2019 0.3 0.0 - 1.2 mg/dL Final     Alkaline Phosphatase   Date Value Ref Range Status   08/06/2020 67 35 - 104 U/L Final   05/11/2020 68 35 - 104 U/L Final   10/28/2019 84 35 - 104 U/L Final     AST   Date Value Ref Range Status   08/06/2020 29 0 - 31 U/L Final   05/11/2020 25 0 - 31 U/L Final   10/28/2019 35 (H) 0 - 31 U/L Final     ALT   Date Value Ref Range Status   08/06/2020 15 0 - 32 U/L Final   05/11/2020 19 0 - 32 U/L Final   10/28/2019 27 0 - 32 U/L Final     GFR Non-   Date Value Ref Range Status   08/07/2020 >60 >=60 mL/min/1.73 Final     Comment:     Chronic Kidney Disease: less than 60 ml/min/1.73 sq.m. Kidney Failure: less than 15 ml/min/1.73 sq.m. Results valid for patients 18 years and older. 08/06/2020 >60 >=60 mL/min/1.73 Final     Comment:     Chronic Kidney Disease: less than 60 ml/min/1.73 sq.m. Kidney Failure: less than 15 ml/min/1.73 sq.m. Results valid for patients 18 years and older. 05/11/2020 >60 >=60 mL/min/1.73 Final     Comment:     Chronic Kidney Disease: less than 60 ml/min/1.73 sq.m. Kidney Failure: less than 15 ml/min/1.73 sq.m. Results valid for patients 18 years and older.        GFR    Date Value Ref Range Status   08/07/2020 >60  Final   08/06/2020 >60  Final   05/11/2020 >60 plans with the patient and individuals accompanying the patient to this visit. They had the opportunity to ask questions about the proposed management plans and to have those questions answered. This patient has a high probability of sudden, clinically significant deterioration, which requires the highest level of physician preparedness to intervene urgently. I managed/supervised life or organ supporting interventions that required frequent physician assessment. I devoted my full attention to the direct care of this patient for the amount of time indicated below. Time I spent with the family or surrogate(s) is included only if the patient was incapable of providing the necessary information or participating in medical decisions - Time devoted to teaching and to any procedures I billed separately is not included.     CRITICAL CARE TIME:  34 minutes    Electronically signed by Jorge Duron MD on 8/7/2020 at 10:03 AM

## 2020-08-07 NOTE — H&P
[address]                              HISTORY AND PHYSICAL    PATIENT NAME: Ehsan Saavedra                    :        1945  MED REC NO:   [id number]                            ROOM:       03  ACCOUNT NO:   [de-identified]                           ADMIT DATE: 2020  PROVIDER:     Ruth Lange DO    CHIEF COMPLAINT AND HISTORY OF CHIEF COMPLAINT:  This is a 71-year-old  white female who was admitted to [address] [place] [address]. The patient  presented to the hospital here through the emergency room last evening  on 2020. The patient presented to the hospital here for  evaluation of hypoxemia. The patient initially presented to the  hospital here the day before on 2020, at which time she complained  of shortness of breath. The patient at that time left before her  treatment and workup was completed. She contacted her family doctor,  Dr. Terry Bay today, at which time, she encouraged her to come into the  hospital because of low saturation. Outpatient appointment has been set up. She does have a known history of mitral valve prolapse and does have  exertional dyspnea. The patient apparently presented to the emergency  room here and was seen and evaluated. It was noted at this time that  her O2 sat was diminished and arterial blood gas at that time did show  hypocapnic hypoxemic respiratory failure. The patient at this time with  the addition of BiPAP, did show improvement in the oxygenation, but  worsening acidosis. Because of the above findings, she was admitted to  the hospital to the intensive care unit for close observation. Consultation was obtained with the intensivist.  The patient was seen  the following morning. She was doing better. There was improvement  noted in arterial blood gas, although abnormality was present.   From a  cardiac standpoint of view, the patient currently respiratory failure, multifactorial in  nature, complicated by probably underlying moderate-to-severe chronic  obstructive pulmonary diseases secondary to cigarette smoking plus  possible component of alveolar hypoventilation secondary to medication  with possible diaphragmatic impairment of the right hemidiaphragm. 2.  Restless legs syndrome, probably secondary to obstructive sleep  apnea. 3.  Probable obstructive sleep apnea. 4.  Obesity, secondary to excessive caloric intake. 5.  Essential hypertension. 6.  History of cigarette smoking. 7.  Arthritis. 8.  Chronic pain syndrome. 9.  History of breast cancer, on testosterone suppression. 10. Recent fall with periorbital contusion. PLAN AND RECOMMENDATION:  At this time, currently the patient does  appear to be stable. She has been admitted to the hospital to the  intensive care unit because of hypercapnic hypoxemic respiratory failure. I am sure the patient does have some underlying COPD secondary to  cigarette smoking. We have encouraged her to stop smoking. Steroids  will be employed along with aggressive bronchodilator. The patient does  have multiple medication. We could also have alveloar hypo-ventilation due to  that of gabapentin. This will be discontinued at this time and the  patient is currently on ReQuip for a restless leg syndrome. The patient  is also complaining of restless leg syndrome. Her ferritin level  appeared to be on low limits of normal.  Supplemental iron will be  given. The patient will need to get a formal sleep study performed upon  discharge. In the meantime, since she is a CO2 retainer, we will  attempt to keep the O2 saturation between 88 to 92. We will adjust her  previous medication and make further changes. Because of her recent  fall and the periorbital ecchymosis, I have obtained a CAT scan of the  head. Otherwise, she appeared to be stable. We will continue to follow  with the intensivist at this time. Intensivist/pulmonologist will make  further recommendation as clinically indicated. We have encouraged her  to stop smoking. I will make further adjustment including DVT  prophylaxis and further change as clinically indicated.   Possible sniff test to   evaluate diaphragmatic excursion        MARTA ARTEAGA DO    D: 08/07/2020 14:20:54       T: 08/07/2020 15:43:49     [email or web address]  Job#: [id number]     Doc#: [id number]    CC:

## 2020-08-07 NOTE — PROGRESS NOTES
Per new verbal order from Dr Angelica Sethi per Dr Lynette Galvin, drop patients fio2 to 40%. Per jaspal Matta for patient sat to be in the 90s.

## 2020-08-07 NOTE — CARE COORDINATION
CM Note: 8/7/2020 at 10:34am: COVID test negative - test done on 8/7. Pt currently in ICU. CM talked to pt for transition of care. Pt states she lives alone in a a one level condo with 2 steps to enter. States she fell about a week ago. States her children live out of state. She states she is pretty independent with her ADL's and uses a walker for ambulation. States she does drive. She states no hx of home oxygen, but does have a nebulizer. DME: [facility], [place], operated by [facility], shower bench. States hx of [facility] after hip surgery, but does not recall the name of the agency. Hx of going to [facility]. PCP: Dr. Leroy Monsalve. PCP: [facility] in [place]. Mrs. Cielo Julian states she drove herself here to the hospital and her car is in the parking lot. States her friends can come and  her car. She states her plan would be to return home when discharged. PT/OT may be helpful when the pt is able to help with discharge planning.  Terry Ellis RN

## 2020-08-08 ENCOUNTER — APPOINTMENT (OUTPATIENT)
Dept: GENERAL RADIOLOGY | Age: 75
DRG: 189 | End: 2020-08-08
Payer: MEDICARE

## 2020-08-08 PROBLEM — J44.1 COPD EXACERBATION (HCC): Status: ACTIVE | Noted: 2020-08-08

## 2020-08-08 LAB
ALBUMIN SERPL-MCNC: 3.9 G/DL (ref 3.5–5.2)
ALP BLD-CCNC: 68 U/L (ref 35–104)
ALT SERPL-CCNC: 13 U/L (ref 0–32)
ANION GAP SERPL CALCULATED.3IONS-SCNC: 10 MMOL/L (ref 7–16)
AST SERPL-CCNC: 25 U/L (ref 0–31)
B.E.: 8.4 MMOL/L (ref -3–3)
BASOPHILS ABSOLUTE: 0 E9/L (ref 0–0.2)
BASOPHILS RELATIVE PERCENT: 0 % (ref 0–2)
BILIRUB SERPL-MCNC: 0.5 MG/DL (ref 0–1.2)
BUN BLDV-MCNC: 22 MG/DL (ref 8–23)
CALCIUM SERPL-MCNC: 9 MG/DL (ref 8.6–10.2)
CHLORIDE BLD-SCNC: 90 MMOL/L (ref 98–107)
CO2: 33 MMOL/L (ref 22–29)
COHB: 0.9 % (ref 0–1.5)
CREAT SERPL-MCNC: 0.5 MG/DL (ref 0.5–1)
CRITICAL: ABNORMAL
DATE ANALYZED: ABNORMAL
DATE OF COLLECTION: ABNORMAL
EOSINOPHILS ABSOLUTE: 0 E9/L (ref 0.05–0.5)
EOSINOPHILS RELATIVE PERCENT: 0 % (ref 0–6)
FIO2: 40 %
GFR AFRICAN AMERICAN: >60
GFR NON-AFRICAN AMERICAN: >60 ML/MIN/1.73
GLUCOSE BLD-MCNC: 143 MG/DL (ref 74–99)
HCO3: 35.3 MMOL/L (ref 22–26)
HCT VFR BLD CALC: 52.2 % (ref 34–48)
HEMOGLOBIN: 16.5 G/DL (ref 11.5–15.5)
HHB: 3.9 % (ref 0–5)
L. PNEUMOPHILA SEROGP 1 UR AG: NORMAL
LAB: ABNORMAL
LYMPHOCYTES ABSOLUTE: 0.36 E9/L (ref 1.5–4)
LYMPHOCYTES RELATIVE PERCENT: 6.1 % (ref 20–42)
Lab: ABNORMAL
MAGNESIUM: 2 MG/DL (ref 1.6–2.6)
MCH RBC QN AUTO: 29.6 PG (ref 26–35)
MCHC RBC AUTO-ENTMCNC: 31.6 % (ref 32–34.5)
MCV RBC AUTO: 93.5 FL (ref 80–99.9)
METHB: 0.5 % (ref 0–1.5)
MODE: ABNORMAL
MONOCYTES ABSOLUTE: 0.24 E9/L (ref 0.1–0.95)
MONOCYTES RELATIVE PERCENT: 4.3 % (ref 2–12)
MRSA CULTURE ONLY: NORMAL
NEUTROPHILS ABSOLUTE: 5.4 E9/L (ref 1.8–7.3)
NEUTROPHILS RELATIVE PERCENT: 89.6 % (ref 43–80)
O2 CONTENT: 23.7 ML/DL
O2 SATURATION: 96 % (ref 92–98.5)
O2HB: 94.7 % (ref 94–97)
OPERATOR ID: 901
PATIENT TEMP: 37
PCO2: 56 MMHG (ref 35–45)
PDW BLD-RTO: 14.6 FL (ref 11.5–15)
PEEP/CPAP: 6 CMH2O
PFO2: 2.15 MMHG/%
PH BLOOD GAS: 7.42 (ref 7.35–7.45)
PHOSPHORUS: 3 MG/DL (ref 2.5–4.5)
PLATELET # BLD: 213 E9/L (ref 130–450)
PMV BLD AUTO: 9.7 FL (ref 7–12)
PO2: 86.1 MMHG (ref 75–100)
POTASSIUM SERPL-SCNC: 3.8 MMOL/L (ref 3.5–5)
PS: 18 CMH20
RBC # BLD: 5.58 E12/L (ref 3.5–5.5)
SODIUM BLD-SCNC: 133 MMOL/L (ref 132–146)
SOURCE, BLOOD GAS: ABNORMAL
STREP PNEUMONIAE ANTIGEN, URINE: NORMAL
THB: 17.8 G/DL (ref 11.5–16.5)
TIME ANALYZED: 521
TOTAL PROTEIN: 7.3 G/DL (ref 6.4–8.3)
WBC # BLD: 6 E9/L (ref 4.5–11.5)

## 2020-08-08 PROCEDURE — 82805 BLOOD GASES W/O2 SATURATION: CPT

## 2020-08-08 PROCEDURE — 36600 WITHDRAWAL OF ARTERIAL BLOOD: CPT

## 2020-08-08 PROCEDURE — 6360000002 HC RX W HCPCS: Performed by: INTERNAL MEDICINE

## 2020-08-08 PROCEDURE — 84100 ASSAY OF PHOSPHORUS: CPT

## 2020-08-08 PROCEDURE — 71045 X-RAY EXAM CHEST 1 VIEW: CPT

## 2020-08-08 PROCEDURE — 80053 COMPREHEN METABOLIC PANEL: CPT

## 2020-08-08 PROCEDURE — 94660 CPAP INITIATION&MGMT: CPT

## 2020-08-08 PROCEDURE — 6370000000 HC RX 637 (ALT 250 FOR IP): Performed by: INTERNAL MEDICINE

## 2020-08-08 PROCEDURE — 83735 ASSAY OF MAGNESIUM: CPT

## 2020-08-08 PROCEDURE — 2580000003 HC RX 258: Performed by: INTERNAL MEDICINE

## 2020-08-08 PROCEDURE — 94640 AIRWAY INHALATION TREATMENT: CPT

## 2020-08-08 PROCEDURE — 2700000000 HC OXYGEN THERAPY PER DAY

## 2020-08-08 PROCEDURE — 85025 COMPLETE CBC W/AUTO DIFF WBC: CPT

## 2020-08-08 PROCEDURE — 2000000000 HC ICU R&B

## 2020-08-08 PROCEDURE — 2500000003 HC RX 250 WO HCPCS: Performed by: INTERNAL MEDICINE

## 2020-08-08 RX ORDER — DOXYCYCLINE HYCLATE 100 MG/1
100 CAPSULE ORAL EVERY 12 HOURS SCHEDULED
Status: DISCONTINUED | OUTPATIENT
Start: 2020-08-08 | End: 2020-08-11 | Stop reason: HOSPADM

## 2020-08-08 RX ORDER — METHYLPREDNISOLONE SODIUM SUCCINATE 40 MG/ML
40 INJECTION, POWDER, LYOPHILIZED, FOR SOLUTION INTRAMUSCULAR; INTRAVENOUS EVERY 12 HOURS
Status: DISCONTINUED | OUTPATIENT
Start: 2020-08-09 | End: 2020-08-09

## 2020-08-08 RX ADMIN — IPRATROPIUM BROMIDE AND ALBUTEROL SULFATE 1 AMPULE: .5; 3 SOLUTION RESPIRATORY (INHALATION) at 12:57

## 2020-08-08 RX ADMIN — ROFLUMILAST 500 MCG: 500 TABLET ORAL at 11:27

## 2020-08-08 RX ADMIN — AMLODIPINE BESYLATE 5 MG: 5 TABLET ORAL at 11:27

## 2020-08-08 RX ADMIN — DOXYCYCLINE 100 MG: 100 INJECTION, POWDER, LYOPHILIZED, FOR SOLUTION INTRAVENOUS at 12:42

## 2020-08-08 RX ADMIN — NEBIVOLOL HYDROCHLORIDE 5 MG: 5 TABLET ORAL at 11:27

## 2020-08-08 RX ADMIN — IPRATROPIUM BROMIDE AND ALBUTEROL SULFATE 1 AMPULE: .5; 3 SOLUTION RESPIRATORY (INHALATION) at 09:35

## 2020-08-08 RX ADMIN — Medication 10 ML: at 20:20

## 2020-08-08 RX ADMIN — ROPINIROLE HYDROCHLORIDE 3 MG: 2 TABLET, FILM COATED ORAL at 20:20

## 2020-08-08 RX ADMIN — IPRATROPIUM BROMIDE AND ALBUTEROL SULFATE 1 AMPULE: .5; 3 SOLUTION RESPIRATORY (INHALATION) at 17:23

## 2020-08-08 RX ADMIN — FERROUS SULFATE TAB 325 MG (65 MG ELEMENTAL FE) 325 MG: 325 (65 FE) TAB at 11:28

## 2020-08-08 RX ADMIN — PANTOPRAZOLE SODIUM 40 MG: 40 TABLET, DELAYED RELEASE ORAL at 11:27

## 2020-08-08 RX ADMIN — IPRATROPIUM BROMIDE AND ALBUTEROL SULFATE 1 AMPULE: .5; 3 SOLUTION RESPIRATORY (INHALATION) at 06:24

## 2020-08-08 RX ADMIN — METHYLPREDNISOLONE SODIUM SUCCINATE 40 MG: 40 INJECTION, POWDER, FOR SOLUTION INTRAMUSCULAR; INTRAVENOUS at 11:28

## 2020-08-08 RX ADMIN — LOSARTAN POTASSIUM 50 MG: 50 TABLET, FILM COATED ORAL at 11:28

## 2020-08-08 RX ADMIN — DOXYCYCLINE HYCLATE 100 MG: 100 CAPSULE ORAL at 20:20

## 2020-08-08 RX ADMIN — DULOXETINE HYDROCHLORIDE 60 MG: 60 CAPSULE, DELAYED RELEASE ORAL at 11:27

## 2020-08-08 RX ADMIN — ENOXAPARIN SODIUM 40 MG: 40 INJECTION SUBCUTANEOUS at 11:28

## 2020-08-08 RX ADMIN — Medication 10 ML: at 11:28

## 2020-08-08 ASSESSMENT — PAIN SCALES - GENERAL
PAINLEVEL_OUTOF10: 0

## 2020-08-08 NOTE — PROGRESS NOTES
Assessment and Plan  Patient is a 69 y.o. female with the following medical Problems:   1. Acute on chronic hypoxic and hypercapnic respiratory failure  2. History of recurrent falls  3. Acute exacerbation of COPD  4. Metabolic encephalopathy  5. Moderate pulmonary hypertension  6. Morbid obesity  7. Suspected obstructive sleep apnea  8. Right lower lobe pneumonia versus atelectasis    Plan of care:  1 switch BiPAP to nasal cannula targeting an O2 saturation of 88 to 94%. 2.  Continue with Solu-Medrol and DuoNeb  3. Antibiotic as per primary MD  4. Blood pressure control  5. Encourage ambulation  6. Incentive spirometer   7. patient is stable to be transferred to [address] [place] [address] if okay by primary care physician    History of Present Illness:   Patient is a 69-year-old woman with above-mentioned medical problems who was admitted with acute exacerbation of COPD and acute on chronic hypoxic and hypercapnic respiratory failure. Patient has been on BiPAP, however, she is awake and communicating this morning. She was transitioned to nasal cannula and tolerated it very well. She has no fever, chills, rigors. She denies any respiratory complaint.     Past Medical History:  Past Medical History:   Diagnosis Date    Adverse effect of anesthetic     difficulty waking up if lying flat post op    Arthritis     Bronchitis     Cancer ([address]     breast left    Diverticula of colon 2011    Hyperlipidemia     Hypertension     Pneumonia     Restless leg syndrome     Sleep apnea     CPAP    Thyroid disease         Family History:   Family History   Problem Relation Age of Onset    Heart Failure Mother     Parkinsonism Father     Diabetes Father     Other Brother         PVD       Allergies:         Demerol [meperidine hcl] and Nabumetone    Social history:  Social History     Socioeconomic History    Marital status:      Spouse name: Not on file    Number of children: Not on file    Years of education: Not 5-325 MG per tablet 1 tablet, 1 tablet, Oral, Q6H PRN, Xu White DO    Roflumilast (DALIRESP) tablet 500 mcg, 500 mcg, Oral, Daily, Xu White DO, 500 mcg at 08/08/20 1127    ferrous sulfate (IRON 325) tablet 325 mg, 325 mg, Oral, Daily with breakfast, Sascha White DO, 325 mg at 08/08/20 1128    hydrALAZINE (APRESOLINE) injection 5 mg, 5 mg, Intravenous, Q4H PRN, Lala Suggs MD    sodium chloride flush 0.9 % injection 10 mL, 10 mL, Intravenous, 2 times per day, Sascha White DO, 10 mL at 08/08/20 1128    sodium chloride flush 0.9 % injection 10 mL, 10 mL, Intravenous, PRN, Xu White DO    acetaminophen (TYLENOL) tablet 650 mg, 650 mg, Oral, Q4H PRN, Sascha White DO    enoxaparin (LOVENOX) injection 40 mg, 40 mg, Subcutaneous, Daily, Sascha White DO, 40 mg at 08/08/20 1128    cefTRIAXone (ROCEPHIN) 2 g in sterile water 20 mL IV syringe, 2 g, Intravenous, Q24H, Kamini Goyal MD, 2 g at 08/07/20 2340    doxycycline (VIBRAMYCIN) 100 mg in dextrose 5 % 100 mL IVPB, 100 mg, Intravenous, Q12H, Kamini Goyal MD, Stopped at 08/07/20 2101    nebivolol (BYSTOLIC) tablet 5 mg, 5 mg, Oral, Daily, Kamini Goyal MD, 5 mg at 08/08/20 1127    amLODIPine (NORVASC) tablet 5 mg, 5 mg, Oral, Daily, Kamini Goyal MD, 5 mg at 08/08/20 1127    rOPINIRole (REQUIP) tablet 3 mg, 3 mg, Oral, Nightly, Kamini Goyal MD, 3 mg at 08/07/20 1954    pantoprazole (PROTONIX) tablet 40 mg, 40 mg, Oral, QAM AC, Kamini Goyal MD, 40 mg at 08/08/20 1127    losartan (COZAAR) tablet 50 mg, 50 mg, Oral, Daily, 50 mg at 08/08/20 1128 **AND** [DISCONTINUED] hydrochlorothiazide (HYDRODIURIL) tablet 12.5 mg, 12.5 mg, Oral, Daily, Kamini Goyal MD, 12.5 mg at 08/07/20 0004    Review of Systems:   General: denies weight gain, denies loss of appetite, fever, chills, night sweats.   HEENT: denies headaches, dizziness, head trauma, visual changes, eye pain, tinnitus, nosebleeds, hoarseness or throat pain    Respiratory: denies chest pain, dyspnea, cough and hemoptysis  Cardiovascular: denies orthopnea, paroxysmal nocturnal dyspnea, leg swelling, and previous heart attack. Gastrointestinal: denies pain, nausea vomiting, diarrhea, constipation, melena or bleeding. Genitourinary: denies hematuria, frequency, urgency or dysuria  Neurology: denies syncope, seizures, paralysis, paraesthesia   Endocrine: denies polyuria, polydipsia, skin or hair changes, and heat or cold intolerance  Musculoskeletal: denies joint pain, swelling, arthritis or myalgia  Hematologic: denies bleeding, adenopathy and easy bruising  Skin: denies rashes and skin discoloration  Psychiatry: denies depression    Physical Exam:   Vital Signs:  BP (!) 147/93   Pulse 76   Temp 99.5 °F (37.5 °C) (Bladder)   Resp 18   Ht 5' (1.524 m)   Wt 212 lb 1.3 oz (96.2 kg)   SpO2 99%   BMI 41.42 kg/m²     Input/Output: In: 100   Out: 595     Oxygen requirements: BiPAP    Ventilator Information:  Vent Information  Skin Assessment: Clean, dry, & intact  FiO2 : 40 %  SpO2: 99 %  SpO2/FiO2 ratio: 247.5  Mask Type: Full face mask  Mask Size: Large    General appearance: not in pain or distress, in no respiratory distress    HEENT: Atraumatic/normocephalic, EOMI, MYLES, pharynx clear, moist mucosa, redness of the uvula appreciated,   Neck: Supple, no jugular venous distension, lymphadenopathy, thyromegaly or carotid bruits  Chest: Decreased breath sounds, no wheezing, no crackles and no tenderness over ribs   Cardiovascular: Normal S1 , S2, regular rate and rhythm, no murmur, rub or gallop  Abdomen: Normal sounds present, soft, lax with no tenderness, no hepatosplenomegaly, and no masses  Extremities: No edema. Pulses are equally present.    Skin: intact, no rashes   Neurologic: Alert and oriented x 3, No focal deficit     Investigations:  Labs, radiological imaging and cardiac work up were

## 2020-08-08 NOTE — PROGRESS NOTES
Internal Medicine Progress Note    Beth Casas. Alan Dumont., & [address] [facility] [address] Alan Dumont., F.A.C.O.I. Ailyn Blackwell D.O., F.A.C.O.I. Primary Care Physician: Latricia Haley MD   Admitting Physician:  Asher Reid DO  Admission date and time: 8/6/2020  3:41 PM    Room:  [place] [address]  Admitting diagnosis: Respiratory failure [facility]) [J96.90]    Patient Name: Mona Chavez  MRN: [id number]    Date of Service: 8/8/2020     Subjective:    Steve Torrez is a 69 y. o.  female who was seen and examined today,8/8/2020, at the bedside. She does seem to be breathing easier as well on BiPAP nocturnally. O2 sat still remain variable on supplemental O2 between 2 and 4 L of oxygen. The patient denies other complaints although she still appears somnolent at time. Results the CAT scan yesterday showed no intracranial pathology. The patient does appear stable for transfer to [address] [place] [address]    No family present during my examination. Review of System:   Constitutional:   Denies fever or chills, weight loss or gain, fatigue or malaise. HEENT:   Denies ear pain, sore throat, sinus or eye problems. Cardiovascular:   Denies any chest pain, irregular heartbeats, or palpitations. Respiratory:   Improved shortness of breath  Gastrointestinal:   Denies nausea, vomiting, diarrhea, or constipation. Denies any abdominal pain. Genitourinary:    Denies any urgency, frequency, hematuria. Voiding  without difficulty. Extremities:   Denies lower extremity swelling, edema or cyanosis. Neurology:    Denies any headache or focal neurological deficits, generalized weakness and somnolent   Psch:   Denies being anxious or depressed. Musculoskeletal:    Denies  myalgias, joint complaints or back pain. Integumentary:   Denies any rashes, ulcers, or excoriations. Denies bruising. Hematologic/Lymphatic:  Denies bruising or bleeding. Physical Exam:  I/O this shift:  In: 260 [P.O.:240;  I.V.:20]  Out: - Intake/Output Summary (Last 24 hours) at 8/8/2020 1325  Last data filed at 8/8/2020 1000  Gross per 24 hour   Intake 360 ml   Output 870 ml   Net -510 ml   I/O last 3 completed shifts: In: 310 [P.O.:100; I.V.:10; IV Piggyback:200]  Out: 4264 [Urine:2685]  Patient Vitals for the past 96 hrs (Last 3 readings):   Weight   08/06/20 2230 212 lb 1.3 oz (96.2 kg)   08/06/20 1539 210 lb (95.3 kg)       Vital Signs:   Blood pressure (!) 163/89, pulse 84, temperature 99.3 °F (37.4 °C), temperature source Core, resp. rate 19, height 5' (1.524 m), weight 212 lb 1.3 oz (96.2 kg), SpO2 97 %, not currently breastfeeding. Ary Gold is a 69 y. o.  female who is alert, responsive, oriented to person, place, and time. General appearance:   Well preserved, alert, no distress. Head:  Normocephalic. No masses, lesions or tenderness. Eyes:  PERRLA. EOMI. Sclera clear. Buccal mucosa moist.  Periorbital ecchymosis  ENT:  Ears normal. Mucosa normal.  Nasal cannula in place  Neck:    Supple. Trachea midline. No thyromegaly. No JVD. No bruits. Heart:    Rhythm regular. Rate controlled. No murmurs. Lungs:    Diminished posteriorly with few wheezing. Abdomen:   Soft. Non-tender. Non-distended. Bowel sounds positive. No organomegaly or masses. No pain on palpation. Obese  Extremities:    Peripheral pulses present. No peripheral edema. No ulcers. No cyanosis. No clubbing. Neurologic:    Alert x 3. No focal deficit. Cranial nerves grossly intact. No focal weakness. Alert but easily falls to sleep  Psych:   Behavior is normal. Mood appears normal. Speech is not rapid and/or pressured. Musculoskeletal:   Spine ROM normal. Muscular strength intact. Gait not assessed. Skin:    No rashes  Skin normal color and texture.   Genitalia/Breast:  Soto catheter      Allergy:  Allergies   Allergen Reactions    Demerol [Meperidine Hcl] Other (See Comments)     Hallucinations, anxiety        Nabumetone Hives Medication:  Scheduled Meds:   DULoxetine  60 mg Oral Daily    methylPREDNISolone  40 mg Intravenous Q8H    ipratropium-albuterol  1 ampule Inhalation Q4H WA    Roflumilast  500 mcg Oral Daily    ferrous sulfate  325 mg Oral Daily with breakfast    sodium chloride flush  10 mL Intravenous 2 times per day    enoxaparin  40 mg Subcutaneous Daily    cefTRIAXone (ROCEPHIN) IV  2 g Intravenous Q24H    doxycycline (VIBRAMYCIN) IV  100 mg Intravenous Q12H    nebivolol  5 mg Oral Daily    amLODIPine  5 mg Oral Daily    rOPINIRole  3 mg Oral Nightly    pantoprazole  40 mg Oral QAM AC    losartan  50 mg Oral Daily     Continuous Infusions:    Objective Data:  CBC:   Recent Labs     08/06/20  1555 08/08/20  0501   WBC 4.9 6.0   HGB 14.7 16.5*    213            BMP:    Recent Labs     08/06/20  1555 08/07/20  0519 08/08/20  0501    138 133   K 4.1 4.2 3.8   CL 93* 93* 90*   CO2 36* 34* 33*   BUN 22 15 22   CREATININE 0.8 0.6 0.5   GLUCOSE 150* 156* 143*     CMP:    Lab Results   Component Value Date     08/08/2020    K 3.8 08/08/2020    CL 90 08/08/2020    CO2 33 08/08/2020    BUN 22 08/08/2020    CREATININE 0.5 08/08/2020    GFRAA >60 08/08/2020    LABGLOM >60 08/08/2020    GLUCOSE 143 08/08/2020    GLUCOSE 125 03/27/2012    PROT 7.3 08/08/2020    LABALBU 3.9 08/08/2020    LABALBU 4.3 03/25/2012    CALCIUM 9.0 08/08/2020    BILITOT 0.5 08/08/2020    ALKPHOS 68 08/08/2020    AST 25 08/08/2020    ALT 13 08/08/2020     Hepatic:   Recent Labs     08/06/20  1555 08/08/20  0501   AST 29 25   ALT 15 13   BILITOT 0.6 0.5   ALKPHOS 67 68     Troponin:   Recent Labs     08/06/20  1555   TROPONINI 0.03     BNP: No results for input(s): BNP in the last 72 hours. Lipids: No results for input(s): CHOL, HDL in the last 72 hours.     Invalid input(s): LDLCALCU  ABGs: No results found for: PHART, PO2ART, LXD9WBY  INR:   Recent Labs     08/06/20  1555   INR 1.1   PROTIME 12.0     RAD: Xr Chest (2 Vw)    Result Date: 8/6/2020  EXAMINATION: TWO XRAY VIEWS OF THE CHEST 8/6/2020 3:58 pm COMPARISON: Previous CT scan of 06/03/2020 and chest x-ray of 07/22/2020. HISTORY: ORDERING SYSTEM PROVIDED HISTORY: hypoxia TECHNOLOGIST PROVIDED HISTORY: Reason for exam:->hypoxia FINDINGS: The heart is mildly enlarged. There are no findings of failure. There is chronic elevation of the right hemidiaphragm. There is no right or left lung infiltrate. There is no pleural effusion. 1. There is no evidence of failure or pneumonia. 2. Chronic elevation of the right hemidiaphragm. Ct Head Wo Contrast    Result Date: 8/7/2020  EXAMINATION: CT OF THE HEAD WITHOUT CONTRAST  8/7/2020 12:21 pm TECHNIQUE: CT of the head was performed without the administration of intravenous contrast. Dose modulation, iterative reconstruction, and/or weight based adjustment of the mA/kV was utilized to reduce the radiation dose to as low as reasonably achievable. COMPARISON: None. HISTORY: ORDERING SYSTEM PROVIDED HISTORY: recent fall TECHNOLOGIST PROVIDED HISTORY: Reason for exam:->recent fall Has a \"code stroke\" or \"stroke alert\" been called? ->No FINDINGS: BRAIN/VENTRICLES: There is no acute intracranial hemorrhage, mass effect or midline shift. No abnormal extra-axial fluid collection. The gray-white differentiation is maintained without evidence of an acute infarct. There is no evidence of hydrocephalus. ORBITS: The visualized portion of the orbits demonstrate no acute abnormality. SINUSES: The visualized paranasal sinuses and mastoid air cells demonstrate no acute abnormality. SOFT TISSUES/SKULL: There is no fracture of the calvarium. Note is made of a small soft tissue contusion superior to the right orbit. There is no fracture of the calvarium. No acute intracranial abnormality. Specifically, there is no acute intracranial hemorrhage Small right supraorbital soft tissue contusion.      Cta Chest W Contrast    Result Date: 8/6/2020  EXAMINATION: CTA OF THE CHEST 8/6/2020 6:09 pm TECHNIQUE: CTA of the chest was performed after the administration of intravenous contrast.  Multiplanar reformatted images are provided for review. MIP images are provided for review. Dose modulation, iterative reconstruction, and/or weight based adjustment of the mA/kV was utilized to reduce the radiation dose to as low as reasonably achievable. COMPARISON: None. HISTORY: ORDERING SYSTEM PROVIDED HISTORY: rule out PE TECHNOLOGIST PROVIDED HISTORY: Reason for exam:->rule out PE FINDINGS: Pulmonary Arteries: Pulmonary arteries are adequately opacified for evaluation. No evidence of intraluminal filling defect to suggest pulmonary embolism. The main pulmonary artery is prominent which can be seen with pulmonary hypertension. . Mediastinum: No evidence of mediastinal lymphadenopathy. The heart is enlarged. There is no pericardial effusion. .  There is no acute abnormality of the thoracic aorta. Lungs/pleura: There is chronic elevation of the right hemidiaphragm. There is alveolar consolidation seen within the posterior basal segment of the right lung. While this could represent atelectasis secondary to the chronic elevation the right hemidiaphragm the intermittent consolidation would favor that of pneumonia. The left lung is clear. Upper Abdomen: Limited images of the upper abdomen are unremarkable. Soft Tissues/Bones: No acute bone or soft tissue abnormality. 1. There is no evidence of a pulmonary embolus. 2. Chronic significant elevation the right hemidiaphragm. 3. Alveolar opacification within the right lung base. The airspace disease given the appearance is favored to represent pneumonia rather than compression atelectasis. 4. Ectasias of the pulmonary artery. The finding can be seen with pulmonary hypertension.  5. Cardiomegaly     Blood Culture:   Recent Labs     08/06/20 1953   BC 24 Hours no growth   ,   Recent Labs     08/06/20 1953 diaphragmatic excursion when able to stand  · Physical occupational therapy and pulmonary rehab  · Discharge planning--an outpatient sleep study    40 minutes of critical care time was spent with the patient. This includes chart review, , reviewing rhythm strips, and discussion with those consultants involved in the patient's care. I reviewed the patient's past medical, surgical history and medication. Patient's medications were reviewed/continued/adjusted. Labs as ordered. Please see orders for further plan of care. Rhythm strips reviewed as well as consultant recommendations/notes and/or discussion. I reviewed the  course of events since last visit. More than 50% of my  time was spent at the bedside counseling and/or coordination of care with the patient and/or family with face to face contact. This time was spent reviewing notes and laboratory data, instructing and counseling the patient. Time I spent with the family or surrogate(s) is included only if the patient was incapable of providing the necessary information or participating in medical decisionsI also discussed the differential diagnosis and all of the proposed management plans with the patient and individuals accompanying the patient. Buddie Stage requires this high level of physician care and nursing in the ICU due the complexity of decision management and chance of rapid decline or death. I am ready available for decision making and intervention. I reviewed the relevant imaging studies and available reports. I also discussed the differential diagnosis and all of the proposed management plans with the patient and individuals accompanying the patient to this visit. I reviewed the relevant imaging studies and available reports. Sascha White DO, F.A.[email or web address].   On 8/8/2020  1:25 PM

## 2020-08-09 ENCOUNTER — APPOINTMENT (OUTPATIENT)
Dept: GENERAL RADIOLOGY | Age: 75
DRG: 189 | End: 2020-08-09
Payer: MEDICARE

## 2020-08-09 LAB
ALBUMIN SERPL-MCNC: 3.9 G/DL (ref 3.5–5.2)
ALP BLD-CCNC: 62 U/L (ref 35–104)
ALT SERPL-CCNC: 16 U/L (ref 0–32)
ANION GAP SERPL CALCULATED.3IONS-SCNC: 11 MMOL/L (ref 7–16)
AST SERPL-CCNC: 27 U/L (ref 0–31)
B.E.: 10.9 MMOL/L (ref -3–3)
BASOPHILS ABSOLUTE: 0.01 E9/L (ref 0–0.2)
BASOPHILS RELATIVE PERCENT: 0.1 % (ref 0–2)
BILIRUB SERPL-MCNC: 0.6 MG/DL (ref 0–1.2)
BUN BLDV-MCNC: 26 MG/DL (ref 8–23)
CALCIUM SERPL-MCNC: 9.1 MG/DL (ref 8.6–10.2)
CHLORIDE BLD-SCNC: 91 MMOL/L (ref 98–107)
CO2: 34 MMOL/L (ref 22–29)
COHB: 1.1 % (ref 0–1.5)
CREAT SERPL-MCNC: 0.6 MG/DL (ref 0.5–1)
CRITICAL: ABNORMAL
DATE ANALYZED: ABNORMAL
DATE OF COLLECTION: ABNORMAL
EOSINOPHILS ABSOLUTE: 0.09 E9/L (ref 0.05–0.5)
EOSINOPHILS RELATIVE PERCENT: 0.8 % (ref 0–6)
GFR AFRICAN AMERICAN: >60
GFR NON-AFRICAN AMERICAN: >60 ML/MIN/1.73
GLUCOSE BLD-MCNC: 110 MG/DL (ref 74–99)
HCO3: 37.4 MMOL/L (ref 22–26)
HCT VFR BLD CALC: 49.7 % (ref 34–48)
HEMOGLOBIN: 15.8 G/DL (ref 11.5–15.5)
HHB: 7.4 % (ref 0–5)
IMMATURE GRANULOCYTES #: 0.04 E9/L
IMMATURE GRANULOCYTES %: 0.4 % (ref 0–5)
LAB: ABNORMAL
LYMPHOCYTES ABSOLUTE: 0.74 E9/L (ref 1.5–4)
LYMPHOCYTES RELATIVE PERCENT: 6.7 % (ref 20–42)
Lab: ABNORMAL
MCH RBC QN AUTO: 30 PG (ref 26–35)
MCHC RBC AUTO-ENTMCNC: 31.8 % (ref 32–34.5)
MCV RBC AUTO: 94.3 FL (ref 80–99.9)
METHB: 0.4 % (ref 0–1.5)
MODE: ABNORMAL
MONOCYTES ABSOLUTE: 0.8 E9/L (ref 0.1–0.95)
MONOCYTES RELATIVE PERCENT: 7.2 % (ref 2–12)
NEUTROPHILS ABSOLUTE: 9.36 E9/L (ref 1.8–7.3)
NEUTROPHILS RELATIVE PERCENT: 84.8 % (ref 43–80)
O2 CONTENT: 21.6 ML/DL
O2 SATURATION: 92.5 % (ref 92–98.5)
O2HB: 91.1 % (ref 94–97)
OPERATOR ID: ABNORMAL
PATIENT TEMP: 37
PCO2: 54.6 MMHG (ref 35–45)
PDW BLD-RTO: 14.7 FL (ref 11.5–15)
PH BLOOD GAS: 7.45 (ref 7.35–7.45)
PLATELET # BLD: 211 E9/L (ref 130–450)
PMV BLD AUTO: 9.6 FL (ref 7–12)
PO2: 64.7 MMHG (ref 75–100)
POTASSIUM SERPL-SCNC: 3.3 MMOL/L (ref 3.5–5)
RBC # BLD: 5.27 E12/L (ref 3.5–5.5)
SODIUM BLD-SCNC: 136 MMOL/L (ref 132–146)
SOURCE, BLOOD GAS: ABNORMAL
THB: 16.9 G/DL (ref 11.5–16.5)
TIME ANALYZED: 538
TOTAL PROTEIN: 7 G/DL (ref 6.4–8.3)
URINE CULTURE, ROUTINE: NORMAL
WBC # BLD: 11 E9/L (ref 4.5–11.5)

## 2020-08-09 PROCEDURE — 6360000002 HC RX W HCPCS: Performed by: INTERNAL MEDICINE

## 2020-08-09 PROCEDURE — 97165 OT EVAL LOW COMPLEX 30 MIN: CPT

## 2020-08-09 PROCEDURE — 85025 COMPLETE CBC W/AUTO DIFF WBC: CPT

## 2020-08-09 PROCEDURE — 2700000000 HC OXYGEN THERAPY PER DAY

## 2020-08-09 PROCEDURE — 94660 CPAP INITIATION&MGMT: CPT

## 2020-08-09 PROCEDURE — 36600 WITHDRAWAL OF ARTERIAL BLOOD: CPT

## 2020-08-09 PROCEDURE — 97530 THERAPEUTIC ACTIVITIES: CPT

## 2020-08-09 PROCEDURE — 2580000003 HC RX 258: Performed by: INTERNAL MEDICINE

## 2020-08-09 PROCEDURE — 82805 BLOOD GASES W/O2 SATURATION: CPT

## 2020-08-09 PROCEDURE — 97161 PT EVAL LOW COMPLEX 20 MIN: CPT | Performed by: PHYSICAL THERAPIST

## 2020-08-09 PROCEDURE — 80053 COMPREHEN METABOLIC PANEL: CPT

## 2020-08-09 PROCEDURE — 97116 GAIT TRAINING THERAPY: CPT | Performed by: PHYSICAL THERAPIST

## 2020-08-09 PROCEDURE — 6370000000 HC RX 637 (ALT 250 FOR IP): Performed by: INTERNAL MEDICINE

## 2020-08-09 PROCEDURE — 94640 AIRWAY INHALATION TREATMENT: CPT

## 2020-08-09 PROCEDURE — 2000000000 HC ICU R&B

## 2020-08-09 PROCEDURE — 71045 X-RAY EXAM CHEST 1 VIEW: CPT

## 2020-08-09 RX ORDER — METHYLPREDNISOLONE 4 MG/1
24 TABLET ORAL ONCE
Status: COMPLETED | OUTPATIENT
Start: 2020-08-09 | End: 2020-08-09

## 2020-08-09 RX ORDER — METHYLPREDNISOLONE 4 MG/1
4 TABLET ORAL
Status: DISCONTINUED | OUTPATIENT
Start: 2020-08-10 | End: 2020-08-11 | Stop reason: HOSPADM

## 2020-08-09 RX ORDER — METHYLPREDNISOLONE 4 MG/1
8 TABLET ORAL NIGHTLY
Status: COMPLETED | OUTPATIENT
Start: 2020-08-10 | End: 2020-08-10

## 2020-08-09 RX ORDER — METHYLPREDNISOLONE 4 MG/1
4 TABLET ORAL NIGHTLY
Status: DISCONTINUED | OUTPATIENT
Start: 2020-08-11 | End: 2020-08-11 | Stop reason: HOSPADM

## 2020-08-09 RX ADMIN — ROFLUMILAST 500 MCG: 500 TABLET ORAL at 08:36

## 2020-08-09 RX ADMIN — PANTOPRAZOLE SODIUM 40 MG: 40 TABLET, DELAYED RELEASE ORAL at 05:37

## 2020-08-09 RX ADMIN — Medication 10 ML: at 21:09

## 2020-08-09 RX ADMIN — DOXYCYCLINE HYCLATE 100 MG: 100 CAPSULE ORAL at 08:36

## 2020-08-09 RX ADMIN — METHYLPREDNISOLONE 24 MG: 4 TABLET ORAL at 14:35

## 2020-08-09 RX ADMIN — LOSARTAN POTASSIUM 50 MG: 50 TABLET, FILM COATED ORAL at 08:36

## 2020-08-09 RX ADMIN — IPRATROPIUM BROMIDE AND ALBUTEROL SULFATE 1 AMPULE: .5; 3 SOLUTION RESPIRATORY (INHALATION) at 09:47

## 2020-08-09 RX ADMIN — IPRATROPIUM BROMIDE AND ALBUTEROL SULFATE 1 AMPULE: .5; 3 SOLUTION RESPIRATORY (INHALATION) at 17:16

## 2020-08-09 RX ADMIN — METHYLPREDNISOLONE SODIUM SUCCINATE 40 MG: 40 INJECTION, POWDER, FOR SOLUTION INTRAMUSCULAR; INTRAVENOUS at 04:25

## 2020-08-09 RX ADMIN — ENOXAPARIN SODIUM 40 MG: 40 INJECTION SUBCUTANEOUS at 08:37

## 2020-08-09 RX ADMIN — AMLODIPINE BESYLATE 5 MG: 5 TABLET ORAL at 08:36

## 2020-08-09 RX ADMIN — HYDRALAZINE HYDROCHLORIDE 5 MG: 20 INJECTION INTRAMUSCULAR; INTRAVENOUS at 05:40

## 2020-08-09 RX ADMIN — IPRATROPIUM BROMIDE AND ALBUTEROL SULFATE 1 AMPULE: .5; 3 SOLUTION RESPIRATORY (INHALATION) at 06:34

## 2020-08-09 RX ADMIN — NEBIVOLOL HYDROCHLORIDE 5 MG: 5 TABLET ORAL at 08:37

## 2020-08-09 RX ADMIN — CEFTRIAXONE SODIUM 2 G: 2 INJECTION, POWDER, FOR SOLUTION INTRAMUSCULAR; INTRAVENOUS at 00:00

## 2020-08-09 RX ADMIN — Medication 10 ML: at 08:37

## 2020-08-09 RX ADMIN — DOXYCYCLINE HYCLATE 100 MG: 100 CAPSULE ORAL at 21:08

## 2020-08-09 RX ADMIN — ROPINIROLE HYDROCHLORIDE 3 MG: 2 TABLET, FILM COATED ORAL at 21:09

## 2020-08-09 RX ADMIN — HYDROCODONE BITARTRATE AND ACETAMINOPHEN 1 TABLET: 5; 325 TABLET ORAL at 16:54

## 2020-08-09 RX ADMIN — FERROUS SULFATE TAB 325 MG (65 MG ELEMENTAL FE) 325 MG: 325 (65 FE) TAB at 08:36

## 2020-08-09 RX ADMIN — DULOXETINE HYDROCHLORIDE 60 MG: 60 CAPSULE, DELAYED RELEASE ORAL at 08:36

## 2020-08-09 RX ADMIN — IPRATROPIUM BROMIDE AND ALBUTEROL SULFATE 1 AMPULE: .5; 3 SOLUTION RESPIRATORY (INHALATION) at 13:35

## 2020-08-09 ASSESSMENT — PAIN SCALES - GENERAL
PAINLEVEL_OUTOF10: 0
PAINLEVEL_OUTOF10: 5
PAINLEVEL_OUTOF10: 0
PAINLEVEL_OUTOF10: 0

## 2020-08-09 ASSESSMENT — PAIN DESCRIPTION - ORIENTATION: ORIENTATION: MID

## 2020-08-09 ASSESSMENT — PAIN DESCRIPTION - FREQUENCY: FREQUENCY: INTERMITTENT

## 2020-08-09 ASSESSMENT — PAIN DESCRIPTION - LOCATION: LOCATION: BACK

## 2020-08-09 ASSESSMENT — PAIN DESCRIPTION - ONSET: ONSET: GRADUAL

## 2020-08-09 ASSESSMENT — PAIN DESCRIPTION - DESCRIPTORS: DESCRIPTORS: ACHING

## 2020-08-09 ASSESSMENT — PAIN DESCRIPTION - PAIN TYPE: TYPE: CHRONIC PAIN

## 2020-08-09 NOTE — PROGRESS NOTES
Physical Therapy    Physical Therapy Initial Evaluation    Room #:  IC03/IC03-01  Patient Name: Josefa Denton  YOB: 1945  MRN: [id number]    Referring Provider: Uli Cosme DO     Date of Service: 8/9/2020    Evaluating Physical Therapist:  Chanelle Barba, PT  Lic. # [id number]      Diagnosis:   Respiratory failure ([facility] [J96.90]   Admitted with  shortness of breath  And fall one week ago; pneumonia  hypocapnic hypoxemic respiratory failure.     Patient Active Problem List   Diagnosis    Osteoarthritis of hip    Hip pain    Periprosthetic fracture around internal prosthetic left hip joint (HCC)    Aseptic loosening of prosthetic hip (HCC)    Sleep disorder    Restless legs syndrome (RLS)    Numbness and tingling    Trigger point of extremity    Primary osteoarthritis of right foot    Right foot pain    Shoulder contusion    Shoulder sprain    Shoulder pain    Osteoarthritis of lumbar spine    Spondylolisthesis, grade 1    Neuroforaminal stenosis of spine    Degenerative arthritis of lumbar spine    Lumbar spinal stenosis    Neuropathy involving both lower extremities    Protruded lumbar disc    Lumbar radiculopathy    Facet syndrome, lumbar (HCC)    Status post total hip replacement, left    Sacroiliitis (HCC)    Malignant neoplasm of central portion of left female breast (HCC)    Midline low back pain with bilateral sciatica    Chronic pain syndrome    Class 3 severe obesity due to excess calories with serious comorbidity and body mass index (BMI) of 45.0 to 49.9 in adult (HCC)    Dyspnea on exertion    Tobacco abuse    Pure hypercholesterolemia    Bunionette of right foot    Difficulty walking    Morbid obesity with BMI of 45.0-49.9, adult (HCC)    Pulmonary emphysema (HCC)    AYESHA (obstructive sleep apnea)    Respiratory failure (HCC)    COPD exacerbation ([facility]        Tentative placement recommendation: Subacute rehab    Equipment recommendation: Patient has needed equipment       Prior Level of Function: Patient ambulated independently with rollator (4 wheeled walker with seat and brakes)   Rehab Potential: good  for baseline    Past medical history:   Past Medical History:   Diagnosis Date    Adverse effect of anesthetic     difficulty waking up if lying flat post op    Arthritis     Bronchitis     Cancer ([address]     breast left    Diverticula of colon 2011    Hyperlipidemia     Hypertension     Pneumonia     Restless leg syndrome     Sleep apnea     CPAP    Thyroid disease      Past Surgical History:   Procedure Laterality Date    APPENDECTOMY      BREAST SURGERY Left 03/2016    lumpectomy for cancer with chemo & radiation last chemo in july     CHOLECYSTECTOMY  2009    lap    COLONOSCOPY  2011    diverticulosis    COSMETIC SURGERY Bilateral     blephoroplasty    EYE SURGERY Bilateral     cataract extraction    HAND SURGERY      CTS B/L    HYSTERECTOMY      JOINT REPLACEMENT  03/04/07    [place] Knees. [place].  Hips    KNEE ARTHROSCOPY Bilateral     LAP BAND  2007    NERVE BLOCK Left 8/19/15    sacroiliac joint #1    NERVE BLOCK  8 19 15    lumbar epidural #1    NERVE BLOCK N/A 9/9/15    lumbar epidural nerve block #3    NERVE BLOCK N/A 10 26 2015    paravertebral facet bilateral lumbar #1    NERVE BLOCK Bilateral 11 2 15    lumb facet #2    NERVE BLOCK Bilateral 11/09/15    paravertebral facet block lumbar #3    NERVE BLOCK Bilateral 04 13 2016    Bilateral sacroiliac joint injection #1    NERVE BLOCK Bilateral 08/31/2016    SI injection #2    NERVE BLOCK Bilateral 10/03/2016    si inj #3    NERVE SURGERY Right 1/22/2020    RADIOFREQUENCY, SACROILIAC JOINT RIGHT AT S1, S2, S3 performed by Serena Edmond DO at [facility]      upper and lower dental implants  except 6 teeth in  front    OTHER SURGICAL HISTORY Right 4/15/2015    right ethmoidectomymaaxillary antrostomies frontal recess exploration    OTHER increase safety and independence with bed mobility, balance, functional transfers, and functional mobility. Treatment:  Patient practiced and was instructed in the following treatment: Sat edge of bed 5 minutes with Minimal assist of 1 to increase dynamic sitting balance and activity tolerance. d/t impulsiveness           At end of session, patient in bed with alarm call light and phone within reach,   all lines and tubes intact, nursing notified. Patient would benefit from continued skilled Physical Therapy to improve functional independence and quality of life. Patient's/ family goals   home        Patient and or family understand(s) diagnosis, prognosis, and plan of care. PLAN:    Physical Therapy care will be provided in accordance with the objectives noted above. Exercises and functional mobility practice will be used as well as appropriate assistive devices or modalities to obtain goals. Patient and family education will also be administered as needed. Frequency of treatments: Patient will be seen    daily  for therapeutic exercise, functional retraining, endurance activities, balance exercises, family and patient education. Time in  223/306  Time out  239/330    Total Treatment Time  30 minutes    Evaluation time includes thorough review of current medical information, gathering information on past medical history/social history and prior level of function, completion of standardized testing/informal observation of tasks, assessment of data, and development of Plan of care and goals.      CPT codes:  Low Complexity PT evaluation (14768)  Gait Training (97472) 30 minutes 2 unit(s)    Harinder Alamo PT

## 2020-08-09 NOTE — CARE COORDINATION
DAMIR Note: 8/9/2020 at 2:03pm: SW consult for SNF. COVID test negative - test done on 8/7. CM talked with pt and her daughter Robert Trejo at the bedside. Pt remains in ICU. Pt states she lives alone in a condo. Both state that she does not have home oxygen. Daughter states her mom drove herself here to the hospital and her car is in the parking lot. PT / OT has been consulted but have not seen as yet. Will await their recommendations. Both pt and daughter Robert Trejo state if SNF is recommended, she would like to go to [facility] as she has been there before. Referral called and left on VM of Germaine Bowman - nurse liason at [facility], to have her follow along and also informed her that we are waiting PT / OT evals. Would need to see if [facility] can accept. For SNF placement - PRECERT NOT NEEDED. Would need a signed YONG and HENS. Unit CM / SW to follow up tomorrow.  Barbara Gonsales RN

## 2020-08-09 NOTE — PROGRESS NOTES
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 Her wheezing has almost disappeared; therefore, we will decrease her steroids further and perhaps place her on a Medrol Dosepak. The patient's arterial blood gases are as probably as good as they will be in terms of ventilatory capacity. There is adequate renal compensation for her persistent and probably long-term hypercapnia. She will need to get up in a chair and appears stable for transfer to the monitored floor. She will continue to need some physical and Occupational Therapy. Blood pressures under reasonable control on a combination of amlodipine, losartan and nebivolol. MEDICATIONS:   methylPREDNISolone  40 mg Intravenous Q12H    doxycycline hyclate  100 mg Oral 2 times per day    DULoxetine  60 mg Oral Daily    ipratropium-albuterol  1 ampule Inhalation Q4H WA    Roflumilast  500 mcg Oral Daily    ferrous sulfate  325 mg Oral Daily with breakfast    sodium chloride flush  10 mL Intravenous 2 times per day    enoxaparin  40 mg Subcutaneous Daily    cefTRIAXone (ROCEPHIN) IV  2 g Intravenous Q24H    nebivolol  5 mg Oral Daily    amLODIPine  5 mg Oral Daily    rOPINIRole  3 mg Oral Nightly    pantoprazole  40 mg Oral QAM AC    losartan  50 mg Oral Daily       HYDROcodone 5 mg - acetaminophen, hydrALAZINE, sodium chloride flush, acetaminophen      REVIEW OF SYSTEMS:  Constitutional: Denies fever, weight loss, night sweats, and fatigue  Skin: Denies pigmentation, dark lesions, and rashes   HEENT: Denies hearing loss, tinnitus, ear drainage, epistaxis, sore throat, and hoarseness.   Cardiovascular: Denies palpitations, chest pain, and chest pressure. Respiratory: Denies cough, dyspnea at rest, hemoptysis, apnea, and choking. Gastrointestinal: Denies nausea, vomiting, poor appetite, diarrhea, heartburn or reflux  Genitourinary: Denies dysuria, frequency, urgency or hematuria  Musculoskeletal: Denies myalgias, muscle weakness, and bone pain  Neurological: Denies dizziness, vertigo, headache, and focal weakness  Psychological: Denies anxiety and depression  Endocrine: Denies heat intolerance and cold intolerance  Hematopoietic/Lymphatic: Denies bleeding problems and blood transfusions    OBJECTIVE:  Vitals:    08/09/20 0700   BP: (!) 163/101   Pulse: 82   Resp: 19   Temp:    SpO2: 92%     FiO2 : 40 %  O2 Flow Rate (L/min): 2 L/min  O2 Device: Nasal cannula    PHYSICAL EXAM:  Constitutional: No fever, chills, diaphoresis  Skin: No skin rash, no skin breakdown  HEENT: Unremarkable  Neck: No JVD, lymphadenopathy, thyromegaly  Cardiovascular: S1, S2 normal.  No S3 murmurs rubs present  Respiratory: Clear to auscultation bilaterally. Much improved since I last saw her 2 days ago  Gastrointestinal: Soft, obese, nontender  Genitourinary: No CVA tenderness  Extremities: No clubbing, cyanosis, or edema  Neurological: Awake, alert, oriented x3. No evidence of focal motor or sensory deficits  Psychological: In good spirits.   Appropriate affect    LABS:  WBC   Date Value Ref Range Status   08/09/2020 11.0 4.5 - 11.5 E9/L Final   08/08/2020 6.0 4.5 - 11.5 E9/L Final   08/06/2020 4.9 4.5 - 11.5 E9/L Final     Hemoglobin   Date Value Ref Range Status   08/09/2020 15.8 (H) 11.5 - 15.5 g/dL Final   08/08/2020 16.5 (H) 11.5 - 15.5 g/dL Final   08/06/2020 14.7 11.5 - 15.5 g/dL Final     Hematocrit   Date Value Ref Range Status   08/09/2020 49.7 (H) 34.0 - 48.0 % Final   08/08/2020 52.2 (H) 34.0 - 48.0 % Final   08/06/2020 46.2 34.0 - 48.0 % Final     MCV   Date Value Ref Range Status   08/09/2020 94.3 80.0 - 99.9 fL Final   08/08/2020 93.5 80.0 - 99.9 fL Final   08/06/2020 97.3 80.0 - 99.9 fL Final     Platelets   Date Value Ref Range Status   08/09/2020 211 130 - 450 E9/L Final   08/08/2020 213 130 - 450 E9/L Final   08/06/2020 182 130 - 450 E9/L Final     Sodium   Date Value Ref Range Status   08/09/2020 136 132 - 146 mmol/L Final   08/08/2020 133 132 - 146 mmol/L Final   08/07/2020 138 132 - 146 mmol/L Final     Potassium   Date Value Ref Range Status   08/09/2020 3.3 (L) 3.5 - 5.0 mmol/L Final   08/08/2020 3.8 3.5 - 5.0 mmol/L Final   08/07/2020 4.2 3.5 - 5.0 mmol/L Final     Chloride   Date Value Ref Range Status   08/09/2020 91 (L) 98 - 107 mmol/L Final   08/08/2020 90 (L) 98 - 107 mmol/L Final   08/07/2020 93 (L) 98 - 107 mmol/L Final     CO2   Date Value Ref Range Status   08/09/2020 34 (H) 22 - 29 mmol/L Final   08/08/2020 33 (H) 22 - 29 mmol/L Final   08/07/2020 34 (H) 22 - 29 mmol/L Final     BUN   Date Value Ref Range Status   08/09/2020 26 (H) 8 - 23 mg/dL Final   08/08/2020 22 8 - 23 mg/dL Final   08/07/2020 15 8 - 23 mg/dL Final     CREATININE   Date Value Ref Range Status   08/09/2020 0.6 0.5 - 1.0 mg/dL Final   08/08/2020 0.5 0.5 - 1.0 mg/dL Final   08/07/2020 0.6 0.5 - 1.0 mg/dL Final     Glucose   Date Value Ref Range Status   08/09/2020 110 (H) 74 - 99 mg/dL Final   08/08/2020 143 (H) 74 - 99 mg/dL Final   08/07/2020 156 (H) 74 - 99 mg/dL Final   03/27/2012 125 (H) 70 - 110 mg/dL Final   03/25/2012 112 (H) 70 - 110 mg/dL Final   03/08/2012 143 (H) 70 - 110 mg/dL Final     Calcium   Date Value Ref Range Status   08/09/2020 9.1 8.6 - 10.2 mg/dL Final   08/08/2020 9.0 8.6 - 10.2 mg/dL Final   08/07/2020 9.0 8.6 - 10.2 mg/dL Final     Total Protein   Date Value Ref Range Status   08/09/2020 7.0 6.4 - 8.3 g/dL Final   08/08/2020 7.3 6.4 - 8.3 g/dL Final   08/06/2020 6.9 6.4 - 8.3 g/dL Final     Albumin   Date Value Ref Range Status   03/25/2012 4.3 3.2 - 4.8 g/dL Final   02/21/2012 4.4 3.2 - 4.8 g/dL Final   08/16/2011 4.6 3.2 - 4.8 g/dL Final Alb   Date Value Ref Range Status   08/09/2020 3.9 3.5 - 5.2 g/dL Final   08/08/2020 3.9 3.5 - 5.2 g/dL Final   08/06/2020 4.0 3.5 - 5.2 g/dL Final     Total Bilirubin   Date Value Ref Range Status   08/09/2020 0.6 0.0 - 1.2 mg/dL Final   08/08/2020 0.5 0.0 - 1.2 mg/dL Final   08/06/2020 0.6 0.0 - 1.2 mg/dL Final     Alkaline Phosphatase   Date Value Ref Range Status   08/09/2020 62 35 - 104 U/L Final   08/08/2020 68 35 - 104 U/L Final   08/06/2020 67 35 - 104 U/L Final     AST   Date Value Ref Range Status   08/09/2020 27 0 - 31 U/L Final   08/08/2020 25 0 - 31 U/L Final   08/06/2020 29 0 - 31 U/L Final     ALT   Date Value Ref Range Status   08/09/2020 16 0 - 32 U/L Final   08/08/2020 13 0 - 32 U/L Final   08/06/2020 15 0 - 32 U/L Final     GFR Non-   Date Value Ref Range Status   08/09/2020 >60 >=60 mL/min/1.73 Final     Comment:     Chronic Kidney Disease: less than 60 ml/min/1.73 sq.m. Kidney Failure: less than 15 ml/min/1.73 sq.m. Results valid for patients 18 years and older. 08/08/2020 >60 >=60 mL/min/1.73 Final     Comment:     Chronic Kidney Disease: less than 60 ml/min/1.73 sq.m. Kidney Failure: less than 15 ml/min/1.73 sq.m. Results valid for patients 18 years and older. 08/07/2020 >60 >=60 mL/min/1.73 Final     Comment:     Chronic Kidney Disease: less than 60 ml/min/1.73 sq.m. Kidney Failure: less than 15 ml/min/1.73 sq.m. Results valid for patients 18 years and older.        GFR    Date Value Ref Range Status   08/09/2020 >60  Final   08/08/2020 >60  Final   08/07/2020 >60  Final     Magnesium   Date Value Ref Range Status   08/08/2020 2.0 1.6 - 2.6 mg/dL Final   08/06/2020 2.0 1.6 - 2.6 mg/dL Final     Phosphorus   Date Value Ref Range Status   08/08/2020 3.0 2.5 - 4.5 mg/dL Final     Recent Labs     08/09/20  0538   PH 7.453*   PO2 64.7*   PCO2 54.6*   HCO3 37.4*   BE 10.9*   O2SAT 92.5       RADIOLOGY:  XR CHEST PORTABLE Final Result   No significant interval change. XR CHEST PORTABLE   Final Result   Hypoaeration with vascular crowding. CT HEAD WO CONTRAST   Final Result   No acute intracranial abnormality. Specifically, there is no acute   intracranial hemorrhage      Small right supraorbital soft tissue contusion. CTA CHEST W CONTRAST   Final Result   1. There is no evidence of a pulmonary embolus. 2. Chronic significant elevation the right hemidiaphragm. 3. Alveolar opacification within the right lung base. The airspace disease   given the appearance is favored to represent pneumonia rather than   compression atelectasis. 4. Ectasias of the pulmonary artery. The finding can be seen with pulmonary   hypertension. 5. Cardiomegaly         XR CHEST (2 VW)   Final Result   1. There is no evidence of failure or pneumonia. 2. Chronic elevation of the right hemidiaphragm. XR CHEST PORTABLE    (Results Pending)   FL SNIFF TEST    (Results Pending)           PROBLEM LIST:  Principal Problem:    COPD exacerbation ([address]  Active Problems:    Respiratory failure ([address]  Resolved Problems:    * No resolved hospital problems. *      IMPRESSION:  1. Pneumonia  2. Acute on chronic hypercapnic and hypoxemic respiratory failure  3. 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 Hypertension  5. Pulmonary hypertension    PLAN:  1. Change IV steroids to oral  2. Continue antibiotics  3. Mobilize out of bed  4. Needs to be evaluated for sleep apnea as outpatient  5. Stable for transfer if is from critical care/pulmonary point of view  6. Monitor blood pressure carefully    ATTESTATION:  ICU Staff Physician note of personal involvement in Care  As the attending physician, I certify that I personally reviewed the patients history and personally examined the patient to confirm the physical findings described above,  And that I reviewed the relevant imaging studies and available reports.   I also discussed the

## 2020-08-09 NOTE — PROGRESS NOTES
OCCUPATIONAL THERAPY  Initial Evaluation  Date:2020  Patient Name: Mona Chavez  MRN: [id number]  : 1945  ROOM #: IC03/IC03-01     Referring Provider: DO Timmy Platt OT: Ashley Griffin OTR/L [id number]    Placement Recommendation: Subacute   Recommended Adaptive Equipment: none     [facility]   AM-PAC Daily Activity Inpatient   How much help for putting on and taking off regular lower body clothing?: Total  How much help for Bathing?: A Lot  How much help for Toileting?: A Lot  How much help for putting on and taking off regular upper body clothing?: A Lot  How much help for taking care of personal grooming?: A Lot  How much help for eating meals?: None  AM-PAC Inpatient Daily Activity Raw Score: 13  AM-PAC Inpatient ADL T-Scale Score : 32.03  ADL Inpatient CMS 0-100% Score: 63.03  ADL Inpatient CMS G-Code Modifier : CL    Diagnosis:   1. COPD exacerbation ([facility]    2. Pneumonia due to organism    3. Hypoxia    4. Hypercapnia    5. Acute on chronic respiratory failure with hypoxia and hypercapnia (HCC)      Pertinent Medical History:   Past Medical History:   Diagnosis Date    Adverse effect of anesthetic     difficulty waking up if lying flat post op    Arthritis     Bronchitis     Cancer ([facility]     breast left    Diverticula of colon     Hyperlipidemia     Hypertension     Pneumonia     Restless leg syndrome     Sleep apnea     CPAP    Thyroid disease         Precautions:  falls, O2, facial bruising d/t recent fall    Pain Scale: Numeric Rate: 5/10 pain in knees; Nursing notified. Social history: alone       Drive: yes    Occupation: retired   Home architecture: condo, 1 story, 2 steps to enter with rail, walk in shower.    PLOF: independent with BADL and IADL, ambulated with wheeled walker/tri-wheeled walker   Equipment owned: wheeled walker, tri-wheeled walker, cane, wheelchair, shower bench, bedside commode, grab bars, reacher, sock aide   Cognition: oriented x 3; follows 2 step directions. fair  Problem solving skills   fair  Memory    fair  Sequencing  Communication: intact   Visual perceptual skills: intact     Glasses: yes, readers    Edema: no     Sensation: intact   Hand Dominance:  Left     X Right     Left Right Comment   Passive range of motion [facility]     Active range of motion [facility]     Muscle Grade 4/5 4/5    /pinch Strength Intact  Intact     Additional Information: Good  and wfl FMC/dexterity noted during ADL tasks Good  and wfl FMC/dexterity noted during ADL tasks      Functional Assessment:   Initial Eval Status  Date: 8/9/2020 Treatment Status  Date: STGs = LTGs  Time frame: 5-7 days   Feeding Independent   Independent    Grooming Moderate Assist   Independent    UB Dressing Moderate Assist to don outer gown  Independent    LB Dressing Dependent   Independent    Bathing Moderate Assist  Independent    Toileting Moderate Assist   Independent    Bed Mobility  Supine to sit: N/T as pt was up in chair   Scooting:N/T   Sit to supine: N/t as pt was up in chair   Supine to sit: Independent   Sit to supine: Independent    Functional Transfers Minimal Assist from bedside chair to tri-wheeled walker  Independent    Functional Mobility Minimal Assist with wheeled walker/tri-wheeled walker  Modified [place]    Activity Tolerance Fair   Good      Balance:   Sitting: good   Standing: fair with wheeled walker    Endurance: fair     Comments: Upon arrival to the room the patient was in chair. At end of the session, the patient was in chair. Call light and phone within reach. All lines and tubes intact. Overall patient demonstrated decreased independence and safety during completion of ADL/functional transfer/mobility tasks. Pt would benefit from continued skilled OT to increase safety and independence with completion of ADL/IADL tasks for functional independence and quality of life. Treatment: Therapist facilitated bed mobility.  Sitting balance at EOB to family were instructed on functional diagnosis, prognosis/goals and OT plan of care. Demonstrated fair understanding. Time In: 2:20pm   Time Out: 2:39pm                Treatment Charges: Mins Units   ADL/Home Mgt                    46542     Therapeutic Activities          52083 12    Therapeutic Exercise          98076     Manual Therapy                  29577     Neuro Re-ed                       09942     Orthotic manage/training    01557     Total Timed Treatment 12 1     Evaluation time includes thorough review of current medical information, gathering information on past medical history/social history and prior level of function, completion of standardized testing/informal observation of tasks, assessment of data, and development of POC/Goals.     Rey Singer OTR/L [id number]

## 2020-08-09 NOTE — PROGRESS NOTES
bleeding. Physical Exam:  I/O this shift:  In: 250 [P.O.:240; I.V.:10]  Out: -     Intake/Output Summary (Last 24 hours) at 8/9/2020 1113  Last data filed at 8/9/2020 0900  Gross per 24 hour   Intake 760 ml   Output 1100 ml   Net -340 ml   I/O last 3 completed shifts: In: 200 [P.O.:720; I.V.:50]  Out: 1100 [Urine:1100]  Patient Vitals for the past 96 hrs (Last 3 readings):   Weight   08/06/20 2230 212 lb 1.3 oz (96.2 kg)   08/06/20 1539 210 lb (95.3 kg)     Vital Signs:   Blood pressure (!) 168/108, pulse 78, temperature 98.7 °F (37.1 °C), temperature source Temporal, resp. rate 19, height 5' (1.524 m), weight 212 lb 1.3 oz (96.2 kg), SpO2 94 %, not currently breastfeeding. General appearance:  Awake and alert. She is somewhat confused but seems to be redirectable. Head:  Normocephalic. No masses, lesions or tenderness. Eyes:  PERRLA. EOMI. Sclera clear. Buccal mucosa moist.  ENT:  Ears normal. Mucosa normal.  Nasal cannula oxygen is in place. Neck:    Supple. Trachea midline. No thyromegaly. No JVD. No bruits. Heart:    Rhythm regular. Rate controlled. No murmurs. Lungs:    Decent aeration throughout. I do not appreciate significant wheezing. Abdomen:   Soft. Non-tender. Non-distended. Bowel sounds positive. No organomegaly or masses. No pain on palpation. Extremities:    Peripheral pulses present. No peripheral edema. No ulcers. No cyanosis. No clubbing. Neurologic:    She answers many questions appropriately but remains confused. She has no focal deficits. Psych:   Behavior is normal. Mood appears normal. Speech is not rapid and/or pressured. Musculoskeletal:   Spine ROM normal. Muscular strength intact. Gait not assessed. Integumentary:  No rashes  Skin normal color and texture.   Genitalia/Breast:  Deferred    Medication:  Scheduled Meds:   methylPREDNISolone  24 mg Oral Once    [START ON 8/10/2020] methylPREDNISolone  4 mg Oral QAM AC    [START ON 8/10/2020] methylPREDNISolone  4 mg Oral Lunch    [START ON 8/10/2020] methylPREDNISolone  4 mg Oral Dinner    [START ON 8/10/2020] methylPREDNISolone  8 mg Oral Nightly    [START ON 8/11/2020] methylPREDNISolone  4 mg Oral Nightly    doxycycline hyclate  100 mg Oral 2 times per day    DULoxetine  60 mg Oral Daily    ipratropium-albuterol  1 ampule Inhalation Q4H WA    Roflumilast  500 mcg Oral Daily    ferrous sulfate  325 mg Oral Daily with breakfast    sodium chloride flush  10 mL Intravenous 2 times per day    enoxaparin  40 mg Subcutaneous Daily    cefTRIAXone (ROCEPHIN) IV  2 g Intravenous Q24H    nebivolol  5 mg Oral Daily    amLODIPine  5 mg Oral Daily    rOPINIRole  3 mg Oral Nightly    pantoprazole  40 mg Oral QAM AC    losartan  50 mg Oral Daily       Objective Data:  CBC with Differential:    Lab Results   Component Value Date    WBC 11.0 08/09/2020    RBC 5.27 08/09/2020    HGB 15.8 08/09/2020    HCT 49.7 08/09/2020     08/09/2020    MCV 94.3 08/09/2020    MCH 30.0 08/09/2020    MCHC 31.8 08/09/2020    RDW 14.7 08/09/2020    LYMPHOPCT 6.7 08/09/2020    MONOPCT 7.2 08/09/2020    BASOPCT 0.1 08/09/2020    MONOSABS 0.80 08/09/2020    LYMPHSABS 0.74 08/09/2020    EOSABS 0.09 08/09/2020    BASOSABS 0.01 08/09/2020     CMP:    Lab Results   Component Value Date     08/09/2020    K 3.3 08/09/2020    CL 91 08/09/2020    CO2 34 08/09/2020    BUN 26 08/09/2020    CREATININE 0.6 08/09/2020    GFRAA >60 08/09/2020    LABGLOM >60 08/09/2020    GLUCOSE 110 08/09/2020    GLUCOSE 125 03/27/2012    PROT 7.0 08/09/2020    LABALBU 3.9 08/09/2020    LABALBU 4.3 03/25/2012    CALCIUM 9.1 08/09/2020    BILITOT 0.6 08/09/2020    ALKPHOS 62 08/09/2020    AST 27 08/09/2020    ALT 16 08/09/2020       Assessment:  1. Acute respiratory failure with hypercapnia/hypoxemia in the setting of an exacerbation of COPD partly complicated by community-acquired pneumonia  2. Restless leg syndrome  3.  Obstructive sleep apnea with obesity hypoventilatory syndrome  4. Essential hypertension  5. Chronic pain syndrome on multiple medications at home  6. History of breast cancer, on testosterone suppression. 7. Recent fall with periorbital contusion. 8. History of tobacco abuse    Plan:   The patient's respiratory status appears stable at this point as she has been weaned to her at home nasal cannula oxygen requirements. She seems mildly confused today and my examination today would lead me to believe the patient is not capable of living and functioning independently. I will discuss this with her family and asked the social work team to investigate nursing home placement. Electrolyte derangements will be addressed. Multiple medications have been placed on hold in the setting of encephalopathy. We will continue to hold these medications. The patient is acceptable for transfer from the intensive care unit today. Continue current therapy. See orders for further plan of care. Greater than 40 minutes of critical care time was spent with the patient. This time included chart review, , and discussion with those consultants involved in the patient's care. More than 50% of my  time was spent at the bedside counseling/coordinating care with the patient and/or family with face to face contact. This time was spent reviewing notes and laboratory data as well as instructing and counseling the patient. Time I spent with the family or surrogate(s) is included only if the patient was incapable of providing the necessary information or participating in medical decisions. I also discussed the differential diagnosis and all of the proposed management plans with the patient and individuals accompanying the patient. Piper Galvez requires this high level of physician care and nursing on the IMC/Telemetry unit due the complexity of decision management and chance of rapid decline or death.   Continued cardiac monitoring and higher level of nursing are required. I am readily available for any further decision-making and intervention.      Sommer Blackwell DO, F.A.C.O.I.  8/9/2020  11:13 AM

## 2020-08-10 ENCOUNTER — APPOINTMENT (OUTPATIENT)
Dept: GENERAL RADIOLOGY | Age: 75
DRG: 189 | End: 2020-08-10
Payer: MEDICARE

## 2020-08-10 ENCOUNTER — TELEPHONE (OUTPATIENT)
Dept: SLEEP MEDICINE | Age: 75
End: 2020-08-10

## 2020-08-10 LAB
ALBUMIN SERPL-MCNC: 3.7 G/DL (ref 3.5–5.2)
ALP BLD-CCNC: 53 U/L (ref 35–104)
ALT SERPL-CCNC: 19 U/L (ref 0–32)
ANION GAP SERPL CALCULATED.3IONS-SCNC: 11 MMOL/L (ref 7–16)
AST SERPL-CCNC: 30 U/L (ref 0–31)
BASOPHILS ABSOLUTE: 0.01 E9/L (ref 0–0.2)
BASOPHILS RELATIVE PERCENT: 0.1 % (ref 0–2)
BILIRUB SERPL-MCNC: 0.7 MG/DL (ref 0–1.2)
BUN BLDV-MCNC: 26 MG/DL (ref 8–23)
CALCIUM SERPL-MCNC: 8.9 MG/DL (ref 8.6–10.2)
CHLORIDE BLD-SCNC: 89 MMOL/L (ref 98–107)
CO2: 33 MMOL/L (ref 22–29)
CREAT SERPL-MCNC: 0.7 MG/DL (ref 0.5–1)
EOSINOPHILS ABSOLUTE: 0 E9/L (ref 0.05–0.5)
EOSINOPHILS RELATIVE PERCENT: 0 % (ref 0–6)
GFR AFRICAN AMERICAN: >60
GFR NON-AFRICAN AMERICAN: >60 ML/MIN/1.73
GLUCOSE BLD-MCNC: 103 MG/DL (ref 74–99)
HCT VFR BLD CALC: 44.7 % (ref 34–48)
HEMOGLOBIN: 14.8 G/DL (ref 11.5–15.5)
IMMATURE GRANULOCYTES #: 0.03 E9/L
IMMATURE GRANULOCYTES %: 0.4 % (ref 0–5)
LYMPHOCYTES ABSOLUTE: 0.9 E9/L (ref 1.5–4)
LYMPHOCYTES RELATIVE PERCENT: 11.2 % (ref 20–42)
MAGNESIUM: 1.8 MG/DL (ref 1.6–2.6)
MCH RBC QN AUTO: 30.5 PG (ref 26–35)
MCHC RBC AUTO-ENTMCNC: 33.1 % (ref 32–34.5)
MCV RBC AUTO: 92.2 FL (ref 80–99.9)
MONOCYTES ABSOLUTE: 0.99 E9/L (ref 0.1–0.95)
MONOCYTES RELATIVE PERCENT: 12.3 % (ref 2–12)
NEUTROPHILS ABSOLUTE: 6.09 E9/L (ref 1.8–7.3)
NEUTROPHILS RELATIVE PERCENT: 76 % (ref 43–80)
PDW BLD-RTO: 14.3 FL (ref 11.5–15)
PHOSPHORUS: 2.6 MG/DL (ref 2.5–4.5)
PLATELET # BLD: 188 E9/L (ref 130–450)
PMV BLD AUTO: 9.6 FL (ref 7–12)
POTASSIUM SERPL-SCNC: 3 MMOL/L (ref 3.5–5)
RBC # BLD: 4.85 E12/L (ref 3.5–5.5)
SODIUM BLD-SCNC: 133 MMOL/L (ref 132–146)
TOTAL PROTEIN: 6.4 G/DL (ref 6.4–8.3)
WBC # BLD: 8 E9/L (ref 4.5–11.5)

## 2020-08-10 PROCEDURE — 6370000000 HC RX 637 (ALT 250 FOR IP): Performed by: INTERNAL MEDICINE

## 2020-08-10 PROCEDURE — 94660 CPAP INITIATION&MGMT: CPT

## 2020-08-10 PROCEDURE — 6360000002 HC RX W HCPCS: Performed by: INTERNAL MEDICINE

## 2020-08-10 PROCEDURE — 71045 X-RAY EXAM CHEST 1 VIEW: CPT

## 2020-08-10 PROCEDURE — 83735 ASSAY OF MAGNESIUM: CPT

## 2020-08-10 PROCEDURE — 2580000003 HC RX 258: Performed by: INTERNAL MEDICINE

## 2020-08-10 PROCEDURE — 80053 COMPREHEN METABOLIC PANEL: CPT

## 2020-08-10 PROCEDURE — 76000 FLUOROSCOPY <1 HR PHYS/QHP: CPT

## 2020-08-10 PROCEDURE — 94640 AIRWAY INHALATION TREATMENT: CPT

## 2020-08-10 PROCEDURE — 84100 ASSAY OF PHOSPHORUS: CPT

## 2020-08-10 PROCEDURE — 2700000000 HC OXYGEN THERAPY PER DAY

## 2020-08-10 PROCEDURE — 97530 THERAPEUTIC ACTIVITIES: CPT

## 2020-08-10 PROCEDURE — 1200000000 HC SEMI PRIVATE

## 2020-08-10 PROCEDURE — 85025 COMPLETE CBC W/AUTO DIFF WBC: CPT

## 2020-08-10 RX ORDER — GABAPENTIN 300 MG/1
300 CAPSULE ORAL 3 TIMES DAILY
Status: DISCONTINUED | OUTPATIENT
Start: 2020-08-10 | End: 2020-08-11 | Stop reason: HOSPADM

## 2020-08-10 RX ORDER — POTASSIUM BICARBONATE 25 MEQ/1
25 TABLET, EFFERVESCENT ORAL DAILY
Status: DISCONTINUED | OUTPATIENT
Start: 2020-08-11 | End: 2020-08-10 | Stop reason: CLARIF

## 2020-08-10 RX ORDER — POTASSIUM BICARBONATE 25 MEQ/1
25 TABLET, EFFERVESCENT ORAL ONCE
Status: DISCONTINUED | OUTPATIENT
Start: 2020-08-10 | End: 2020-08-10 | Stop reason: CLARIF

## 2020-08-10 RX ADMIN — ROPINIROLE HYDROCHLORIDE 3 MG: 2 TABLET, FILM COATED ORAL at 21:36

## 2020-08-10 RX ADMIN — PANTOPRAZOLE SODIUM 40 MG: 40 TABLET, DELAYED RELEASE ORAL at 05:17

## 2020-08-10 RX ADMIN — POTASSIUM BICARBONATE 20 MEQ: 782 TABLET, EFFERVESCENT ORAL at 10:15

## 2020-08-10 RX ADMIN — CEFTRIAXONE SODIUM 2 G: 2 INJECTION, POWDER, FOR SOLUTION INTRAMUSCULAR; INTRAVENOUS at 00:01

## 2020-08-10 RX ADMIN — GABAPENTIN 300 MG: 300 CAPSULE ORAL at 13:24

## 2020-08-10 RX ADMIN — ROFLUMILAST 500 MCG: 500 TABLET ORAL at 08:42

## 2020-08-10 RX ADMIN — ENOXAPARIN SODIUM 40 MG: 40 INJECTION SUBCUTANEOUS at 08:42

## 2020-08-10 RX ADMIN — GABAPENTIN 300 MG: 300 CAPSULE ORAL at 08:42

## 2020-08-10 RX ADMIN — FERROUS SULFATE TAB 325 MG (65 MG ELEMENTAL FE) 325 MG: 325 (65 FE) TAB at 08:42

## 2020-08-10 RX ADMIN — CEFTRIAXONE SODIUM 2 G: 2 INJECTION, POWDER, FOR SOLUTION INTRAMUSCULAR; INTRAVENOUS at 23:32

## 2020-08-10 RX ADMIN — AMLODIPINE BESYLATE 5 MG: 5 TABLET ORAL at 08:42

## 2020-08-10 RX ADMIN — Medication 10 ML: at 08:44

## 2020-08-10 RX ADMIN — IPRATROPIUM BROMIDE AND ALBUTEROL SULFATE 1 AMPULE: .5; 3 SOLUTION RESPIRATORY (INHALATION) at 09:26

## 2020-08-10 RX ADMIN — HYDRALAZINE HYDROCHLORIDE 5 MG: 20 INJECTION INTRAMUSCULAR; INTRAVENOUS at 05:16

## 2020-08-10 RX ADMIN — METHYLPREDNISOLONE 4 MG: 4 TABLET ORAL at 11:54

## 2020-08-10 RX ADMIN — IPRATROPIUM BROMIDE AND ALBUTEROL SULFATE 1 AMPULE: .5; 3 SOLUTION RESPIRATORY (INHALATION) at 17:30

## 2020-08-10 RX ADMIN — METHYLPREDNISOLONE 4 MG: 4 TABLET ORAL at 05:17

## 2020-08-10 RX ADMIN — Medication 10 ML: at 21:37

## 2020-08-10 RX ADMIN — LOSARTAN POTASSIUM 50 MG: 50 TABLET, FILM COATED ORAL at 08:58

## 2020-08-10 RX ADMIN — GABAPENTIN 300 MG: 300 CAPSULE ORAL at 21:37

## 2020-08-10 RX ADMIN — DOXYCYCLINE HYCLATE 100 MG: 100 CAPSULE ORAL at 08:42

## 2020-08-10 RX ADMIN — METHYLPREDNISOLONE 8 MG: 4 TABLET ORAL at 21:36

## 2020-08-10 RX ADMIN — ACETAMINOPHEN 650 MG: 325 TABLET, FILM COATED ORAL at 00:07

## 2020-08-10 RX ADMIN — METHYLPREDNISOLONE 4 MG: 4 TABLET ORAL at 17:23

## 2020-08-10 RX ADMIN — NEBIVOLOL HYDROCHLORIDE 5 MG: 5 TABLET ORAL at 08:58

## 2020-08-10 RX ADMIN — IPRATROPIUM BROMIDE AND ALBUTEROL SULFATE 1 AMPULE: .5; 3 SOLUTION RESPIRATORY (INHALATION) at 06:24

## 2020-08-10 RX ADMIN — DULOXETINE HYDROCHLORIDE 60 MG: 60 CAPSULE, DELAYED RELEASE ORAL at 08:58

## 2020-08-10 RX ADMIN — DOXYCYCLINE HYCLATE 100 MG: 100 CAPSULE ORAL at 21:37

## 2020-08-10 ASSESSMENT — PAIN SCALES - GENERAL
PAINLEVEL_OUTOF10: 0
PAINLEVEL_OUTOF10: 4
PAINLEVEL_OUTOF10: 0
PAINLEVEL_OUTOF10: 4
PAINLEVEL_OUTOF10: 0

## 2020-08-10 ASSESSMENT — PAIN DESCRIPTION - ONSET: ONSET: AWAKENED FROM SLEEP

## 2020-08-10 ASSESSMENT — PAIN DESCRIPTION - PAIN TYPE: TYPE: ACUTE PAIN

## 2020-08-10 ASSESSMENT — PAIN DESCRIPTION - DESCRIPTORS: DESCRIPTORS: ACHING;DULL;DISCOMFORT

## 2020-08-10 ASSESSMENT — PAIN DESCRIPTION - LOCATION: LOCATION: HEAD

## 2020-08-10 ASSESSMENT — PAIN DESCRIPTION - FREQUENCY: FREQUENCY: INTERMITTENT

## 2020-08-10 NOTE — TELEPHONE ENCOUNTER
Sp[facility] with [place] with [facility]--she stated that pt did receive machine on 4/21/17 and if she still has straight medicare she is not eligible for a new machine unless she changes from BiPAP to CPAP--she is able to add Dr Hermann Rinaldi to pts account and will verify with pt that she is seeing Dr Gwen Jim for sleep--once she receives permission from pt she will send download to office so dr can see current settings and compliance--pt would only need to do titration study if dr wishes for setting changes

## 2020-08-10 NOTE — PROGRESS NOTES
Nightly    pantoprazole  40 mg Oral QAM AC    losartan  50 mg Oral Daily       HYDROcodone 5 mg - acetaminophen, hydrALAZINE, sodium chloride flush, acetaminophen      REVIEW OF SYSTEMS:  Constitutional: Denies fever, weight loss, night sweats, and fatigue  Skin: Denies pigmentation, dark lesions, and rashes   HEENT: Denies hearing loss, tinnitus, ear drainage, epistaxis, sore throat, and hoarseness. Cardiovascular: Denies palpitations, chest pain, and chest pressure. Respiratory: Denies cough, dyspnea at rest, hemoptysis, apnea, and choking. Gastrointestinal: Denies nausea, vomiting, poor appetite, diarrhea, heartburn or reflux  Genitourinary: Denies dysuria, frequency, urgency or hematuria  Musculoskeletal: Denies myalgias, muscle weakness, and bone pain  Neurological: Denies dizziness, vertigo, headache, and focal weakness  Psychological: Denies anxiety and depression  Endocrine: Denies heat intolerance and cold intolerance  Hematopoietic/Lymphatic: Denies bleeding problems and blood transfusions    OBJECTIVE:  Vitals:    08/10/20 0800   BP: (!) 138/93   Pulse: 87   Resp: 13   Temp: 98.8 °F (37.1 °C)   SpO2: 92%     FiO2 : 40 %  O2 Flow Rate (L/min): 2 L/min  O2 Device: Nasal cannula    PHYSICAL EXAM:  Constitutional: Current /85, no chills, no fever, no rigors, no diaphoresis, no tachypnea  Skin: Skin rash, no skin breakdown  HEENT: Unremarkable  Neck: No JVD, lymphadenopathy, thyromegaly  Cardiovascular: S1, S2 normal.  No S3 or rubs present  Respiratory: Diminished breath sounds over right posterior hemithorax but no more wheezing or crackles are heard  Gastrointestinal: Soft, obese, nontender  Genitourinary: No CVA tenderness  Extremities: No clubbing, cyanosis, or edema  Neurological: Awake, alert, oriented x3.   No evidence of focal motor or sensory deficits  Psychological: In reasonably good spirits with appropriate affect    LABS:  WBC   Date Value Ref Range Status   08/10/2020 8.0 4.5 - 11.5 70 - 110 mg/dL Final   03/08/2012 143 (H) 70 - 110 mg/dL Final     Calcium   Date Value Ref Range Status   08/10/2020 8.9 8.6 - 10.2 mg/dL Final   08/09/2020 9.1 8.6 - 10.2 mg/dL Final   08/08/2020 9.0 8.6 - 10.2 mg/dL Final     Total Protein   Date Value Ref Range Status   08/10/2020 6.4 6.4 - 8.3 g/dL Final   08/09/2020 7.0 6.4 - 8.3 g/dL Final   08/08/2020 7.3 6.4 - 8.3 g/dL Final     Albumin   Date Value Ref Range Status   03/25/2012 4.3 3.2 - 4.8 g/dL Final   02/21/2012 4.4 3.2 - 4.8 g/dL Final   08/16/2011 4.6 3.2 - 4.8 g/dL Final     Alb   Date Value Ref Range Status   08/10/2020 3.7 3.5 - 5.2 g/dL Final   08/09/2020 3.9 3.5 - 5.2 g/dL Final   08/08/2020 3.9 3.5 - 5.2 g/dL Final     Total Bilirubin   Date Value Ref Range Status   08/10/2020 0.7 0.0 - 1.2 mg/dL Final   08/09/2020 0.6 0.0 - 1.2 mg/dL Final   08/08/2020 0.5 0.0 - 1.2 mg/dL Final     Alkaline Phosphatase   Date Value Ref Range Status   08/10/2020 53 35 - 104 U/L Final   08/09/2020 62 35 - 104 U/L Final   08/08/2020 68 35 - 104 U/L Final     AST   Date Value Ref Range Status   08/10/2020 30 0 - 31 U/L Final   08/09/2020 27 0 - 31 U/L Final   08/08/2020 25 0 - 31 U/L Final     ALT   Date Value Ref Range Status   08/10/2020 19 0 - 32 U/L Final   08/09/2020 16 0 - 32 U/L Final   08/08/2020 13 0 - 32 U/L Final     GFR Non-   Date Value Ref Range Status   08/10/2020 >60 >=60 mL/min/1.73 Final     Comment:     Chronic Kidney Disease: less than 60 ml/min/1.73 sq.m. Kidney Failure: less than 15 ml/min/1.73 sq.m. Results valid for patients 18 years and older. 08/09/2020 >60 >=60 mL/min/1.73 Final     Comment:     Chronic Kidney Disease: less than 60 ml/min/1.73 sq.m. Kidney Failure: less than 15 ml/min/1.73 sq.m. Results valid for patients 18 years and older. 08/08/2020 >60 >=60 mL/min/1.73 Final     Comment:     Chronic Kidney Disease: less than 60 ml/min/1.73 sq.m.           Kidney Failure: less than 15 ml/min/1.73 sq.m. Results valid for patients 18 years and older. GFR    Date Value Ref Range Status   08/10/2020 >60  Final   08/09/2020 >60  Final   08/08/2020 >60  Final     Magnesium   Date Value Ref Range Status   08/10/2020 1.8 1.6 - 2.6 mg/dL Final   08/08/2020 2.0 1.6 - 2.6 mg/dL Final   08/06/2020 2.0 1.6 - 2.6 mg/dL Final     Phosphorus   Date Value Ref Range Status   08/10/2020 2.6 2.5 - 4.5 mg/dL Final   08/08/2020 3.0 2.5 - 4.5 mg/dL Final     Recent Labs     08/09/20  0538   PH 7.453*   PO2 64.7*   PCO2 54.6*   HCO3 37.4*   BE 10.9*   O2SAT 92.5       RADIOLOGY:  XR CHEST PORTABLE   Final Result   No significant interval change. XR CHEST PORTABLE   Final Result   Hypoaeration with vascular crowding. CT HEAD WO CONTRAST   Final Result   No acute intracranial abnormality. Specifically, there is no acute   intracranial hemorrhage      Small right supraorbital soft tissue contusion. CTA CHEST W CONTRAST   Final Result   1. There is no evidence of a pulmonary embolus. 2. Chronic significant elevation the right hemidiaphragm. 3. Alveolar opacification within the right lung base. The airspace disease   given the appearance is favored to represent pneumonia rather than   compression atelectasis. 4. Ectasias of the pulmonary artery. The finding can be seen with pulmonary   hypertension. 5. Cardiomegaly         XR CHEST (2 VW)   Final Result   1. There is no evidence of failure or pneumonia. 2. Chronic elevation of the right hemidiaphragm. XR CHEST PORTABLE    (Results Pending)   FL SNIFF TEST    (Results Pending)           PROBLEM LIST:  Principal Problem:    COPD exacerbation ([address]  Active Problems:    Respiratory failure ([address]  Resolved Problems:    * No resolved hospital problems. *      IMPRESSION:  1. Pneumonia, right lower lobe-resolving  2. COPD with exacerbation, improving  3. Obesity  4. Pulmonary hypertension  5.  History of falls possibly related to large amounts of narcotics and gabapentin  6. Likely obstructive sleep apnea  7. Systemic hypertension  8. Acute on chronic diastolic heart failure  PLAN:  1. Continue antihypertensive medications  2. Continue to taper steroids  3. Can probably discontinue IV antibiotics as well as p.o. antibiotics tomorrow  4. Physical and Occupational Therapy are very important for her  5. Check labs in a.m.  6. Sniff test for diaphragmatic function is supposed to be done today    ATTESTATION:  ICU Staff Physician note of personal involvement in Care  As the attending physician, I certify that I personally reviewed the patients history and personally examined the patient to confirm the physical findings described above,  And that I reviewed the relevant imaging studies and available reports. I also discussed the differential diagnosis and all of the proposed management plans with the patient and individuals accompanying the patient to this visit. They had the opportunity to ask questions about the proposed management plans and to have those questions answered. This patient has a high probability of sudden, clinically significant deterioration, which requires the highest level of physician preparedness to intervene urgently. I managed/supervised life or organ supporting interventions that required frequent physician assessment. I devoted my full attention to the direct care of this patient for the amount of time indicated below. Time I spent with the family or surrogate(s) is included only if the patient was incapable of providing the necessary information or participating in medical decisions - Time devoted to teaching and to any procedures I billed separately is not included.     CRITICAL CARE TIME:  32 minutes    Electronically signed by Constance Quintero MD on 8/10/2020 at 10:01 AM

## 2020-08-10 NOTE — DISCHARGE INSTR - COC
Continuity of Care Form    Patient Name: Yu Bacon   :  1945  MRN:  [id number]    Admit date:  2020  Discharge date:  2020    Code Status Order: Full Code   Advance Directives:   [facility] Documentation     Date/Time Healthcare Directive Type of Healthcare Directive Copy in [facility] [address] Agent's Name Healthcare Agent's Phone Number    [phone]  No, patient does not have an advance directive for healthcare treatment -- -- -- -- --          Admitting Physician:  Emily Rizzo DO  PCP: Marci Jeans. Meris, MD    Discharging Nurse: [facility] Unit/Room#: IC03/IC03-01  Discharging Unit Phone Number: [phone]    Emergency Contact:   Extended Emergency Contact Information  Primary Emergency Contact: Bruce Hernandez  [place] Phone: [phone]  Relation: Brother/Sister  Secondary Emergency Contact: Cameron [address] Phone: [phone]  Mobile Phone: [phone]  Relation: Child    Past Surgical History:  Past Surgical History:   Procedure Laterality Date    APPENDECTOMY      BREAST SURGERY Left 2016    lumpectomy for cancer with chemo & radiation last chemo in july     CHOLECYSTECTOMY      lap    COLONOSCOPY  2011    diverticulosis    COSMETIC SURGERY Bilateral     blephoroplasty    EYE SURGERY Bilateral     cataract extraction    HAND SURGERY      CTS B/L    HYSTERECTOMY      JOINT REPLACEMENT  07    [place]ne[place]. [place]  Hips    KNEE ARTHROSCOPY Bilateral     LAP BAND  2007    NERVE BLOCK Left 8/19/15    sacroiliac joint #1    NERVE BLOCK   15    lumbar epidural #1    NERVE BLOCK N/A 9/9/15    lumbar epidural nerve block #3    NERVE BLOCK N/A 10 26 2015    paravertebral facet bilateral lumbar #1    NERVE BLOCK Bilateral 11 2 15    lumb facet #2    NERVE BLOCK Bilateral 11/09/15    paravertebral facet block lumbar #3    NERVE BLOCK Bilateral  13     Bilateral sacroiliac joint injection #1    NERVE BLOCK Bilateral 08/31/2016    SI injection #2    NERVE BLOCK Bilateral 10/03/2016    si inj #3    NERVE SURGERY Right 1/22/2020    RADIOFREQUENCY, SACROILIAC JOINT RIGHT AT S1, S2, S3 performed by Dina [facility] DO at [facility]      upper and lower dental implants  except 6 teeth in  front    OTHER SURGICAL HISTORY Right 4/15/2015    right ethmoidectomymaaxillary antrostomies frontal recess exploration    OTHER SURGICAL HISTORY Right 12 30 2015    Right lumbar radiofrequency    OTHER SURGICAL HISTORY Left 2 8 16    lumb radiofreq    OTHER SURGICAL HISTORY Left 11/21/2016    lateral sacral radiofrequency    OTHER SURGICAL HISTORY Right 01/09/2017    radiofrequency    OTHER SURGICAL HISTORY Right 02/08/2017    lumbar facet radiofreq    OTHER SURGICAL HISTORY Left 03/27/2017    lumbar radiofrequency     OTHER SURGICAL HISTORY Left 05/24/2017    SI joint radiofrequency    OTHER SURGICAL HISTORY Right 01/22/2020    si radiofrequency    OVARY REMOVAL  1975    right    REVISION TOTAL HIP ARTHROPLASTY  3/26/2012    orif left hip revision of femoral stem    TOTAL HIP ARTHROPLASTY  MARCH 2012    TOTAL LEFT HIP ARTHROPLASTY WITH PLATELET GEL       Immunization History:   Immunization History   Administered Date(s) Administered    Influenza Vaccine, unspecified formulation 12/12/2015    Influenza, High Dose (Fluzone 65 yrs and older) 10/25/2018    Influenza, Triv, inactivated, subunit, adjuvanted, IM (Fluad 65 yrs and older) 09/23/2019    Pneumococcal Conjugate 13-valent ([id number]) 08/03/2018    Tdap (Boostrix, Adacel) 07/13/2012, 06/15/2015       Active Problems:  Patient Active Problem List   Diagnosis Code    Osteoarthritis of hip M16.9    Hip pain M25.559    Periprosthetic fracture around internal prosthetic left hip joint ([facility] M97. 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, Z96.649    Sleep disorder G47.9    Restless legs syndrome (RLS) G25.81    Numbness and tingling R20.0, R20.2    Trigger point of extremity M79.609    Primary osteoarthritis of right foot M19.071    Right foot pain M79.671    Shoulder contusion S40.019A    Shoulder sprain S43.409A    Shoulder pain M25.519    Osteoarthritis of lumbar spine M47.816    Spondylolisthesis, grade 1 M43.10    Neuroforaminal stenosis of spine M48.00    Degenerative arthritis of lumbar spine M47.816    Lumbar spinal stenosis M48.061    Neuropathy involving both lower extremities G57.93    Protruded lumbar disc M51.26    Lumbar radiculopathy M54.16    Facet syndrome, lumbar ([facility]) M47.816    Status post total hip replacement, left Z96.642    Sacroiliitis ([facility]) M46.1    Malignant neoplasm of central portion of left female breast ([facility]) C50.112    Midline low back pain with bilateral sciatica M54.41, M54.42    Chronic pain syndrome G89.4    Class 3 severe obesity due to excess calories with serious comorbidity and body mass index (BMI) of 45.0 to 49.9 in adult ([facility]) E66.01, Z68.42    Dyspnea on exertion R06.09    Tobacco abuse Z72.0    Pure hypercholesterolemia E78.00    Bunionette of right foot M21.621    Difficulty walking R26.2    Morbid obesity with BMI of 45.0-49.9, adult ([facility]) E66.01, Z68.42    Pulmonary emphysema ([facility]) J43.9    AYESHA (obstructive sleep apnea) G47.33    Respiratory failure ([facility]) J96.90    COPD exacerbation ([facility]) J44.1       Isolation/Infection:   Isolation          No Isolation        Patient Infection Status     Infection Onset Added Last Indicated Last Indicated By Review Planned Expiration Resolved Resolved By    None active    Resolved    COVID-19 Rule Out 08/06/20 08/06/20 08/07/20 COVID-19 (Ordered)   08/07/20 Rule-Out Test Resulted          Nurse Assessment:  Last Vital Signs: BP (!) 140/85   Pulse 79   Temp 98.8 °F (37.1 °C) (Oral)   Resp 18   Ht 5' (1.524 m)   Wt 202 lb (91.6 kg)   SpO2 93%   BMI 39.45 kg/m²     Last documented pain score (0-10 scale): Pain Level: 0  Last Weight:   Wt Readings from Last 1 Encounters:   08/10/20 202 lb (91.6 kg)     Mental Status:  alert and oriented x 4 / poor safety awareness    IV Access:  - None    Nursing Mobility/ADLs:  Walking   Assisted  Transfer  Assisted  Bathing  Assisted  Dressing  Assisted  Toileting  Assisted  Feeding  Assisted  Med Admin  Assisted  Med Delivery   whole    Wound Care Documentation and Therapy:        Elimination:  Continence:   · Bowel: Yes  · Bladder: Yes  Urinary Catheter: None   Colostomy/Ileostomy/Ileal Conduit: no       Date of Last BM: 08/11/2020    Intake/Output Summary (Last 24 hours) at 8/10/2020 1138  Last data filed at 8/10/2020 0520  Gross per 24 hour   Intake 370 ml   Output 1050 ml   Net -680 ml     I/O last 3 completed shifts: In: 620 [P.O.:600; I.V.:20]  Out: 1050 [Urine:1050]    Safety Concerns:     History of Falls (last 30 days)    Impairments/Disabilities:      Lower leg neuropathy     Nutrition Therapy:  Current Nutrition Therapy:   - Oral Diet:  Cardiac    Routes of Feeding: Oral  Liquids: Thin Liquids  Daily Fluid Restriction: no  Last Modified Barium Swallow with Video (Video Swallowing Test): not done    Treatments at the Time of Hospital Discharge:   Respiratory Treatments: DUONEB EVERY [address]  Oxygen Therapy:  is not on home oxygen therapy. Ventilator:    - No ventilator support    Rehab Therapies: Physical Therapy  Weight Bearing Status/Restrictions: No weight bearing restirctions  Other Medical Equipment (for information only, NOT a DME order):  walker  Other Treatments: BIPAP AT  (SETTINGS OF 12 / 6)  PATIENT HAS BEEN REFUSING TO USE IT.     Patient's personal belongings (please select all that are sent with patient):  None    RN SIGNATURE:  Electronically signed by Marcin Gautam RN on 8/11/20 at 12:02 PM EDT    CASE MANAGEMENT/SOCIAL WORK SECTION    Inpatient Status Date: 08/6/20    Readmission Risk Assessment Score:  Readmission Risk              Risk of Unplanned Readmission:        20           [facility]   [facility] phone [phone], fax [phone],   · Address:  · Phone:  · Fax:    Dialysis Facility (if applicable)   · Name:  · Address:  · Dialysis Schedule:  · Phone:  · Fax:    / signature: Electronically signed by CARISA Verduzco on 8/10/20 at 11:39 AM EDT    PHYSICIAN SECTION    Prognosis: {Prognosis:9885120680}    Condition at Discharge: 50David Nguyen Patient Condition:[id number]}    Rehab Potential (if transferring to Rehab): {Prognosis:5859068845}    Recommended Labs or Other Treatments After Discharge: ***    Physician Certification: I certify the above information and transfer of Marj Bazan  is necessary for the continuing treatment of the diagnosis listed and that she requires [place] for less 30 days.      Update Admission H&P: {CHP DME Changes in NONMQ:[id number]}    PHYSICIAN SIGNATURE:  Electronically signed by Mariaa Trammell DO on 8/11/20 at 10:16 AM EDT

## 2020-08-10 NOTE — PROGRESS NOTES
Internal Medicine Progress Note    [facility]    Gómez Hong. Na Serrano., F.A.C.O.I. Danielle Smith D.O., ANDREIOUmeshIUmesh Hernandez D.O. Uzair Liu, MSN, APRN, NP-C  Jerryl Boeck. Marry Jeffrey, MSN, APRN-CNP     Primary Care Physician: Roxanne Mendieta. MD Tera   Admitting Physician:  Mirta Mayen DO  Admission date and time: 8/6/2020  3:41 PM    Room:  [place] [address]  Admitting diagnosis: Respiratory failure [facility]) [J96.90]    Patient Name: Moriah Ramirez  MRN: [id number]    Date of Service: 8/10/2020     Subjective:  Aaron Diop was evaluated while undergoing sniff testing today. Her respiratory status continues to improve but there are reports of some agitation throughout the day yesterday. This is likely compatible with some degree of withdrawal and gentle medications have been added back. The patient is acceptable for transfer from the intensive care unit. Discharge planning is underway. Review of System:   Constitutional:   Denies fever or chills, weight loss or gain, fatigue or malaise. HEENT:   Denies ear pain, sore throat, sinus or eye problems. Cardiovascular:   Denies any chest pain, irregular heartbeats, or palpitations. Respiratory:   Admits to chronic shortness of breath. Gastrointestinal:   Denies nausea, vomiting, diarrhea, or constipation. Denies any abdominal pain. Genitourinary:    Denies any urgency, frequency, hematuria. Voiding  without difficulty. Extremities:   Denies lower extremity swelling, edema or cyanosis. Neurology:    Denies any headache or focal neurological deficits, admits to chronic weakness and deconditioning. Psch:   Denies being anxious or depressed. Musculoskeletal:    Denies  myalgias, joint complaints or back pain. Integumentary:   Denies any rashes, ulcers, or excoriations. Denies bruising. Hematologic/Lymphatic:  Denies bruising or bleeding. Physical Exam:  I/O this shift:   In: 840 [P.O.:840]  Out: 600 methylPREDNISolone  4 mg Oral Dinner    methylPREDNISolone  8 mg Oral Nightly    [START ON 8/11/2020] methylPREDNISolone  4 mg Oral Nightly    doxycycline hyclate  100 mg Oral 2 times per day    DULoxetine  60 mg Oral Daily    ipratropium-albuterol  1 ampule Inhalation Q4H WA    Roflumilast  500 mcg Oral Daily    ferrous sulfate  325 mg Oral Daily with breakfast    sodium chloride flush  10 mL Intravenous 2 times per day    enoxaparin  40 mg Subcutaneous Daily    cefTRIAXone (ROCEPHIN) IV  2 g Intravenous Q24H    nebivolol  5 mg Oral Daily    amLODIPine  5 mg Oral Daily    rOPINIRole  3 mg Oral Nightly    pantoprazole  40 mg Oral QAM AC    losartan  50 mg Oral Daily       Objective Data:  CBC with Differential:    Lab Results   Component Value Date    WBC 8.0 08/10/2020    RBC 4.85 08/10/2020    HGB 14.8 08/10/2020    HCT 44.7 08/10/2020     08/10/2020    MCV 92.2 08/10/2020    MCH 30.5 08/10/2020    MCHC 33.1 08/10/2020    RDW 14.3 08/10/2020    LYMPHOPCT 11.2 08/10/2020    MONOPCT 12.3 08/10/2020    BASOPCT 0.1 08/10/2020    MONOSABS 0.99 08/10/2020    LYMPHSABS 0.90 08/10/2020    EOSABS 0.00 08/10/2020    BASOSABS 0.01 08/10/2020     CMP:    Lab Results   Component Value Date     08/10/2020    K 3.0 08/10/2020    CL 89 08/10/2020    CO2 33 08/10/2020    BUN 26 08/10/2020    CREATININE 0.7 08/10/2020    GFRAA >60 08/10/2020    LABGLOM >60 08/10/2020    GLUCOSE 103 08/10/2020    GLUCOSE 125 03/27/2012    PROT 6.4 08/10/2020    LABALBU 3.7 08/10/2020    LABALBU 4.3 03/25/2012    CALCIUM 8.9 08/10/2020    BILITOT 0.7 08/10/2020    ALKPHOS 53 08/10/2020    AST 30 08/10/2020    ALT 19 08/10/2020       Assessment:  1. Acute respiratory failure with hypercapnia/hypoxemia in the setting of an exacerbation of COPD partly complicated by community-acquired pneumonia  2. Restless leg syndrome  3. Obstructive sleep apnea with obesity hypoventilatory syndrome  4. Essential hypertension  5.  Chronic pain syndrome on multiple medications at home  6. History of breast cancer, on testosterone suppression. 7. Recent fall with periorbital contusion. 8. History of tobacco abuse    Plan:   The patient's respiratory status seems to be stabilizing and she is acceptable for transfer from the intensive care unit. We are awaiting the results of the sniff test from today. We will maintain antibiotics for an additional 24 hours with plans to de-escalate tomorrow. The social work team is following with plans for discharge to the skilled nursing facility tomorrow as well. Greater than 40 minutes of critical care time was spent with the patient. This time included chart review, , and discussion with those consultants involved in the patient's care. More than 50% of my  time was spent at the bedside counseling/coordinating care with the patient and/or family with face to face contact. This time was spent reviewing notes and laboratory data as well as instructing and counseling the patient. Time I spent with the family or surrogate(s) is included only if the patient was incapable of providing the necessary information or participating in medical decisions. I also discussed the differential diagnosis and all of the proposed management plans with the patient and individuals accompanying the patient. Piper Galvez requires this high level of physician care and nursing on the IMC/Telemetry unit due the complexity of decision management and chance of rapid decline or death. Continued cardiac monitoring and higher level of nursing are required. I am readily available for any further decision-making and intervention.      Kaity Nunes DO, CAMMIE.C.O.I.  8/10/2020  1:55 PM

## 2020-08-10 NOTE — PROGRESS NOTES
Physical Therapy    Physical Therapy Initial Evaluation    Room #:  IC03/IC03-01  Patient Name: Marj Bazan  YOB: 1945  MRN: [id number]    Referring Provider: Woody Zhong DO     Date of Service: 8/10/2020    Evaluating Physical Therapist:  Jessica Dow, PT  Lic. # [id number]      Diagnosis:   [facility] [J96.90]   Admitted with  shortness of breath  And fall one week ago; pneumonia  hypocapnic hypoxemic respiratory failure.     Patient Active Problem List   Diagnosis    Osteoarthritis of hip    Hip pain    Periprosthetic fracture around internal prosthetic left hip joint (HCC)    Aseptic loosening of prosthetic hip (HCC)    Sleep disorder    Restless legs syndrome (RLS)    Numbness and tingling    Trigger point of extremity    Primary osteoarthritis of right foot    Right foot pain    Shoulder contusion    Shoulder sprain    Shoulder pain    Osteoarthritis of lumbar spine    Spondylolisthesis, grade 1    Neuroforaminal stenosis of spine    Degenerative arthritis of lumbar spine    Lumbar spinal stenosis    Neuropathy involving both lower extremities    Protruded lumbar disc    Lumbar radiculopathy    Facet syndrome, lumbar (HCC)    Status post total hip replacement, left    Sacroiliitis (HCC)    Malignant neoplasm of central portion of left female breast (HCC)    Midline low back pain with bilateral sciatica    Chronic pain syndrome    Class 3 severe obesity due to excess calories with serious comorbidity and body mass index (BMI) of 45.0 to 49.9 in adult (HCC)    Dyspnea on exertion    Tobacco abuse    Pure hypercholesterolemia    Bunionette of right foot    Difficulty walking    Morbid obesity with BMI of 45.0-49.9, adult (HCC)    Pulmonary emphysema (HCC)    AYESHA (obstructive sleep apnea)    Respiratory failure (HCC)    COPD exacerbation ([facility]        Tentative placement recommendation: Subacute rehab    Equipment recommendation: Patient has needed equipment       Prior Level of Function: Patient ambulated independently with rollator (4 wheeled walker with seat and brakes)   Rehab Potential: good  for baseline    Past medical history:   Past Medical History:   Diagnosis Date    Adverse effect of anesthetic     difficulty waking up if lying flat post op    Arthritis     Bronchitis     Cancer ([address]     breast left    Diverticula of colon 2011    Hyperlipidemia     Hypertension     Pneumonia     Restless leg syndrome     Sleep apnea     CPAP    Thyroid disease      Past Surgical History:   Procedure Laterality Date    APPENDECTOMY      BREAST SURGERY Left 03/2016    lumpectomy for cancer with chemo & radiation last chemo in july     CHOLECYSTECTOMY  2009    lap    COLONOSCOPY  2011    diverticulosis    COSMETIC SURGERY Bilateral     blephoroplasty    EYE SURGERY Bilateral     cataract extraction    HAND SURGERY      CTS B/L    HYSTERECTOMY      JOINT REPLACEMENT  03/04/07    [place] Knees. [place].  Hips    KNEE ARTHROSCOPY Bilateral     LAP BAND  2007    NERVE BLOCK Left 8/19/15    sacroiliac joint #1    NERVE BLOCK  8 19 15    lumbar epidural #1    NERVE BLOCK N/A 9/9/15    lumbar epidural nerve block #3    NERVE BLOCK N/A 10 26 2015    paravertebral facet bilateral lumbar #1    NERVE BLOCK Bilateral 11 2 15    lumb facet #2    NERVE BLOCK Bilateral 11/09/15    paravertebral facet block lumbar #3    NERVE BLOCK Bilateral 04 13 2016    Bilateral sacroiliac joint injection #1    NERVE BLOCK Bilateral 08/31/2016    SI injection #2    NERVE BLOCK Bilateral 10/03/2016    si inj #3    NERVE SURGERY Right 1/22/2020    RADIOFREQUENCY, SACROILIAC JOINT RIGHT AT S1, S2, S3 performed by Martin Humphries, DO at [facility]      upper and lower dental implants  except 6 teeth in  front    OTHER SURGICAL HISTORY Right 4/15/2015    right ethmoidectomymaaxillary antrostomies frontal recess exploration    OTHER SURGICAL HISTORY Right 12 30 2015    Right lumbar radiofrequency    OTHER SURGICAL HISTORY Left 2 8 16    lumb radiofreq    OTHER SURGICAL HISTORY Left 11/21/2016    lateral sacral radiofrequency    OTHER SURGICAL HISTORY Right 01/09/2017    radiofrequency    OTHER SURGICAL HISTORY Right 02/08/2017    lumbar facet radiofreq    OTHER SURGICAL HISTORY Left 03/27/2017    lumbar radiofrequency     OTHER SURGICAL HISTORY Left 05/24/2017    SI joint radiofrequency    OTHER SURGICAL HISTORY Right 01/22/2020    si radiofrequency    OVARY REMOVAL  1975    right    REVISION TOTAL HIP ARTHROPLASTY  3/26/2012    orif left hip revision of femoral stem    TOTAL HIP ARTHROPLASTY  MARCH 2012    TOTAL LEFT HIP ARTHROPLASTY WITH PLATELET GEL       Precautions: falls, alarm, O2, hard of hearing and impulsive ,      SUBJECTIVE:    Social history: Patient lives alone  in a cond  with 2 steps  to enter with [place] [employer] owned: [address] Tub transfer bench, Bedside commode , wheelchair canes   2 Liters of o2 via nasal cannula     AM-PAC Basic Mobility        AM-PAC Mobility Inpatient   How much difficulty turning over in bed?: A Little  How much difficulty sitting down on / standing up from a chair with arms?: A Little  How much difficulty moving from lying on back to sitting on side of bed?: A Little  How much help from another person moving to and from a bed to a chair?: A Little  How much help from another person needed to walk in hospital room?: A Little  How much help from another person for climbing 3-5 steps with a railing?: A Lot  AM-PAC Inpatient Mobility Raw Score : 17  AM-PAC Inpatient T-Scale Score : 42.13  Mobility Inpatient CMS 0-100% Score: 50.57  Mobility Inpatient CMS G-Code Modifier : CK    Nursing cleared patient for PT treatment. OBJECTIVE:   Initial Evaluation  Date: 8/9/20 Treatment Date:    8/10/2020   Short Term/ Long Term   Goals   Was pt agreeable to Eval/treatment?  Yes   yes To be met in 2 days   Pain level   0/10    0/10    Bed Mobility    Rolling: Minimal assist of 1    Supine to sit: Not assessed     Sit to supine: Minimal assist of 1    Scooting: Minimal assist of 1   Rolling: Not assessed    Supine to sit: Not assessed   Sit to supine: Minimal assist of 1   Scooting: Minimal assist of 1    Rolling: Independent    Supine to sit: Independent    Sit to supine: Independent    Scooting: Independent     Transfers Sit to stand: Minimal assist of 1   Sit to stand: Minimal assist of 1   Stand pivot: Not assessed     Sit to stand: Independent     Ambulation    75 feet using  rollator with Minimal assist of 1   d/t veer to right and requires constant cues to correct on .5 Liters of o2 via nasal cannula 89-93% x 2 reps 2 x 40 feet using  rollator with Minimal assist of 1 requires verbal cues to steer rollator. 100 feet using  rollator with Independent    Stair negotiation: ascended and descended   2 steps not assessed   na   2 steps with rail supervision    ROM Within functional limits        Strength BUE: 4-/5  RLE:  4-/5  LLE:  4-/5   Increase strength in affected mm groups by 1/3 grade   Balance Sitting EOB:  fair    Dynamic Standing:  poor  without device support Sitting EOB:  good   Dynamic Standing:  fair    Sitting EOB:  good    Dynamic Standing: fair         Patient is Alert & Oriented x person, place, time and situation  and follows directions    Sensation:  Pt denies numbness and tingling to extremities  Edema:  none noted    Patient education  Patient educated on role of Physical Therapy, risks of immobility, safety and plan of care     Patient response to education:   Pt verbalized understanding Pt demonstrated skill Pt requires further education in this area   Yes Partial Yes       ASSESSMENT:    Comments:  Pt.  Coming from [facility] in wheelchair    Treatment:  Patient practiced and was instructed in the following treatment:      Sat edge of bed 5 minutes with Minimal assist of 1 to increase dynamic sitting balance and activity tolerance. Pt. Ambulated in casillas and returned to eob. Pt able to transfer Min A and lift BLE's with no assist.    Therapeutic Exercises: not performed      At end of session, patient in bed with RN present,  as well as a family member, call light and phone within reach,  all lines and tubes intact, nursing notified. Patient would benefit from continued skilled Physical Therapy to improve functional independence and quality of life. PLAN:    Patient is making good progress towards established goals. Will continue with current Plan of care.       Time in  1415  Time out  1430    Total Treatment Time  15 minutes    CPT codes:  Therapeutic activities (45466)   15 minutes  1 unit(s)    Cheri Cabot, PTA   Lic# [id number]

## 2020-08-10 NOTE — CARE COORDINATION
SOCIAL WORK / DISCHARGE PLANNING:  COVID negative 8/7. PT/OT rec UZMA, pt and dtr had chosen [facility]. HOSP [facility] [address] [facility], phone [phone], fax [phone] can accept pending bed availability but can not do nebs at facility. NO PRECERT needed. Electronic YONG in pt's EPIC Chart for physician signature. HENs form completed.                  Electronically signed by CARISA Hernandez on 8/10/2020 at 11:38 AM

## 2020-08-11 ENCOUNTER — APPOINTMENT (OUTPATIENT)
Dept: GENERAL RADIOLOGY | Age: 75
DRG: 189 | End: 2020-08-11
Payer: MEDICARE

## 2020-08-11 VITALS
BODY MASS INDEX: 39.66 KG/M2 | RESPIRATION RATE: 18 BRPM | HEART RATE: 75 BPM | HEIGHT: 60 IN | WEIGHT: 202 LBS | OXYGEN SATURATION: 92 % | DIASTOLIC BLOOD PRESSURE: 72 MMHG | SYSTOLIC BLOOD PRESSURE: 167 MMHG | TEMPERATURE: 98.5 F

## 2020-08-11 PROCEDURE — 6370000000 HC RX 637 (ALT 250 FOR IP): Performed by: INTERNAL MEDICINE

## 2020-08-11 PROCEDURE — 71045 X-RAY EXAM CHEST 1 VIEW: CPT

## 2020-08-11 PROCEDURE — 6360000002 HC RX W HCPCS: Performed by: INTERNAL MEDICINE

## 2020-08-11 PROCEDURE — 2700000000 HC OXYGEN THERAPY PER DAY

## 2020-08-11 PROCEDURE — 94640 AIRWAY INHALATION TREATMENT: CPT

## 2020-08-11 PROCEDURE — 97530 THERAPEUTIC ACTIVITIES: CPT

## 2020-08-11 PROCEDURE — 97110 THERAPEUTIC EXERCISES: CPT

## 2020-08-11 PROCEDURE — 2580000003 HC RX 258: Performed by: INTERNAL MEDICINE

## 2020-08-11 RX ORDER — POTASSIUM BICARBONATE 25 MEQ/1
50 TABLET, EFFERVESCENT ORAL ONCE
Status: DISCONTINUED | OUTPATIENT
Start: 2020-08-11 | End: 2020-08-11 | Stop reason: CLARIF

## 2020-08-11 RX ORDER — GABAPENTIN 300 MG/1
300 CAPSULE ORAL 3 TIMES DAILY
Qty: 90 CAPSULE | Refills: 3 | Status: SHIPPED | OUTPATIENT
Start: 2020-08-11 | End: 2021-04-28 | Stop reason: ALTCHOICE

## 2020-08-11 RX ORDER — FERROUS SULFATE 325(65) MG
325 TABLET ORAL
Qty: 30 TABLET | Refills: 3 | Status: SHIPPED | OUTPATIENT
Start: 2020-08-12 | End: 2020-08-26 | Stop reason: CLARIF

## 2020-08-11 RX ORDER — METHYLPREDNISOLONE 4 MG/1
TABLET ORAL
Qty: 1 KIT | Refills: 0 | Status: SHIPPED | OUTPATIENT
Start: 2020-08-11 | End: 2020-08-17

## 2020-08-11 RX ORDER — HYDROCODONE BITARTRATE AND ACETAMINOPHEN 5; 325 MG/1; MG/1
1 TABLET ORAL EVERY 6 HOURS PRN
Qty: 10 TABLET | Refills: 0 | Status: SHIPPED | OUTPATIENT
Start: 2020-08-11 | End: 2020-08-14

## 2020-08-11 RX ADMIN — ROFLUMILAST 500 MCG: 500 TABLET ORAL at 07:57

## 2020-08-11 RX ADMIN — POTASSIUM BICARBONATE 40 MEQ: 782 TABLET, EFFERVESCENT ORAL at 11:51

## 2020-08-11 RX ADMIN — POTASSIUM BICARBONATE 20 MEQ: 782 TABLET, EFFERVESCENT ORAL at 07:57

## 2020-08-11 RX ADMIN — AMLODIPINE BESYLATE 5 MG: 5 TABLET ORAL at 07:57

## 2020-08-11 RX ADMIN — METHYLPREDNISOLONE 4 MG: 4 TABLET ORAL at 05:25

## 2020-08-11 RX ADMIN — PANTOPRAZOLE SODIUM 40 MG: 40 TABLET, DELAYED RELEASE ORAL at 05:25

## 2020-08-11 RX ADMIN — DULOXETINE HYDROCHLORIDE 60 MG: 60 CAPSULE, DELAYED RELEASE ORAL at 07:56

## 2020-08-11 RX ADMIN — IPRATROPIUM BROMIDE AND ALBUTEROL SULFATE 1 AMPULE: .5; 3 SOLUTION RESPIRATORY (INHALATION) at 06:02

## 2020-08-11 RX ADMIN — NEBIVOLOL HYDROCHLORIDE 5 MG: 5 TABLET ORAL at 07:57

## 2020-08-11 RX ADMIN — DOXYCYCLINE HYCLATE 100 MG: 100 CAPSULE ORAL at 07:57

## 2020-08-11 RX ADMIN — LOSARTAN POTASSIUM 50 MG: 50 TABLET, FILM COATED ORAL at 07:57

## 2020-08-11 RX ADMIN — GABAPENTIN 300 MG: 300 CAPSULE ORAL at 07:57

## 2020-08-11 RX ADMIN — FERROUS SULFATE TAB 325 MG (65 MG ELEMENTAL FE) 325 MG: 325 (65 FE) TAB at 07:57

## 2020-08-11 RX ADMIN — METHYLPREDNISOLONE 4 MG: 4 TABLET ORAL at 11:51

## 2020-08-11 RX ADMIN — ENOXAPARIN SODIUM 40 MG: 40 INJECTION SUBCUTANEOUS at 07:56

## 2020-08-11 RX ADMIN — Medication 10 ML: at 07:56

## 2020-08-11 ASSESSMENT — PAIN SCALES - GENERAL: PAINLEVEL_OUTOF10: 0

## 2020-08-11 NOTE — CARE COORDINATION
SS Note: SW confirmed with HOSP [facility] [address] [facility]KE GAGNON, phone [phone], fax [phone], bed is available after 1PM today, NO PRECERT required. ADAM met with pt and dtr Stevo Porter dtr to transport pt to SNF today.   Electronically signed by CARISA Pillai on 8/11/2020 at 11:26 AM

## 2020-08-11 NOTE — CARE COORDINATION
8-11-Cm note: spoke to Gilda Vazquez at [facility], the bed is ready for this pt, I called the pt's room, her daughter Jeneal Aase is here to transport her mom to [place], [place] to call nurse to nurse [phone].  Electronically signed by Vj Small RN on 8/11/2020 at 11:02 AM

## 2020-08-11 NOTE — PROGRESS NOTES
Physical Therapy    Physical Therapy Treatment Note    Room #:  8055/3054-61  Patient Name: Mario Padron  YOB: 1945  MRN: [id number]    Referring Provider: Sienna Solis DO     Date of Service: 8/11/2020    Evaluating Physical Therapist:  Harinder Alamo, PT  Lic. # [id number]      Diagnosis:   Respiratory failure ([facility] [J96.90]   Admitted with  shortness of breath  And fall one week ago; pneumonia  hypocapnic hypoxemic respiratory failure.     Patient Active Problem List   Diagnosis    Osteoarthritis of hip    Hip pain    Periprosthetic fracture around internal prosthetic left hip joint (HCC)    Aseptic loosening of prosthetic hip (HCC)    Sleep disorder    Restless legs syndrome (RLS)    Numbness and tingling    Trigger point of extremity    Primary osteoarthritis of right foot    Right foot pain    Shoulder contusion    Shoulder sprain    Shoulder pain    Osteoarthritis of lumbar spine    Spondylolisthesis, grade 1    Neuroforaminal stenosis of spine    Degenerative arthritis of lumbar spine    Lumbar spinal stenosis    Neuropathy involving both lower extremities    Protruded lumbar disc    Lumbar radiculopathy    Facet syndrome, lumbar (HCC)    Status post total hip replacement, left    Sacroiliitis (HCC)    Malignant neoplasm of central portion of left female breast (HCC)    Midline low back pain with bilateral sciatica    Chronic pain syndrome    Class 3 severe obesity due to excess calories with serious comorbidity and body mass index (BMI) of 45.0 to 49.9 in adult (HCC)    Dyspnea on exertion    Tobacco abuse    Pure hypercholesterolemia    Bunionette of right foot    Difficulty walking    Morbid obesity with BMI of 45.0-49.9, adult (HCC)    Pulmonary emphysema (HCC)    AYESHA (obstructive sleep apnea)    Respiratory failure (HCC)    COPD exacerbation ([facility]        Tentative placement recommendation: Subacute vs Home Health Physical Therapy if patient meets goals    Equipment recommendation: Patient has needed equipment       Prior Level of Function: Patient ambulated independently with rollator (4 wheeled walker with seat and brakes)   Rehab Potential: good  for baseline    Past medical history:   Past Medical History:   Diagnosis Date    Adverse effect of anesthetic     difficulty waking up if lying flat post op    Arthritis     Bronchitis     Cancer ([address]     breast left    Diverticula of colon 2011    Hyperlipidemia     Hypertension     Pneumonia     Restless leg syndrome     Sleep apnea     CPAP    Thyroid disease      Past Surgical History:   Procedure Laterality Date    APPENDECTOMY      BREAST SURGERY Left 03/2016    lumpectomy for cancer with chemo & radiation last chemo in july     CHOLECYSTECTOMY  2009    lap    COLONOSCOPY  2011    diverticulosis    COSMETIC SURGERY Bilateral     blephoroplasty    EYE SURGERY Bilateral     cataract extraction    HAND SURGERY      CTS B/L    HYSTERECTOMY      JOINT REPLACEMENT  03/04/07    [place] Knees. [place]  Hips    KNEE ARTHROSCOPY Bilateral     LAP BAND  2007    NERVE BLOCK Left 8/19/15    sacroiliac joint #1    NERVE BLOCK  8 19 15    lumbar epidural #1    NERVE BLOCK N/A 9/9/15    lumbar epidural nerve block #3    NERVE BLOCK N/A 10 26 2015    paravertebral facet bilateral lumbar #1    NERVE BLOCK Bilateral 11 2 15    lumb facet #2    NERVE BLOCK Bilateral 11/09/15    paravertebral facet block lumbar #3    NERVE BLOCK Bilateral 04 13 2016    Bilateral sacroiliac joint injection #1    NERVE BLOCK Bilateral 08/31/2016    SI injection #2    NERVE BLOCK Bilateral 10/03/2016    si inj #3    NERVE SURGERY Right 1/22/2020    RADIOFREQUENCY, SACROILIAC JOINT RIGHT AT S1, S2, S3 performed by Pasquale Draper DO at [facility]      upper and lower dental implants  except 6 teeth in  front    OTHER SURGICAL HISTORY Right 4/15/2015    right ethmoidectomymaaxillary antrostomies frontal recess exploration    OTHER SURGICAL HISTORY Right 12 30 2015    Right lumbar radiofrequency    OTHER SURGICAL HISTORY Left 2 8 16    lumb radiofreq    OTHER SURGICAL HISTORY Left 11/21/2016    lateral sacral radiofrequency    OTHER SURGICAL HISTORY Right 01/09/2017    radiofrequency    OTHER SURGICAL HISTORY Right 02/08/2017    lumbar facet radiofreq    OTHER SURGICAL HISTORY Left 03/27/2017    lumbar radiofrequency     OTHER SURGICAL HISTORY Left 05/24/2017    SI joint radiofrequency    OTHER SURGICAL HISTORY Right 01/22/2020    si radiofrequency    OVARY REMOVAL  1975    right    REVISION TOTAL HIP ARTHROPLASTY  3/26/2012    orif left hip revision of femoral stem    TOTAL HIP ARTHROPLASTY  MARCH 2012    TOTAL LEFT HIP ARTHROPLASTY WITH PLATELET GEL       Precautions: falls, alarm, O2, hard of hearing and impulsive ,      SUBJECTIVE:    Social history: Patient lives alone  in a condo  with 2 steps  to enter with [employer] owned: [address] and Tub transfer bench, Bedside commode , wheelchair canes   2 Liters of o2 via nasal cannula     AM-PAC Basic Mobility        AM-PAC Mobility Inpatient   How much difficulty turning over in bed?: A Little  How much difficulty sitting down on / standing up from a chair with arms?: A Little  How much difficulty moving from lying on back to sitting on side of bed?: A Little  How much help from another person moving to and from a bed to a chair?: A Little  How much help from another person needed to walk in hospital room?: A Little  How much help from another person for climbing 3-5 steps with a railing?: A Lot  AM-PAC Inpatient Mobility Raw Score : 17  AM-PAC Inpatient T-Scale Score : 42.13  Mobility Inpatient CMS 0-100% Score: 50.57  Mobility Inpatient CMS G-Code Modifier : CK    Nursing cleared patient for PT treatment.      OBJECTIVE:   Initial Evaluation  Date: 8/9/20 Treatment Date:    8/11/2020   Short Term/ Long Term   Goals Was pt agreeable to Eval/treatment? Yes   yes To be met in 2 days   Pain level   0/10    0/10    Bed Mobility    Rolling: Minimal assist of 1    Supine to sit: Not assessed     Sit to supine: Minimal assist of 1    Scooting: Minimal assist of 1   Rolling: Supervision    Supine to sit: Minimal assist of 1  Sit to supine: Not assessed    Scooting: Supervision     Rolling: Independent    Supine to sit: Independent    Sit to supine: Independent    Scooting: Independent     Transfers Sit to stand: Minimal assist of 1   Sit to stand: Minimal assist of 1   Stand pivot: Not assessed     Sit to stand: Independent     Ambulation    75 feet using  rollator with Minimal assist of 1   d/t veer to right and requires constant cues to correct on .5 Liters of o2 via nasal cannula 89-93% x 2 reps 70 x 2 feet using  rollator with Supervision  requires verbal cues to steer rollator, pacing, pursed lip breathing, and safety.     100 feet using  rollator with Independent    Stair negotiation: ascended and descended   2 steps not assessed   na   2 steps with rail supervision    ROM Within functional limits        Strength BUE: 4-/5  RLE:  4-/5  LLE:  4-/5   Increase strength in affected mm groups by 1/3 grade   Balance Sitting EOB:  fair    Dynamic Standing:  poor  without device support Sitting EOB:  good   Dynamic Standing:  fair rollator   Sitting EOB:  good    Dynamic Standing: fair         Patient is Alert & Oriented x person, place, time and situation  and follows directions    Sensation:  Pt denies numbness and tingling to extremities  Edema:  none noted    Patient education  Patient educated on role of Physical Therapy, risks of immobility, safety and plan of care, purse lipped breathing, energy conservation and safety      Patient response to education:   Pt verbalized understanding Pt demonstrated skill Pt requires further education in this area   Yes Partial Yes       ASSESSMENT:    Comments:  Pt able to progress with increased distance with no LOB. Pt on 3L at start of tx session and SPO2 was 98%, nursing wanted a pulse ox on room air, I took O2 off, she ambulated in the hallway and back to the chair and her SPO2 was 94%. Pt performed exercises with AROM and on room air and her SPO2 was 90 - 92%. Treatment:  Patient practiced and was instructed in the following treatment:    Pt transferred to edge of bed. Sat edge of bed 5 minutes with Minimal assist of 1 to increase dynamic sitting balance and activity tolerance. Pt. Ambulated in [place] and back to the chair. Pt performed seated exercises. Therapeutic Exercises: ankle pumps, hip abduction/adduction, long arc quad and seated marching, x 20 reps. V/C for technique. At end of session, patient in chair with call light and phone within reach,  all lines and tubes intact, nursing notified. Patient would benefit from continued skilled Physical Therapy to improve functional independence and quality of life. PLAN:    Patient is making good progress towards established goals. Will continue with current Plan of care. Time in  9:20  Time out  9:45    Total Treatment Time  25 minutes    CPT codes:  Therapeutic activities (09881)   14 minutes  1 unit(s)  Therapeutic exercises (01282)   11 minutes  1 unit(s)    Moris Unger  [facility]  LIC # [id number]

## 2020-08-11 NOTE — PROGRESS NOTES
[facility] Quality Flow/Interdisciplinary Rounds Progress Note        Quality Flow Rounds held on August 11, 2020    Disciplines Attending:  Bedside Nurse, ,  and Nursing Unit Leadership    Eliceo Irvin was admitted on 8/6/2020  3:41 PM    Anticipated Discharge Date:  Expected Discharge Date: 08/11/20    Disposition:    Teja Score:  Teja Scale Score: 20    Readmission Risk              Risk of Unplanned Readmission:        20           Discussed patient goal for the day, patient clinical progression, and barriers to discharge.   The following Goal(s) of the Day/Commitment(s) have been identified:  Activity progression, up with walker, PT/OT, dc today      Katia Villa  August 11, 2020

## 2020-08-11 NOTE — DISCHARGE SUMMARY
Internal Medicine Progress Note     [facility]     Jose David Alba. Sugey Cabrera., CAMMIE.[email or web address]. Silke Monahan D.O., ANDREIOVINCE Whipple D.O. Catherine Munoz, MSN, APRN, NP-C  Marlen Puga. Leela Coyne, MSN, [address] [facility]       Internal Medicine  Discharge Summary    NAME: Melani Card  :  1945  MRN:  [id number]  PCP:Huong Harrison MD  ADMITTED: 2020      DISCHARGED: 20    ADMITTING PHYSICIAN: Jose David White DO    CONSULTANT(S):   IP CONSULT TO INTERNAL MEDICINE  IP CONSULT TO CRITICAL CARE  IP CONSULT TO SOCIAL WORK     ADMITTING DIAGNOSIS:   Respiratory failure ([address] [J96.90]     DISCHARGE DIAGNOSES:   1. Acute respiratory failure with hypercapnia/hypoxemia in the setting of an exacerbation of COPD partly complicated by community-acquired pneumonia  2. Restless leg syndrome  3. Obstructive sleep apnea with obesity hypoventilatory syndrome  4. Essential hypertension  5. Chronic pain syndrome on multiple medications at home  6. History of breast cancer, on testosterone suppression. 7. Recent fall with periorbital contusion. 8. History of tobacco abuse    BRIEF HISTORY OF PRESENT ILLNESS:   This is a 69-year-old white female who was admitted to [address] [place] [address]. The patient  presented to the hospital here through the emergency room last evening  on 2020. The patient presented to the hospital here for  evaluation of hypoxemia. The patient initially presented to the  hospital here the day before on 2020, at which time she complained  of shortness of breath. The patient at that time left before her  treatment and workup was completed. She contacted her family doctor,  Dr. Amy Harrison today, at which time, she encouraged her to come into the  hospital because of low saturation. Outpatient appointment has been set up. She does have a known history of mitral valve prolapse and does have  exertional dyspnea.   The patient apparently presented 08/10/2020     08/10/2020     08/10/2020    K 3.0 (L) 08/10/2020    CL 89 (L) 08/10/2020    CREATININE 0.7 08/10/2020    BUN 26 (H) 08/10/2020    CO2 33 (H) 08/10/2020    GLUCOSE 103 (H) 08/10/2020    ALT 19 08/10/2020    AST 30 08/10/2020    INR 1.1 08/06/2020     Lab Results   Component Value Date    INR 1.1 08/06/2020    INR 1.1 04/16/2012    INR 1.8 03/29/2012    PROTIME 12.0 08/06/2020    PROTIME 10.8 04/16/2012    PROTIME 16.7 (H) 03/29/2012      Lab Results   Component Value Date    TSH 1.750 10/28/2019     Lab Results   Component Value Date    TRIG 105 05/11/2020    TRIG 129 11/30/2018    TRIG 122 04/06/2017     Lab Results   Component Value Date    HDL 42 05/11/2020    HDL 52 11/30/2018    HDL 53 04/06/2017     Lab Results   Component Value Date    LDLCALC 112 (H) 05/11/2020    LDLCALC 127 (H) 11/30/2018    LDLCALC 145 (H) 04/06/2017     Lab Results   Component Value Date    LABA1C 5.7 (H) 11/30/2018       IMAGING:  Xr Chest (2 Vw)    Result Date: 8/6/2020  EXAMINATION: TWO XRAY VIEWS OF THE CHEST 8/6/2020 3:58 pm COMPARISON: Previous CT scan of 06/03/2020 and chest x-ray of 07/22/2020. HISTORY: ORDERING SYSTEM PROVIDED HISTORY: hypoxia TECHNOLOGIST PROVIDED HISTORY: Reason for exam:->hypoxia FINDINGS: The heart is mildly enlarged. There are no findings of failure. There is chronic elevation of the right hemidiaphragm. There is no right or left lung infiltrate. There is no pleural effusion. 1. There is no evidence of failure or pneumonia. 2. Chronic elevation of the right hemidiaphragm. Xr Chest Standard (2 Vw)    Result Date: 7/22/2020  EXAMINATION: TWO XRAY VIEWS OF THE CHEST 7/22/2020 4:06 pm COMPARISON: 01/10/2020 HISTORY: ORDERING SYSTEM PROVIDED HISTORY: Respiratory crackles at right lung base TECHNOLOGIST PROVIDED HISTORY: Reason for exam:->righ lung base crackles FINDINGS: Chronic elevation of the right hemidiaphragm is again noted.   There is extensive bilateral chronic interstitial disease, similar compared to previous. No definite acute airspace disease or pleural effusion is seen. The heart is top-normal in size with tortuosity of the thoracic aorta. Heart and mediastinum are not significantly changed and there is no overt vascular congestion. 1. Chronic interstitial changes. Chronic elevation of right hemidiaphragm. No significant change. 2. No evidence of an acute process. Ct Head Wo Contrast    Result Date: 8/7/2020  EXAMINATION: CT OF THE HEAD WITHOUT CONTRAST  8/7/2020 12:21 pm TECHNIQUE: CT of the head was performed without the administration of intravenous contrast. Dose modulation, iterative reconstruction, and/or weight based adjustment of the mA/kV was utilized to reduce the radiation dose to as low as reasonably achievable. COMPARISON: None. HISTORY: ORDERING SYSTEM PROVIDED HISTORY: recent fall TECHNOLOGIST PROVIDED HISTORY: Reason for exam:->recent fall Has a \"code stroke\" or \"stroke alert\" been called? ->No FINDINGS: BRAIN/VENTRICLES: There is no acute intracranial hemorrhage, mass effect or midline shift. No abnormal extra-axial fluid collection. The gray-white differentiation is maintained without evidence of an acute infarct. There is no evidence of hydrocephalus. ORBITS: The visualized portion of the orbits demonstrate no acute abnormality. SINUSES: The visualized paranasal sinuses and mastoid air cells demonstrate no acute abnormality. SOFT TISSUES/SKULL: There is no fracture of the calvarium. Note is made of a small soft tissue contusion superior to the right orbit. There is no fracture of the calvarium. No acute intracranial abnormality. Specifically, there is no acute intracranial hemorrhage Small right supraorbital soft tissue contusion.      Ct Head Wo Contrast    Result Date: 8/5/2020  EXAMINATION: CT OF THE HEAD WITHOUT CONTRAST  8/5/2020 3:44 pm TECHNIQUE: CT of the head was performed without the administration of intravenous contrast. lacrimal glands appear unremarkable. No retrobulbar hematoma or mass is seen. The orbital walls and rims are intact. SINUSES/MASTOIDS:  The paranasal sinuses and mastoid air cells are well aerated. No acute fracture is seen. SOFT TISSUES:  No appreciable facial soft tissue swelling is seen. No acute traumatic injury of the facial bones. Xr Chest Portable    Result Date: 8/11/2020  EXAMINATION: ONE XRAY VIEW OF THE CHEST 8/11/2020 7:33 am COMPARISON: 08/10/2020 HISTORY: ORDERING SYSTEM PROVIDED HISTORY: SOB TECHNOLOGIST PROVIDED HISTORY: Reason for exam:->SOB FINDINGS: Again noted is the chronically elevated right hemidiaphragm. Focal area of improved atelectasis in the left lung base, with a focal new discoid area of atelectasis noted in the left mid lung. No other new infiltrate is identified. No pleural effusion. No acute osseous fracture. No pneumothorax. Degenerative changes in the spine and shoulders. Again noted is cardiomegaly with mild vascular congestion centrally. Minimal atelectatic changes in the left lung, otherwise stable chest without new infiltrate. Cardiomegaly with vascular congestion. Xr Chest Portable    Result Date: 8/9/2020  EXAMINATION: ONE XRAY VIEW OF THE CHEST 8/9/2020 4:13 am COMPARISON: 08/08/2020 HISTORY: ORDERING SYSTEM PROVIDED HISTORY: SOB TECHNOLOGIST PROVIDED HISTORY: Reason for exam:->SOB FINDINGS: The lungs are underinflated, resulting in vascular crowding and subsegmental atelectasis. No focal consolidation, pleural effusion or pneumothorax. The cardiac silhouette and osseous structures are stable. No significant interval change. Xr Chest Portable    Result Date: 8/8/2020  EXAMINATION: ONE XRAY VIEW OF THE CHEST 8/8/2020 7:22 am COMPARISON: 08/06/2020 HISTORY: ORDERING SYSTEM PROVIDED HISTORY: SOB TECHNOLOGIST PROVIDED HISTORY: Reason for exam:->SOB Initial exam. FINDINGS: Low lung volumes with vascular crowding. No overt pulmonary edema. Stable heart size and mediastinal contours. Hypoaeration with vascular crowding. Xr Chest 1 View    Result Date: 8/10/2020  EXAMINATION: ONE XRAY VIEW OF THE CHEST 8/10/2020 2:14 pm COMPARISON: None. HISTORY: ORDERING SYSTEM PROVIDED HISTORY: SOB TECHNOLOGIST PROVIDED HISTORY: Reason for exam:->SOB Chronic elevation of the right hemidiaphragm FINDINGS: Under fluoroscopy a sniff test was performed. Correlation is made with multiple previous chest x-rays dating back to the prior chest x-ray of 03/11/2016. There is movement of the right hemidiaphragm with inspiration and expiration. I was able to maintain fluoroscopy while the patient took multiple deep inspirations. Both the right and left hemidiaphragms demonstrate sequential movement. In review with the patient's prior study dating back to 03/11/2016 there is no interval change in the chronic elevation of the right hemidiaphragm. Chronic elevation of the right hemidiaphragm. There is no evidence of right diaphragmatic paralysis. Fl Sniff Test    Result Date: 8/10/2020  EXAMINATION: FLUOROSCOPIC SNIFF TEST TECHNIQUE: Fluoroscopic guided sniff test FLUOROSCOPY DOSE AND TYPE OR TIME AND EXPOSURES: 1.3 minutes HISTORY: ORDERING SYSTEM PROVIDED HISTORY: diaphragmatic paralysis TECHNOLOGIST PROVIDED HISTORY: Reason for exam:->diaphragmatic paralysis FINDINGS: While the patient was standing a sniff test was performed. Real-time fluoroscopy was performed while evaluating movement of the diaphragms. There is symmetric movement of the right and left hemidiaphragms. There is no paralysis of the right hemidiaphragm. Please note there is chronic elevation right hemidiaphragm dated back to the patient's prior chest x-ray of 2016. 1. Chronic elevation right hemidiaphragm. 2. There is no evidence of paralysis of the right hemidiaphragm.      Cta Chest W Contrast    Result Date: 8/6/2020  EXAMINATION: CTA OF THE CHEST 8/6/2020 6:09 pm TECHNIQUE: CTA of the chest was performed after the administration of intravenous contrast.  Multiplanar reformatted images are provided for review. MIP images are provided for review. Dose modulation, iterative reconstruction, and/or weight based adjustment of the mA/kV was utilized to reduce the radiation dose to as low as reasonably achievable. COMPARISON: None. HISTORY: ORDERING SYSTEM PROVIDED HISTORY: rule out PE TECHNOLOGIST PROVIDED HISTORY: Reason for exam:->rule out PE FINDINGS: Pulmonary Arteries: Pulmonary arteries are adequately opacified for evaluation. No evidence of intraluminal filling defect to suggest pulmonary embolism. The main pulmonary artery is prominent which can be seen with pulmonary hypertension. . Mediastinum: No evidence of mediastinal lymphadenopathy. The heart is enlarged. There is no pericardial effusion. .  There is no acute abnormality of the thoracic aorta. Lungs/pleura: There is chronic elevation of the right hemidiaphragm. There is alveolar consolidation seen within the posterior basal segment of the right lung. While this could represent atelectasis secondary to the chronic elevation the right hemidiaphragm the intermittent consolidation would favor that of pneumonia. The left lung is clear. Upper Abdomen: Limited images of the upper abdomen are unremarkable. Soft Tissues/Bones: No acute bone or soft tissue abnormality. 1. There is no evidence of a pulmonary embolus. 2. Chronic significant elevation the right hemidiaphragm. 3. Alveolar opacification within the right lung base. The airspace disease given the appearance is favored to represent pneumonia rather than compression atelectasis. 4. Ectasias of the pulmonary artery. The finding can be seen with pulmonary hypertension. 5. Cardiomegaly       HOSPITAL COURSE:   Bibiana Ayala did very well throughout the hospitalization.   She initially required intensive care unit monitoring in the setting of respiratory failure complicated by polypharmacy. Her respiratory status improved dramatically and her medications were adjusted to avoid oversedation. She became ambulatory and was weaned off nasal cannula oxygen. She will complete a course of corticosteroids as an outpatient. Her respiratory cultures returned negative and there are no further needs for antibiotics. Secondary to her overall deconditioning, recommendations are for skilled nursing facility placement and this has been arranged accordingly. She is acceptable for discharge there today. BRIEF PHYSICAL EXAMINATION AND LABORATORIES ON DAY OF DISCHARGE:  VITALS:  BP (!) 167/72   Pulse 75   Temp 98.5 °F (36.9 °C) (Oral)   Resp 18   Ht 5' (1.524 m)   Wt 202 lb (91.6 kg)   SpO2 92%   BMI 39.45 kg/m²     HEENT:  PERRLA. EOMI. Sclera clear. Buccal mucosa moist.    Neck:  Supple. Trachea midline. No thyromegaly. No JVD. No bruits. Heart:  Rhythm regular, rate controlled. No murmurs. Lungs:  Symmetrical. Clear to auscultation bilaterally. No wheezes. No rhonchi. No rales. Abdomen: Soft. Non-tender. Non-distended. Bowel sounds positive. No organomegaly or masses. No pain on palpation    Extremities:  Peripheral pulses present. No peripheral edema. No ulcers. Neurologic:  Alert x 3. No focal deficit. Cranial nerves grossly intact. Skin:  No petechia. No hemorrhage. No wounds. DISPOSITION:  The patient's condition is good. At this time the patient is without objective evidence of an acute process requiring continuing hospitalization or inpatient management. They are stable for discharge with outpatient follow-up. I have spoken with the patient and discussed the results of the current hospitalization, in addition to providing specific details for the plan of care and counseling regarding the diagnosis and prognosis.   The plan has been discussed in detail and they are aware of the specific conditions for emergent return, as well as the importance of varenicline (CHANTIX) 0.5 MG tablet  Take 1-2 tablets by mouth See Admin Instructions 0.5mg DAILY for 3 days followed by 0.5mg TWICE DAILY for 4 days followed by 1mg TWICE DAILY                 FOLLOW UP/INSTRUCTIONS:  · This patient is instructed to follow-up with her primary care physician. · Patient is instructed to follow-up with the consults listed above as directed by them. · she is instructed to resume home medications and take new medications as indicated in the list above. · If the patient has a recurrence of symptoms, she is instructed to go to the ED. Preparing for this patient's discharge, including paperwork, orders, instructions, and meeting with patient did require > 40 minutes.     Lucio Sweet DO   8/11/2020  10:16 AM

## 2020-08-12 LAB
BLOOD CULTURE, ROUTINE: NORMAL
CULTURE, BLOOD 2: NORMAL

## 2020-08-13 ENCOUNTER — TELEPHONE (OUTPATIENT)
Dept: FAMILY MEDICINE CLINIC | Age: 75
End: 2020-08-13

## 2020-08-13 NOTE — TELEPHONE ENCOUNTER
Patient called c/o white spots/beads on insides of mouth and back of throat and is wondering if thrush or Strep. Patient was d'chgd from hospital 8/11/20. Patient also stated when she was at [facility], the nurse thought she was on the prednisone too long and wants to know if it can be stopped. Patient stated she is home now. Please advise.         Last seen 7/22/2020  Next appt 8/26/2020  [facility]

## 2020-08-14 NOTE — TELEPHONE ENCOUNTER
Was in the ICU for pneumonia . Now has thrush. Will sent clotrimazole. Will call if not improved by Monday.

## 2020-08-15 PROBLEM — I27.20 PULMONARY HYPERTENSION (HCC): Status: ACTIVE | Noted: 2020-08-15

## 2020-08-15 PROBLEM — I34.0 NONRHEUMATIC MITRAL VALVE REGURGITATION: Status: ACTIVE | Noted: 2020-08-15

## 2020-08-26 ENCOUNTER — OFFICE VISIT (OUTPATIENT)
Dept: FAMILY MEDICINE CLINIC | Age: 75
End: 2020-08-26
Payer: MEDICARE

## 2020-08-26 ENCOUNTER — HOSPITAL ENCOUNTER (OUTPATIENT)
Age: 75
Discharge: HOME | End: 2020-08-28
Payer: MEDICARE

## 2020-08-26 VITALS
BODY MASS INDEX: 39.46 KG/M2 | DIASTOLIC BLOOD PRESSURE: 80 MMHG | WEIGHT: 201 LBS | TEMPERATURE: 98 F | SYSTOLIC BLOOD PRESSURE: 110 MMHG | OXYGEN SATURATION: 98 % | HEART RATE: 77 BPM | RESPIRATION RATE: 18 BRPM | HEIGHT: 60 IN

## 2020-08-26 LAB
ANION GAP SERPL CALCULATED.3IONS-SCNC: 12 MMOL/L (ref 7–16)
BUN BLDV-MCNC: 13 MG/DL (ref 8–23)
CALCIUM SERPL-MCNC: 10.1 MG/DL (ref 8.6–10.2)
CHLORIDE BLD-SCNC: 99 MMOL/L (ref 98–107)
CO2: 33 MMOL/L (ref 22–29)
CREAT SERPL-MCNC: 0.8 MG/DL (ref 0.5–1)
GFR AFRICAN AMERICAN: >60
GFR NON-AFRICAN AMERICAN: >60 ML/MIN/1.73
GLUCOSE BLD-MCNC: 102 MG/DL (ref 74–99)
POTASSIUM SERPL-SCNC: 5.5 MMOL/L (ref 3.5–5)
SODIUM BLD-SCNC: 144 MMOL/L (ref 132–146)
VITAMIN D 25-HYDROXY: 40 NG/ML (ref 30–100)

## 2020-08-26 PROCEDURE — 4040F PNEUMOC VAC/ADMIN/RCVD: CPT | Performed by: FAMILY MEDICINE

## 2020-08-26 PROCEDURE — 4004F PT TOBACCO SCREEN RCVD TLK: CPT | Performed by: FAMILY MEDICINE

## 2020-08-26 PROCEDURE — 80048 BASIC METABOLIC PNL TOTAL CA: CPT

## 2020-08-26 PROCEDURE — 99214 OFFICE O/P EST MOD 30 MIN: CPT | Performed by: FAMILY MEDICINE

## 2020-08-26 PROCEDURE — 1111F DSCHRG MED/CURRENT MED MERGE: CPT | Performed by: FAMILY MEDICINE

## 2020-08-26 PROCEDURE — 1090F PRES/ABSN URINE INCON ASSESS: CPT | Performed by: FAMILY MEDICINE

## 2020-08-26 PROCEDURE — G8400 PT W/DXA NO RESULTS DOC: HCPCS | Performed by: FAMILY MEDICINE

## 2020-08-26 PROCEDURE — 1123F ACP DISCUSS/DSCN MKR DOCD: CPT | Performed by: FAMILY MEDICINE

## 2020-08-26 PROCEDURE — 3017F COLORECTAL CA SCREEN DOC REV: CPT | Performed by: FAMILY MEDICINE

## 2020-08-26 PROCEDURE — G8427 DOCREV CUR MEDS BY ELIG CLIN: HCPCS | Performed by: FAMILY MEDICINE

## 2020-08-26 PROCEDURE — G9899 SCRN MAM PERF RSLTS DOC: HCPCS | Performed by: FAMILY MEDICINE

## 2020-08-26 PROCEDURE — 36415 COLL VENOUS BLD VENIPUNCTURE: CPT

## 2020-08-26 PROCEDURE — 82306 VITAMIN D 25 HYDROXY: CPT

## 2020-08-26 PROCEDURE — G8417 CALC BMI ABV UP PARAM F/U: HCPCS | Performed by: FAMILY MEDICINE

## 2020-08-26 RX ORDER — ROPINIROLE 1 MG/1
1 TABLET, FILM COATED ORAL NIGHTLY
Qty: 90 TABLET | Refills: 3 | Status: SHIPPED
Start: 2020-08-26 | End: 2021-08-13

## 2020-08-26 ASSESSMENT — ENCOUNTER SYMPTOMS
NAUSEA: 0
WHEEZING: 0
DIARRHEA: 0
BACK PAIN: 1
VOMITING: 0
ABDOMINAL PAIN: 0
COUGH: 0
SHORTNESS OF BREATH: 0
CONSTIPATION: 0

## 2020-08-26 NOTE — PROGRESS NOTES
tiotropium (SPIRIVA RESPIMAT) 2.5 MCG/ACT AERS inhaler Inhale 2 puffs into the lungs daily 1 Inhaler 2    DULoxetine (CYMBALTA) 60 MG extended release capsule TAKE 1 CAPSULE BY MOUTH  NIGHTLY 90 capsule 2    losartan-hydrochlorothiazide (HYZAAR) 100-25 MG per tablet take 1 tablet by mouth once daily 90 tablet 0    nebivolol (BYSTOLIC) 5 MG tablet take 1 tablet by mouth once daily AM 90 tablet 0    albuterol sulfate  (90 Base) MCG/ACT inhaler Inhale 2 puffs into the lungs every 4-6 hours as needed for Wheezing or Shortness of Breath 1 Inhaler 0    amLODIPine (NORVASC) 5 MG tablet Take 1 tablet by mouth daily 90 tablet 5    Handicap Placard MISC by Does not apply route Unable to ambulate more than 40 feet without difficulty  Expires 11/25/2024 1 each 0    lidocaine (XYLOCAINE) 5 % ointment Apply topically to hands and low back as needed. 1 Tube 3     No current facility-administered medications on file prior to visit. Allergies   Allergen Reactions    Demerol [Meperidine Hcl] Other (See Comments)     Hallucinations, anxiety        Meperidine Other (See Comments)    Nabumetone Hives and Rash       Past medical, surgical, socialand/or family history reviewed, updated as needed, and are non-contributory (unless otherwise stated). Medications, allergies, and problem list also reviewed and updated as needed in patient's record. Wt Readings from Last 3 Encounters:   08/26/20 201 lb (91.2 kg)   08/10/20 202 lb (91.6 kg)   08/05/20 209 lb (94.8 kg)                   /80   Pulse 77   Temp 98 °F (36.7 °C)   Resp 18   Ht 5' (1.524 m)   Wt 201 lb (91.2 kg)   SpO2 98%   BMI 39.26 kg/m²        Physical Exam  Vitals signs and nursing note reviewed. Constitutional:       General: She is not in acute distress. Appearance: She is well-developed. She is not diaphoretic. Cardiovascular:      Rate and Rhythm: Normal rate and regular rhythm. Heart sounds: Normal heart sounds. No murmur. No friction rub. Pulmonary:      Effort: Pulmonary effort is normal. No respiratory distress. Breath sounds: No wheezing or rales. Abdominal:      General: Bowel sounds are normal. There is no distension. Palpations: Abdomen is soft. There is no mass. Tenderness: There is no abdominal tenderness. There is no guarding. Musculoskeletal: Normal range of motion. ASSESSMENT/PLAN  Anaid Nelson was seen today for copd, health maintenance and medication problem. Diagnoses and all orders for this visit:    Restless leg syndrome  -     rOPINIRole (REQUIP) 1 MG tablet; Take 1 tablet by mouth nightly    Pulmonary hypertension ([address]   - Has appointment coming up with cardiology as patient was found to have mitral regurge on echo. Morbid obesity with BMI of 45.0-49.9, adult ([address]   - Working on losing weight. Malignant neoplasm of central portion of left female breast, unspecified estrogen receptor status ([address]   - Follows with heme/onc    Vitamin D deficiency  -     Vitamin D 25 Hydroxy; Future    Hypokalemia  -     Basic Metabolic Panel; Future    AYESHA (obstructive sleep apnea)   - Referral has been placed to sleep medicine to attempt new sleep study   Acute respiratory failure with hypoxia and hypercapnia ([address]   - Resolved. Following with pulm   Most likely from pneumonia and polypharmacy as well as chronic copd. Phone/MyChart follow up if tests abnormal.    Return in about 6 weeks (around 10/7/2020) for copd . or sooner if necessary. I have reviewed myfindings and recommendations with Melissa. Key Mayes.  Venancio Gomez M.D

## 2020-08-26 NOTE — PATIENT INSTRUCTIONS
Take the requip 1mg   See the cardiologist   I will talk to the sleep doctor again   Follow up in 6 weeks   Get bmp today Teaching Attending Note


Name of Resident: Capo Neville





ATTENDING PHYSICIAN STATEMENT





I saw and evaluated the patient.


Chart, data, imaging reviewed. 


I reviewed the resident's note and discussed the case with the resident.


I agree with the resident's findings and plan as documented.








SUBJECTIVE:


63 year old male with a hx of NIDDM, HTN, GERD, MDD, alcohol abuse, cirrhosis? 

brought to hospital by daughter after he was found to be confused and had 

unsteady gait. Pt with no complaints himself. Denied pain, dizziness or 

headaches. 








OBJECTIVE:


 Last Vital Signs











Temp Pulse Resp BP Pulse Ox


 


 99.4 F   106 H  20   110/41   97 


 


 08/07/18 18:29  08/07/18 18:29  08/07/18 18:29  08/07/18 18:29  08/07/18 18:29








general- unsteady gait, nad, notoxic 


heent- right eye cataract


neck -supple 


cv -s1+s2+ RRR


chest - cta b/l  


abdomen- soft, nt, bs+


ext -no pedal edema 





 Abnormal Lab Results











  08/07/18 08/07/18 08/07/18





  20:15 20:15 20:50


 


Monocytes %  10.7 H  


 


BUN   53 H 


 


Creatinine   2.4 H 


 


Alkaline Phosphatase   134 H 


 


Urine Blood    2+ H


 


Urine Creatinine   














  08/08/18





  02:50


 


Monocytes % 


 


BUN 


 


Creatinine 


 


Alkaline Phosphatase 


 


Urine Blood 


 


Urine Creatinine  13.5 L














Head CT reviewed- found to have ventricular enlargement, no acute infarcts or 

bleeds seen 


cxr -reviewed. 





ASSESSMENT AND PLAN:


#64yo man with ams, possibly secondary to normal pressure hydrocephalus. No 

lesions seen on head CT. May also be secondary to uremic encephalopathy 


-observation 


-neurology evaluation 


-fall precautions 


-bed rest 


-PT evaluation 





#EMILIE  


-iv fluid hydraiton 


-renal u/s 


-urine lytes 


-UA 


-avoid nephrotoxins 





-see resident note for details 


-heparin sc for dvt ppx

## 2020-08-26 NOTE — Clinical Note
Can you call sleep medicine and follow up if they can see her in office. I had talked to Dr. Kassandra Campos about this patient before she went on maternity leave.

## 2020-08-28 ENCOUNTER — HOSPITAL ENCOUNTER (OUTPATIENT)
Age: 75
Discharge: HOME | End: 2020-08-30
Payer: MEDICARE

## 2020-08-28 LAB
ANION GAP SERPL CALCULATED.3IONS-SCNC: 10 MMOL/L (ref 7–16)
BUN BLDV-MCNC: 19 MG/DL (ref 8–23)
CALCIUM SERPL-MCNC: 9.7 MG/DL (ref 8.6–10.2)
CHLORIDE BLD-SCNC: 98 MMOL/L (ref 98–107)
CO2: 34 MMOL/L (ref 22–29)
CREAT SERPL-MCNC: 0.9 MG/DL (ref 0.5–1)
GFR AFRICAN AMERICAN: >60
GFR NON-AFRICAN AMERICAN: >60 ML/MIN/1.73
GLUCOSE BLD-MCNC: 95 MG/DL (ref 74–99)
POTASSIUM SERPL-SCNC: 5.2 MMOL/L (ref 3.5–5)
SODIUM BLD-SCNC: 142 MMOL/L (ref 132–146)

## 2020-08-28 PROCEDURE — 80048 BASIC METABOLIC PNL TOTAL CA: CPT

## 2020-08-28 PROCEDURE — 36415 COLL VENOUS BLD VENIPUNCTURE: CPT

## 2020-08-28 NOTE — PROGRESS NOTES
Called to discuss potassium. Elevated. Not taking potassium will recheck today. Advised to avoid high potassium foods. Reports that something is hanging out of her urethra. Advised to call ob/gyn. Sees Dr. Anitha López.

## 2020-09-18 ENCOUNTER — OFFICE VISIT (OUTPATIENT)
Dept: CARDIOLOGY CLINIC | Age: 75
End: 2020-09-18
Payer: MEDICARE

## 2020-09-18 VITALS
RESPIRATION RATE: 16 BRPM | BODY MASS INDEX: 40.25 KG/M2 | DIASTOLIC BLOOD PRESSURE: 70 MMHG | HEIGHT: 60 IN | SYSTOLIC BLOOD PRESSURE: 124 MMHG | WEIGHT: 205 LBS | HEART RATE: 91 BPM | OXYGEN SATURATION: 99 %

## 2020-09-18 PROCEDURE — G8400 PT W/DXA NO RESULTS DOC: HCPCS | Performed by: INTERNAL MEDICINE

## 2020-09-18 PROCEDURE — 3017F COLORECTAL CA SCREEN DOC REV: CPT | Performed by: INTERNAL MEDICINE

## 2020-09-18 PROCEDURE — 4040F PNEUMOC VAC/ADMIN/RCVD: CPT | Performed by: INTERNAL MEDICINE

## 2020-09-18 PROCEDURE — G8427 DOCREV CUR MEDS BY ELIG CLIN: HCPCS | Performed by: INTERNAL MEDICINE

## 2020-09-18 PROCEDURE — 93000 ELECTROCARDIOGRAM COMPLETE: CPT | Performed by: INTERNAL MEDICINE

## 2020-09-18 PROCEDURE — 1123F ACP DISCUSS/DSCN MKR DOCD: CPT | Performed by: INTERNAL MEDICINE

## 2020-09-18 PROCEDURE — 99205 OFFICE O/P NEW HI 60 MIN: CPT | Performed by: INTERNAL MEDICINE

## 2020-09-18 PROCEDURE — G8417 CALC BMI ABV UP PARAM F/U: HCPCS | Performed by: INTERNAL MEDICINE

## 2020-09-18 PROCEDURE — G9899 SCRN MAM PERF RSLTS DOC: HCPCS | Performed by: INTERNAL MEDICINE

## 2020-09-18 PROCEDURE — 4004F PT TOBACCO SCREEN RCVD TLK: CPT | Performed by: INTERNAL MEDICINE

## 2020-09-18 PROCEDURE — 1090F PRES/ABSN URINE INCON ASSESS: CPT | Performed by: INTERNAL MEDICINE

## 2020-09-18 RX ORDER — OXYCODONE AND ACETAMINOPHEN 7.5; 325 MG/1; MG/1
TABLET ORAL
COMMUNITY
Start: 2020-09-05 | End: 2021-04-28 | Stop reason: ALTCHOICE

## 2020-09-18 NOTE — PROGRESS NOTES
Chief Complaint   Patient presents with    Abnormal Test Results       Patient Active Problem List    Diagnosis Date Noted    Sacroiliitis ([facility] [address] 04/13/2016     Priority: High    Protruded lumbar disc 08/19/2015     Priority: High    Facet syndrome, lumbar ([address] 11/02/2015     Priority: Medium    Degenerative arthritis of lumbar spine 08/19/2015     Priority: Low    Nonrheumatic mitral valve regurgitation 08/15/2020    Pulmonary hypertension ([address] 08/15/2020    COPD exacerbation ([address] 08/08/2020    Respiratory failure ([address] 08/06/2020    Morbid obesity with BMI of 45.0-49.9, adult ([address] 08/02/2020    Pulmonary emphysema ([address] 08/02/2020    AYESHA (obstructive sleep apnea) 08/02/2020    Bunionette of right foot 12/12/2019    Difficulty walking 12/12/2019    Class 3 severe obesity due to excess calories with serious comorbidity and body mass index (BMI) of 45.0 to 49.9 in adult ([address] 12/17/2018    Dyspnea on exertion 12/17/2018    Tobacco abuse 12/17/2018    Pure hypercholesterolemia 12/17/2018    Chronic pain syndrome 09/14/2017    Midline low back pain with bilateral sciatica 06/15/2017    Malignant neoplasm of central portion of left female breast ([address] 09/28/2016    Status post total hip replacement, left 03/22/2016    Spondylolisthesis, grade 1 08/19/2015    Neuroforaminal stenosis of spine 08/19/2015    Lumbar spinal stenosis 08/19/2015    Neuropathy involving both lower extremities 08/19/2015    Lumbar radiculopathy 08/19/2015    Osteoarthritis of lumbar spine 08/13/2015    Shoulder contusion 02/18/2015    Shoulder sprain 02/18/2015    Shoulder pain 02/18/2015    Primary osteoarthritis of right foot 05/13/2014    Right foot pain 05/13/2014    Numbness and tingling 12/06/2013    Trigger point of extremity 12/06/2013    Restless legs syndrome (RLS) 08/21/2013    Sleep disorder 05/15/2013    Periprosthetic fracture around internal prosthetic left hip joint ([address] 03/26/2012    Aseptic loosening of prosthetic hip (HCC) 03/26/2012    Osteoarthritis of hip 12/08/2011     Overview Note:     replace inactive diagnosis      Hip pain 12/08/2011       Current Outpatient Medications   Medication Sig Dispense Refill    oxyCODONE-acetaminophen (PERCOCET) 7.5-325 MG per tablet take 1 tablet by mouth every 6 to 8 hours if needed for pain      rOPINIRole (REQUIP) 1 MG tablet Take 1 tablet by mouth nightly 90 tablet 3    gabapentin (NEURONTIN) 300 MG capsule Take 1 capsule by mouth 3 times daily for 30 days. 90 capsule 3    pantoprazole (PROTONIX) 40 MG tablet Take 40 mg by mouth daily      tiotropium (SPIRIVA RESPIMAT) 2.5 MCG/ACT AERS inhaler Inhale 2 puffs into the lungs daily 1 Inhaler 2    DULoxetine (CYMBALTA) 60 MG extended release capsule TAKE 1 CAPSULE BY MOUTH  NIGHTLY 90 capsule 2    losartan-hydrochlorothiazide (HYZAAR) 100-25 MG per tablet take 1 tablet by mouth once daily 90 tablet 0    nebivolol (BYSTOLIC) 5 MG tablet take 1 tablet by mouth once daily AM 90 tablet 0    albuterol sulfate  (90 Base) MCG/ACT inhaler Inhale 2 puffs into the lungs every 4-6 hours as needed for Wheezing or Shortness of Breath 1 Inhaler 0    amLODIPine (NORVASC) 5 MG tablet Take 1 tablet by mouth daily 90 tablet 5    Handicap Placard MISC by Does not apply route Unable to ambulate more than 40 feet without difficulty  Expires 11/25/2024 1 each 0    lidocaine (XYLOCAINE) 5 % ointment Apply topically to hands and low back as needed. 1 Tube 3     No current facility-administered medications for this visit.          Allergies   Allergen Reactions    Demerol [Meperidine Hcl] Other (See Comments)     Hallucinations, anxiety        Meperidine Other (See Comments)    Nabumetone Hives and Rash       Vitals:    09/18/20 1115   BP: 124/70   Site: Right Upper Arm   Position: Sitting   Cuff Size: Large Adult   Pulse: 91   Resp: 16   SpO2: 99%   Weight: 205 lb (93 kg)   Height: 5' (1.524 m)       Social History     Socioeconomic History    Marital status:      Spouse name: Not on file    Number of children: Not on file    Years of education: Not on file    Highest education level: Not on file   Occupational History    Not on file   Social Needs    Financial resource strain: Not on file    Food insecurity     Worry: Not on file     Inability: Not on file    Transportation needs     Medical: Not on file     Non-medical: Not on file   Tobacco Use    Smoking status: Current Every Day Smoker     Packs/day: 1.00     Years: 31.00     Pack years: 31.00     Types: Cigarettes    Smokeless tobacco: Never Used    Tobacco comment: quit for 10 years, but back at it. Substance and Sexual Activity    Alcohol use: Not Currently     Alcohol/week: 1.0 standard drinks     Types: 1 Glasses of wine per week     Comment: 4 cups of coffee a day     Drug use: No    Sexual activity: Not on file   Lifestyle    Physical activity     Days per week: Not on file     Minutes per session: Not on file    Stress: Not on file   Relationships    Social connections     Talks on phone: Not on file     Gets together: Not on file     Attends [employer] service: Not on file     Active member of club or organization: Not on file     Attends meetings of clubs or organizations: Not on file     Relationship status: Not on file    Intimate partner violence     Fear of current or ex partner: Not on file     Emotionally abused: Not on file     Physically abused: Not on file     Forced sexual activity: Not on file   Other Topics Concern    Not on file   Social History Narrative    Lives alone in [place]. Retired . Family History   Problem Relation Age of Onset    Heart Failure Mother    Joselito Ratliff Parkinsonism Father     Diabetes Father     Other Brother         PVD         SUBJECTIVE: Flavia  presents to the office today for consult - dr. Onesimo Gallagher - for cardiac evaluation  .   She complains of dyspnea and denies chest pain, orthopnea, paroxysmal nocturnal dyspnea and syncope. Recently admitted with profound sob - I reviewed records - no cards involvement, but pulm claimed COPD exacerbation, probable pneumonia, and HFpEF. BNP was 949, echo performed ( I reviewed images)  Since discharge and adjustment of meds she feels markedly improved. No diuretic. Hx of breast CA and XRT, no ESRD, no hx of rheumatic fever        Review of Systems:   Heart: as above   Lungs: as above   Eyes: denies changes in vision or discharge. Ears: denies changes in hearing or pain. Nose: denies epistaxis or masses   Throat: denies sore throat or trouble swallowing. Neuro: denies numbness, tingling, tremors. Skin: denies rashes or itching. : denies hematuria, dysuria   GI: denies vomiting, diarrhea   Psych: denies mood changed, anxiety, depression. all others negative. Physical Exam   /70 (Site: Right Upper Arm, Position: Sitting, Cuff Size: Large Adult)   Pulse 91   Resp 16   Ht 5' (1.524 m)   Wt 205 lb (93 kg)   SpO2 99%   BMI 40.04 kg/m²   Constitutional: Oriented to person, place, and time. Overweight No distress. Head: Normocephalic and atraumatic. Eyes: EOM are normal. Pupils are equal, round, and reactive to light. Neck: Normal range of motion. Neck supple. No hepatojugular reflux and no JVD present. Carotid bruit is not present. No tracheal deviation present. No thyromegaly present. Cardiovascular: Normal rate, regular rhythm, normal heart sounds and intact distal pulses. Exam reveals no gallop and no friction rub. No murmur heard. Pulmonary/Chest: Effort normal and breath sounds normal. No respiratory distress. No wheezes. No rales. No tenderness. Abdominal: Soft. Bowel sounds are normal. No distension and no mass. No tenderness. No rebound and no guarding. Musculoskeletal: Normal range of motion. Trace bilateral edema and no tenderness.    Neurological: Alert and oriented to person, place, and time.   Skin: Skin is warm and dry. No rash noted. Not diaphoretic. No erythema. Psychiatric: Normal mood and affect. Behavior is normal.     EKG:  normal EKG, normal sinus rhythm. ASSESSMENT AND PLAN:  Patient Active Problem List   Diagnosis    Osteoarthritis of hip    Hip pain    Periprosthetic fracture around internal prosthetic left hip joint (HCC)    Aseptic loosening of prosthetic hip (HCC)    Sleep disorder    Restless legs syndrome (RLS)    Numbness and tingling    Trigger point of extremity    Primary osteoarthritis of right foot    Right foot pain    Shoulder contusion    Shoulder sprain    Shoulder pain    Osteoarthritis of lumbar spine    Spondylolisthesis, grade 1    Neuroforaminal stenosis of spine    Degenerative arthritis of lumbar spine    Lumbar spinal stenosis    Neuropathy involving both lower extremities    Protruded lumbar disc    Lumbar radiculopathy    [facility] syndrome[facility] lumbar ([facility]    Status post total hip replacement, left    [facility]roiliitis ([facility]    Malignant neoplasm of central portion of left female breast ([facility])    Midline low back pain with bilateral sciatica    Chronic pain syndrome    Class 3 severe obesity due to excess calories with serious comorbidity and body mass index (BMI) of 45.0 to 49.9 in adult ([facility])    Dyspnea on exertion    Tobacco abuse    Pure hypercholesterolemia    Bunionette of right foot    Difficulty walking    Morbid obesity with BMI of 45.0-49.9, adult (HCC)    Pulmonary emphysema (HCC)    AYESHA (obstructive sleep apnea)    Respiratory failure ([facility])    COPD exacerbation ([facility])    Nonrheumatic mitral valve regurgitation    Pulmonary hypertension ([address]       Patient with SOB, etiology multifactorial, but does include HFpEF  Her echo to my review does show SEVERE mitral annular calcification which is likely due to chest XRT ( leaflets do not look rheumatic) - she has mild to moderate MR, no significant stenosis, and normal LVEF. Diastolic function cannot be assessed in patients with severe MAC, but that measurement alone is not how we diagnose HFpEF. She also has moderate PHTN due to COPD. In sinus rhythm and no evidence of ischemic heart disease  Today she is euvolemic and needs no diuretic therapy  Emphasize low salt diet, compliance with meds. The patient was educated as to the symptoms that would require a prompt return to our office. Return visit in 6 months.     Phillip Salinas M.D  [facility]

## 2020-09-23 RX ORDER — ROPINIROLE 3 MG/1
TABLET, FILM COATED ORAL
Qty: 90 TABLET | Refills: 3 | OUTPATIENT
Start: 2020-09-23

## 2020-10-06 RX ORDER — DULOXETIN HYDROCHLORIDE 60 MG/1
CAPSULE, DELAYED RELEASE ORAL
Qty: 90 CAPSULE | Refills: 2 | OUTPATIENT
Start: 2020-10-06

## 2020-10-07 ENCOUNTER — HOSPITAL ENCOUNTER (OUTPATIENT)
Age: 75
Discharge: HOME | End: 2020-10-09
Payer: MEDICARE

## 2020-10-07 ENCOUNTER — OFFICE VISIT (OUTPATIENT)
Dept: FAMILY MEDICINE CLINIC | Age: 75
End: 2020-10-07
Payer: MEDICARE

## 2020-10-07 VITALS
BODY MASS INDEX: 39.27 KG/M2 | RESPIRATION RATE: 20 BRPM | WEIGHT: 200 LBS | HEIGHT: 60 IN | OXYGEN SATURATION: 98 % | HEART RATE: 78 BPM | DIASTOLIC BLOOD PRESSURE: 82 MMHG | SYSTOLIC BLOOD PRESSURE: 132 MMHG

## 2020-10-07 LAB
ALBUMIN SERPL-MCNC: 4.3 G/DL (ref 3.5–5.2)
ALP BLD-CCNC: 76 U/L (ref 35–104)
ALT SERPL-CCNC: 29 U/L (ref 0–32)
ANION GAP SERPL CALCULATED.3IONS-SCNC: 12 MMOL/L (ref 7–16)
AST SERPL-CCNC: 43 U/L (ref 0–31)
BILIRUB SERPL-MCNC: 0.6 MG/DL (ref 0–1.2)
BUN BLDV-MCNC: 17 MG/DL (ref 8–23)
CALCIUM SERPL-MCNC: 9.7 MG/DL (ref 8.6–10.2)
CHLORIDE BLD-SCNC: 97 MMOL/L (ref 98–107)
CO2: 30 MMOL/L (ref 22–29)
CREAT SERPL-MCNC: 0.9 MG/DL (ref 0.5–1)
GFR AFRICAN AMERICAN: >60
GFR NON-AFRICAN AMERICAN: >60 ML/MIN/1.73
GLUCOSE BLD-MCNC: 112 MG/DL (ref 74–99)
HCT VFR BLD CALC: 49.5 % (ref 34–48)
HEMOGLOBIN: 15.7 G/DL (ref 11.5–15.5)
MCH RBC QN AUTO: 30.9 PG (ref 26–35)
MCHC RBC AUTO-ENTMCNC: 31.7 % (ref 32–34.5)
MCV RBC AUTO: 97.4 FL (ref 80–99.9)
PDW BLD-RTO: 16.5 FL (ref 11.5–15)
PLATELET # BLD: 241 E9/L (ref 130–450)
PMV BLD AUTO: 9.9 FL (ref 7–12)
POTASSIUM SERPL-SCNC: 4.8 MMOL/L (ref 3.5–5)
RBC # BLD: 5.08 E12/L (ref 3.5–5.5)
SODIUM BLD-SCNC: 139 MMOL/L (ref 132–146)
TOTAL PROTEIN: 7.2 G/DL (ref 6.4–8.3)
WBC # BLD: 6.6 E9/L (ref 4.5–11.5)

## 2020-10-07 PROCEDURE — G8400 PT W/DXA NO RESULTS DOC: HCPCS | Performed by: FAMILY MEDICINE

## 2020-10-07 PROCEDURE — G8926 SPIRO NO PERF OR DOC: HCPCS | Performed by: FAMILY MEDICINE

## 2020-10-07 PROCEDURE — 36415 COLL VENOUS BLD VENIPUNCTURE: CPT

## 2020-10-07 PROCEDURE — G8427 DOCREV CUR MEDS BY ELIG CLIN: HCPCS | Performed by: FAMILY MEDICINE

## 2020-10-07 PROCEDURE — 4004F PT TOBACCO SCREEN RCVD TLK: CPT | Performed by: FAMILY MEDICINE

## 2020-10-07 PROCEDURE — 1123F ACP DISCUSS/DSCN MKR DOCD: CPT | Performed by: FAMILY MEDICINE

## 2020-10-07 PROCEDURE — 4040F PNEUMOC VAC/ADMIN/RCVD: CPT | Performed by: FAMILY MEDICINE

## 2020-10-07 PROCEDURE — G8417 CALC BMI ABV UP PARAM F/U: HCPCS | Performed by: FAMILY MEDICINE

## 2020-10-07 PROCEDURE — 3017F COLORECTAL CA SCREEN DOC REV: CPT | Performed by: FAMILY MEDICINE

## 2020-10-07 PROCEDURE — 3023F SPIROM DOC REV: CPT | Performed by: FAMILY MEDICINE

## 2020-10-07 PROCEDURE — 99214 OFFICE O/P EST MOD 30 MIN: CPT | Performed by: FAMILY MEDICINE

## 2020-10-07 PROCEDURE — 1090F PRES/ABSN URINE INCON ASSESS: CPT | Performed by: FAMILY MEDICINE

## 2020-10-07 PROCEDURE — G8482 FLU IMMUNIZE ORDER/ADMIN: HCPCS | Performed by: FAMILY MEDICINE

## 2020-10-07 PROCEDURE — 90732 PPSV23 VACC 2 YRS+ SUBQ/IM: CPT | Performed by: FAMILY MEDICINE

## 2020-10-07 PROCEDURE — G9899 SCRN MAM PERF RSLTS DOC: HCPCS | Performed by: FAMILY MEDICINE

## 2020-10-07 PROCEDURE — 80053 COMPREHEN METABOLIC PANEL: CPT

## 2020-10-07 PROCEDURE — G0009 ADMIN PNEUMOCOCCAL VACCINE: HCPCS | Performed by: FAMILY MEDICINE

## 2020-10-07 PROCEDURE — 85027 COMPLETE CBC AUTOMATED: CPT

## 2020-10-07 RX ORDER — DULOXETIN HYDROCHLORIDE 60 MG/1
60 CAPSULE, DELAYED RELEASE ORAL NIGHTLY
Qty: 90 CAPSULE | Refills: 2 | Status: SHIPPED
Start: 2020-10-07 | End: 2021-04-28

## 2020-10-07 RX ORDER — DULOXETIN HYDROCHLORIDE 30 MG/1
30 CAPSULE, DELAYED RELEASE ORAL DAILY
Qty: 30 CAPSULE | Refills: 0 | Status: SHIPPED
Start: 2020-10-07 | End: 2020-11-04

## 2020-10-07 ASSESSMENT — ENCOUNTER SYMPTOMS
SHORTNESS OF BREATH: 1
BACK PAIN: 1
WHEEZING: 0
VOMITING: 0
NAUSEA: 0
CONSTIPATION: 0
ABDOMINAL PAIN: 0
COUGH: 0
DIARRHEA: 0

## 2020-10-07 NOTE — PROGRESS NOTES
[address] [facility]  [phone]   Hugo Mathis MD     Patient: Terri Hightower  YOB: 1945  Visit Date: 10/7/20    Mark Briggs is a 69y.o. year old female here today for   Chief Complaint   Patient presents with    COPD       HPI  Patient is a 69year old female here for follow up of MVR, sleep apnea, copd, new onset chest pain. Copd - denies any shortness of breath or cough. Is feeling well since being hospitalized. Denies any current fevers, N/V. Smoking 1ppd. No interest in quitting. Dr. Selin Arellano has appointment at the end of the month. Forgets to use spiriva. Does not really need to use albuterol. MVR- saw cardiologist- he did not recommend any surgery. Would like to know her ejection fraction. Would like to go to [place] for a second opinion. Has leg swelling at night around her ankles. Has new onset chest pain - that has been going on for a month. Pain is in the center of her chest and radiates to her right side. Not sure what makes it better or worse. Happens randomly . 6/10. Has happened 3-4 times in the past two months. Takes all medication. Denies blurry vision, lightheadedness. Sleep apnea - still needs to see sleep doctor. Is now sleeping fine. Is sleeping more than usual. Does not wake up in the middle of the night. Mood - has been feeling depressed , guiltly feelings , she is on a lower dose of cymbalta since being in the hospital and thinks that has negatively affected her depression. Denies any SI/HI. Denies calf pain in her legs when walking. Review of Systems   Constitutional: Negative for chills and fever. Respiratory: Positive for shortness of breath (on exertion). Negative for cough and wheezing. Cardiovascular: Positive for chest pain. Negative for palpitations and leg swelling. Gastrointestinal: Negative for abdominal pain, constipation, diarrhea, nausea and vomiting. Musculoskeletal: Positive for back pain and gait problem. Current Outpatient Medications on File Prior to Visit   Medication Sig Dispense Refill    oxyCODONE-acetaminophen (PERCOCET) 7.5-325 MG per tablet take 1 tablet by mouth every 6 to 8 hours if needed for pain      rOPINIRole (REQUIP) 1 MG tablet Take 1 tablet by mouth nightly 90 tablet 3    pantoprazole (PROTONIX) 40 MG tablet Take 40 mg by mouth daily      tiotropium (SPIRIVA RESPIMAT) 2.5 MCG/ACT AERS inhaler Inhale 2 puffs into the lungs daily 1 Inhaler 2    losartan-hydrochlorothiazide (HYZAAR) 100-25 MG per tablet take 1 tablet by mouth once daily 90 tablet 0    nebivolol (BYSTOLIC) 5 MG tablet take 1 tablet by mouth once daily AM 90 tablet 0    albuterol sulfate  (90 Base) MCG/ACT inhaler Inhale 2 puffs into the lungs every 4-6 hours as needed for Wheezing or Shortness of Breath 1 Inhaler 0    amLODIPine (NORVASC) 5 MG tablet Take 1 tablet by mouth daily 90 tablet 5    Handicap Placard MISC by Does not apply route Unable to ambulate more than 40 feet without difficulty  Expires 11/25/2024 1 each 0    lidocaine (XYLOCAINE) 5 % ointment Apply topically to hands and low back as needed. 1 Tube 3    gabapentin (NEURONTIN) 300 MG capsule Take 1 capsule by mouth 3 times daily for 30 days. 90 capsule 3     No current facility-administered medications on file prior to visit. Allergies   Allergen Reactions    Demerol [Meperidine Hcl] Other (See Comments)     Hallucinations, anxiety        Meperidine Other (See Comments)    Nabumetone Hives and Rash       Past medical, surgical, socialand/or family history reviewed, updated as needed, and are non-contributory (unless otherwise stated). Medications, allergies, and problem list also reviewed and updated as needed in patient's record.     Wt Readings from Last 3 Encounters:   10/07/20 200 lb (90.7 kg)   09/18/20 205 lb (93 kg)   08/26/20 201 lb (91.2 kg)                   /82 Laura Valdez DO, Sleep Medicine, [place]    Moderate episode of recurrent major depressive disorder (HCC)  -     DULoxetine (CYMBALTA) 60 MG extended release capsule; Take 1 capsule by mouth nightly  -     DULoxetine (CYMBALTA) 30 MG extended release capsule; Take 1 capsule by mouth daily  - Increase cymbalta back to 90mg for worsening depression. Need for pneumococcal vaccination  -     PNEUMOVAX 23 subcutaneous/IM (Pneumococcal polysaccharide vaccine 23-valent >= 1yo)    Pulmonary emphysema, unspecified emphysema type ([facility] [address]   - Follows with pulm . Encouraged compliance with inhalers. Chest pain - unlikely cardiac . Not associated with activity. Has recently seen cardologist. May be 2/2 anxiety. Will continue to monitor       Phone/MyChart follow up if tests abnormal.    Return in about 1 month (around 11/7/2020) for multiple issues . or sooner if necessary. I have reviewed myfindings and recommendations with Melissa. Merlyn Tyler.  Reji Denise M.D

## 2020-10-14 ENCOUNTER — TELEPHONE (OUTPATIENT)
Dept: SLEEP MEDICINE | Age: 75
End: 2020-10-14

## 2020-10-29 RX ORDER — CELECOXIB 200 MG/1
200 CAPSULE ORAL 2 TIMES DAILY
Qty: 60 CAPSULE | Refills: 0 | OUTPATIENT
Start: 2020-10-29 | End: 2020-11-28

## 2020-11-08 RX ORDER — DULOXETIN HYDROCHLORIDE 30 MG/1
CAPSULE, DELAYED RELEASE ORAL
Qty: 30 CAPSULE | Refills: 1 | Status: SHIPPED
Start: 2020-11-08 | End: 2021-01-05

## 2020-11-09 RX ORDER — CELECOXIB 200 MG/1
CAPSULE ORAL
Qty: 60 CAPSULE | Refills: 0 | OUTPATIENT
Start: 2020-11-09

## 2020-11-18 ENCOUNTER — OFFICE VISIT (OUTPATIENT)
Dept: FAMILY MEDICINE CLINIC | Age: 75
End: 2020-11-18
Payer: MEDICARE

## 2020-11-18 VITALS
RESPIRATION RATE: 16 BRPM | OXYGEN SATURATION: 93 % | HEART RATE: 70 BPM | TEMPERATURE: 97.7 F | SYSTOLIC BLOOD PRESSURE: 126 MMHG | BODY MASS INDEX: 39.79 KG/M2 | HEIGHT: 60 IN | DIASTOLIC BLOOD PRESSURE: 76 MMHG | WEIGHT: 202.7 LBS

## 2020-11-18 PROCEDURE — G8482 FLU IMMUNIZE ORDER/ADMIN: HCPCS | Performed by: FAMILY MEDICINE

## 2020-11-18 PROCEDURE — 99213 OFFICE O/P EST LOW 20 MIN: CPT | Performed by: FAMILY MEDICINE

## 2020-11-18 PROCEDURE — G8400 PT W/DXA NO RESULTS DOC: HCPCS | Performed by: FAMILY MEDICINE

## 2020-11-18 PROCEDURE — 4040F PNEUMOC VAC/ADMIN/RCVD: CPT | Performed by: FAMILY MEDICINE

## 2020-11-18 PROCEDURE — 4004F PT TOBACCO SCREEN RCVD TLK: CPT | Performed by: FAMILY MEDICINE

## 2020-11-18 PROCEDURE — G8427 DOCREV CUR MEDS BY ELIG CLIN: HCPCS | Performed by: FAMILY MEDICINE

## 2020-11-18 PROCEDURE — G9899 SCRN MAM PERF RSLTS DOC: HCPCS | Performed by: FAMILY MEDICINE

## 2020-11-18 PROCEDURE — 1090F PRES/ABSN URINE INCON ASSESS: CPT | Performed by: FAMILY MEDICINE

## 2020-11-18 PROCEDURE — G8417 CALC BMI ABV UP PARAM F/U: HCPCS | Performed by: FAMILY MEDICINE

## 2020-11-18 PROCEDURE — 3017F COLORECTAL CA SCREEN DOC REV: CPT | Performed by: FAMILY MEDICINE

## 2020-11-18 PROCEDURE — 1123F ACP DISCUSS/DSCN MKR DOCD: CPT | Performed by: FAMILY MEDICINE

## 2020-11-18 ASSESSMENT — ENCOUNTER SYMPTOMS
ABDOMINAL PAIN: 0
VOMITING: 0
NAUSEA: 0
DIARRHEA: 0
COUGH: 0
WHEEZING: 0
SHORTNESS OF BREATH: 1
CONSTIPATION: 0
BACK PAIN: 1

## 2020-11-18 NOTE — PROGRESS NOTES
[address] [facility]  [phone]   Krzysztof Valentin MD     Patient: Agus Forrester  YOB: 1945  Visit Date: 11/18/20    Sofi Neely is a 69y.o. year old female here today for   Chief Complaint   Patient presents with    1 Month Follow-Up     patient states she feels much better, complains of a sharp pain in lower back on the left side when laying down in bed     Discuss Medications       HPI  Will be having echo, EKG, CT scan , chest xray, THONY on Dec 10 and then heart cath on Dec 11 and then sees cardiologist Dr. Romina Galeano on Dec 14. Needs to reschedule sleep appointment with Dr. Ghazala Faulkner on Dec 10. Breathing has been stable as long as she is not walking and carrying something feels very tired. Just feels tired and week. Does not feel as short of breath. Saw pulm at the end of October - did not change any inhalers. Saw Dr. Iman Blackman at [facility]. Will be due for mammogram in 4/2020. Has been feeling overall better. Has been using cpap and oxygen at night. Mood is better since increase in cymbalta. People have noticed that she is feeling better. Sleeping ok. Review of Systems   Constitutional: Negative for chills and fever. Respiratory: Positive for shortness of breath (on exertion improving). Negative for cough and wheezing. Cardiovascular: Positive for chest pain (one in the last month). Negative for palpitations and leg swelling. Gastrointestinal: Negative for abdominal pain, constipation, diarrhea, nausea and vomiting. Musculoskeletal: Positive for back pain and gait problem.        Current Outpatient Medications on File Prior to Visit   Medication Sig Dispense Refill    BIOTIN PO Take 3,000 mg by mouth daily      DULoxetine (CYMBALTA) 30 MG extended release capsule take 1 capsule by mouth once daily 30 capsule 1    DULoxetine (CYMBALTA) 60 MG extended release capsule Take 1 capsule by mouth nightly 90 capsule 2  oxyCODONE-acetaminophen (PERCOCET) 7.5-325 MG per tablet take 1 tablet by mouth every 6 to 8 hours if needed for pain      rOPINIRole (REQUIP) 1 MG tablet Take 1 tablet by mouth nightly 90 tablet 3    gabapentin (NEURONTIN) 300 MG capsule Take 1 capsule by mouth 3 times daily for 30 days. 90 capsule 3    tiotropium (SPIRIVA RESPIMAT) 2.5 MCG/ACT AERS inhaler Inhale 2 puffs into the lungs daily 1 Inhaler 2    losartan-hydrochlorothiazide (HYZAAR) 100-25 MG per tablet take 1 tablet by mouth once daily 90 tablet 0    nebivolol (BYSTOLIC) 5 MG tablet take 1 tablet by mouth once daily AM 90 tablet 0    amLODIPine (NORVASC) 5 MG tablet Take 1 tablet by mouth daily 90 tablet 5    Handicap Placard MISC by Does not apply route Unable to ambulate more than 40 feet without difficulty  Expires 11/25/2024 1 each 0    lidocaine (XYLOCAINE) 5 % ointment Apply topically to hands and low back as needed. 1 Tube 3     No current facility-administered medications on file prior to visit. Allergies   Allergen Reactions    Demerol [Meperidine Hcl] Other (See Comments)     Hallucinations, anxiety        Meperidine Other (See Comments)    Nabumetone Hives and Rash       Past medical, surgical, socialand/or family history reviewed, updated as needed, and are non-contributory (unless otherwise stated). Medications, allergies, and problem list also reviewed and updated as needed in patient's record. Wt Readings from Last 3 Encounters:   11/18/20 202 lb 11.2 oz (91.9 kg)   10/07/20 200 lb (90.7 kg)   09/18/20 205 lb (93 kg)                   /76   Pulse 70   Temp 97.7 °F (36.5 °C) (Temporal)   Resp 16   Ht 5' (1.524 m)   Wt 202 lb 11.2 oz (91.9 kg)   SpO2 93%   BMI 39.59 kg/m²        Physical Exam  Vitals signs and nursing note reviewed. Constitutional:       General: She is not in acute distress. Appearance: She is well-developed. She is not diaphoretic.    Cardiovascular:      Rate and Rhythm: Normal with sleep medicine. Continue current meds. Will discuss NSAID with cardiology     Phone/MyChart follow up if tests abnormal.    Return in about 5 weeks (around 12/23/2020) for heart . or sooner if necessary. I have reviewed myfindings and recommendations with Melissa. Merna Chaudhari.  PRESLEY JassoD

## 2020-11-30 RX ORDER — LOSARTAN POTASSIUM AND HYDROCHLOROTHIAZIDE 25; 100 MG/1; MG/1
TABLET ORAL
Qty: 90 TABLET | Refills: 3 | Status: SHIPPED
Start: 2020-11-30 | End: 2021-08-18

## 2020-12-10 RX ORDER — AMLODIPINE BESYLATE 5 MG/1
5 TABLET ORAL DAILY
Qty: 90 TABLET | Refills: 3 | Status: SHIPPED
Start: 2020-12-10 | End: 2021-08-18

## 2020-12-14 ENCOUNTER — TELEPHONE (OUTPATIENT)
Dept: FAMILY MEDICINE CLINIC | Age: 75
End: 2020-12-14

## 2020-12-16 ENCOUNTER — TELEPHONE (OUTPATIENT)
Dept: FAMILY MEDICINE CLINIC | Age: 75
End: 2020-12-16

## 2020-12-16 RX ORDER — CELECOXIB 200 MG/1
200 CAPSULE ORAL 2 TIMES DAILY
Qty: 60 CAPSULE | Refills: 3 | Status: SHIPPED
Start: 2020-12-16 | End: 2021-04-13

## 2020-12-16 NOTE — TELEPHONE ENCOUNTER
Patient left  msg requesting Celebrex, stating her cardiologist informed her it is OK.     Last seen 11/18/2020  Next appt 12/23/2020  [facility]

## 2020-12-23 ENCOUNTER — OFFICE VISIT (OUTPATIENT)
Dept: FAMILY MEDICINE CLINIC | Age: 75
End: 2020-12-23
Payer: MEDICARE

## 2020-12-23 VITALS
WEIGHT: 204 LBS | BODY MASS INDEX: 40.05 KG/M2 | OXYGEN SATURATION: 94 % | DIASTOLIC BLOOD PRESSURE: 80 MMHG | RESPIRATION RATE: 18 BRPM | SYSTOLIC BLOOD PRESSURE: 124 MMHG | HEART RATE: 87 BPM | HEIGHT: 60 IN

## 2020-12-23 PROCEDURE — 99214 OFFICE O/P EST MOD 30 MIN: CPT | Performed by: FAMILY MEDICINE

## 2020-12-23 PROCEDURE — G8417 CALC BMI ABV UP PARAM F/U: HCPCS | Performed by: FAMILY MEDICINE

## 2020-12-23 PROCEDURE — 1123F ACP DISCUSS/DSCN MKR DOCD: CPT | Performed by: FAMILY MEDICINE

## 2020-12-23 PROCEDURE — G8482 FLU IMMUNIZE ORDER/ADMIN: HCPCS | Performed by: FAMILY MEDICINE

## 2020-12-23 PROCEDURE — G9899 SCRN MAM PERF RSLTS DOC: HCPCS | Performed by: FAMILY MEDICINE

## 2020-12-23 PROCEDURE — 1090F PRES/ABSN URINE INCON ASSESS: CPT | Performed by: FAMILY MEDICINE

## 2020-12-23 PROCEDURE — 3017F COLORECTAL CA SCREEN DOC REV: CPT | Performed by: FAMILY MEDICINE

## 2020-12-23 PROCEDURE — G8926 SPIRO NO PERF OR DOC: HCPCS | Performed by: FAMILY MEDICINE

## 2020-12-23 PROCEDURE — G8427 DOCREV CUR MEDS BY ELIG CLIN: HCPCS | Performed by: FAMILY MEDICINE

## 2020-12-23 PROCEDURE — G8400 PT W/DXA NO RESULTS DOC: HCPCS | Performed by: FAMILY MEDICINE

## 2020-12-23 PROCEDURE — 4004F PT TOBACCO SCREEN RCVD TLK: CPT | Performed by: FAMILY MEDICINE

## 2020-12-23 PROCEDURE — 4040F PNEUMOC VAC/ADMIN/RCVD: CPT | Performed by: FAMILY MEDICINE

## 2020-12-23 PROCEDURE — 3023F SPIROM DOC REV: CPT | Performed by: FAMILY MEDICINE

## 2020-12-23 RX ORDER — FUROSEMIDE 20 MG/1
20 TABLET ORAL 2 TIMES DAILY
COMMUNITY
Start: 2020-12-22 | End: 2022-09-09 | Stop reason: SDUPTHER

## 2020-12-23 ASSESSMENT — ENCOUNTER SYMPTOMS
VOMITING: 0
DIARRHEA: 0
ABDOMINAL PAIN: 0
BACK PAIN: 1
CONSTIPATION: 0
COUGH: 0
WHEEZING: 0
SHORTNESS OF BREATH: 1
NAUSEA: 0

## 2020-12-23 ASSESSMENT — PATIENT HEALTH QUESTIONNAIRE - PHQ9
2. FEELING DOWN, DEPRESSED OR HOPELESS: 0
SUM OF ALL RESPONSES TO PHQ QUESTIONS 1-9: 0
SUM OF ALL RESPONSES TO PHQ9 QUESTIONS 1 & 2: 0
1. LITTLE INTEREST OR PLEASURE IN DOING THINGS: 0
SUM OF ALL RESPONSES TO PHQ QUESTIONS 1-9: 0
SUM OF ALL RESPONSES TO PHQ QUESTIONS 1-9: 0

## 2020-12-23 NOTE — PATIENT INSTRUCTIONS
Do physical therapy   Reschedule your CT scan of your chest   See pulmonologist, cardiologist and sleep doctor   Get blood work done next week  Follow up in 3 months

## 2020-12-23 NOTE — PROGRESS NOTES
[facility]  [phone]   Milton Capellan MD     Patient: Merna Gaitan  YOB: 1945  Visit Date: 12/23/20    Kwame Uriostegui is a 77y.o. year old female here today for   Chief Complaint   Patient presents with    Depression     5 wk f/u        HPI  Patient is a 77year old female here for follow up of depression and dyspnea on exertion. Continues to have shortness of breath. Lasix was changed to 20mg bid. Has follow up on January 4. Did not recommend MR replacement at this time. Wanted her to have new PFTs. Has follow up of scheduled with Dr. Karen Galdamez. Had to cancel CT scan of lungs. Will reschedule this. Joint pain is improved with restarted celebrex. Mood is better with increased cymbalta. Wearing cpap at night. Denies fevers, chills, abdominal pain, n/v. Having a lot of muscle tightness. Would be ok with physical therapy. No new complaints. No pain in legs when she walks. Hurts to more when she is sitting. Review of Systems   Constitutional: Negative for chills and fever. Respiratory: Positive for shortness of breath (on exertion improving). Negative for cough and wheezing. Cardiovascular: Positive for chest pain (one in the last month). Negative for palpitations and leg swelling. Gastrointestinal: Negative for abdominal pain, constipation, diarrhea, nausea and vomiting. Musculoskeletal: Positive for back pain and gait problem.        Current Outpatient Medications on File Prior to Visit   Medication Sig Dispense Refill    furosemide (LASIX) 20 MG tablet Take 20 mg by mouth 2 times daily      celecoxib (CELEBREX) 200 MG capsule Take 1 capsule by mouth 2 times daily 60 capsule 3    amLODIPine (NORVASC) 5 MG tablet TAKE 1 TABLET BY MOUTH  DAILY 90 tablet 3    losartan-hydroCHLOROthiazide (HYZAAR) 100-25 MG per tablet TAKE 1 TABLET BY MOUTH  DAILY 90 tablet 3    BIOTIN PO Take 3,000 mg by mouth daily      DULoxetine (CYMBALTA) 30 MG extended release capsule take 1 capsule by mouth once daily 30 capsule 1    DULoxetine (CYMBALTA) 60 MG extended release capsule Take 1 capsule by mouth nightly 90 capsule 2    oxyCODONE-acetaminophen (PERCOCET) 7.5-325 MG per tablet take 1 tablet by mouth every 6 to 8 hours if needed for pain      rOPINIRole (REQUIP) 1 MG tablet Take 1 tablet by mouth nightly 90 tablet 3    tiotropium (SPIRIVA RESPIMAT) 2.5 MCG/ACT AERS inhaler Inhale 2 puffs into the lungs daily 1 Inhaler 2    nebivolol (BYSTOLIC) 5 MG tablet take 1 tablet by mouth once daily AM 90 tablet 0    Handicap Placard MISC by Does not apply route Unable to ambulate more than 40 feet without difficulty  Expires 11/25/2024 1 each 0    lidocaine (XYLOCAINE) 5 % ointment Apply topically to hands and low back as needed. 1 Tube 3    gabapentin (NEURONTIN) 300 MG capsule Take 1 capsule by mouth 3 times daily for 30 days. 90 capsule 3     No current facility-administered medications on file prior to visit. Allergies   Allergen Reactions    Demerol [Meperidine Hcl] Other (See Comments)     Hallucinations, anxiety        Meperidine Other (See Comments)    Nabumetone Hives and Rash       Past medical, surgical, socialand/or family history reviewed, updated as needed, and are non-contributory (unless otherwise stated). Medications, allergies, and problem list also reviewed and updated as needed in patient's record. Wt Readings from Last 3 Encounters:   12/23/20 204 lb (92.5 kg)   11/18/20 202 lb 11.2 oz (91.9 kg)   10/07/20 200 lb (90.7 kg)                   /80 (Site: Right Upper Arm, Position: Sitting)   Pulse 87   Resp 18   Ht 5' (1.524 m)   Wt 204 lb (92.5 kg)   SpO2 94%   BMI 39.84 kg/m²        Physical Exam  Vitals signs and nursing note reviewed. Constitutional:       General: She is not in acute distress. Appearance: She is well-developed. She is not diaphoretic.    Cardiovascular: Rate and Rhythm: Normal rate and regular rhythm. Heart sounds: Murmur present. No friction rub. Pulmonary:      Effort: Pulmonary effort is normal. No respiratory distress. Breath sounds: No wheezing or rales. Abdominal:      General: Bowel sounds are normal. There is no distension. Palpations: Abdomen is soft. There is no mass. Tenderness: There is no abdominal tenderness. There is no guarding. Musculoskeletal: Normal range of motion. Right lower leg: Edema (trace) present. Left lower leg: Edema (trace) present. Results for orders placed or performed during the hospital encounter of 10/07/20   CBC   Result Value Ref Range    WBC 6.6 4.5 - 11.5 E9/L    RBC 5.08 3.50 - 5.50 E12/L    Hemoglobin 15.7 (H) 11.5 - 15.5 g/dL    Hematocrit 49.5 (H) 34.0 - 48.0 %    MCV 97.4 80.0 - 99.9 fL    MCH 30.9 26.0 - 35.0 pg    MCHC 31.7 (L) 32.0 - 34.5 %    RDW 16.5 (H) 11.5 - 15.0 fL    Platelets 811 603 - 649 E9/L    MPV 9.9 7.0 - 12.0 fL   COMPREHENSIVE METABOLIC PANEL   Result Value Ref Range    Sodium 139 132 - 146 mmol/L    Potassium 4.8 3.5 - 5.0 mmol/L    Chloride 97 (L) 98 - 107 mmol/L    CO2 30 (H) 22 - 29 mmol/L    Anion Gap 12 7 - 16 mmol/L    Glucose 112 (H) 74 - 99 mg/dL    BUN 17 8 - 23 mg/dL    CREATININE 0.9 0.5 - 1.0 mg/dL    GFR Non-African American >60 >=60 mL/min/1.73    GFR African American >60     Calcium 9.7 8.6 - 10.2 mg/dL    Total Protein 7.2 6.4 - 8.3 g/dL    Alb 4.3 3.5 - 5.2 g/dL    Total Bilirubin 0.6 0.0 - 1.2 mg/dL    Alkaline Phosphatase 76 35 - 104 U/L    ALT 29 0 - 32 U/L    AST 43 (H) 0 - 31 U/L       ASSESSMENT/PLAN  Sheldon Moss was seen today for depression.     Diagnoses and all orders for this visit:    Pain in both lower extremities  -     [facility]    Pulmonary hypertension [facility])   - Has appointment with Dr. Ramon Heard on Feb 2   AYESHA (obstructive sleep apnea)   - Has follow up with sleep doctor   Pulmonary emphysema, unspecified emphysema type ([facility] [address]   - Needs to get CT scan of lungs prior to pulm appointment. Moderate episode of recurrent major depressive disorder ([facility] [address]   - Improved with increased dose of cymbalta. Mitral annular calcification   - Seeing cardiology at [facility] [place] . Heart cath negative for CAD. No surgery recommended at this time. Phone/MyChart follow up if tests abnormal.    Return in about 3 months (around 3/23/2021). or sooner if necessary. I have reviewed myfindings and recommendations with Melissa. Merlyn Tyler.  Reji Denise M.D

## 2021-01-03 DIAGNOSIS — F33.1 MODERATE EPISODE OF RECURRENT MAJOR DEPRESSIVE DISORDER (HCC): ICD-10-CM

## 2021-01-05 RX ORDER — DULOXETIN HYDROCHLORIDE 30 MG/1
CAPSULE, DELAYED RELEASE ORAL
Qty: 30 CAPSULE | Refills: 1 | Status: SHIPPED
Start: 2021-01-05 | End: 2021-03-09

## 2021-01-07 RX ORDER — NEBIVOLOL 5 MG/1
TABLET ORAL
Qty: 90 TABLET | Refills: 3 | Status: SHIPPED
Start: 2021-01-07 | End: 2022-02-14

## 2021-01-13 ENCOUNTER — TELEPHONE (OUTPATIENT)
Dept: PHYSICAL THERAPY | Age: 76
End: 2021-01-13

## 2021-01-13 PROBLEM — M79.604 PAIN IN BOTH LOWER EXTREMITIES: Status: ACTIVE | Noted: 2021-01-13

## 2021-01-13 PROBLEM — M79.605 PAIN IN BOTH LOWER EXTREMITIES: Status: ACTIVE | Noted: 2021-01-13

## 2021-01-13 NOTE — TELEPHONE ENCOUNTER
Date: 2021       Patient Name: Mega Jiménez  : 1945      MRN: [id number]    Patient canceled appointment scheduled for today  at 1300 due to no reason given .     Analisa Gaston, PT

## 2021-01-20 ENCOUNTER — TELEPHONE (OUTPATIENT)
Dept: CARDIOLOGY CLINIC | Age: 76
End: 2021-01-20

## 2021-01-20 ENCOUNTER — TELEPHONE (OUTPATIENT)
Dept: PHYSICAL THERAPY | Age: 76
End: 2021-01-20

## 2021-01-20 NOTE — TELEPHONE ENCOUNTER
Patient was on March recall. Called to schedule, she didn't want to make an appointment. Said she was seeing a doctor in [address]. Will call in future if she wants seen.

## 2021-01-20 NOTE — TELEPHONE ENCOUNTER
Date: 2021       Patient Name: Ebony Delgado  : 1945      MRN: [id number]    No show/no call for PT evaluation scheduled at [phone] today.     Thamas Printers, PT

## 2021-02-16 ENCOUNTER — TELEPHONE (OUTPATIENT)
Dept: ADMINISTRATIVE | Age: 76
End: 2021-02-16

## 2021-02-16 ENCOUNTER — HOSPITAL ENCOUNTER (EMERGENCY)
Age: 76
Discharge: HOME | End: 2021-02-16
Attending: EMERGENCY MEDICINE
Payer: MEDICARE

## 2021-02-16 ENCOUNTER — NURSE TRIAGE (OUTPATIENT)
Dept: OTHER | Facility: CLINIC | Age: 76
End: 2021-02-16

## 2021-02-16 ENCOUNTER — APPOINTMENT (OUTPATIENT)
Dept: CT IMAGING | Age: 76
End: 2021-02-16
Payer: MEDICARE

## 2021-02-16 VITALS
HEART RATE: 70 BPM | SYSTOLIC BLOOD PRESSURE: 152 MMHG | OXYGEN SATURATION: 95 % | TEMPERATURE: 98 F | DIASTOLIC BLOOD PRESSURE: 79 MMHG | RESPIRATION RATE: 18 BRPM

## 2021-02-16 DIAGNOSIS — N30.00 ACUTE CYSTITIS WITHOUT HEMATURIA: ICD-10-CM

## 2021-02-16 DIAGNOSIS — R10.9 FLANK PAIN: Primary | ICD-10-CM

## 2021-02-16 LAB
ANION GAP SERPL CALCULATED.3IONS-SCNC: 7 MMOL/L (ref 7–16)
BACTERIA: ABNORMAL /HPF
BASOPHILS ABSOLUTE: 0.01 E9/L (ref 0–0.2)
BASOPHILS RELATIVE PERCENT: 0.2 % (ref 0–2)
BILIRUBIN URINE: NEGATIVE
BLOOD, URINE: NEGATIVE
BUN BLDV-MCNC: 29 MG/DL (ref 8–23)
CALCIUM SERPL-MCNC: 9.5 MG/DL (ref 8.6–10.2)
CHLORIDE BLD-SCNC: 91 MMOL/L (ref 98–107)
CLARITY: ABNORMAL
CO2: 36 MMOL/L (ref 22–29)
COLOR: YELLOW
CREAT SERPL-MCNC: 0.8 MG/DL (ref 0.5–1)
EOSINOPHILS ABSOLUTE: 0.02 E9/L (ref 0.05–0.5)
EOSINOPHILS RELATIVE PERCENT: 0.4 % (ref 0–6)
EPITHELIAL CELLS, UA: ABNORMAL /HPF
GFR AFRICAN AMERICAN: >60
GFR NON-AFRICAN AMERICAN: >60 ML/MIN/1.73
GLUCOSE BLD-MCNC: 115 MG/DL (ref 74–99)
GLUCOSE URINE: NEGATIVE MG/DL
HCT VFR BLD CALC: 49.2 % (ref 34–48)
HEMOGLOBIN: 16.5 G/DL (ref 11.5–15.5)
IMMATURE GRANULOCYTES #: 0.01 E9/L
IMMATURE GRANULOCYTES %: 0.2 % (ref 0–5)
KETONES, URINE: NEGATIVE MG/DL
LEUKOCYTE ESTERASE, URINE: NEGATIVE
LYMPHOCYTES ABSOLUTE: 0.78 E9/L (ref 1.5–4)
LYMPHOCYTES RELATIVE PERCENT: 14.8 % (ref 20–42)
MCH RBC QN AUTO: 32.9 PG (ref 26–35)
MCHC RBC AUTO-ENTMCNC: 33.5 % (ref 32–34.5)
MCV RBC AUTO: 98 FL (ref 80–99.9)
MONOCYTES ABSOLUTE: 0.31 E9/L (ref 0.1–0.95)
MONOCYTES RELATIVE PERCENT: 5.9 % (ref 2–12)
NEUTROPHILS ABSOLUTE: 4.15 E9/L (ref 1.8–7.3)
NEUTROPHILS RELATIVE PERCENT: 78.5 % (ref 43–80)
NITRITE, URINE: POSITIVE
PDW BLD-RTO: 13 FL (ref 11.5–15)
PH UA: 7.5 (ref 5–9)
PLATELET # BLD: 208 E9/L (ref 130–450)
PMV BLD AUTO: 9.4 FL (ref 7–12)
POTASSIUM SERPL-SCNC: 4.2 MMOL/L (ref 3.5–5)
PROTEIN UA: NEGATIVE MG/DL
RBC # BLD: 5.02 E12/L (ref 3.5–5.5)
RBC UA: ABNORMAL /HPF (ref 0–2)
SODIUM BLD-SCNC: 134 MMOL/L (ref 132–146)
SPECIFIC GRAVITY UA: 1.01 (ref 1–1.03)
UROBILINOGEN, URINE: 0.2 E.U./DL
WBC # BLD: 5.3 E9/L (ref 4.5–11.5)
WBC UA: ABNORMAL /HPF (ref 0–5)

## 2021-02-16 PROCEDURE — 6360000002 HC RX W HCPCS: Performed by: EMERGENCY MEDICINE

## 2021-02-16 PROCEDURE — 2580000003 HC RX 258: Performed by: EMERGENCY MEDICINE

## 2021-02-16 PROCEDURE — 96374 THER/PROPH/DIAG INJ IV PUSH: CPT

## 2021-02-16 PROCEDURE — 85025 COMPLETE CBC W/AUTO DIFF WBC: CPT

## 2021-02-16 PROCEDURE — 80048 BASIC METABOLIC PNL TOTAL CA: CPT

## 2021-02-16 PROCEDURE — 99284 EMERGENCY DEPT VISIT MOD MDM: CPT

## 2021-02-16 PROCEDURE — 81001 URINALYSIS AUTO W/SCOPE: CPT

## 2021-02-16 PROCEDURE — 96375 TX/PRO/DX INJ NEW DRUG ADDON: CPT

## 2021-02-16 PROCEDURE — 6360000004 HC RX CONTRAST MEDICATION: Performed by: RADIOLOGY

## 2021-02-16 PROCEDURE — 74177 CT ABD & PELVIS W/CONTRAST: CPT

## 2021-02-16 RX ORDER — KETOROLAC TROMETHAMINE 30 MG/ML
15 INJECTION, SOLUTION INTRAMUSCULAR; INTRAVENOUS ONCE
Status: COMPLETED | OUTPATIENT
Start: 2021-02-16 | End: 2021-02-16

## 2021-02-16 RX ORDER — CEFDINIR 300 MG/1
300 CAPSULE ORAL 2 TIMES DAILY
Qty: 14 CAPSULE | Refills: 0 | Status: SHIPPED | OUTPATIENT
Start: 2021-02-16 | End: 2021-02-23

## 2021-02-16 RX ORDER — FENTANYL CITRATE 50 UG/ML
25 INJECTION, SOLUTION INTRAMUSCULAR; INTRAVENOUS ONCE
Status: COMPLETED | OUTPATIENT
Start: 2021-02-16 | End: 2021-02-16

## 2021-02-16 RX ADMIN — FENTANYL CITRATE 25 MCG: 50 INJECTION, SOLUTION INTRAMUSCULAR; INTRAVENOUS at 17:00

## 2021-02-16 RX ADMIN — KETOROLAC TROMETHAMINE 15 MG: 30 INJECTION, SOLUTION INTRAMUSCULAR; INTRAVENOUS at 17:00

## 2021-02-16 RX ADMIN — IOHEXOL 50 ML: 240 INJECTION, SOLUTION INTRATHECAL; INTRAVASCULAR; INTRAVENOUS; ORAL at 18:22

## 2021-02-16 RX ADMIN — CEFTRIAXONE 2000 MG: 2 INJECTION, POWDER, FOR SOLUTION INTRAMUSCULAR; INTRAVENOUS at 18:26

## 2021-02-16 RX ADMIN — IOPAMIDOL 75 ML: 755 INJECTION, SOLUTION INTRAVENOUS at 18:22

## 2021-02-16 ASSESSMENT — PAIN SCALES - GENERAL
PAINLEVEL_OUTOF10: 5
PAINLEVEL_OUTOF10: 10

## 2021-02-16 ASSESSMENT — ENCOUNTER SYMPTOMS
WHEEZING: 0
NAUSEA: 0
SHORTNESS OF BREATH: 0
COUGH: 0
SORE THROAT: 0
DIARRHEA: 0
ABDOMINAL DISTENTION: 0
ABDOMINAL PAIN: 0
BACK PAIN: 0
SINUS PRESSURE: 0
CHEST TIGHTNESS: 0
VOMITING: 0

## 2021-02-16 ASSESSMENT — PAIN DESCRIPTION - PAIN TYPE: TYPE: ACUTE PAIN

## 2021-02-16 ASSESSMENT — PAIN DESCRIPTION - DESCRIPTORS: DESCRIPTORS: SORE

## 2021-02-16 NOTE — ED NOTES
Patient to ct at this time.       Leopold Lamy, [facility]  02/16/21 [id number]

## 2021-02-16 NOTE — TELEPHONE ENCOUNTER
Reason for Disposition   SEVERE pain (e.g., excruciating, scale 8-10) and present > 1 hour    Answer Assessment - Initial Assessment Questions  1. LOCATION: \"Where does it hurt? \" (e.g., left, right)      Left side- hip/flank area    2. ONSET: \"When did the pain start? \"      One week ago    3. SEVERITY: \"How bad is the pain? \" (e.g., Scale 1-10; mild, moderate, or severe)    - MILD (1-3): doesn't interfere with normal activities     - MODERATE (4-7): interferes with normal activities or awakens from sleep     - SEVERE (8-10): excruciating pain and patient unable to do normal activities (stays in bed)        Rated as 9- severe per pt. Hard to do any activities unless necessary. Pinching feeling, especially when standing    4. PATTERN: \"Does the pain come and go, or is it constant? \"   constant        5. CAUSE: \"What do you think is causing the pain? \"      Unknown. Implants? Has had bilateral hip replacements    6. OTHER SYMPTOMS:  \"Do you have any other symptoms? \" (e.g., fever, abdominal pain, vomiting, leg weakness, burning with urination, blood in urine)      Constipation a few days ago. Took stool softeners- passed. Swelling over area of pain/ at crest of hip- started out as baseball size- improved some with ice/rest.     7. PREGNANCY:  \"Is there any chance you are pregnant? \" \"When was your last menstrual period? \"      n/a    Protocols used: FLANK PAIN-ADULT-[place]    Patient called Rory Orozco at [facility] pre-service center [facility])  with red flag complaint. Brief description of triage: pt called with severe flank/hip pain with swelling/heat x 1 week, worsening yesterday    Triage indicates for patient to ED now. Care advice provided, patient verbalizes understanding; denies any other questions or concerns; instructed to call back for any new or worsening symptoms. Pt to have someone transport to ED at this time. Attention Provider: Thank you for allowing me to participate in the care of your patient.   The patient was connected to triage in response to information provided to the ECC. Please do not respond through this encounter as the response is not directed to a shared pool.

## 2021-02-16 NOTE — ED PROVIDER NOTES
GCS: GCS eye subscore is 4. GCS verbal subscore is 5. GCS motor subscore is 6. Cranial Nerves: Cranial nerves are intact. No cranial nerve deficit. Sensory: Sensation is intact. Motor: Motor function is intact. Coordination: Coordination is intact. Coordination normal.          Procedures     MDM     ED Course as of Feb 16 1858 Tue Feb 16, 2021 1858 Patient sitting the bed resting comfortably no distress stating she is feeling better. Work-up results, mild UTI and possible flare of her chronic pain are all discussed with her. She is comfortable with that and comfortable going home at this time. She will follow closely with her doctor and will return if symptoms change or worsen. Overall exam is improved, she appears comfortable and in no distress. [NC]      ED Course User Index  [NC] Mary Ann Gutierrez, DO        ED Course as of Feb 16 1858 Tue Feb 16, 2021 1858 Patient sitting the bed resting comfortably no distress stating she is feeling better. Work-up results, mild UTI and possible flare of her chronic pain are all discussed with her. She is comfortable with that and comfortable going home at this time. She will follow closely with her doctor and will return if symptoms change or worsen. Overall exam is improved, she appears comfortable and in no distress. [NC]      ED Course User Index  [NC] Carissa Chatman, DO       --------------------------------------------- PAST HISTORY ---------------------------------------------  Past Medical History:  has a past medical history of Adverse effect of anesthetic, Arthritis, Bronchitis, Cancer ([facility] [address] Diverticula of colon, Hyperlipidemia, Hypertension, Pneumonia, Restless leg syndrome, Sleep apnea, and Thyroid disease. Past Surgical History:  has a past surgical history that includes Appendectomy; Hand surgery; Hysterectomy; Knee arthroscopy (Bilateral); Lap Band (2007); Ovary removal (1975); other surgical history; Cholecystectomy (2009); Colonoscopy (2011); Total hip arthroplasty [facility] 2012); Revision total hip arthroplasty (3/26/2012); Eye surgery (Bilateral); other surgical history (Right, 4/15/2015); Nerve Block (Left, 8/19/15); Nerve Block (8 19 15); Nerve Block (N/A, 9/9/15); Nerve Block (N/A, 10 26 2015); Nerve Block (Bilateral, 11 2 15); Nerve Block (Bilateral, 11/09/15); other surgical history (Right, 12 30 2015); other surgical history (Left, 2 8 16); Nerve Block (Bilateral, 04 13 2016); Nerve Block (Bilateral, 08/31/2016); Nerve Block (Bilateral, 10/03/2016); Breast surgery (Left, 03/2016); other surgical history (Left, 11/21/2016); other surgical history (Right, 01/09/2017); other surgical history (Right, 02/08/2017); other surgical history (Left, 03/27/2017); other surgical history (Left, 05/24/2017); Cosmetic surgery (Bilateral); joint replacement (03/04/07); other surgical history (Right, 01/22/2020); and Nerve Surgery (Right, 1/22/2020). Social History:  reports that she has been smoking cigarettes. She has a 31.00 pack-year smoking history. She has never used smokeless tobacco. She reports previous alcohol use of about 1.0 standard drinks of alcohol per week. She reports that she does not use drugs. Family History: family history includes Diabetes in her father; Heart Failure in her mother; Other in her brother; Parkinsonism in her father. The patients home medications have been reviewed.     Allergies: Demerol [meperidine hcl], Meperidine, and Nabumetone    -------------------------------------------------- RESULTS -------------------------------------------------  Labs:  Results for orders placed or performed during the hospital encounter of 02/16/21   CBC Auto Differential   Result Value Ref Range WBC 5.3 4.5 - 11.5 E9/L    RBC 5.02 3.50 - 5.50 E12/L    Hemoglobin 16.5 (H) 11.5 - 15.5 g/dL    Hematocrit 49.2 (H) 34.0 - 48.0 %    MCV 98.0 80.0 - 99.9 fL    MCH 32.9 26.0 - 35.0 pg    MCHC 33.5 32.0 - 34.5 %    RDW 13.0 11.5 - 15.0 fL    Platelets 276 740 - 858 E9/L    MPV 9.4 7.0 - 12.0 fL    Neutrophils % 78.5 43.0 - 80.0 %    Immature Granulocytes % 0.2 0.0 - 5.0 %    Lymphocytes % 14.8 (L) 20.0 - 42.0 %    Monocytes % 5.9 2.0 - 12.0 %    Eosinophils % 0.4 0.0 - 6.0 %    Basophils % 0.2 0.0 - 2.0 %    Neutrophils Absolute 4.15 1.80 - 7.30 E9/L    Immature Granulocytes # 0.01 E9/L    Lymphocytes Absolute 0.78 (L) 1.50 - 4.00 E9/L    Monocytes Absolute 0.31 0.10 - 0.95 E9/L    Eosinophils Absolute 0.02 (L) 0.05 - 0.50 E9/L    Basophils Absolute 0.01 0.00 - 0.20 D7/X   Basic Metabolic Panel   Result Value Ref Range    Sodium 134 132 - 146 mmol/L    Potassium 4.2 3.5 - 5.0 mmol/L    Chloride 91 (L) 98 - 107 mmol/L    CO2 36 (H) 22 - 29 mmol/L    Anion Gap 7 7 - 16 mmol/L    Glucose 115 (H) 74 - 99 mg/dL    BUN 29 (H) 8 - 23 mg/dL    CREATININE 0.8 0.5 - 1.0 mg/dL    GFR Non-African American >60 >=60 mL/min/1.73    GFR African American >60     Calcium 9.5 8.6 - 10.2 mg/dL   Urinalysis   Result Value Ref Range    Color, UA Yellow Straw/Yellow    Clarity, UA SLCLOUDY Clear    Glucose, Ur Negative Negative mg/dL    Bilirubin Urine Negative Negative    Ketones, Urine Negative Negative mg/dL    Specific Gravity, UA 1.010 1.005 - 1.030    Blood, Urine Negative Negative    pH, UA 7.5 5.0 - 9.0    Protein, UA Negative Negative mg/dL    Urobilinogen, Urine 0.2 <2.0 E.U./dL    Nitrite, Urine POSITIVE (A) Negative    Leukocyte Esterase, Urine Negative Negative   Microscopic Urinalysis   Result Value Ref Range    WBC, UA 0-1 0 - 5 /HPF    RBC, UA NONE 0 - 2 /HPF    Epithelial Cells, UA RARE /HPF    Bacteria, UA MANY (A) None Seen /HPF       Radiology:  CT ABDOMEN PELVIS W IV CONTRAST Additional Contrast? Oral Final Result   Intra and extrahepatic bile duct dilatation likely manifesting post   cholecystectomy state. Correlation with LFTs may be helpful in further   evaluation. No inflammatory changes in the abdomen or pelvis.          ------------------------- NURSING NOTES AND VITALS REVIEWED ---------------------------  Date / Time Roomed:  2/16/2021  4:22 PM  ED Bed Assignment:  19/19    The nursing notes within the ED encounter and vital signs as below have been reviewed. BP (!) 152/79   Pulse 70   Temp 98 °F (36.7 °C) (Oral)   Resp 18   SpO2 95%   Oxygen Saturation Interpretation: Normal      ------------------------------------------ PROGRESS NOTES ------------------------------------------  I have spoken with the patient and discussed todays results, in addition to providing specific details for the plan of care and counseling regarding the diagnosis and prognosis. Their questions are answered at this time and they are agreeable with the plan. I discussed at length with them reasons for immediate return here for re evaluation. They will followup with primary care by calling their office tomorrow. --------------------------------- ADDITIONAL PROVIDER NOTES ---------------------------------  At this time the patient is without objective evidence of an acute process requiring hospitalization or inpatient management. They have remained hemodynamically stable throughout their entire ED visit and are stable for discharge with outpatient follow-up. The plan has been discussed in detail and they are aware of the specific conditions for emergent return, as well as the importance of follow-up. New Prescriptions    CEFDINIR (OMNICEF) 300 MG CAPSULE    Take 1 capsule by mouth 2 times daily for 7 days       Diagnosis:  1. Flank pain    2. Acute cystitis without hematuria        Disposition:  Patient's disposition: Discharge to home  Patient's condition is stable.            Micheline Blanton DO 02/16/21 1850

## 2021-02-22 ENCOUNTER — OFFICE VISIT (OUTPATIENT)
Dept: FAMILY MEDICINE CLINIC | Age: 76
End: 2021-02-22
Payer: MEDICARE

## 2021-02-22 VITALS
SYSTOLIC BLOOD PRESSURE: 130 MMHG | DIASTOLIC BLOOD PRESSURE: 84 MMHG | TEMPERATURE: 96.6 F | HEIGHT: 60 IN | WEIGHT: 199 LBS | HEART RATE: 78 BPM | BODY MASS INDEX: 39.07 KG/M2 | OXYGEN SATURATION: 95 % | RESPIRATION RATE: 20 BRPM

## 2021-02-22 DIAGNOSIS — M54.50 ACUTE LEFT-SIDED LOW BACK PAIN WITHOUT SCIATICA: ICD-10-CM

## 2021-02-22 DIAGNOSIS — Z00.00 ROUTINE GENERAL MEDICAL EXAMINATION AT A HEALTH CARE FACILITY: Primary | ICD-10-CM

## 2021-02-22 DIAGNOSIS — R82.90 ABNORMAL URINALYSIS: ICD-10-CM

## 2021-02-22 PROCEDURE — 4040F PNEUMOC VAC/ADMIN/RCVD: CPT | Performed by: FAMILY MEDICINE

## 2021-02-22 PROCEDURE — G8482 FLU IMMUNIZE ORDER/ADMIN: HCPCS | Performed by: FAMILY MEDICINE

## 2021-02-22 PROCEDURE — 1123F ACP DISCUSS/DSCN MKR DOCD: CPT | Performed by: FAMILY MEDICINE

## 2021-02-22 PROCEDURE — 3017F COLORECTAL CA SCREEN DOC REV: CPT | Performed by: FAMILY MEDICINE

## 2021-02-22 PROCEDURE — G0438 PPPS, INITIAL VISIT: HCPCS | Performed by: FAMILY MEDICINE

## 2021-02-22 RX ORDER — GABAPENTIN 300 MG/1
300 CAPSULE ORAL 3 TIMES DAILY
COMMUNITY
Start: 2020-10-15 | End: 2022-01-05 | Stop reason: CLARIF

## 2021-02-22 ASSESSMENT — PATIENT HEALTH QUESTIONNAIRE - PHQ9
SUM OF ALL RESPONSES TO PHQ9 QUESTIONS 1 & 2: 0
SUM OF ALL RESPONSES TO PHQ QUESTIONS 1-9: 0
2. FEELING DOWN, DEPRESSED OR HOPELESS: 0
SUM OF ALL RESPONSES TO PHQ QUESTIONS 1-9: 0

## 2021-02-22 NOTE — PROGRESS NOTES
Medicare Annual Wellness Visit  Name: Valente Hahn Date: 3/1/2021   MRN: <[id number]> Sex: Female   Age: 77 y.o. Ethnicity: Non-/Non    : 1945 Race: Mike Shaikh is here for Medicare AWV    Screenings for behavioral, psychosocial and functional/safety risks, and cognitive dysfunction are all negative except as indicated below. These results, as well as other patient data from the [address] form, are documented in Flowsheets linked to this Encounter. Allergies   Allergen Reactions    Demerol [Meperidine Hcl] Other (See Comments)     Hallucinations, anxiety        Meperidine Other (See Comments)    Nabumetone Hives and Rash         Prior to Visit Medications    Medication Sig Taking? Authorizing Provider   diclofenac sodium (VOLTAREN) 1 % GEL apply 4 grams to KNEE four times a day AS NEEDED FOR PAIN Yes Historical Provider, MD   gabapentin (NEURONTIN) 300 MG capsule Take 300 mg by mouth 3 times daily.  Yes Historical Provider, MD   SPIRIVA RESPIMAT 2.5 MCG/ACT AERS inhaler inhale 2 puffs by mouth once daily Yes Mayda Pablo MD   PROAIR  (90 Base) MCG/ACT inhaler inhale 2 puffs by mouth every 4 to 6 hours if needed for wheezing or shortness of breath Yes Mayda Pablo MD   nebivolol (BYSTOLIC) 5 MG tablet TAKE 1 TABLET BY MOUTH ONCE DAILY Yes Mayda Pablo MD   DULoxetine (CYMBALTA) 30 MG extended release capsule take 1 capsule by mouth once daily Yes Mayda Pablo MD   furosemide (LASIX) 20 MG tablet Take 20 mg by mouth 2 times daily Yes Historical Provider, MD   amLODIPine (NORVASC) 5 MG tablet TAKE 1 TABLET BY MOUTH  DAILY Yes Mayda Pablo MD   losartan-hydroCHLOROthiazide (HYZAAR) 100-25 MG per tablet TAKE 1 TABLET BY MOUTH  DAILY Yes Mayda Pablo MD   BIOTIN PO Take 3,000 mg by mouth daily Yes Historical Provider, MD DULoxetine (CYMBALTA) 60 MG extended release capsule Take 1 capsule by mouth nightly Yes Justina Garrido MD   oxyCODONE-acetaminophen (PERCOCET) 7.5-325 MG per tablet take 1 tablet by mouth every 6 to 8 hours if needed for pain Yes Historical Provider, MD   rOPINIRole (REQUIP) 1 MG tablet Take 1 tablet by mouth nightly Yes Justina Garrido MD   Handicap Placard MISC by Does not apply route Unable to ambulate more than 40 feet without difficulty  Expires 11/25/2024 Yes Justina Garrido MD   lidocaine (XYLOCAINE) 5 % ointment Apply topically to hands and low back as needed. Yes Tim Coil, APRN - CNP   sulfamethoxazole-trimethoprim (BACTRIM DS;SEPTRA DS) 800-160 MG per tablet Take 1 tablet by mouth 2 times daily for 3 days  Justina Garrido MD   celecoxib (CELEBREX) 200 MG capsule Take 1 capsule by mouth 2 times daily  Justina Garrido MD   gabapentin (NEURONTIN) 300 MG capsule Take 1 capsule by mouth 3 times daily for 30 days. Nidia Lazar, DO         Past Medical History:   Diagnosis Date    Adverse effect of anesthetic     difficulty waking up if lying flat post op    Arthritis     Bronchitis     Cancer ([address]     breast left    Diverticula of colon 2011    Hyperlipidemia     Hypertension     Pneumonia     Restless leg syndrome     Sleep apnea     CPAP    Thyroid disease        Past Surgical History:   Procedure Laterality Date    APPENDECTOMY      BREAST SURGERY Left 03/2016    lumpectomy for cancer with chemo & radiation last chemo in july     CHOLECYSTECTOMY  2009    lap    COLONOSCOPY  2011    diverticulosis    COSMETIC SURGERY Bilateral     blephoroplasty    EYE SURGERY Bilateral     cataract extraction    HAND SURGERY      CTS B/L    HYSTERECTOMY      JOINT REPLACEMENT  03/04/07    [place]ne[place]. [place]  Hips    KNEE ARTHROSCOPY Bilateral     LAP BAND  2007    NERVE BLOCK Left 8/19/15    sacroiliac joint #1    NERVE BLOCK  8 19 15    lumbar epidural #1    NERVE BLOCK N/A 9/9/15 Art Kirby MD as Consulting Physician (Cardiology)    Wt Readings from Last 3 Encounters:   02/22/21 199 lb (90.3 kg)   12/23/20 204 lb (92.5 kg)   11/18/20 202 lb 11.2 oz (91.9 kg)     Vitals:    02/22/21 1626   BP: 130/84   Pulse: 78   Resp: 20   Temp: 96.6 °F (35.9 °C)   SpO2: 95%   Weight: 199 lb (90.3 kg)   Height: 5' (1.524 m)     Body mass index is 38.86 kg/m². Based upon direct observation of the patient, evaluation of cognition reveals recent and remote memory intact. General Appearance: alert and oriented to person, place and time, well-developed and well-nourished, in no acute distress  Pulmonary/Chest: clear to auscultation bilaterally- no wheezes, rales or rhonchi, normal air movement, no respiratory distress  Cardiovascular: normal rate, normal S1 and S2 and no murmurs  Abdomen: soft, non-tender, non-distended, normal bowel sounds, no masses or organomegaly  Extremities: no cyanosis and no clubbing    Patient's complete Health Risk Assessment and screening values have been reviewed and are found in Flowsheets. The following problems were reviewed today and where indicated follow up appointments were made and/or referrals ordered. Positive Risk Factor Screenings with Interventions:         Substance History:  Social History     Tobacco History     Smoking Status  Current Every Day Smoker Smoking Frequency  1 pack/day for 31 years (31 pk yrs) Smoking Tobacco Type  Cigarettes    Smokeless Tobacco Use  Never Used    Tobacco Comment  quit for 10 years, but back at it.           Alcohol History     Alcohol Use Status  Not Currently Drinks/Week  1 Glasses of wine per week Amount  1.0 standard drinks of alcohol/wk Comment  4 cups of coffee a day           Drug Use     Drug Use Status  No          Sexual Activity     Sexually Active  Not Asked               Alcohol Screening: A score of 8 or more is associated with harmful or hazardous drinking. A score of 13 or more in women, and 15 or more in men, is likely to indicate alcohol dependence. Substance Abuse Interventions:  · Tobacco abuse:  tobacco cessation tips and resources provided    General Health and ACP:  General  In general, how would you say your health is?: Fair  In the past 7 days, have you experienced any of the following? New or Increased Pain, New or Increased Fatigue, Loneliness, Social Isolation, Stress or Anger?: (!) New or Increased Pain, New or Increased Fatigue  Do you get the social and emotional support that you need?: (!) No  Do you have a Living Will?: Yes  Advance Directives     Power of  Living Will ACP-Advance Directive ACP-Power of     Not on File Filed on 05/09/15 Filed Not on File      General Health Risk Interventions:  · Pain issues: pain after injection after 1/11 and 1/18 but that improved over time. Then pain started on 2/8  , sharp on left side lower back. radiates across waist . does not go down leg. went to the ER. was diagnosed with uti. no swelling. no dysuria. having a hard time walking because pain. pain is sharp.  no inciting trauma, pain management  referral ordered for evaluation/treatment of left lower back pain    Health Habits/Nutrition:  Health Habits/Nutrition  Do you exercise for at least 20 minutes 2-3 times per week?: Yes  Have you lost any weight without trying in the past 3 months?: No  Do you eat only one meal per day?: No  Have you seen the dentist within the past year?: Yes  Body mass index: (!) 38.86  Health Habits/Nutrition Interventions:  · Nutritional issues:  only eats one meal a day because she does not feel hungry    Hearing/Vision:  No exam data present  Hearing/Vision  Do you or your family notice any trouble with your hearing that hasn't been managed with hearing aids?: No Do you have difficulty driving, watching TV, or doing any of your daily activities because of your eyesight?: No  Have you had an eye exam within the past year?: (!) No  Hearing/Vision Interventions:  · Vision concerns:  patient encouraged to make appointment with his/her eye specialist         Cancelled appointment with Dr. Michael Esteban. Personalized Preventive Plan   Current Health Maintenance Status  Immunization History   Administered Date(s) Administered    Influenza Vaccine, unspecified formulation 12/12/2015    Influenza, High Dose (Fluzone 65 yrs and older) 10/25/2018, 09/18/2020    Influenza, Quadv, IM, PF (6 mo and older Fluzone, Flulaval, Fluarix, and 3 yrs and older Afluria) 09/18/2020    Influenza, Triv, inactivated, subunit, adjuvanted, IM (Fluad 65 yrs and older) 09/23/2019    Pneumococcal Conjugate 13-valent ([id number]) 08/03/2018    Pneumococcal Polysaccharide ([id number]) 10/07/2020    Tdap (Boostrix, Adacel) 07/13/2012, 06/15/2015        Health Maintenance   Topic Date Due    Hepatitis C screen  1945    COVID-19 Vaccine (1 of 2) 12/09/1962    Shingles Vaccine (1 of 2) 12/09/1996    DEXA (modify frequency per FRAX score)  12/09/2001    Annual Wellness Visit (AWV)  05/29/2019    A1C test (Diabetic or Prediabetic)  11/30/2019    Breast cancer screen  04/29/2020    Low dose CT lung screening  08/06/2021    Potassium monitoring  02/16/2022    Creatinine monitoring  02/16/2022    Lipid screen  05/11/2025    DTaP/Tdap/Td vaccine (3 - Td) 06/15/2025    Colon cancer screen colonoscopy  03/21/2028    Flu vaccine  Completed    Pneumococcal 65+ years Vaccine  Completed    Hepatitis A vaccine  Aged Out    Hepatitis B vaccine  Aged Out    Hib vaccine  Aged Out    Meningococcal (ACWY) vaccine  Aged Out     Recommendations for "[employer] Due: see orders and patient instructions/AVS.  . Recommended screening schedule for the next 5-10 years is provided to the patient in written form: see Patient Kasey Subramanian was seen today for medicare aw. Diagnoses and all orders for this visit:    Routine general medical examination at a health care facility    Acute left-sided low back pain without sciatica  -     Cancel: Culture, Urine; Future    Abnormal urinalysis  -     Cancel: Culture, Urine;  Future

## 2021-02-22 NOTE — PATIENT INSTRUCTIONS
Personalized Preventive Plan for [facility] - 2/22/2021  Medicare offers a range of preventive health benefits. Some of the tests and screenings are paid in full while other may be subject to a deductible, co-insurance, and/or copay. Some of these benefits include a comprehensive review of your medical history including lifestyle, illnesses that may run in your family, and various assessments and screenings as appropriate. After reviewing your medical record and screening and assessments performed today your provider may have ordered immunizations, labs, imaging, and/or referrals for you. A list of these orders (if applicable) as well as your Preventive Care list are included within your After Visit Summary for your review. Other Preventive Recommendations:    · A preventive eye exam performed by an eye specialist is recommended every 1-2 years to screen for glaucoma; cataracts, macular degeneration, and other eye disorders. · A preventive dental visit is recommended every 6 months. · Try to get at least 150 minutes of exercise per week or 10,000 steps per day on a pedometer . · Order or download the FREE \"Exercise & Physical Activity: Your Everyday Guide\" from [employer]. Call 1-119.629.5589 or search [employer] online. · You need 0301-4869 mg of calcium and 1982-4077 IU of vitamin D per day. It is possible to meet your calcium requirement with diet alone, but a vitamin D supplement is usually necessary to meet this goal.  · When exposed to the sun, use a sunscreen that protects against both UVA and UVB radiation with an SPF of 30 or greater. Reapply every 2 to 3 hours or after sweating, drying off with a towel, or swimming. · Always wear a seat belt when traveling in a car. Always wear a helmet when riding a bicycle or motorcycle.

## 2021-02-23 DIAGNOSIS — R82.90 ABNORMAL URINALYSIS: ICD-10-CM

## 2021-02-23 DIAGNOSIS — M54.50 ACUTE LEFT-SIDED LOW BACK PAIN WITHOUT SCIATICA: ICD-10-CM

## 2021-02-25 LAB — URINE CULTURE, ROUTINE: NORMAL

## 2021-02-26 ENCOUNTER — TELEPHONE (OUTPATIENT)
Dept: FAMILY MEDICINE CLINIC | Age: 76
End: 2021-02-26

## 2021-02-26 RX ORDER — SULFAMETHOXAZOLE AND TRIMETHOPRIM 800; 160 MG/1; MG/1
1 TABLET ORAL 2 TIMES DAILY
Qty: 6 TABLET | Refills: 0 | Status: SHIPPED | OUTPATIENT
Start: 2021-02-26 | End: 2021-03-01

## 2021-02-26 NOTE — TELEPHONE ENCOUNTER
Continues to have pain after finishing omnicef. Urinalysis positive for nitrites but culture negative. However culture may not be accurate due to previous abx. Will give three days of bactrim and patient to call on Monday if not improved. Patient agreeable with plan.

## 2021-02-26 NOTE — TELEPHONE ENCOUNTER
Pt got urine result on my chart and is requesting RX      Arsen Nix       Last seen 2/22/2021  Next appt Visit date not found

## 2021-03-02 NOTE — TELEPHONE ENCOUNTER
Pt called back saying she's feeling better--still sore left front lower abdomen and back but overall says antibiotic worked.

## 2021-03-07 DIAGNOSIS — F33.1 MODERATE EPISODE OF RECURRENT MAJOR DEPRESSIVE DISORDER (HCC): ICD-10-CM

## 2021-03-09 RX ORDER — DULOXETIN HYDROCHLORIDE 30 MG/1
CAPSULE, DELAYED RELEASE ORAL
Qty: 30 CAPSULE | Refills: 1 | Status: SHIPPED
Start: 2021-03-09 | End: 2021-05-10

## 2021-03-15 ENCOUNTER — TELEPHONE (OUTPATIENT)
Dept: FAMILY MEDICINE CLINIC | Age: 76
End: 2021-03-15

## 2021-03-15 RX ORDER — BUPROPION HYDROCHLORIDE 150 MG/1
150 TABLET, EXTENDED RELEASE ORAL 2 TIMES DAILY
Qty: 60 TABLET | Refills: 1 | Status: SHIPPED
Start: 2021-03-15 | End: 2021-04-26 | Stop reason: SDUPTHER

## 2021-03-18 ENCOUNTER — APPOINTMENT (OUTPATIENT)
Dept: CT IMAGING | Age: 76
End: 2021-03-18
Payer: MEDICARE

## 2021-03-18 ENCOUNTER — HOSPITAL ENCOUNTER (EMERGENCY)
Age: 76
Discharge: HOME | End: 2021-03-18
Attending: EMERGENCY MEDICINE
Payer: MEDICARE

## 2021-03-18 VITALS
OXYGEN SATURATION: 91 % | BODY MASS INDEX: 38.47 KG/M2 | WEIGHT: 197 LBS | DIASTOLIC BLOOD PRESSURE: 89 MMHG | HEART RATE: 78 BPM | RESPIRATION RATE: 16 BRPM | TEMPERATURE: 98.4 F | SYSTOLIC BLOOD PRESSURE: 153 MMHG

## 2021-03-18 DIAGNOSIS — M54.50 ACUTE LEFT-SIDED LOW BACK PAIN WITHOUT SCIATICA: Primary | ICD-10-CM

## 2021-03-18 LAB
ANION GAP SERPL CALCULATED.3IONS-SCNC: 11 MMOL/L (ref 7–16)
BASOPHILS ABSOLUTE: 0.03 E9/L (ref 0–0.2)
BASOPHILS RELATIVE PERCENT: 0.5 % (ref 0–2)
BILIRUBIN URINE: NEGATIVE
BLOOD, URINE: NEGATIVE
BUN BLDV-MCNC: 17 MG/DL (ref 8–23)
CALCIUM SERPL-MCNC: 9.9 MG/DL (ref 8.6–10.2)
CHLORIDE BLD-SCNC: 92 MMOL/L (ref 98–107)
CLARITY: CLEAR
CO2: 35 MMOL/L (ref 22–29)
COLOR: YELLOW
CREAT SERPL-MCNC: 0.7 MG/DL (ref 0.5–1)
EOSINOPHILS ABSOLUTE: 0.07 E9/L (ref 0.05–0.5)
EOSINOPHILS RELATIVE PERCENT: 1.1 % (ref 0–6)
GFR AFRICAN AMERICAN: >60
GFR NON-AFRICAN AMERICAN: >60 ML/MIN/1.73
GLUCOSE BLD-MCNC: 106 MG/DL (ref 74–99)
GLUCOSE URINE: NEGATIVE MG/DL
HCT VFR BLD CALC: 51.1 % (ref 34–48)
HEMOGLOBIN: 17.5 G/DL (ref 11.5–15.5)
IMMATURE GRANULOCYTES #: 0.04 E9/L
IMMATURE GRANULOCYTES %: 0.6 % (ref 0–5)
KETONES, URINE: 15 MG/DL
LEUKOCYTE ESTERASE, URINE: NEGATIVE
LYMPHOCYTES ABSOLUTE: 0.91 E9/L (ref 1.5–4)
LYMPHOCYTES RELATIVE PERCENT: 14.4 % (ref 20–42)
MCH RBC QN AUTO: 32.6 PG (ref 26–35)
MCHC RBC AUTO-ENTMCNC: 34.2 % (ref 32–34.5)
MCV RBC AUTO: 95.3 FL (ref 80–99.9)
MONOCYTES ABSOLUTE: 0.5 E9/L (ref 0.1–0.95)
MONOCYTES RELATIVE PERCENT: 7.9 % (ref 2–12)
NEUTROPHILS ABSOLUTE: 4.75 E9/L (ref 1.8–7.3)
NEUTROPHILS RELATIVE PERCENT: 75.5 % (ref 43–80)
NITRITE, URINE: NEGATIVE
PDW BLD-RTO: 12.9 FL (ref 11.5–15)
PH UA: 7 (ref 5–9)
PLATELET # BLD: 220 E9/L (ref 130–450)
PMV BLD AUTO: 8.5 FL (ref 7–12)
POTASSIUM REFLEX MAGNESIUM: 4.6 MMOL/L (ref 3.5–5)
PROTEIN UA: NEGATIVE MG/DL
RBC # BLD: 5.36 E12/L (ref 3.5–5.5)
SODIUM BLD-SCNC: 138 MMOL/L (ref 132–146)
SPECIFIC GRAVITY UA: 1.01 (ref 1–1.03)
UROBILINOGEN, URINE: 0.2 E.U./DL
WBC # BLD: 6.3 E9/L (ref 4.5–11.5)

## 2021-03-18 PROCEDURE — 6370000000 HC RX 637 (ALT 250 FOR IP): Performed by: EMERGENCY MEDICINE

## 2021-03-18 PROCEDURE — 6360000002 HC RX W HCPCS: Performed by: EMERGENCY MEDICINE

## 2021-03-18 PROCEDURE — 96374 THER/PROPH/DIAG INJ IV PUSH: CPT

## 2021-03-18 PROCEDURE — 74176 CT ABD & PELVIS W/O CONTRAST: CPT

## 2021-03-18 PROCEDURE — 80048 BASIC METABOLIC PNL TOTAL CA: CPT

## 2021-03-18 PROCEDURE — 81003 URINALYSIS AUTO W/O SCOPE: CPT

## 2021-03-18 PROCEDURE — 99285 EMERGENCY DEPT VISIT HI MDM: CPT

## 2021-03-18 PROCEDURE — 85025 COMPLETE CBC W/AUTO DIFF WBC: CPT

## 2021-03-18 RX ORDER — LIDOCAINE 50 MG/G
1 PATCH TOPICAL DAILY
Qty: 10 PATCH | Refills: 0 | Status: SHIPPED | OUTPATIENT
Start: 2021-03-18 | End: 2021-03-28

## 2021-03-18 RX ORDER — KETOROLAC TROMETHAMINE 30 MG/ML
15 INJECTION, SOLUTION INTRAMUSCULAR; INTRAVENOUS ONCE
Status: COMPLETED | OUTPATIENT
Start: 2021-03-18 | End: 2021-03-18

## 2021-03-18 RX ORDER — ACETAMINOPHEN 500 MG
1000 TABLET ORAL ONCE
Status: COMPLETED | OUTPATIENT
Start: 2021-03-18 | End: 2021-03-18

## 2021-03-18 RX ORDER — OXYCODONE HYDROCHLORIDE 5 MG/1
5 TABLET ORAL ONCE
Status: COMPLETED | OUTPATIENT
Start: 2021-03-18 | End: 2021-03-18

## 2021-03-18 RX ORDER — LIDOCAINE 4 G/G
1 PATCH TOPICAL DAILY
Status: DISCONTINUED | OUTPATIENT
Start: 2021-03-18 | End: 2021-03-19 | Stop reason: HOSPADM

## 2021-03-18 RX ADMIN — ACETAMINOPHEN 1000 MG: 500 TABLET ORAL at 22:20

## 2021-03-18 RX ADMIN — OXYCODONE 5 MG: 5 TABLET ORAL at 23:07

## 2021-03-18 RX ADMIN — KETOROLAC TROMETHAMINE 15 MG: 30 INJECTION, SOLUTION INTRAMUSCULAR; INTRAVENOUS at 21:08

## 2021-03-19 NOTE — ED PROVIDER NOTES
Chief complaint: Back pain      HPI:  3/18/21, Time: 11:59 PM EDT    HPI               Muna Cr is a 77 y.o. female presenting to the ED for back  pain. The history is obtained from the patient as well as the patient's medical record. The patient states that she does have chronic back pain. She has had multiple back surgeries. The patient states her pain is located in the low left lumbar spine. When she points she does point to over the left SI joint. Pain is described as throbbing. Worse with movement. No alleviating factors. She did take Percocet prior to arrival with no relief. The pain is been constant since onset. Moderate in severity. The patient denies any numbness, weakness or paresthesias of the extremities. She denies any bowel or bladder incontinence or saddle anesthesias. No fevers. No history of IV drug abuse. She does have prior epidural injections. There are no associated fevers, chills, lightheadedness, nasal congestion, chest pain, shortness of breath, cough, nausea, vomiting, abdominal pain, diarrhea, constipation, dysuria or hematuria. The patient has no other stated complaints at this time. ROS:   Review of Systems   Constitutional: Negative for chills and fatigue. HENT: Negative for congestion. Eyes: Negative for redness. Respiratory: Negative for shortness of breath. Cardiovascular: Negative for chest pain. Gastrointestinal: Negative for abdominal pain, nausea and vomiting. Genitourinary: Negative for dysuria. Musculoskeletal: Positive for back pain. Negative for arthralgias. Skin: Negative for rash. Neurological: Negative for light-headedness. Psychiatric/Behavioral: Negative for confusion.    All other systems reviewed and are negative.      --------------------------------------------- PAST HISTORY ---------------------------------------------  Past Medical History:  has a past medical history of Adverse effect of anesthetic, Arthritis, Bronchitis, Cancer ([facility] [address] Diverticula of colon, Hyperlipidemia, Hypertension, Pneumonia, Restless leg syndrome, Sleep apnea, and Thyroid disease. Past Surgical History:  has a past surgical history that includes Appendectomy; Hand surgery; Hysterectomy; Knee arthroscopy (Bilateral); Lap Band (2007); Ovary removal (1975); other surgical history; Cholecystectomy (2009); Colonoscopy (2011); Total hip arthroplasty [facility] 2012); Revision total hip arthroplasty (3/26/2012); Eye surgery (Bilateral); other surgical history (Right, 4/15/2015); Nerve Block (Left, 8/19/15); Nerve Block (8 19 15); Nerve Block (N/A, 9/9/15); Nerve Block (N/A, 10 26 2015); Nerve Block (Bilateral, 11 2 15); Nerve Block (Bilateral, 11/09/15); other surgical history (Right, 12 30 2015); other surgical history (Left, 2 8 16); Nerve Block (Bilateral, 04 13 2016); Nerve Block (Bilateral, 08/31/2016); Nerve Block (Bilateral, 10/03/2016); Breast surgery (Left, 03/2016); other surgical history (Left, 11/21/2016); other surgical history (Right, 01/09/2017); other surgical history (Right, 02/08/2017); other surgical history (Left, 03/27/2017); other surgical history (Left, 05/24/2017); Cosmetic surgery (Bilateral); joint replacement (03/04/07); other surgical history (Right, 01/22/2020); and Nerve Surgery (Right, 1/22/2020). Social History:  reports that she has been smoking cigarettes. She has a 31.00 pack-year smoking history. She has never used smokeless tobacco. She reports previous alcohol use of about 1.0 standard drinks of alcohol per week. She reports that she does not use drugs. Family History: family history includes Diabetes in her father; Heart Failure in her mother; Other in her brother; Parkinsonism in her father. The patients home medications have been reviewed.     Allergies: Demerol [meperidine hcl], Meperidine, and Nabumetone    ---------------------------------------------------PHYSICAL Neutrophils Absolute 4.75 1.80 - 7.30 E9/L    Immature Granulocytes # 0.04 E9/L    Lymphocytes Absolute 0.91 (L) 1.50 - 4.00 E9/L    Monocytes Absolute 0.50 0.10 - 0.95 E9/L    Eosinophils Absolute 0.07 0.05 - 0.50 E9/L    Basophils Absolute 0.03 0.00 - 0.20 Q3/X   Basic Metabolic Panel w/ Reflex to MG   Result Value Ref Range    Sodium 138 132 - 146 mmol/L    Potassium reflex Magnesium 4.6 3.5 - 5.0 mmol/L    Chloride 92 (L) 98 - 107 mmol/L    CO2 35 (H) 22 - 29 mmol/L    Anion Gap 11 7 - 16 mmol/L    Glucose 106 (H) 74 - 99 mg/dL    BUN 17 8 - 23 mg/dL    CREATININE 0.7 0.5 - 1.0 mg/dL    GFR Non-African American >60 >=60 mL/min/1.73    GFR African American >60     Calcium 9.9 8.6 - 10.2 mg/dL   Urinalysis   Result Value Ref Range    Color, UA Yellow Straw/Yellow    Clarity, UA Clear Clear    Glucose, Ur Negative Negative mg/dL    Bilirubin Urine Negative Negative    Ketones, Urine 15 (A) Negative mg/dL    Specific Gravity, UA 1.015 1.005 - 1.030    Blood, Urine Negative Negative    pH, UA 7.0 5.0 - 9.0    Protein, UA Negative Negative mg/dL    Urobilinogen, Urine 0.2 <2.0 E.U./dL    Nitrite, Urine Negative Negative    Leukocyte Esterase, Urine Negative Negative       RADIOLOGY:  Interpreted by Radiologist.  CT ABDOMEN PELVIS WO CONTRAST Additional Contrast? None   Final Result   No evidence of acute intra-abdominal or pelvic pathology or significant   interval change since the prior examination. The               ------------------------- NURSING NOTES AND VITALS REVIEWED ---------------------------   The nursing notes within the ED encounter and vital signs as below have been reviewed by myself. BP (!) 153/89   Pulse 78   Temp 98.4 °F (36.9 °C) (Oral)   Resp 16   Wt 197 lb (89.4 kg)   SpO2 91%   BMI 38.47 kg/m²   Oxygen Saturation Interpretation: Normal    The patients available past medical records and past encounters were reviewed.         ------------------------------ ED COURSE/MEDICAL DECISION MAKING----------------------  Medications   lidocaine 4 % external patch 1 patch (1 patch Transdermal Patch Applied 3/18/21 2242)   ketorolac (TORADOL) injection 15 mg (15 mg Intravenous Given 3/18/21 2108)   acetaminophen (TYLENOL) tablet 1,000 mg (1,000 mg Oral Given 3/18/21 2220)   oxyCODONE (ROXICODONE) immediate release tablet 5 mg (5 mg Oral Given 3/18/21 2307)             Medical Decision Making:   I, Dr. Anca Chapa am the primary physician of record. Melody Love is a 77 y.o. female who presents to the ED for back pain. The patient does present with tenderness over the left SI joint wall as the left flank differential diagnosis includes but is not limited to lumbar strain, exacerbation of chronic low back pain, urolithiasis, pyelonephritis the patient does have a past medical history of   has a past medical history of Adverse effect of anesthetic, Arthritis, Bronchitis, Cancer ([facility] [address] Diverticula of colon, Hyperlipidemia, Hypertension, Pneumonia, Restless leg syndrome, Sleep apnea, and Thyroid disease. . The patient was given Toradol, Tylenol, oxycodone. Labs and imaging obtained, reviewed. Patient treated symptomatically. Patient had a CBC, BMP, urinalysis which were unremarkable, CT abdomen pelvis with no acute process. The patient does have no midline lumbar tenderness to palpation. The patient did have improvement in her pain. No clinical concern for cauda equina syndrome. Patient be discharged home. Re-Evaluations/Consultations:             ED Course as of Mar 18 2359   Thu Mar 18, 2021   2138 The patient is in the bed in no acute distress. The patient states that her pain has been improving. [MT]      ED Course User Index  [MT] Cece Orlando DO               This patient's ED course included: History, physical examination, reevaluation prior to disposition, labs, imaging    This patient has remained hemodynamically stable during their ED course. Counseling:    The emergency provider has spoken with the patient and discussed todays results, in addition to providing specific details for the plan of care and counseling regarding the diagnosis and prognosis. Questions are answered at this time and they are agreeable with the plan.       --------------------------------- IMPRESSION AND DISPOSITION ---------------------------------    IMPRESSION  1. Acute left-sided low back pain without sciatica        DISPOSITION  Disposition: Discharge to home  Patient condition is stable        NOTE: This report was transcribed using voice recognition software.  Every effort was made to ensure accuracy; however, inadvertent computerized transcription errors may be present         Lito Trammell DO  03/20/21 0601

## 2021-03-20 ASSESSMENT — ENCOUNTER SYMPTOMS
ABDOMINAL PAIN: 0
NAUSEA: 0
BACK PAIN: 1
EYE REDNESS: 0
VOMITING: 0
SHORTNESS OF BREATH: 0

## 2021-04-13 RX ORDER — CELECOXIB 200 MG/1
CAPSULE ORAL
Qty: 60 CAPSULE | Refills: 3 | Status: SHIPPED
Start: 2021-04-13 | End: 2021-09-21

## 2021-04-26 ENCOUNTER — OFFICE VISIT (OUTPATIENT)
Dept: FAMILY MEDICINE CLINIC | Age: 76
End: 2021-04-26
Payer: MEDICARE

## 2021-04-26 VITALS
DIASTOLIC BLOOD PRESSURE: 80 MMHG | HEART RATE: 61 BPM | WEIGHT: 193 LBS | OXYGEN SATURATION: 91 % | BODY MASS INDEX: 37.89 KG/M2 | TEMPERATURE: 96.7 F | HEIGHT: 60 IN | SYSTOLIC BLOOD PRESSURE: 138 MMHG | RESPIRATION RATE: 20 BRPM

## 2021-04-26 DIAGNOSIS — Z01.810 PREOP CARDIOVASCULAR EXAM: Primary | ICD-10-CM

## 2021-04-26 PROCEDURE — G8427 DOCREV CUR MEDS BY ELIG CLIN: HCPCS | Performed by: FAMILY MEDICINE

## 2021-04-26 PROCEDURE — 93000 ELECTROCARDIOGRAM COMPLETE: CPT | Performed by: FAMILY MEDICINE

## 2021-04-26 PROCEDURE — G8400 PT W/DXA NO RESULTS DOC: HCPCS | Performed by: FAMILY MEDICINE

## 2021-04-26 PROCEDURE — 3017F COLORECTAL CA SCREEN DOC REV: CPT | Performed by: FAMILY MEDICINE

## 2021-04-26 PROCEDURE — G8417 CALC BMI ABV UP PARAM F/U: HCPCS | Performed by: FAMILY MEDICINE

## 2021-04-26 PROCEDURE — 4040F PNEUMOC VAC/ADMIN/RCVD: CPT | Performed by: FAMILY MEDICINE

## 2021-04-26 PROCEDURE — 1090F PRES/ABSN URINE INCON ASSESS: CPT | Performed by: FAMILY MEDICINE

## 2021-04-26 PROCEDURE — 1123F ACP DISCUSS/DSCN MKR DOCD: CPT | Performed by: FAMILY MEDICINE

## 2021-04-26 PROCEDURE — 4004F PT TOBACCO SCREEN RCVD TLK: CPT | Performed by: FAMILY MEDICINE

## 2021-04-26 PROCEDURE — 99213 OFFICE O/P EST LOW 20 MIN: CPT | Performed by: FAMILY MEDICINE

## 2021-04-26 RX ORDER — PENICILLIN V POTASSIUM 500 MG/1
TABLET ORAL
COMMUNITY
Start: 2021-03-09 | End: 2021-08-04 | Stop reason: CLARIF

## 2021-04-26 RX ORDER — OXYCODONE AND ACETAMINOPHEN 7.5; 325 MG/1; MG/1
TABLET ORAL
Status: ON HOLD | COMMUNITY
Start: 2021-03-06 | End: 2021-07-07 | Stop reason: HOSPADM

## 2021-04-26 RX ORDER — BUPROPION HYDROCHLORIDE 150 MG/1
150 TABLET, EXTENDED RELEASE ORAL 2 TIMES DAILY
Qty: 60 TABLET | Refills: 1 | Status: SHIPPED
Start: 2021-04-26 | End: 2021-06-25 | Stop reason: ALTCHOICE

## 2021-04-26 RX ORDER — METHOCARBAMOL 750 MG/1
TABLET, FILM COATED ORAL
COMMUNITY
Start: 2021-03-30

## 2021-04-26 ASSESSMENT — ENCOUNTER SYMPTOMS
BACK PAIN: 1
CONSTIPATION: 0
NAUSEA: 0
WHEEZING: 0
COUGH: 0
SHORTNESS OF BREATH: 1
DIARRHEA: 0
VOMITING: 0
ABDOMINAL PAIN: 0

## 2021-04-26 NOTE — PROGRESS NOTES
[facility] [place] Prudencio  [phone]   Prerna Sheffield MD     Patient: Xiao Villavicencio  YOB: 1945  Visit Date: 4/26/21    Ba Anderson is a 77y.o. year old female here today for   Chief Complaint   Patient presents with    Pre-op Exam     needs EKG surgical clearance       HPI  Spinal cord stimulator. Back pain. No other concerns at this time. Knows she needs to follow up for pulmonary nodule. Will be delaying the appointment for her CT scan due to surgery. Has had anesthesia before . Took her a while to come out of anesthesia   Can get around the house and do things she needs to do but has to do things one at a time and gets winded very easily. Walk from the care made her very tired. Attributes this to back pain.     ekg   Has been having severe back pain. sometimes cannot even walk. Review of Systems   Constitutional: Negative for chills and fever. Respiratory: Positive for shortness of breath (at baseline). Negative for cough and wheezing. Cardiovascular: Negative for chest pain, palpitations and leg swelling. Gastrointestinal: Negative for abdominal pain, constipation, diarrhea, nausea and vomiting. Musculoskeletal: Positive for back pain and gait problem.        Current Outpatient Medications on File Prior to Visit   Medication Sig Dispense Refill    oxyCODONE-acetaminophen (PERCOCET) 7.5-325 MG per tablet take 1 tablet by mouth every 6 to 8 hours if needed for pain      methocarbamol (ROBAXIN) 750 MG tablet take 1 tablet by mouth every 12 hours if needed      mupirocin (BACTROBAN) 2 % ointment apply into each nostril twice a day as directed (START 2 DAYS PRIOR TO SURGERY AND MORNING OF.)      penicillin v potassium (VEETID) 500 MG tablet take 1 tablet by mouth every 6 hours until finished      celecoxib (CELEBREX) 200 MG capsule take 1 capsule by mouth twice a day 60 capsule 3    diclofenac sodium (VOLTAREN) 1 % GEL apply 4 grams to KNEE four times a day AS NEEDED FOR PAIN      gabapentin (NEURONTIN) 300 MG capsule Take 300 mg by mouth 3 times daily.  SPIRIVA RESPIMAT 2.5 MCG/ACT AERS inhaler inhale 2 puffs by mouth once daily 4 g 3    PROAIR  (90 Base) MCG/ACT inhaler inhale 2 puffs by mouth every 4 to 6 hours if needed for wheezing or shortness of breath 8.5 g 2    nebivolol (BYSTOLIC) 5 MG tablet TAKE 1 TABLET BY MOUTH ONCE DAILY 90 tablet 3    furosemide (LASIX) 20 MG tablet Take 20 mg by mouth 2 times daily      amLODIPine (NORVASC) 5 MG tablet TAKE 1 TABLET BY MOUTH  DAILY 90 tablet 3    losartan-hydroCHLOROthiazide (HYZAAR) 100-25 MG per tablet TAKE 1 TABLET BY MOUTH  DAILY 90 tablet 3    BIOTIN PO Take 3,000 mg by mouth daily      rOPINIRole (REQUIP) 1 MG tablet Take 1 tablet by mouth nightly 90 tablet 3    Handicap Placard MISC by Does not apply route Unable to ambulate more than 40 feet without difficulty  Expires 11/25/2024 1 each 0    lidocaine (XYLOCAINE) 5 % ointment Apply topically to hands and low back as needed. 1 Tube 3     No current facility-administered medications on file prior to visit. Allergies   Allergen Reactions    Demerol [Meperidine Hcl] Other (See Comments)     Hallucinations, anxiety        Meperidine Other (See Comments)    Nabumetone Hives and Rash       Past medical, surgical, socialand/or family history reviewed, updated as needed, and are non-contributory (unless otherwise stated). Medications, allergies, and problem list also reviewed and updated as needed in patient's record. Wt Readings from Last 3 Encounters:   04/26/21 193 lb (87.5 kg)   03/18/21 197 lb (89.4 kg)   02/22/21 199 lb (90.3 kg)                   /80   Pulse 61   Temp 96.7 °F (35.9 °C)   Resp 20   Ht 5' (1.524 m)   Wt 193 lb (87.5 kg)   SpO2 91%   BMI 37.69 kg/m²        Physical Exam  Vitals signs and nursing note reviewed.    Constitutional:       General: She is not in acute distress. Appearance: She is well-developed. She is not diaphoretic. Cardiovascular:      Rate and Rhythm: Normal rate and regular rhythm. Heart sounds: Murmur present. No friction rub. Pulmonary:      Effort: Pulmonary effort is normal. No respiratory distress. Breath sounds: No wheezing or rales. Abdominal:      General: Bowel sounds are normal. There is no distension. Palpations: Abdomen is soft. There is no mass. Tenderness: There is no abdominal tenderness. There is no guarding. Musculoskeletal: Normal range of motion. Right lower leg: Edema (trace) present. Left lower leg: Edema (trace) present.        Results for orders placed or performed during the hospital encounter of 03/18/21   CBC Auto Differential   Result Value Ref Range    WBC 6.3 4.5 - 11.5 E9/L    RBC 5.36 3.50 - 5.50 E12/L    Hemoglobin 17.5 (H) 11.5 - 15.5 g/dL    Hematocrit 51.1 (H) 34.0 - 48.0 %    MCV 95.3 80.0 - 99.9 fL    MCH 32.6 26.0 - 35.0 pg    MCHC 34.2 32.0 - 34.5 %    RDW 12.9 11.5 - 15.0 fL    Platelets 720 124 - 604 E9/L    MPV 8.5 7.0 - 12.0 fL    Neutrophils % 75.5 43.0 - 80.0 %    Immature Granulocytes % 0.6 0.0 - 5.0 %    Lymphocytes % 14.4 (L) 20.0 - 42.0 %    Monocytes % 7.9 2.0 - 12.0 %    Eosinophils % 1.1 0.0 - 6.0 %    Basophils % 0.5 0.0 - 2.0 %    Neutrophils Absolute 4.75 1.80 - 7.30 E9/L    Immature Granulocytes # 0.04 E9/L    Lymphocytes Absolute 0.91 (L) 1.50 - 4.00 E9/L    Monocytes Absolute 0.50 0.10 - 0.95 E9/L    Eosinophils Absolute 0.07 0.05 - 0.50 E9/L    Basophils Absolute 0.03 0.00 - 0.20 U4/S   Basic Metabolic Panel w/ Reflex to MG   Result Value Ref Range    Sodium 138 132 - 146 mmol/L    Potassium reflex Magnesium 4.6 3.5 - 5.0 mmol/L    Chloride 92 (L) 98 - 107 mmol/L    CO2 35 (H) 22 - 29 mmol/L    Anion Gap 11 7 - 16 mmol/L    Glucose 106 (H) 74 - 99 mg/dL    BUN 17 8 - 23 mg/dL    CREATININE 0.7 0.5 - 1.0 mg/dL    GFR Non-African American >60 >=60 mL/min/1.73 GFR African American >60     Calcium 9.9 8.6 - 10.2 mg/dL   Urinalysis   Result Value Ref Range    Color, UA Yellow Straw/Yellow    Clarity, UA Clear Clear    Glucose, Ur Negative Negative mg/dL    Bilirubin Urine Negative Negative    Ketones, Urine 15 (A) Negative mg/dL    Specific Gravity, UA 1.015 1.005 - 1.030    Blood, Urine Negative Negative    pH, UA 7.0 5.0 - 9.0    Protein, UA Negative Negative mg/dL    Urobilinogen, Urine 0.2 <2.0 E.U./dL    Nitrite, Urine Negative Negative    Leukocyte Esterase, Urine Negative Negative       ASSESSMENT/PLAN  Elsa Duran was seen today for pre-op exam.    Diagnoses and all orders for this visit:    Preop cardiovascular exam  -     EKG 12 Lead    Other orders  -     buPROPion (ZYBAN) 150 MG extended release tablet; Take 1 tablet by mouth 2 times daily          Pre-Operative Risk assessment using 2014 ACC/AHA guidelines     Emergent procedure No  Active Cardiac Condition No (decompensated HF, Arrhythmia, MI <3 weeks, severe valve disease)  Risk Level of Procedure Intermediate Risk (intraperitoneal, intrathoracic, HENT, orthopedic, or carotid endarterectomy, etc.)  Revised Cardiac Risk Index Risk factors: None  Measurement of Exercise Tolerance before Surgery >4 No    According to the 2014 ACC/AHA pre-operative risk assessment guidelines Caitlin Harkins is a low risk for major cardiac complications during a intermediate risk procedure and may continue as planned. Specific medication recommendations are listed below. Medications recommended to continue should be taken with a sip of water even when NPO. Further recommendations from consultants: Pulmonology    Patient to see pulm prior to surgery      Phone/MyChart follow up if tests abnormal.    Return in about 1 month (around 5/26/2021). or sooner if necessary. I have reviewed myfindings and recommendations with Bridget Clemons.  Ac Davis M.D

## 2021-04-26 NOTE — PATIENT INSTRUCTIONS
See the lung doctor   You will need to see lung doctor before surgery   Follow up in 1 month or sooner as needed.

## 2021-04-27 ENCOUNTER — TELEPHONE (OUTPATIENT)
Dept: FAMILY MEDICINE CLINIC | Age: 76
End: 2021-04-27

## 2021-04-27 DIAGNOSIS — J44.1 COPD EXACERBATION (HCC): ICD-10-CM

## 2021-04-27 DIAGNOSIS — F33.1 MODERATE EPISODE OF RECURRENT MAJOR DEPRESSIVE DISORDER (HCC): ICD-10-CM

## 2021-04-27 DIAGNOSIS — R06.09 DYSPNEA ON EXERTION: ICD-10-CM

## 2021-04-27 DIAGNOSIS — J43.9 PULMONARY EMPHYSEMA, UNSPECIFIED EMPHYSEMA TYPE (HCC): Primary | ICD-10-CM

## 2021-04-27 DIAGNOSIS — G47.33 OSA (OBSTRUCTIVE SLEEP APNEA): ICD-10-CM

## 2021-04-27 NOTE — TELEPHONE ENCOUNTER
Patient needs new referral for Dr. Marlen Trammell, she no showed first appt and never rescheduled.  Referral put in for patient to get cleared for pain procedure

## 2021-04-29 RX ORDER — DULOXETIN HYDROCHLORIDE 60 MG/1
60 CAPSULE, DELAYED RELEASE ORAL NIGHTLY
Qty: 90 CAPSULE | Refills: 1 | Status: SHIPPED
Start: 2021-04-29 | End: 2021-10-05

## 2021-04-30 ENCOUNTER — TELEPHONE (OUTPATIENT)
Dept: FAMILY MEDICINE CLINIC | Age: 76
End: 2021-04-30

## 2021-04-30 NOTE — TELEPHONE ENCOUNTER
Can you please call and help patient schedule visit asap with Dr. Dwight Barnes or Mena De Luna depending on patient's preference for preoperative eval and concern for worsening hypoxia .

## 2021-05-03 NOTE — TELEPHONE ENCOUNTER
Called Dr Radha Pereira office and was advised records will be reviewed and appt will be scheduled accordingly. They'll call pt with date. Pt informed and verbalized understanding.

## 2021-05-08 DIAGNOSIS — F33.1 MODERATE EPISODE OF RECURRENT MAJOR DEPRESSIVE DISORDER (HCC): ICD-10-CM

## 2021-05-10 RX ORDER — DULOXETIN HYDROCHLORIDE 30 MG/1
CAPSULE, DELAYED RELEASE ORAL
Qty: 30 CAPSULE | Refills: 1 | Status: SHIPPED
Start: 2021-05-10 | End: 2021-06-17

## 2021-06-01 ENCOUNTER — HOSPITAL ENCOUNTER (OUTPATIENT)
Age: 76
Discharge: HOME | End: 2021-06-01
Payer: MEDICARE

## 2021-06-01 ENCOUNTER — HOSPITAL ENCOUNTER (OUTPATIENT)
Dept: CT IMAGING | Age: 76
Discharge: HOME | End: 2021-06-01
Payer: MEDICARE

## 2021-06-01 DIAGNOSIS — R91.8 OPACITY OF LUNG ON IMAGING STUDY: ICD-10-CM

## 2021-06-01 LAB
BUN BLDV-MCNC: 27 MG/DL (ref 6–23)
CREAT SERPL-MCNC: 1 MG/DL (ref 0.5–1)
GFR AFRICAN AMERICAN: >60
GFR NON-AFRICAN AMERICAN: 54 ML/MIN/1.73

## 2021-06-01 PROCEDURE — 82565 ASSAY OF CREATININE: CPT

## 2021-06-01 PROCEDURE — 36415 COLL VENOUS BLD VENIPUNCTURE: CPT

## 2021-06-01 PROCEDURE — 71260 CT THORAX DX C+: CPT

## 2021-06-01 PROCEDURE — 6360000004 HC RX CONTRAST MEDICATION: Performed by: RADIOLOGY

## 2021-06-01 PROCEDURE — 84520 ASSAY OF UREA NITROGEN: CPT

## 2021-06-01 RX ADMIN — IOPAMIDOL 75 ML: 755 INJECTION, SOLUTION INTRAVENOUS at 17:26

## 2021-06-02 ENCOUNTER — OFFICE VISIT (OUTPATIENT)
Dept: FAMILY MEDICINE CLINIC | Age: 76
End: 2021-06-02
Payer: MEDICARE

## 2021-06-02 VITALS
HEART RATE: 71 BPM | HEIGHT: 60 IN | BODY MASS INDEX: 36.91 KG/M2 | OXYGEN SATURATION: 99 % | SYSTOLIC BLOOD PRESSURE: 110 MMHG | RESPIRATION RATE: 20 BRPM | DIASTOLIC BLOOD PRESSURE: 70 MMHG | TEMPERATURE: 96.6 F | WEIGHT: 188 LBS

## 2021-06-02 DIAGNOSIS — E66.01 CLASS 2 SEVERE OBESITY DUE TO EXCESS CALORIES WITH SERIOUS COMORBIDITY AND BODY MASS INDEX (BMI) OF 36.0 TO 36.9 IN ADULT (HCC): ICD-10-CM

## 2021-06-02 DIAGNOSIS — J43.9 PULMONARY EMPHYSEMA, UNSPECIFIED EMPHYSEMA TYPE (HCC): ICD-10-CM

## 2021-06-02 DIAGNOSIS — C50.112 MALIGNANT NEOPLASM OF CENTRAL PORTION OF LEFT FEMALE BREAST, UNSPECIFIED ESTROGEN RECEPTOR STATUS (HCC): ICD-10-CM

## 2021-06-02 DIAGNOSIS — Z01.810 PREOP CARDIOVASCULAR EXAM: Primary | ICD-10-CM

## 2021-06-02 DIAGNOSIS — M46.1 SACROILIITIS (HCC): ICD-10-CM

## 2021-06-02 DIAGNOSIS — R63.4 WEIGHT LOSS: ICD-10-CM

## 2021-06-02 DIAGNOSIS — I27.20 PULMONARY HYPERTENSION (HCC): ICD-10-CM

## 2021-06-02 PROCEDURE — 4040F PNEUMOC VAC/ADMIN/RCVD: CPT | Performed by: FAMILY MEDICINE

## 2021-06-02 PROCEDURE — 1123F ACP DISCUSS/DSCN MKR DOCD: CPT | Performed by: FAMILY MEDICINE

## 2021-06-02 PROCEDURE — 1090F PRES/ABSN URINE INCON ASSESS: CPT | Performed by: FAMILY MEDICINE

## 2021-06-02 PROCEDURE — 3023F SPIROM DOC REV: CPT | Performed by: FAMILY MEDICINE

## 2021-06-02 PROCEDURE — G8427 DOCREV CUR MEDS BY ELIG CLIN: HCPCS | Performed by: FAMILY MEDICINE

## 2021-06-02 PROCEDURE — G8417 CALC BMI ABV UP PARAM F/U: HCPCS | Performed by: FAMILY MEDICINE

## 2021-06-02 PROCEDURE — G8400 PT W/DXA NO RESULTS DOC: HCPCS | Performed by: FAMILY MEDICINE

## 2021-06-02 PROCEDURE — G8926 SPIRO NO PERF OR DOC: HCPCS | Performed by: FAMILY MEDICINE

## 2021-06-02 PROCEDURE — 4004F PT TOBACCO SCREEN RCVD TLK: CPT | Performed by: FAMILY MEDICINE

## 2021-06-02 PROCEDURE — 99214 OFFICE O/P EST MOD 30 MIN: CPT | Performed by: FAMILY MEDICINE

## 2021-06-02 PROCEDURE — 3017F COLORECTAL CA SCREEN DOC REV: CPT | Performed by: FAMILY MEDICINE

## 2021-06-02 SDOH — ECONOMIC STABILITY: FOOD INSECURITY: WITHIN THE PAST 12 MONTHS, YOU WORRIED THAT YOUR FOOD WOULD RUN OUT BEFORE YOU GOT MONEY TO BUY MORE.: NEVER TRUE

## 2021-06-02 SDOH — ECONOMIC STABILITY: FOOD INSECURITY: WITHIN THE PAST 12 MONTHS, THE FOOD YOU BOUGHT JUST DIDN'T LAST AND YOU DIDN'T HAVE MONEY TO GET MORE.: NEVER TRUE

## 2021-06-02 ASSESSMENT — ENCOUNTER SYMPTOMS
SHORTNESS OF BREATH: 1
BACK PAIN: 1
COUGH: 0
CONSTIPATION: 0
NAUSEA: 0
VOMITING: 0
ABDOMINAL PAIN: 0
WHEEZING: 0
DIARRHEA: 0

## 2021-06-02 ASSESSMENT — SOCIAL DETERMINANTS OF HEALTH (SDOH): HOW HARD IS IT FOR YOU TO PAY FOR THE VERY BASICS LIKE FOOD, HOUSING, MEDICAL CARE, AND HEATING?: NOT HARD AT ALL

## 2021-06-02 NOTE — PROGRESS NOTES
[address] [place] [address]  [phone]   Beatriz Hurley MD     Patient: Ever Block  YOB: 1945  Visit Date: 6/2/21    Arturo Carranza is a 77y.o. year old female here today for   Chief Complaint   Patient presents with    Pre-op Exam       HPI  Patient is a 77year old female with history of copd, pulmonary htn, sleep apnea, history of breast cancer     No complaint there than chronic back pain. Plan for spinal cord stimulator. Has lot 56 pounds unintentionally. Denies fevers, chills, night sweats. Denies cough or wheezing   Denies chest pain, syncope, edema. Has some depressed mood and anhedonia. denies SI/HI    David had cath   Has appointment with Dr. Rajan Miles tomorrow. [facility]. Gets too full and eats small bites. Last saw gi doctor a few months. Had egd /colonscopy recently. Review of Systems   Constitutional: Negative for chills and fever. Respiratory: Positive for shortness of breath (at baseline). Negative for cough and wheezing. Cardiovascular: Negative for chest pain, palpitations and leg swelling. Gastrointestinal: Negative for abdominal pain, constipation, diarrhea, nausea and vomiting. Musculoskeletal: Positive for back pain and gait problem.        Current Outpatient Medications on File Prior to Visit   Medication Sig Dispense Refill    DULoxetine (CYMBALTA) 30 MG extended release capsule take 1 capsule by mouth once daily 30 capsule 1    DULoxetine (CYMBALTA) 60 MG extended release capsule TAKE 1 CAPSULE BY MOUTH  NIGHTLY 90 capsule 1    oxyCODONE-acetaminophen (PERCOCET) 7.5-325 MG per tablet take 1 tablet by mouth every 6 to 8 hours if needed for pain      methocarbamol (ROBAXIN) 750 MG tablet take 1 tablet by mouth every 12 hours if needed      mupirocin (BACTROBAN) 2 % ointment apply into each nostril twice a day as directed (START 2 DAYS PRIOR TO SURGERY AND MORNING OF.)  penicillin v potassium (VEETID) 500 MG tablet take 1 tablet by mouth every 6 hours until finished      buPROPion (ZYBAN) 150 MG extended release tablet Take 1 tablet by mouth 2 times daily 60 tablet 1    celecoxib (CELEBREX) 200 MG capsule take 1 capsule by mouth twice a day 60 capsule 3    diclofenac sodium (VOLTAREN) 1 % GEL apply 4 grams to KNEE four times a day AS NEEDED FOR PAIN      gabapentin (NEURONTIN) 300 MG capsule Take 300 mg by mouth 3 times daily.  SPIRIVA RESPIMAT 2.5 MCG/ACT AERS inhaler inhale 2 puffs by mouth once daily 4 g 3    PROAIR  (90 Base) MCG/ACT inhaler inhale 2 puffs by mouth every 4 to 6 hours if needed for wheezing or shortness of breath 8.5 g 2    nebivolol (BYSTOLIC) 5 MG tablet TAKE 1 TABLET BY MOUTH ONCE DAILY 90 tablet 3    furosemide (LASIX) 20 MG tablet Take 20 mg by mouth 2 times daily      amLODIPine (NORVASC) 5 MG tablet TAKE 1 TABLET BY MOUTH  DAILY 90 tablet 3    losartan-hydroCHLOROthiazide (HYZAAR) 100-25 MG per tablet TAKE 1 TABLET BY MOUTH  DAILY 90 tablet 3    BIOTIN PO Take 3,000 mg by mouth daily      rOPINIRole (REQUIP) 1 MG tablet Take 1 tablet by mouth nightly 90 tablet 3    Handicap Placard MISC by Does not apply route Unable to ambulate more than 40 feet without difficulty  Expires 11/25/2024 1 each 0    lidocaine (XYLOCAINE) 5 % ointment Apply topically to hands and low back as needed. 1 Tube 3     No current facility-administered medications on file prior to visit. Allergies   Allergen Reactions    Demerol [Meperidine Hcl] Other (See Comments)     Hallucinations, anxiety        Meperidine Other (See Comments)    Nabumetone Hives and Rash       Past medical, surgical, socialand/or family history reviewed, updated as needed, and are non-contributory (unless otherwise stated). Medications, allergies, and problem list also reviewed and updated as needed in patient's record.     Wt Readings from Last 3 Encounters:   06/02/21 188 obesity due to excess calories with serious comorbidity and body mass index (BMI) of 36.0 to 36.9 in adult [facility])   - Has been losing weight and is unsure why . Will monitor . Advised to see oncologist     Malignant neoplasm of central portion of left female breast, unspecified estrogen receptor status ([facility] [address]   - Sees heme/onc  Weight loss  See above           Phone/MyChart follow up if tests abnormal.    Return in about 1 month (around 7/2/2021). or sooner if necessary. I have reviewed myfindings and recommendations with Melissa. Irais Aguilar.  Kayleen Grider MD, M.D

## 2021-06-07 ENCOUNTER — HOSPITAL ENCOUNTER (OUTPATIENT)
Dept: NON INVASIVE DIAGNOSTICS | Age: 76
Discharge: HOME | End: 2021-06-07
Payer: MEDICARE

## 2021-06-07 LAB
LV EF: 73 %
LVEF MODALITY: NORMAL

## 2021-06-07 PROCEDURE — 93306 TTE W/DOPPLER COMPLETE: CPT

## 2021-06-09 ENCOUNTER — TELEPHONE (OUTPATIENT)
Dept: FAMILY MEDICINE CLINIC | Age: 76
End: 2021-06-09

## 2021-06-29 DIAGNOSIS — M79.604 PAIN IN BOTH LOWER EXTREMITIES: ICD-10-CM

## 2021-06-29 DIAGNOSIS — M79.605 PAIN IN BOTH LOWER EXTREMITIES: ICD-10-CM

## 2021-06-29 LAB
ANION GAP SERPL CALCULATED.3IONS-SCNC: 11 MMOL/L (ref 7–16)
APTT: 30.1 SEC (ref 24.5–35.1)
BILIRUBIN URINE: NEGATIVE
BLOOD, URINE: NEGATIVE
BUN BLDV-MCNC: 18 MG/DL (ref 6–23)
CALCIUM SERPL-MCNC: 9.4 MG/DL (ref 8.6–10.2)
CHLORIDE BLD-SCNC: 88 MMOL/L (ref 98–107)
CLARITY: CLEAR
CO2: 35 MMOL/L (ref 22–29)
COLOR: YELLOW
CREAT SERPL-MCNC: 0.9 MG/DL (ref 0.5–1)
GFR AFRICAN AMERICAN: >60
GFR NON-AFRICAN AMERICAN: >60 ML/MIN/1.73
GLUCOSE BLD-MCNC: 87 MG/DL (ref 74–99)
GLUCOSE URINE: NEGATIVE MG/DL
HCT VFR BLD CALC: 48.5 % (ref 34–48)
HEMOGLOBIN: 16 G/DL (ref 11.5–15.5)
INR BLD: 1
KETONES, URINE: NEGATIVE MG/DL
LEUKOCYTE ESTERASE, URINE: NEGATIVE
MCH RBC QN AUTO: 32.9 PG (ref 26–35)
MCHC RBC AUTO-ENTMCNC: 33 % (ref 32–34.5)
MCV RBC AUTO: 99.8 FL (ref 80–99.9)
NITRITE, URINE: NEGATIVE
PDW BLD-RTO: 13.8 FL (ref 11.5–15)
PH UA: 6 (ref 5–9)
PLATELET # BLD: 247 E9/L (ref 130–450)
PMV BLD AUTO: 9.3 FL (ref 7–12)
POTASSIUM SERPL-SCNC: 4.2 MMOL/L (ref 3.5–5)
PROTEIN UA: NEGATIVE MG/DL
PROTHROMBIN TIME: 11 SEC (ref 9.3–12.4)
RBC # BLD: 4.86 E12/L (ref 3.5–5.5)
SODIUM BLD-SCNC: 134 MMOL/L (ref 132–146)
SPECIFIC GRAVITY UA: 1.01 (ref 1–1.03)
UROBILINOGEN, URINE: 0.2 E.U./DL
WBC # BLD: 7 E9/L (ref 4.5–11.5)

## 2021-07-01 LAB — URINE CULTURE, ROUTINE: NORMAL

## 2021-07-06 ENCOUNTER — ANESTHESIA EVENT (OUTPATIENT)
Dept: OPERATING ROOM | Age: 76
End: 2021-07-06
Payer: MEDICARE

## 2021-07-06 ASSESSMENT — LIFESTYLE VARIABLES: SMOKING_STATUS: 1

## 2021-07-06 ASSESSMENT — ENCOUNTER SYMPTOMS: SHORTNESS OF BREATH: 1

## 2021-07-06 NOTE — BRIEF OP NOTE
Brief Postoperative Note      Patient: Ang Kent  YOB: 1945  MRN: [id number]    Date of Procedure: 2021    Pre-Op Diagnosis: POST LAMINECTOMY SYNDROME    Post-Op Diagnosis: Same       Procedure(s):  STAGE 2 [employer]IC LUMBAR SPINAL CORD STIMULATOR IMPLANT (MEDTRONIC)     Surgeon(s):  Abbi Cabrera DO    Assistant:  First Assistant: Bernard Nino RN    Anesthesia: Monitor Anesthesia Care    Estimated Blood Loss (mL): less than 106    Complications: None    Specimens:   None    Implants:  Implant Name Type Inv.  Item Serial No.  Lot No. LRB No. Used Action   KIT LD L60CM 8 ELECTRD PERC COMP CONTAIN LD ANCHR GWIRE NDL  KIT LD L60CM 8 ELECTRD PERC COMP CONTAIN LD [facility] GWIRE NDL  [employer]-WD [id number] Right 1 Implanted   KIT LD L60CM 8 ELECTRD PERC COMP CONTAIN LD ANCHR GWIRE NDL  KIT LD L60CM 8 ELECTRD PERC COMP CONTAIN LD [facility] GWIRE NDL  [employer] NEUROLOGIC [facility]-WD [id number] Right 1 Implanted   POUCH TYRX NEURO ANTIMICROBIAL MED - [id number] Vascular/Graft/Patch/Filter Dalbert Wisam MED  [employer] [facility] INC-PMM [id number] Right 1 Implanted   DEVICE NEUROSTIMULATOR 139CC [id number] 291GM [facility] - [id number]  DEVICE NEUROSTIMULATOR 139CC [id number] [address] INC-  Right 1 Implanted         Drains: None    Findings: see operative dication    Electronically signed by Abbi Cabrera DO on 2021 at 11:26 AM

## 2021-07-06 NOTE — ANESTHESIA PRE PROCEDURE
Department of Anesthesiology  Preprocedure Note       Name:  Eliot Deng   Age:  77 y.o.  :  1945                                          MRN:  [id number]         Date:  2021      Surgeon: Nikita Leonard):  Yamile Clemons DO    Procedure: STAGE 2 MEDTRONIC LUMBAR SPINAL CORD STIMULATOR IMPLANT (CPT 53625, 92585) (MEDTRONIC) (PT REQUESTS BATTERY IN RIGHT FLANK AREA) (Right )    Medications prior to admission:   Prior to Admission medications    Medication Sig Start Date End Date Taking? Authorizing Provider   DULoxetine (CYMBALTA) 30 MG extended release capsule TAKE 1 CAPSULE BY MOUTH  DAILY 21   Pushpa Marcano MD   rOPINIRole (REQUIP) 1 MG tablet Take 1 tablet by mouth nightly 21   Pushpa Marcano MD   DULoxetine (CYMBALTA) 60 MG extended release capsule TAKE 1 CAPSULE BY MOUTH  NIGHTLY 21   Pushpa Marcano MD   oxyCODONE-acetaminophen (PERCOCET) 7.5-325 MG per tablet take 1 tablet by mouth every 6 to 8 hours if needed for pain 3/6/21   Historical Provider, MD   methocarbamol (ROBAXIN) 750 MG tablet take 1 tablet by mouth every 12 hours if needed 3/30/21   Historical Provider, MD   mupirocin (BACTROBAN) 2 % ointment apply into each nostril twice a day as directed (START 2 DAYS PRIOR TO SURGERY AND MORNING OF.) 21   Historical Provider, MD   penicillin v potassium (VEETID) 500 MG tablet take 1 tablet by mouth every 6 hours until finished 3/9/21   Historical Provider, MD   celecoxib (CELEBREX) 200 MG capsule take 1 capsule by mouth twice a day 21   Pushpa Marcano MD   diclofenac sodium (VOLTAREN) 1 % GEL apply 4 grams to KNEE four times a day AS NEEDED FOR PAIN 20   Historical Provider, MD   gabapentin (NEURONTIN) 300 MG capsule Take 300 mg by mouth 3 times daily.  10/15/20   Historical Provider, MD   SPIRIVA RESPIMAT 2.5 MCG/ACT AERS inhaler inhale 2 puffs by mouth once daily 21   Pushpa Marcano MD   PROAIR  (90 Base) MCG/ACT inhaler inhale 2 puffs by mouth every 4 to 6 hours if needed for wheezing or shortness of breath 1/18/21   Archana Kelley MD   nebivolol (BYSTOLIC) 5 MG tablet TAKE 1 TABLET BY MOUTH ONCE DAILY 1/7/21   Archana Kelely MD   furosemide (LASIX) 20 MG tablet Take 20 mg by mouth 2 times daily 12/22/20   Historical Provider, MD   amLODIPine (NORVASC) 5 MG tablet TAKE 1 TABLET BY MOUTH  DAILY 12/10/20   Archana Kelley MD   losartan-hydroCHLOROthiazide (HYZAAR) 100-25 MG per tablet TAKE 1 TABLET BY MOUTH  DAILY 11/30/20   Faizaveronica Haley MD   BIOTIN PO Take 3,000 mg by mouth daily    Historical Provider, MD   rOPINIRole (REQUIP) 1 MG tablet Take 1 tablet by mouth nightly 8/26/20   Archana Kelley MD   Handicap Placard MISC by Does not apply route Unable to ambulate more than 40 feet without difficulty  Expires 11/25/2024 11/25/19   Archana Kelley MD   lidocaine (XYLOCAINE) 5 % ointment Apply topically to hands and low back as needed.  2/29/16   Adalberto Murphy, VAIBHAV - CNP       Current medications:    Current Outpatient Medications   Medication Sig Dispense Refill    DULoxetine (CYMBALTA) 30 MG extended release capsule TAKE 1 CAPSULE BY MOUTH  DAILY 90 capsule 3    rOPINIRole (REQUIP) 1 MG tablet Take 1 tablet by mouth nightly 90 tablet 3    DULoxetine (CYMBALTA) 60 MG extended release capsule TAKE 1 CAPSULE BY MOUTH  NIGHTLY 90 capsule 1    oxyCODONE-acetaminophen (PERCOCET) 7.5-325 MG per tablet take 1 tablet by mouth every 6 to 8 hours if needed for pain      methocarbamol (ROBAXIN) 750 MG tablet take 1 tablet by mouth every 12 hours if needed      mupirocin (BACTROBAN) 2 % ointment apply into each nostril twice a day as directed (START 2 DAYS PRIOR TO SURGERY AND MORNING OF.)      penicillin v potassium (VEETID) 500 MG tablet take 1 tablet by mouth every 6 hours until finished      celecoxib (CELEBREX) 200 MG capsule take 1 capsule by mouth twice a day 60 capsule 3    diclofenac sodium (VOLTAREN) 1 % GEL apply 4 grams [place]. Hips    KNEE ARTHROSCOPY Bilateral     LAP BAND  2007    LUMBAR FUSION  2019    [facility] [address] [facility] [address] [facility] 08/19/2015    sacroiliac joint #1    NERVE BLOCK  08/19/2015    lumbar epidural #1    NERVE BLOCK N/A 09/09/2015    lumbar epidural nerve block #3    NERVE BLOCK N/A 10/26/2015    paravertebral facet bilateral lumbar #1    NERVE BLOCK Bilateral 11/02/2015    lumb facet #2    NERVE BLOCK Bilateral 11/09/2015    paravertebral facet block lumbar #3    NERVE BLOCK Bilateral 04/13/2016    Bilateral sacroiliac joint injection #1    NERVE BLOCK Bilateral 08/31/2016    SI injection #2    NERVE BLOCK Bilateral 10/03/2016    si inj #3    NERVE SURGERY Right 01/22/2020    RADIOFREQUENCY, SACROILIAC JOINT RIGHT AT S1, S2, S3 performed by Raffaele Godoy DO at [facility]      upper and lower dental implants  except 6 teeth in  front    OTHER SURGICAL HISTORY Right 04/15/2015    right ethmoidectomymaaxillary antrostomies frontal recess exploration    OTHER SURGICAL HISTORY Right 12/30/2015    Right lumbar radiofrequency    OTHER SURGICAL HISTORY Left 02/08/2016    lumb radiofreq    OTHER SURGICAL HISTORY Left 11/21/2016    lateral sacral radiofrequency    OTHER SURGICAL HISTORY Right 01/09/2017    radiofrequency    OTHER SURGICAL HISTORY Right 02/08/2017    lumbar facet radiofreq    OTHER SURGICAL HISTORY Left 03/27/2017    lumbar radiofrequency     OTHER SURGICAL HISTORY Left 05/24/2017    SI joint radiofrequency    OTHER SURGICAL HISTORY Right 01/22/2020    si radiofrequency    OVARY REMOVAL  1975    right    REVISION TOTAL HIP ARTHROPLASTY  03/26/2012    orif left hip revision of femoral stem    TOTAL HIP ARTHROPLASTY  03/2012    TOTAL LEFT HIP ARTHROPLASTY WITH PLATELET GEL       Social History:    Social History     Tobacco Use    Smoking status: Current Every Day Smoker     Packs/day: 1.00     Years: 31.00     Pack years: 31.00 PO2ART, [id number], [id number], BEART, [id number]     Type & Screen (If Applicable):  Lab Results   Component Value Date    LABABO A 03/25/2012    LABRH NEG 03/25/2012       Anesthesia Evaluation  Patient summary reviewed history of anesthetic complications:   Airway: Mallampati: III  TM distance: >3 FB   Neck ROM: limited  Mouth opening: > = 3 FB Dental: normal exam   (+) implants      Pulmonary:   (+) COPD:  shortness of breath:  sleep apnea: on CPAP,  rhonchi (insp. rhonchi bilat),  current smoker          Patient smoked on day of surgery. Cardiovascular:  Exercise tolerance: poor (<4 METS),   (+) hypertension:, valvular problems/murmurs: MR, DE LA FUENTE:,       ECG reviewed  Rhythm: regular  Rate: normal  Echocardiogram reviewed         Beta Blocker:  Dose within 24 Hrs         Neuro/Psych:   (+) neuromuscular disease (lumbar radiculopathy):,             GI/Hepatic/Renal:        (-) no morbid obesity       Endo/Other:    (+) hypothyroidism: arthritis: OA., malignancy/cancer ( breast left). Abdominal:   (+) obese,           Vascular: negative vascular ROS. Other Findings:             Anesthesia Plan      MAC     ASA 3     (Moderate risk on cardiac clearance, pulmonary clearance on chart )  Induction: intravenous. Anesthetic plan and risks discussed with patient. Plan discussed with CRNA. PAT Chart Review:  Chart reviewed per routine on January 21, 2020 at 11:20 AM by Karl Mills MD.  Above represents information available via shared medical record including previous anesthesia history, drug and allergy history.   Confirmation of above and final plan per Day of Surgery (DOS) anesthesiologist.        Karl Mills MD   7/6/2021

## 2021-07-07 ENCOUNTER — ANESTHESIA (OUTPATIENT)
Dept: OPERATING ROOM | Age: 76
End: 2021-07-07
Payer: MEDICARE

## 2021-07-07 ENCOUNTER — HOSPITAL ENCOUNTER (OUTPATIENT)
Dept: OPERATING ROOM | Age: 76
Setting detail: OUTPATIENT SURGERY
Discharge: HOME | End: 2021-07-07
Attending: ANESTHESIOLOGY
Payer: MEDICARE

## 2021-07-07 ENCOUNTER — HOSPITAL ENCOUNTER (OUTPATIENT)
Age: 76
Setting detail: OUTPATIENT SURGERY
Discharge: HOME | End: 2021-07-07
Attending: ANESTHESIOLOGY | Admitting: ANESTHESIOLOGY
Payer: MEDICARE

## 2021-07-07 VITALS
BODY MASS INDEX: 36.91 KG/M2 | TEMPERATURE: 98.6 F | DIASTOLIC BLOOD PRESSURE: 71 MMHG | RESPIRATION RATE: 14 BRPM | OXYGEN SATURATION: 91 % | WEIGHT: 188 LBS | SYSTOLIC BLOOD PRESSURE: 128 MMHG | HEART RATE: 68 BPM | HEIGHT: 60 IN

## 2021-07-07 VITALS
OXYGEN SATURATION: 98 % | DIASTOLIC BLOOD PRESSURE: 57 MMHG | RESPIRATION RATE: 24 BRPM | TEMPERATURE: 98.6 F | SYSTOLIC BLOOD PRESSURE: 84 MMHG

## 2021-07-07 DIAGNOSIS — M79.604 PAIN IN BOTH LOWER EXTREMITIES: Primary | ICD-10-CM

## 2021-07-07 DIAGNOSIS — Z96.89 S/P INSERTION OF SPINAL CORD STIMULATOR: ICD-10-CM

## 2021-07-07 DIAGNOSIS — G89.18 ACUTE POST-OPERATIVE PAIN: ICD-10-CM

## 2021-07-07 DIAGNOSIS — M79.605 PAIN IN BOTH LOWER EXTREMITIES: Primary | ICD-10-CM

## 2021-07-07 PROCEDURE — 3700000000 HC ANESTHESIA ATTENDED CARE: Performed by: ANESTHESIOLOGY

## 2021-07-07 PROCEDURE — 6360000002 HC RX W HCPCS: Performed by: NURSE ANESTHETIST, CERTIFIED REGISTERED

## 2021-07-07 PROCEDURE — 2709999900 HC NON-CHARGEABLE SUPPLY: Performed by: ANESTHESIOLOGY

## 2021-07-07 PROCEDURE — 2500000003 HC RX 250 WO HCPCS: Performed by: NURSE ANESTHETIST, CERTIFIED REGISTERED

## 2021-07-07 PROCEDURE — 6370000000 HC RX 637 (ALT 250 FOR IP)

## 2021-07-07 PROCEDURE — C1781 MESH (IMPLANTABLE): HCPCS | Performed by: ANESTHESIOLOGY

## 2021-07-07 PROCEDURE — 7100000011 HC PHASE II RECOVERY - ADDTL 15 MIN: Performed by: ANESTHESIOLOGY

## 2021-07-07 PROCEDURE — 2500000003 HC RX 250 WO HCPCS: Performed by: ANESTHESIOLOGY

## 2021-07-07 PROCEDURE — 6360000002 HC RX W HCPCS: Performed by: ANESTHESIOLOGY

## 2021-07-07 PROCEDURE — 2580000003 HC RX 258: Performed by: ANESTHESIOLOGY

## 2021-07-07 PROCEDURE — 3600000015 HC SURGERY LEVEL 5 ADDTL 15MIN: Performed by: ANESTHESIOLOGY

## 2021-07-07 PROCEDURE — 7100000010 HC PHASE II RECOVERY - FIRST 15 MIN: Performed by: ANESTHESIOLOGY

## 2021-07-07 PROCEDURE — 3700000001 HC ADD 15 MINUTES (ANESTHESIA): Performed by: ANESTHESIOLOGY

## 2021-07-07 PROCEDURE — 2720000010 HC SURG SUPPLY STERILE: Performed by: ANESTHESIOLOGY

## 2021-07-07 PROCEDURE — C1778 LEAD, NEUROSTIMULATOR: HCPCS | Performed by: ANESTHESIOLOGY

## 2021-07-07 PROCEDURE — 3209999900 FLUORO FOR SURGICAL PROCEDURES

## 2021-07-07 PROCEDURE — 3600000005 HC SURGERY LEVEL 5 BASE: Performed by: ANESTHESIOLOGY

## 2021-07-07 DEVICE — IMPLANTABLE DEVICE: Type: IMPLANTABLE DEVICE | Site: BACK | Status: FUNCTIONAL

## 2021-07-07 DEVICE — KIT LD L60CM 8 ELECTRD PERC COMP CONTAIN LD ANCHR GWIRE NDL: Type: IMPLANTABLE DEVICE | Site: BACK | Status: FUNCTIONAL

## 2021-07-07 DEVICE — POUCH TYRX NEURO ANTIMICROBIAL MED: Type: IMPLANTABLE DEVICE | Site: BACK | Status: FUNCTIONAL

## 2021-07-07 RX ORDER — FENTANYL CITRATE 50 UG/ML
50 INJECTION, SOLUTION INTRAMUSCULAR; INTRAVENOUS EVERY 5 MIN PRN
Status: DISCONTINUED | OUTPATIENT
Start: 2021-07-07 | End: 2021-07-07 | Stop reason: HOSPADM

## 2021-07-07 RX ORDER — EPHEDRINE SULFATE/0.9% NACL/PF 50 MG/5 ML
SYRINGE (ML) INTRAVENOUS PRN
Status: DISCONTINUED | OUTPATIENT
Start: 2021-07-07 | End: 2021-07-07 | Stop reason: SDUPTHER

## 2021-07-07 RX ORDER — LIDOCAINE HYDROCHLORIDE 20 MG/ML
INJECTION, SOLUTION INFILTRATION; PERINEURAL PRN
Status: DISCONTINUED | OUTPATIENT
Start: 2021-07-07 | End: 2021-07-07 | Stop reason: SDUPTHER

## 2021-07-07 RX ORDER — HYDRALAZINE HYDROCHLORIDE 20 MG/ML
5 INJECTION INTRAMUSCULAR; INTRAVENOUS EVERY 10 MIN PRN
Status: DISCONTINUED | OUTPATIENT
Start: 2021-07-07 | End: 2021-07-07 | Stop reason: HOSPADM

## 2021-07-07 RX ORDER — CEPHALEXIN 500 MG/1
500 CAPSULE ORAL 3 TIMES DAILY
Qty: 30 CAPSULE | Refills: 0 | Status: SHIPPED | OUTPATIENT
Start: 2021-07-07 | End: 2021-07-17

## 2021-07-07 RX ORDER — MORPHINE SULFATE 2 MG/ML
1 INJECTION, SOLUTION INTRAMUSCULAR; INTRAVENOUS EVERY 5 MIN PRN
Status: DISCONTINUED | OUTPATIENT
Start: 2021-07-07 | End: 2021-07-07 | Stop reason: HOSPADM

## 2021-07-07 RX ORDER — LABETALOL HYDROCHLORIDE 5 MG/ML
5 INJECTION, SOLUTION INTRAVENOUS EVERY 10 MIN PRN
Status: DISCONTINUED | OUTPATIENT
Start: 2021-07-07 | End: 2021-07-07 | Stop reason: HOSPADM

## 2021-07-07 RX ORDER — PROPOFOL 10 MG/ML
INJECTION, EMULSION INTRAVENOUS CONTINUOUS PRN
Status: DISCONTINUED | OUTPATIENT
Start: 2021-07-07 | End: 2021-07-07 | Stop reason: SDUPTHER

## 2021-07-07 RX ORDER — CEFAZOLIN SODIUM 2 G/50ML
2000 SOLUTION INTRAVENOUS ONCE
Status: COMPLETED | OUTPATIENT
Start: 2021-07-07 | End: 2021-07-07

## 2021-07-07 RX ORDER — FENTANYL CITRATE 50 UG/ML
INJECTION, SOLUTION INTRAMUSCULAR; INTRAVENOUS PRN
Status: DISCONTINUED | OUTPATIENT
Start: 2021-07-07 | End: 2021-07-07 | Stop reason: SDUPTHER

## 2021-07-07 RX ORDER — OXYCODONE HYDROCHLORIDE AND ACETAMINOPHEN 5; 325 MG/1; MG/1
TABLET ORAL
Status: COMPLETED
Start: 2021-07-07 | End: 2021-07-07

## 2021-07-07 RX ORDER — MIDAZOLAM HYDROCHLORIDE 1 MG/ML
INJECTION INTRAMUSCULAR; INTRAVENOUS PRN
Status: DISCONTINUED | OUTPATIENT
Start: 2021-07-07 | End: 2021-07-07 | Stop reason: SDUPTHER

## 2021-07-07 RX ORDER — POLYMYXIN B 500000 [USP'U]/1
INJECTION, POWDER, LYOPHILIZED, FOR SOLUTION INTRAMUSCULAR; INTRATHECAL; INTRAVENOUS; OPHTHALMIC PRN
Status: DISCONTINUED | OUTPATIENT
Start: 2021-07-07 | End: 2021-07-07 | Stop reason: ALTCHOICE

## 2021-07-07 RX ORDER — HYDROCODONE BITARTRATE AND ACETAMINOPHEN 5; 325 MG/1; MG/1
2 TABLET ORAL PRN
Status: DISCONTINUED | OUTPATIENT
Start: 2021-07-07 | End: 2021-07-07 | Stop reason: HOSPADM

## 2021-07-07 RX ORDER — OXYCODONE AND ACETAMINOPHEN 7.5; 325 MG/1; MG/1
1 TABLET ORAL EVERY 4 HOURS PRN
Qty: 18 TABLET | Refills: 0 | Status: SHIPPED | OUTPATIENT
Start: 2021-07-07 | End: 2021-07-10

## 2021-07-07 RX ORDER — LIDOCAINE HYDROCHLORIDE 10 MG/ML
INJECTION, SOLUTION EPIDURAL; INFILTRATION; INTRACAUDAL; PERINEURAL PRN
Status: DISCONTINUED | OUTPATIENT
Start: 2021-07-07 | End: 2021-07-07 | Stop reason: ALTCHOICE

## 2021-07-07 RX ORDER — DIPHENHYDRAMINE HYDROCHLORIDE 50 MG/ML
12.5 INJECTION INTRAMUSCULAR; INTRAVENOUS
Status: DISCONTINUED | OUTPATIENT
Start: 2021-07-07 | End: 2021-07-07 | Stop reason: HOSPADM

## 2021-07-07 RX ORDER — SODIUM CHLORIDE, SODIUM LACTATE, POTASSIUM CHLORIDE, CALCIUM CHLORIDE 600; 310; 30; 20 MG/100ML; MG/100ML; MG/100ML; MG/100ML
INJECTION, SOLUTION INTRAVENOUS CONTINUOUS
Status: DISCONTINUED | OUTPATIENT
Start: 2021-07-07 | End: 2021-07-07 | Stop reason: HOSPADM

## 2021-07-07 RX ORDER — HYDROCODONE BITARTRATE AND ACETAMINOPHEN 5; 325 MG/1; MG/1
1 TABLET ORAL PRN
Status: DISCONTINUED | OUTPATIENT
Start: 2021-07-07 | End: 2021-07-07 | Stop reason: HOSPADM

## 2021-07-07 RX ORDER — OXYCODONE HYDROCHLORIDE AND ACETAMINOPHEN 5; 325 MG/1; MG/1
1 TABLET ORAL EVERY 4 HOURS PRN
Status: DISCONTINUED | OUTPATIENT
Start: 2021-07-07 | End: 2021-07-07 | Stop reason: HOSPADM

## 2021-07-07 RX ORDER — PROMETHAZINE HYDROCHLORIDE 25 MG/ML
25 INJECTION, SOLUTION INTRAMUSCULAR; INTRAVENOUS
Status: DISCONTINUED | OUTPATIENT
Start: 2021-07-07 | End: 2021-07-07 | Stop reason: HOSPADM

## 2021-07-07 RX ADMIN — Medication 10 MG: at 11:19

## 2021-07-07 RX ADMIN — FENTANYL CITRATE 50 MCG: 50 INJECTION, SOLUTION INTRAMUSCULAR; INTRAVENOUS at 09:51

## 2021-07-07 RX ADMIN — FENTANYL CITRATE 50 MCG: 50 INJECTION, SOLUTION INTRAMUSCULAR; INTRAVENOUS at 09:54

## 2021-07-07 RX ADMIN — MIDAZOLAM 1 MG: 1 INJECTION INTRAMUSCULAR; INTRAVENOUS at 09:53

## 2021-07-07 RX ADMIN — MIDAZOLAM 1 MG: 1 INJECTION INTRAMUSCULAR; INTRAVENOUS at 09:49

## 2021-07-07 RX ADMIN — PROPOFOL INJECTABLE EMULSION 100 MCG/KG/MIN: 10 INJECTION, EMULSION INTRAVENOUS at 10:26

## 2021-07-07 RX ADMIN — FENTANYL CITRATE 50 MCG: 50 INJECTION, SOLUTION INTRAMUSCULAR; INTRAVENOUS at 11:34

## 2021-07-07 RX ADMIN — Medication 10 MG: at 10:40

## 2021-07-07 RX ADMIN — Medication 10 MG: at 10:59

## 2021-07-07 RX ADMIN — Medication 10 MG: at 10:50

## 2021-07-07 RX ADMIN — CEFAZOLIN SODIUM 2000 MG: 2 SOLUTION INTRAVENOUS at 09:45

## 2021-07-07 RX ADMIN — SODIUM CHLORIDE, POTASSIUM CHLORIDE, SODIUM LACTATE AND CALCIUM CHLORIDE: 600; 310; 30; 20 INJECTION, SOLUTION INTRAVENOUS at 08:19

## 2021-07-07 RX ADMIN — FENTANYL CITRATE 50 MCG: 50 INJECTION, SOLUTION INTRAMUSCULAR; INTRAVENOUS at 10:10

## 2021-07-07 RX ADMIN — OXYCODONE HYDROCHLORIDE AND ACETAMINOPHEN 2 TABLET: 5; 325 TABLET ORAL at 12:07

## 2021-07-07 RX ADMIN — Medication 10 MG: at 10:54

## 2021-07-07 RX ADMIN — LIDOCAINE HYDROCHLORIDE 50 MG: 20 INJECTION, SOLUTION INFILTRATION; PERINEURAL at 10:26

## 2021-07-07 RX ADMIN — FENTANYL CITRATE 50 MCG: 50 INJECTION, SOLUTION INTRAMUSCULAR; INTRAVENOUS at 10:15

## 2021-07-07 ASSESSMENT — PAIN DESCRIPTION - ORIENTATION: ORIENTATION: LOWER

## 2021-07-07 ASSESSMENT — PAIN SCALES - GENERAL
PAINLEVEL_OUTOF10: 6
PAINLEVEL_OUTOF10: 0
PAINLEVEL_OUTOF10: 0
PAINLEVEL_OUTOF10: 5

## 2021-07-07 ASSESSMENT — PAIN DESCRIPTION - DESCRIPTORS
DESCRIPTORS: BURNING
DESCRIPTORS: ACHING
DESCRIPTORS: BURNING

## 2021-07-07 ASSESSMENT — PAIN DESCRIPTION - LOCATION
LOCATION: BACK
LOCATION: BACK

## 2021-07-07 ASSESSMENT — PAIN DESCRIPTION - PAIN TYPE
TYPE: SURGICAL PAIN
TYPE: SURGICAL PAIN

## 2021-07-07 NOTE — ANESTHESIA POSTPROCEDURE EVALUATION
Department of Anesthesiology  Postprocedure Note    Patient: Francine Dang  MRN: [id number]  YOB: 1945  Date of evaluation: 7/7/2021  Time:  12:12 PM     Procedure Summary     Date: 07/07/21 Room / Location: [address] [place] [address] 04 / [address] [facility]    Anesthesia Start: 7177 Anesthesia Stop: 5953    Procedure: STAGE 2 [address]ace Matta (CPT 13385, 82123) (MEDTRONIC) (PT REQUESTS BATTERY IN RIGHT FLANK AREA) (Right Back) Diagnosis: (POST LAMINECTOMY SYNDROME)    Surgeons: Suman Oquendo DO Responsible Provider: Herbie Fuentes MD    Anesthesia Type: MAC ASA Status: 3          Anesthesia Type: MAC    Doris Phase I: Doris Score: 10    Doris Phase II: Doris Score: 10    Last vitals: Reviewed and per EMR flowsheets.        Anesthesia Post Evaluation    Patient location during evaluation: PACU  Patient participation: complete - patient participated  Level of consciousness: awake and alert  Airway patency: patent  Nausea & Vomiting: no nausea and no vomiting  Complications: no  Cardiovascular status: hemodynamically stable  Respiratory status: room air and spontaneous ventilation  Hydration status: stable

## 2021-07-07 NOTE — H&P
Update History & Physical    The patient's History and Physical of 06/29/2021 was reviewed with the patient and there were no significant changes. I examined the patient and there were no significant changes from the previous History and Physical.    Plan: The risk, benefits, expected outcome, and alternative to the recommended procedure have been discussed with the patient. Patient understands and wants to proceed with the procedure.       Electronically signed by Chayito Carpenter DO on 7/7/21 at 7:49 AM EDT

## 2021-07-07 NOTE — PROGRESS NOTES
11:27  WHEN PT MOVED OVER ON HER BACK AND ABDOMINAL BINDER WAS APPLIED, PT HAS A REDDENED AREA TO LOWER ABDOMEN AREA.     DR DELGADO NOTIFIED     SKIN  AREA WASHED OUT WITH HIBICLENS AND WATER PER DR Harinder Melara

## 2021-07-07 NOTE — OP NOTE
[address] [place]                                OPERATIVE REPORT    PATIENT NAME: Sandy Moser                    :        1945  MED REC NO:   [id number]                            ROOM:  ACCOUNT NO:   [de-identified]                           ADMIT DATE: 2021  PROVIDER:     Kacie Oquendo DO    DATE OF PROCEDURE:  2021    LOCATION:  [address] [place] [address] Operating Room #4, [facility]. SURGEON:  Kacie Oquendo DO    PREPROCEDURE DIAGNOSES:  Postlumbar laminectomy syndrome, chronic pain  syndrome. POSTPROCEDURE DIAGNOSES:  Postlumbar laminectomy syndrome, chronic pain  syndrome. PROCEDURES:  1. Surgical implantation of Medtronic rechargeable generator for lumbar  spinal cord stimulator, stage II. 2.  Surgical implantation of Medtronic Octrode electrode lead I, stage  II spinal cord stimulator implant. 3.  Surgical implantation of Medtronic Octrode electrode lead II, stage  II spinal cord stimulator implant. 4.  Analyze and reprogram spinal cord stimulator in surgery with  physician. 5.  Direct x-ray guidance of the spine. COMPLICATIONS:  None. ASSISTANTS:  None. BLOOD LOSS:  Less than 30 mL. ANESTHESIA:  IV anesthesia per Department of Anesthesia, local per Dr. Corrinne Chihuahua. INDICATIONS:  The patient comes in today on 2021 to the [address] [place] [address] operating room #4 via the [address] [place] [address] for the  stage II spinal cord stimulator implant. The patient suffered with severe intractable pain of the back and legs  for years and has exhausted conservative care including oral  medications, rehabilitation, physical therapy, therapy injections and  subsequent multiple back surgeries.     The patient passed psychological screening profile and a very successful  trial and now presents for stage II permanent implantation of her spinal  cord stimulator. The patient and I discussed again at length the details of risks and  potential complications and alternatives to care and the patient has  requested that we proceed with the Medtronic system and as outlined  above. The patient did receive IV antibiotics preoperatively. Please see the  medical record. The patient was seen by the Department of Anesthesia and IV anesthetic  is required. Please see the anesthesia record. The patient brought to operating room #4 at the [address] [place] [address], placed in prone position in neutral fashion and monitored per  the Department of Anesthesia. The patient's lumbosacral spine was prepped and draped including full  surgical sterile technique utilized throughout. The patient has chosen to have the battery placed in the right  paraspinal area and she designated this specific area that she wanted it  and this was marked out prior to her going on the operating table. We started her procedure today at the L1, L2 disk space area because of  her previous surgery from L2 down to L5-S1. We started with localizing with 1% Xylocaine 10 ml with a 25-gauge  1.5-inch disposable needle, and then placed our first Medtronic epidural  Tuohy needle through the skin and under x-ray guidance down to the disk  space at T12 and L1, and then using loss of resistance test to identify  the epidural space on the first attempt without difficulty. We then  advanced the first Medtronic epidural spinal cord stimulator electrode  through the needle cephalad in the posterior epidural space without  resistance up to the vertebral body of T8 in the midline. Trial of  stimulation produced 80% coverage of all her pain areas. The patient  will require a second lead. A second lead was placed in same manner as the first, again on the first  attempt under x-ray guidance without difficulty or resistance.   A second  lead was made parallel to the first lead in order to cover more surface  area and more areas of her pain. Trial stimulation with both leads in place now covered 100% areas of her  pain and she was very happy with the stimulation. The patient asked that we anchor in this location. We then used a scalpel to make vertical incision at the level of the  needles and used careful blunt dissection down to the interspinous  ligament area. Small pocket was carried out. Strict hemostasis was  assured. We then removed the needles from around the electrodes and the stylet  from inside the electrodes under x-ray guidance to make sure that the  electrodes do not move. We then placed the bumpy anchor from [employer]  over the electrodes down to the interspinous ligament area where they  were then secured to the lead and again this was done under x-ray  guidance to make sure they do not move. We then anchored the leads into the interspinous ligament area using 1-0  silk anchor stitches x2 in a wraparound technique of each anchor for a  total of 4 anchors. The wound was irrigated with antibiotic solution. Strict hemostasis was  assured. The wound packed with 4x4 soaked in antibiotic solution. I then turned our attention to the area where she requested the battery  be implanted in the right paraspinal area. We localized over the battery site area as requested by the patient with  10 ml 1% Xylocaine with a 25-gauge 1.5-inch disposable needle. We then made our usual incision just wide enough for the battery and  used careful blunt dissection to create a pocket 1-2 cm underneath the  skin. Strict hemostasis was assured, and the wound was irrigated with  antibiotic solution.     We then removed the 4x4 from the vertical back incision and then  tunneled from the battery site over to the spine incision under the skin  of course and then pulled through the remaining tips of the electrodes  leaving a curl of both electrodes in the vertical back incision for  flexion and extension. Once the tips of electrodes were over to the battery site, we then  connected them after drying their tips to the battery and used [employer] Team Connect  screw down device to secure them. Test stimulation once again produced  excellent stimulation over all the affected areas of her pain and with  good impedances in all 16 portions of the leads. We then secured the battery with an antibacterial pocket and then  anchored the battery with the stencil facing outward in the battery  pocket with 1-0 silk anchor stitches x2 in the usual manner without  difficulty. Excessive curl of electrode placed behind the battery to reduce  interference during reprogramming and recharging. We then closed the battery pocket wound with eight 1-0 Vicryl  interrupted stitches up to the skin and then followed with a continuous  3-0 Polysorb plastic closure technique along the skin edges followed by  Steri-Strips, Aquacel dressing, ABD pressure bandage. Vertical back incision was reexamined, checked for strict hemostasis;  any foreign bodies were removed and then the electrodes were curled in a  non-stressful manner under the skin of course in the pocket. We then closed this wound with seven 1-0 Vicryl interrupted stitches up  to skin edges followed by continuous 3-0 Polysorb plastic closure  technique along the skin edges followed by Steri-Strips, Aquacel  dressing, and ABD pressure bandage. The patient was then taken to recovery room was found to be in stable  condition with good results without complication. Neurologically  unchanged postprocedure, preprocedure with good stimulation, good pain  relief and no complications. The patient will be followed up in [facility] 1 week or  sooner if need be. Please see the written going home instructions, a  copy of which is given to the patient, a copy of which is on the chart.     The patient will be monitored in our recovery area until the patient  meets full discharge criteria, and then she will be discharged home and  followed up as outlined above. I will sign off in the usual manner.           Cortez Verma DO    D: 07/07/2021 11:22:52       T: 07/07/2021 11:29:10     [id number]  Job#: [id number]     Doc#: [id number]    CC:

## 2021-07-14 DIAGNOSIS — F33.1 MODERATE EPISODE OF RECURRENT MAJOR DEPRESSIVE DISORDER (HCC): ICD-10-CM

## 2021-07-14 RX ORDER — DULOXETIN HYDROCHLORIDE 30 MG/1
CAPSULE, DELAYED RELEASE ORAL
Qty: 30 CAPSULE | Refills: 3 | Status: SHIPPED
Start: 2021-07-14 | End: 2022-04-14

## 2021-08-04 ENCOUNTER — OFFICE VISIT (OUTPATIENT)
Dept: FAMILY MEDICINE CLINIC | Age: 76
End: 2021-08-04
Payer: MEDICARE

## 2021-08-04 VITALS
BODY MASS INDEX: 34.95 KG/M2 | HEART RATE: 93 BPM | RESPIRATION RATE: 20 BRPM | SYSTOLIC BLOOD PRESSURE: 136 MMHG | WEIGHT: 178 LBS | DIASTOLIC BLOOD PRESSURE: 88 MMHG | OXYGEN SATURATION: 90 % | HEIGHT: 60 IN | TEMPERATURE: 97.6 F

## 2021-08-04 DIAGNOSIS — R49.0 VOICE HOARSENESS: ICD-10-CM

## 2021-08-04 DIAGNOSIS — R63.4 WEIGHT LOSS: Primary | ICD-10-CM

## 2021-08-04 DIAGNOSIS — Z85.3 HISTORY OF BREAST CANCER: ICD-10-CM

## 2021-08-04 DIAGNOSIS — R63.4 WEIGHT LOSS: ICD-10-CM

## 2021-08-04 PROBLEM — F33.1 MODERATE EPISODE OF RECURRENT MAJOR DEPRESSIVE DISORDER (HCC): Status: ACTIVE | Noted: 2021-08-04

## 2021-08-04 LAB
ALBUMIN SERPL-MCNC: 4.6 G/DL (ref 3.5–5.2)
ALP BLD-CCNC: 63 U/L (ref 35–104)
ALT SERPL-CCNC: 16 U/L (ref 0–32)
ANION GAP SERPL CALCULATED.3IONS-SCNC: 9 MMOL/L (ref 7–16)
AST SERPL-CCNC: 31 U/L (ref 0–31)
BASOPHILS ABSOLUTE: 0.03 E9/L (ref 0–0.2)
BASOPHILS RELATIVE PERCENT: 0.5 % (ref 0–2)
BILIRUB SERPL-MCNC: 0.6 MG/DL (ref 0–1.2)
BUN BLDV-MCNC: 10 MG/DL (ref 6–23)
CALCIUM SERPL-MCNC: 10.2 MG/DL (ref 8.6–10.2)
CHLORIDE BLD-SCNC: 87 MMOL/L (ref 98–107)
CO2: 35 MMOL/L (ref 22–29)
CREAT SERPL-MCNC: 0.7 MG/DL (ref 0.5–1)
EOSINOPHILS ABSOLUTE: 0.19 E9/L (ref 0.05–0.5)
EOSINOPHILS RELATIVE PERCENT: 3.5 % (ref 0–6)
GFR AFRICAN AMERICAN: >60
GFR NON-AFRICAN AMERICAN: >60 ML/MIN/1.73
GLUCOSE BLD-MCNC: 125 MG/DL (ref 74–99)
HCT VFR BLD CALC: 54.4 % (ref 34–48)
HEMOGLOBIN: 17.6 G/DL (ref 11.5–15.5)
IMMATURE GRANULOCYTES #: 0.01 E9/L
IMMATURE GRANULOCYTES %: 0.2 % (ref 0–5)
LYMPHOCYTES ABSOLUTE: 1.11 E9/L (ref 1.5–4)
LYMPHOCYTES RELATIVE PERCENT: 20.3 % (ref 20–42)
MCH RBC QN AUTO: 32.4 PG (ref 26–35)
MCHC RBC AUTO-ENTMCNC: 32.4 % (ref 32–34.5)
MCV RBC AUTO: 100.2 FL (ref 80–99.9)
MONOCYTES ABSOLUTE: 0.6 E9/L (ref 0.1–0.95)
MONOCYTES RELATIVE PERCENT: 11 % (ref 2–12)
NEUTROPHILS ABSOLUTE: 3.52 E9/L (ref 1.8–7.3)
NEUTROPHILS RELATIVE PERCENT: 64.5 % (ref 43–80)
PDW BLD-RTO: 13.9 FL (ref 11.5–15)
PLATELET # BLD: 232 E9/L (ref 130–450)
PMV BLD AUTO: 9.2 FL (ref 7–12)
POTASSIUM SERPL-SCNC: 4.6 MMOL/L (ref 3.5–5)
RBC # BLD: 5.43 E12/L (ref 3.5–5.5)
SODIUM BLD-SCNC: 131 MMOL/L (ref 132–146)
TOTAL PROTEIN: 7.4 G/DL (ref 6.4–8.3)
WBC # BLD: 5.5 E9/L (ref 4.5–11.5)

## 2021-08-04 PROCEDURE — G8417 CALC BMI ABV UP PARAM F/U: HCPCS | Performed by: FAMILY MEDICINE

## 2021-08-04 PROCEDURE — G8400 PT W/DXA NO RESULTS DOC: HCPCS | Performed by: FAMILY MEDICINE

## 2021-08-04 PROCEDURE — G8427 DOCREV CUR MEDS BY ELIG CLIN: HCPCS | Performed by: FAMILY MEDICINE

## 2021-08-04 PROCEDURE — 3017F COLORECTAL CA SCREEN DOC REV: CPT | Performed by: FAMILY MEDICINE

## 2021-08-04 PROCEDURE — 4004F PT TOBACCO SCREEN RCVD TLK: CPT | Performed by: FAMILY MEDICINE

## 2021-08-04 PROCEDURE — 4040F PNEUMOC VAC/ADMIN/RCVD: CPT | Performed by: FAMILY MEDICINE

## 2021-08-04 PROCEDURE — 1090F PRES/ABSN URINE INCON ASSESS: CPT | Performed by: FAMILY MEDICINE

## 2021-08-04 PROCEDURE — 1123F ACP DISCUSS/DSCN MKR DOCD: CPT | Performed by: FAMILY MEDICINE

## 2021-08-04 PROCEDURE — 99214 OFFICE O/P EST MOD 30 MIN: CPT | Performed by: FAMILY MEDICINE

## 2021-08-04 RX ORDER — OXYCODONE AND ACETAMINOPHEN 7.5; 325 MG/1; MG/1
TABLET ORAL
COMMUNITY
Start: 2021-07-29

## 2021-08-04 RX ORDER — CETIRIZINE HYDROCHLORIDE 5 MG/1
5 TABLET ORAL DAILY
Qty: 30 TABLET | Refills: 5 | Status: SHIPPED
Start: 2021-08-04 | End: 2022-09-15 | Stop reason: CLARIF

## 2021-08-04 RX ORDER — AMOXICILLIN 500 MG/1
CAPSULE ORAL
Status: ON HOLD | COMMUNITY
Start: 2021-07-22 | End: 2021-09-01

## 2021-08-04 ASSESSMENT — ENCOUNTER SYMPTOMS
VOMITING: 0
WHEEZING: 0
NAUSEA: 0
CONSTIPATION: 0
BACK PAIN: 1
DIARRHEA: 0
SHORTNESS OF BREATH: 1
COUGH: 0
ABDOMINAL PAIN: 0

## 2021-08-04 NOTE — PATIENT INSTRUCTIONS
Call the GI doctor   See the cancer doctor   SEe the ENT doctor   START zyrtec 5mg daily   Get the mammogram done as soon as possible  Get blood work done   Follow up in 1 month.

## 2021-08-04 NOTE — PROGRESS NOTES
[address] [facility]  [phone]   Moise Villalpando MD     Patient: Ang Kent  YOB: 1945  Visit Date: 8/4/21    Kristie Moser is a 77y.o. year old female here today for   Chief Complaint   Patient presents with    1 Month Follow-Up       HPI  Patient is a 77year old female with history of breast cancer who is here for unwanted weight loss. Last saw oncology in November. Last mammogram was 6 months ago at [facility] . Went back for biopsy but could nto find anyting. Was due for mamogram in May. Voice hoarseness for two weeks - denies issues with swallowing. Has a lto of drainage. No facial pain. No night sweats. No loss of bowel or bladder function. No fevers, no lymphadenopathy     Had surgery. Thinks it helped. Only pain is in lower back . Overall pain is improved. Still smoking. Review of Systems   Constitutional: Negative for chills and fever. Respiratory: Positive for shortness of breath (at baseline). Negative for cough and wheezing. Cardiovascular: Negative for chest pain, palpitations and leg swelling. Gastrointestinal: Negative for abdominal pain, constipation, diarrhea, nausea and vomiting. Musculoskeletal: Positive for back pain and gait problem.        Current Outpatient Medications on File Prior to Visit   Medication Sig Dispense Refill    amoxicillin (AMOXIL) 500 MG capsule take 4 capsules by mouth 1 hour prior to appointment      oxyCODONE-acetaminophen (PERCOCET) 7.5-325 MG per tablet take 1 tablet by mouth every 6 hours if needed      DULoxetine (CYMBALTA) 30 MG extended release capsule take 1 capsule by mouth once daily 30 capsule 3    DULoxetine (CYMBALTA) 60 MG extended release capsule TAKE 1 CAPSULE BY MOUTH  NIGHTLY 90 capsule 1    methocarbamol (ROBAXIN) 750 MG tablet take 1 tablet by mouth every 12 hours if needed      celecoxib (CELEBREX) 200 MG capsule take 1 capsule by mouth twice a day 60 capsule 3    diclofenac sodium (VOLTAREN) 1 % GEL apply 4 grams to KNEE four times a day AS NEEDED FOR PAIN      gabapentin (NEURONTIN) 300 MG capsule Take 300 mg by mouth 3 times daily.  SPIRIVA RESPIMAT 2.5 MCG/ACT AERS inhaler inhale 2 puffs by mouth once daily 4 g 3    PROAIR  (90 Base) MCG/ACT inhaler inhale 2 puffs by mouth every 4 to 6 hours if needed for wheezing or shortness of breath 8.5 g 2    nebivolol (BYSTOLIC) 5 MG tablet TAKE 1 TABLET BY MOUTH ONCE DAILY 90 tablet 3    furosemide (LASIX) 20 MG tablet Take 20 mg by mouth 2 times daily      amLODIPine (NORVASC) 5 MG tablet TAKE 1 TABLET BY MOUTH  DAILY 90 tablet 3    losartan-hydroCHLOROthiazide (HYZAAR) 100-25 MG per tablet TAKE 1 TABLET BY MOUTH  DAILY 90 tablet 3    BIOTIN PO Take 3,000 mg by mouth daily      rOPINIRole (REQUIP) 1 MG tablet Take 1 tablet by mouth nightly 90 tablet 3    Handicap Placard MISC by Does not apply route Unable to ambulate more than 40 feet without difficulty  Expires 11/25/2024 1 each 0    lidocaine (XYLOCAINE) 5 % ointment Apply topically to hands and low back as needed. 1 Tube 3     No current facility-administered medications on file prior to visit. Allergies   Allergen Reactions    Demerol [Meperidine Hcl] Other (See Comments)     Hallucinations, anxiety        Meperidine Other (See Comments)    Nabumetone Hives and Rash       Past medical, surgical, socialand/or family history reviewed, updated as needed, and are non-contributory (unless otherwise stated). Medications, allergies, and problem list also reviewed and updated as needed in patient's record. Wt Readings from Last 3 Encounters:   08/04/21 178 lb (80.7 kg)   07/07/21 188 lb (85.3 kg)   06/02/21 188 lb (85.3 kg)                   /88   Pulse 93   Temp 97.6 °F (36.4 °C)   Resp 20   Ht 5' (1.524 m)   Wt 178 lb (80.7 kg)   SpO2 90%   BMI 34.76 kg/m²        Physical Exam  Vitals and nursing note reviewed. Constitutional:       General: She is not in acute distress. Appearance: She is well-developed. She is not diaphoretic. Cardiovascular:      Rate and Rhythm: Normal rate and regular rhythm. Heart sounds: Murmur heard. No friction rub. Pulmonary:      Effort: Pulmonary effort is normal. No respiratory distress. Breath sounds: No wheezing or rales. Abdominal:      General: Bowel sounds are normal. There is no distension. Palpations: Abdomen is soft. There is no mass. Tenderness: There is no abdominal tenderness. There is no guarding. Musculoskeletal:         General: Normal range of motion. Right lower leg: Edema (trace) present. Left lower leg: Edema (trace) present. Results for orders placed or performed in visit on 06/29/21   Culture, Urine    Specimen: Urine, clean catch   Result Value Ref Range    Urine Culture, Routine       10 to 100,000 CFU/mL  Mixed eyal isolated. Further workup and sensitivity testing  is not routinely indicated and will not be performed.   Mixed eyal isolated includes:  Mixed gram positive organisms     Protime-INR   Result Value Ref Range    Protime 11.0 9.3 - 12.4 sec    INR 1.0    APTT   Result Value Ref Range    aPTT 30.1 24.5 - 35.1 sec   Urinalysis   Result Value Ref Range    Color, UA Yellow Straw/Yellow    Clarity, UA Clear Clear    Glucose, Ur Negative Negative mg/dL    Bilirubin Urine Negative Negative    Ketones, Urine Negative Negative mg/dL    Specific Gravity, UA 1.010 1.005 - 1.030    Blood, Urine Negative Negative    pH, UA 6.0 5.0 - 9.0    Protein, UA Negative Negative mg/dL    Urobilinogen, Urine 0.2 <2.0 E.U./dL    Nitrite, Urine Negative Negative    Leukocyte Esterase, Urine Negative Negative   CBC   Result Value Ref Range    WBC 7.0 4.5 - 11.5 E9/L    RBC 4.86 3.50 - 5.50 E12/L    Hemoglobin 16.0 (H) 11.5 - 15.5 g/dL    Hematocrit 48.5 (H) 34.0 - 48.0 %    MCV 99.8 80.0 - 99.9 fL    MCH 32.9 26.0 - 35.0 pg    MCHC 33.0 32.0 - 34.5 %    RDW 13.8 11.5 - 15.0 fL    Platelets 839 105 - 625 E9/L    MPV 9.3 7.0 - 12.0 fL   Basic Metabolic Panel   Result Value Ref Range    Sodium 134 132 - 146 mmol/L    Potassium 4.2 3.5 - 5.0 mmol/L    Chloride 88 (L) 98 - 107 mmol/L    CO2 35 (H) 22 - 29 mmol/L    Anion Gap 11 7 - 16 mmol/L    Glucose 87 74 - 99 mg/dL    BUN 18 6 - 23 mg/dL    CREATININE 0.9 0.5 - 1.0 mg/dL    GFR Non-African American >60 >=60 mL/min/1.73    GFR African American >60     Calcium 9.4 8.6 - 10.2 mg/dL       ASSESSMENT/PLAN  Christel Sanchez was seen today for 1 month follow-up. Diagnoses and all orders for this visit:    Weight loss  -     ALISSA DIGITAL DIAGNOSTIC W OR WO CAD BILATERAL; Future  -     COMPREHENSIVE METABOLIC PANEL; Future  -     CBC Auto Differential; Future      History of breast cancer  -     ALISSA DIGITAL DIAGNOSTIC W OR WO CAD BILATERAL; Future  - Spoke with patient's oncology office regarding my concern of weight loss. She will get mammogram and follow up with them. Voice hoarseness  -     [facility] - Aldair MerrillrSarah, DO, Ear, Nose, Throat, [place]  -     cetirizine (ZYRTEC) 5 MG tablet; Take 1 tablet by mouth daily  - New voice hoarseness - maybe postnasal drip but referral placed to ENT to rule out malignancy with laryngoscopy             Phone/MyChart follow up if tests abnormal.    Return in about 1 month (around 9/4/2021). or sooner if necessary. I have reviewed myfindings and recommendations with Bridget Ballard.  Amaris Chao MD, M.D

## 2021-08-11 DIAGNOSIS — G25.81 RESTLESS LEG SYNDROME: ICD-10-CM

## 2021-08-12 ENCOUNTER — HOSPITAL ENCOUNTER (OUTPATIENT)
Dept: MAMMOGRAPHY | Age: 76
Discharge: HOME | End: 2021-08-14
Payer: MEDICARE

## 2021-08-12 DIAGNOSIS — Z85.3 HISTORY OF BREAST CANCER: ICD-10-CM

## 2021-08-13 ENCOUNTER — PATIENT MESSAGE (OUTPATIENT)
Dept: FAMILY MEDICINE CLINIC | Age: 76
End: 2021-08-13

## 2021-08-13 DIAGNOSIS — Z11.59 ENCOUNTER FOR HEPATITIS C SCREENING TEST FOR LOW RISK PATIENT: Primary | ICD-10-CM

## 2021-08-13 DIAGNOSIS — E87.1 HYPONATREMIA: Primary | ICD-10-CM

## 2021-08-13 RX ORDER — ROPINIROLE 1 MG/1
1 TABLET, FILM COATED ORAL NIGHTLY
Qty: 90 TABLET | Refills: 3 | Status: SHIPPED
Start: 2021-08-13 | End: 2021-08-18 | Stop reason: SDUPTHER

## 2021-08-13 NOTE — PROGRESS NOTES
Has heme/onc appointment scheduled   Missed her mammogram yesterday. Will reschedule. Will come in to get bmp done.

## 2021-08-16 NOTE — TELEPHONE ENCOUNTER
From: Lou Freeman  To: Brijesh Russell. Avel Andres MD  Sent: 8/13/2021 9:18 PM EDT  Subject: Non-Urgent Medical Question    Dr. Avel Andres    Do I need tested or? For hepatitis C. My ex was found with it when we went to give blood back in 70,s or 80s  My  Then told me I had nothing to worry about!!! I have never been checked but since we are checking things out might be couch to include.     Keren [id number]

## 2021-08-18 ENCOUNTER — OFFICE VISIT (OUTPATIENT)
Dept: FAMILY MEDICINE CLINIC | Age: 76
End: 2021-08-18
Payer: MEDICARE

## 2021-08-18 VITALS
DIASTOLIC BLOOD PRESSURE: 88 MMHG | TEMPERATURE: 97.3 F | SYSTOLIC BLOOD PRESSURE: 138 MMHG | OXYGEN SATURATION: 90 % | WEIGHT: 173 LBS | RESPIRATION RATE: 20 BRPM | HEART RATE: 99 BPM | BODY MASS INDEX: 33.96 KG/M2 | HEIGHT: 60 IN

## 2021-08-18 DIAGNOSIS — E87.1 HYPONATREMIA: Primary | ICD-10-CM

## 2021-08-18 DIAGNOSIS — F33.1 MODERATE EPISODE OF RECURRENT MAJOR DEPRESSIVE DISORDER (HCC): ICD-10-CM

## 2021-08-18 DIAGNOSIS — G25.81 RESTLESS LEG SYNDROME: ICD-10-CM

## 2021-08-18 PROCEDURE — 4040F PNEUMOC VAC/ADMIN/RCVD: CPT | Performed by: FAMILY MEDICINE

## 2021-08-18 PROCEDURE — 1090F PRES/ABSN URINE INCON ASSESS: CPT | Performed by: FAMILY MEDICINE

## 2021-08-18 PROCEDURE — 4004F PT TOBACCO SCREEN RCVD TLK: CPT | Performed by: FAMILY MEDICINE

## 2021-08-18 PROCEDURE — 3017F COLORECTAL CA SCREEN DOC REV: CPT | Performed by: FAMILY MEDICINE

## 2021-08-18 PROCEDURE — 1123F ACP DISCUSS/DSCN MKR DOCD: CPT | Performed by: FAMILY MEDICINE

## 2021-08-18 PROCEDURE — G8427 DOCREV CUR MEDS BY ELIG CLIN: HCPCS | Performed by: FAMILY MEDICINE

## 2021-08-18 PROCEDURE — 99214 OFFICE O/P EST MOD 30 MIN: CPT | Performed by: FAMILY MEDICINE

## 2021-08-18 PROCEDURE — G8417 CALC BMI ABV UP PARAM F/U: HCPCS | Performed by: FAMILY MEDICINE

## 2021-08-18 PROCEDURE — G8400 PT W/DXA NO RESULTS DOC: HCPCS | Performed by: FAMILY MEDICINE

## 2021-08-18 RX ORDER — LOSARTAN POTASSIUM 100 MG/1
100 TABLET ORAL DAILY
Qty: 90 TABLET | Refills: 5 | Status: SHIPPED
Start: 2021-08-18 | End: 2022-09-09 | Stop reason: SDUPTHER

## 2021-08-18 RX ORDER — ROPINIROLE 1 MG/1
1 TABLET, FILM COATED ORAL NIGHTLY
Qty: 90 TABLET | Refills: 3 | Status: SHIPPED
Start: 2021-08-18 | End: 2022-07-05

## 2021-08-18 RX ORDER — AMLODIPINE BESYLATE 10 MG/1
10 TABLET ORAL DAILY
Qty: 90 TABLET | Refills: 1 | Status: SHIPPED
Start: 2021-08-18 | End: 2022-02-14

## 2021-08-18 ASSESSMENT — ENCOUNTER SYMPTOMS
BACK PAIN: 1
NAUSEA: 0
CONSTIPATION: 0
ABDOMINAL PAIN: 0
COUGH: 0
WHEEZING: 0
SHORTNESS OF BREATH: 1
DIARRHEA: 0
VOMITING: 0

## 2021-08-18 NOTE — PROGRESS NOTES
[address] [facility]  [phone]   Janie Abebe MD     Patient: Napolean Friday  YOB: 1945  Visit Date: 8/18/21    Isi Resendiz is a 77y.o. year old female here today for   Chief Complaint   Patient presents with    Check-Up       HPI  Follow up for hyponatremia   No trouble with swallowing, breathing. Continuing to lose weight. Not as eating as much. Eating one meal a day. Has appointment with heme/onc first week of September. Has mammogram scheduled for next Tues  Has not scheduled GI. No night sweats, no sweating, no fevers, breathing ok. No cough. No nasuea , vomiting, dsyphagia,     Review of Systems   Constitutional: Negative for chills and fever. Respiratory: Positive for shortness of breath (at baseline). Negative for cough and wheezing. Cardiovascular: Negative for chest pain, palpitations and leg swelling. Gastrointestinal: Negative for abdominal pain, constipation, diarrhea, nausea and vomiting. Musculoskeletal: Positive for back pain and gait problem.        Current Outpatient Medications on File Prior to Visit   Medication Sig Dispense Refill    amoxicillin (AMOXIL) 500 MG capsule take 4 capsules by mouth 1 hour prior to appointment      oxyCODONE-acetaminophen (PERCOCET) 7.5-325 MG per tablet take 1 tablet by mouth every 6 hours if needed      cetirizine (ZYRTEC) 5 MG tablet Take 1 tablet by mouth daily 30 tablet 5    DULoxetine (CYMBALTA) 30 MG extended release capsule take 1 capsule by mouth once daily 30 capsule 3    DULoxetine (CYMBALTA) 60 MG extended release capsule TAKE 1 CAPSULE BY MOUTH  NIGHTLY 90 capsule 1    methocarbamol (ROBAXIN) 750 MG tablet take 1 tablet by mouth every 12 hours if needed      celecoxib (CELEBREX) 200 MG capsule take 1 capsule by mouth twice a day 60 capsule 3    diclofenac sodium (VOLTAREN) 1 % GEL apply 4 grams to KNEE four times a day AS NEEDED FOR PAIN      gabapentin (NEURONTIN) 300 MG capsule Take 300 mg by mouth 3 times daily.  SPIRIVA RESPIMAT 2.5 MCG/ACT AERS inhaler inhale 2 puffs by mouth once daily 4 g 3    PROAIR  (90 Base) MCG/ACT inhaler inhale 2 puffs by mouth every 4 to 6 hours if needed for wheezing or shortness of breath 8.5 g 2    nebivolol (BYSTOLIC) 5 MG tablet TAKE 1 TABLET BY MOUTH ONCE DAILY 90 tablet 3    furosemide (LASIX) 20 MG tablet Take 20 mg by mouth 2 times daily      BIOTIN PO Take 3,000 mg by mouth daily      Handicap Placard MISC by Does not apply route Unable to ambulate more than 40 feet without difficulty  Expires 11/25/2024 1 each 0    lidocaine (XYLOCAINE) 5 % ointment Apply topically to hands and low back as needed. 1 Tube 3     No current facility-administered medications on file prior to visit. Allergies   Allergen Reactions    Demerol [Meperidine Hcl] Other (See Comments)     Hallucinations, anxiety        Meperidine Other (See Comments)    Nabumetone Hives and Rash       Past medical, surgical, socialand/or family history reviewed, updated as needed, and are non-contributory (unless otherwise stated). Medications, allergies, and problem list also reviewed and updated as needed in patient's record. Wt Readings from Last 3 Encounters:   08/18/21 173 lb (78.5 kg)   08/04/21 178 lb (80.7 kg)   07/07/21 188 lb (85.3 kg)                   /88   Pulse 99   Temp 97.3 °F (36.3 °C)   Resp 20   Ht 5' (1.524 m)   Wt 173 lb (78.5 kg)   SpO2 90%   BMI 33.79 kg/m²        Physical Exam  Vitals and nursing note reviewed. Constitutional:       General: She is not in acute distress. Appearance: She is well-developed. She is not diaphoretic. Cardiovascular:      Rate and Rhythm: Normal rate and regular rhythm. Heart sounds: Murmur heard. No friction rub. Pulmonary:      Effort: Pulmonary effort is normal. No respiratory distress. Breath sounds: No wheezing or rales.    Abdominal:

## 2021-08-18 NOTE — PATIENT INSTRUCTIONS
Come on Monday at 5pm.   Stop losartan- hctz   Start the losartan 100mg by itself   Increase amlodipine to 10mg   Get blood work beforehand so we can review it .

## 2021-08-24 ENCOUNTER — HOSPITAL ENCOUNTER (OUTPATIENT)
Dept: MAMMOGRAPHY | Age: 76
Discharge: HOME | End: 2021-08-26
Payer: MEDICARE

## 2021-08-24 DIAGNOSIS — Z12.31 VISIT FOR SCREENING MAMMOGRAM: ICD-10-CM

## 2021-08-25 ENCOUNTER — TELEPHONE (OUTPATIENT)
Dept: FAMILY MEDICINE CLINIC | Age: 76
End: 2021-08-25

## 2021-08-25 DIAGNOSIS — E87.5 HYPERKALEMIA: Primary | ICD-10-CM

## 2021-08-25 NOTE — TELEPHONE ENCOUNTER
----- Message from Analisa Madrigal MD sent at 8/25/2021 11:11 AM EDT -----  Called patient to discuss results . No answer. LMOM for patient to call back with best number and time to reach them     Potassium is high. May be lab error vs high from losartan and now longer taking hctz. Will need repeat bmp. Please call patient and let her know. Lab is ordered. Please also ask if she had her mammogram done.

## 2021-08-25 NOTE — TELEPHONE ENCOUNTER
----- Message from Paulette Rodriguez sent at 8/25/2021 12:59 PM EDT -----  Subject: Message to Provider    QUESTIONS  Information for Provider? called to give this number to reach her at and   she said Dr. Oniel Garrison can call anytime [phone]  ---------------------------------------------------------------------------  --------------  [address]  What is the best way for the office to contact you? OK to leave message on   voicemail  Preferred Call Back Phone Number? [phone]  ---------------------------------------------------------------------------  --------------  SCRIPT ANSWERS  Relationship to Patient?  Self

## 2021-08-26 ENCOUNTER — TELEPHONE (OUTPATIENT)
Dept: FAMILY MEDICINE CLINIC | Age: 76
End: 2021-08-26

## 2021-08-26 DIAGNOSIS — Z85.3 HISTORY OF BREAST CANCER: Primary | ICD-10-CM

## 2021-08-26 NOTE — TELEPHONE ENCOUNTER
Did not have mammogram done because they could not do it in the [facility]. Patient will call to schedule it at [facility]. Mammogram was reordered. I called and spoke to [facility] breast [facility] [place] they will call her to schedule. Patient to also come in for repeat bmp .     patient also reports she has fallen 3 times in the last week. Last one this morning. Went to sit on bed and was not on it completely and fell on her bottom. Feels well. I advised to go to ER if any more falls.

## 2021-08-27 ENCOUNTER — HOSPITAL ENCOUNTER (OUTPATIENT)
Dept: GENERAL RADIOLOGY | Age: 76
Discharge: HOME | End: 2021-08-29
Payer: MEDICARE

## 2021-08-27 DIAGNOSIS — Z85.3 HISTORY OF BREAST CANCER: ICD-10-CM

## 2021-08-31 ENCOUNTER — TELEPHONE (OUTPATIENT)
Dept: FAMILY MEDICINE CLINIC | Age: 76
End: 2021-08-31

## 2021-08-31 DIAGNOSIS — R29.6 RECURRENT FALLS: ICD-10-CM

## 2021-08-31 DIAGNOSIS — R29.898 WEAKNESS OF BOTH LOWER EXTREMITIES: Primary | ICD-10-CM

## 2021-08-31 NOTE — TELEPHONE ENCOUNTER
Patient called asking for Rx for PT at [facility] in [place]. For her  back and legs pt states she is weak, and would like strengthening.       Last seen 8/18/2021  Next appt 9/8/2021

## 2021-09-01 ENCOUNTER — APPOINTMENT (OUTPATIENT)
Dept: GENERAL RADIOLOGY | Age: 76
End: 2021-09-01
Payer: MEDICARE

## 2021-09-01 ENCOUNTER — APPOINTMENT (OUTPATIENT)
Dept: CT IMAGING | Age: 76
End: 2021-09-01
Payer: MEDICARE

## 2021-09-01 ENCOUNTER — HOSPITAL ENCOUNTER (OUTPATIENT)
Age: 76
Setting detail: OBSERVATION
Discharge: NURSING, REHAB OR LONG-TERM CARE | End: 2021-09-02
Attending: EMERGENCY MEDICINE | Admitting: INTERNAL MEDICINE
Payer: MEDICARE

## 2021-09-01 ENCOUNTER — TELEPHONE (OUTPATIENT)
Dept: FAMILY MEDICINE CLINIC | Age: 76
End: 2021-09-01

## 2021-09-01 DIAGNOSIS — R26.2 UNABLE TO AMBULATE: Primary | ICD-10-CM

## 2021-09-01 DIAGNOSIS — R29.6 MULTIPLE FALLS: ICD-10-CM

## 2021-09-01 DIAGNOSIS — M54.50 CHRONIC LOW BACK PAIN WITHOUT SCIATICA, UNSPECIFIED BACK PAIN LATERALITY: ICD-10-CM

## 2021-09-01 DIAGNOSIS — J96.01 ACUTE RESPIRATORY FAILURE WITH HYPOXIA (HCC): ICD-10-CM

## 2021-09-01 DIAGNOSIS — G89.29 CHRONIC LOW BACK PAIN WITHOUT SCIATICA, UNSPECIFIED BACK PAIN LATERALITY: ICD-10-CM

## 2021-09-01 DIAGNOSIS — R53.1 GENERAL WEAKNESS: ICD-10-CM

## 2021-09-01 LAB
ALBUMIN SERPL-MCNC: 3.9 G/DL (ref 3.5–5.2)
ALP BLD-CCNC: 67 U/L (ref 35–104)
ALT SERPL-CCNC: 14 U/L (ref 0–32)
ANION GAP SERPL CALCULATED.3IONS-SCNC: 7 MMOL/L (ref 7–16)
AST SERPL-CCNC: 25 U/L (ref 0–31)
BACTERIA: ABNORMAL /HPF
BASOPHILS ABSOLUTE: 0.1 E9/L (ref 0–0.2)
BASOPHILS RELATIVE PERCENT: 1.4 % (ref 0–2)
BILIRUB SERPL-MCNC: 0.3 MG/DL (ref 0–1.2)
BILIRUBIN URINE: NEGATIVE
BLOOD, URINE: NEGATIVE
BUN BLDV-MCNC: 30 MG/DL (ref 6–23)
CALCIUM SERPL-MCNC: 8.8 MG/DL (ref 8.6–10.2)
CHLORIDE BLD-SCNC: 92 MMOL/L (ref 98–107)
CLARITY: NORMAL
CO2: 35 MMOL/L (ref 22–29)
COLOR: YELLOW
CREAT SERPL-MCNC: 0.9 MG/DL (ref 0.5–1)
EKG ATRIAL RATE: 84 BPM
EKG P AXIS: 44 DEGREES
EKG P-R INTERVAL: 150 MS
EKG Q-T INTERVAL: 388 MS
EKG QRS DURATION: 86 MS
EKG QTC CALCULATION (BAZETT): 458 MS
EKG R AXIS: 15 DEGREES
EKG T AXIS: 34 DEGREES
EKG VENTRICULAR RATE: 84 BPM
EOSINOPHILS ABSOLUTE: 0.14 E9/L (ref 0.05–0.5)
EOSINOPHILS RELATIVE PERCENT: 2 % (ref 0–6)
EPITHELIAL CELLS, UA: ABNORMAL /HPF
GFR AFRICAN AMERICAN: >60
GFR NON-AFRICAN AMERICAN: >60 ML/MIN/1.73
GLUCOSE BLD-MCNC: 94 MG/DL (ref 74–99)
GLUCOSE URINE: NEGATIVE MG/DL
HCT VFR BLD CALC: 45.8 % (ref 34–48)
HEMOGLOBIN: 15.5 G/DL (ref 11.5–15.5)
IMMATURE GRANULOCYTES #: 0.07 E9/L
IMMATURE GRANULOCYTES %: 1 % (ref 0–5)
KETONES, URINE: NEGATIVE MG/DL
LEUKOCYTE ESTERASE, URINE: NEGATIVE
LYMPHOCYTES ABSOLUTE: 1.59 E9/L (ref 1.5–4)
LYMPHOCYTES RELATIVE PERCENT: 22.8 % (ref 20–42)
MCH RBC QN AUTO: 29 PG (ref 26–35)
MCHC RBC AUTO-ENTMCNC: 33.8 % (ref 32–34.5)
MCV RBC AUTO: 85.6 FL (ref 80–99.9)
MONOCYTES ABSOLUTE: 0.5 E9/L (ref 0.1–0.95)
MONOCYTES RELATIVE PERCENT: 7.2 % (ref 2–12)
NEUTROPHILS ABSOLUTE: 4.57 E9/L (ref 1.8–7.3)
NEUTROPHILS RELATIVE PERCENT: 65.6 % (ref 43–80)
NITRITE, URINE: NEGATIVE
PDW BLD-RTO: 12.5 FL (ref 11.5–15)
PH UA: 5.5 (ref 5–9)
PLATELET # BLD: 228 E9/L (ref 130–450)
PMV BLD AUTO: 10.5 FL (ref 7–12)
POTASSIUM SERPL-SCNC: 4.3 MMOL/L (ref 3.5–5)
PRO-BNP: 170 PG/ML (ref 0–450)
PROTEIN UA: NEGATIVE MG/DL
RBC # BLD: 5.35 E12/L (ref 3.5–5.5)
RBC UA: ABNORMAL /HPF (ref 0–2)
SARS-COV-2, NAAT: NOT DETECTED
SODIUM BLD-SCNC: 134 MMOL/L (ref 132–146)
SPECIFIC GRAVITY UA: 1.01 (ref 1–1.03)
TOTAL PROTEIN: 6.3 G/DL (ref 6.4–8.3)
TROPONIN, HIGH SENSITIVITY: 100 NG/L (ref 0–9)
TROPONIN, HIGH SENSITIVITY: 102 NG/L (ref 0–9)
TROPONIN, HIGH SENSITIVITY: 106 NG/L (ref 0–9)
UROBILINOGEN, URINE: 0.2 E.U./DL
WBC # BLD: 7 E9/L (ref 4.5–11.5)
WBC UA: ABNORMAL /HPF (ref 0–5)

## 2021-09-01 PROCEDURE — G0378 HOSPITAL OBSERVATION PER HR: HCPCS

## 2021-09-01 PROCEDURE — 71046 X-RAY EXAM CHEST 2 VIEWS: CPT

## 2021-09-01 PROCEDURE — 6370000000 HC RX 637 (ALT 250 FOR IP): Performed by: EMERGENCY MEDICINE

## 2021-09-01 PROCEDURE — 81001 URINALYSIS AUTO W/SCOPE: CPT

## 2021-09-01 PROCEDURE — 84484 ASSAY OF TROPONIN QUANT: CPT

## 2021-09-01 PROCEDURE — 6370000000 HC RX 637 (ALT 250 FOR IP): Performed by: INTERNAL MEDICINE

## 2021-09-01 PROCEDURE — 97161 PT EVAL LOW COMPLEX 20 MIN: CPT | Performed by: PHYSICAL THERAPIST

## 2021-09-01 PROCEDURE — 99220 PR INITIAL OBSERVATION CARE/DAY 70 MINUTES: CPT | Performed by: INTERNAL MEDICINE

## 2021-09-01 PROCEDURE — 80053 COMPREHEN METABOLIC PANEL: CPT

## 2021-09-01 PROCEDURE — 96372 THER/PROPH/DIAG INJ SC/IM: CPT

## 2021-09-01 PROCEDURE — 2580000003 HC RX 258: Performed by: INTERNAL MEDICINE

## 2021-09-01 PROCEDURE — 87635 SARS-COV-2 COVID-19 AMP PRB: CPT

## 2021-09-01 PROCEDURE — 83880 ASSAY OF NATRIURETIC PEPTIDE: CPT

## 2021-09-01 PROCEDURE — 85025 COMPLETE CBC W/AUTO DIFF WBC: CPT

## 2021-09-01 PROCEDURE — 99284 EMERGENCY DEPT VISIT MOD MDM: CPT

## 2021-09-01 PROCEDURE — 96374 THER/PROPH/DIAG INJ IV PUSH: CPT

## 2021-09-01 PROCEDURE — 6360000002 HC RX W HCPCS: Performed by: INTERNAL MEDICINE

## 2021-09-01 PROCEDURE — 97165 OT EVAL LOW COMPLEX 30 MIN: CPT | Performed by: OCCUPATIONAL THERAPIST

## 2021-09-01 PROCEDURE — 93005 ELECTROCARDIOGRAM TRACING: CPT | Performed by: EMERGENCY MEDICINE

## 2021-09-01 PROCEDURE — 87088 URINE BACTERIA CULTURE: CPT

## 2021-09-01 RX ORDER — ONDANSETRON 2 MG/ML
4 INJECTION INTRAMUSCULAR; INTRAVENOUS EVERY 6 HOURS PRN
Status: DISCONTINUED | OUTPATIENT
Start: 2021-09-01 | End: 2021-09-02 | Stop reason: HOSPADM

## 2021-09-01 RX ORDER — ONDANSETRON 4 MG/1
4 TABLET, ORALLY DISINTEGRATING ORAL EVERY 8 HOURS PRN
Status: DISCONTINUED | OUTPATIENT
Start: 2021-09-01 | End: 2021-09-02 | Stop reason: HOSPADM

## 2021-09-01 RX ORDER — SODIUM CHLORIDE 0.9 % (FLUSH) 0.9 %
5-40 SYRINGE (ML) INJECTION PRN
Status: DISCONTINUED | OUTPATIENT
Start: 2021-09-01 | End: 2021-09-02 | Stop reason: HOSPADM

## 2021-09-01 RX ORDER — DULOXETIN HYDROCHLORIDE 60 MG/1
60 CAPSULE, DELAYED RELEASE ORAL NIGHTLY
Status: DISCONTINUED | OUTPATIENT
Start: 2021-09-01 | End: 2021-09-02 | Stop reason: HOSPADM

## 2021-09-01 RX ORDER — LOSARTAN POTASSIUM 50 MG/1
100 TABLET ORAL DAILY
Status: DISCONTINUED | OUTPATIENT
Start: 2021-09-01 | End: 2021-09-02 | Stop reason: HOSPADM

## 2021-09-01 RX ORDER — MORPHINE SULFATE 4 MG/ML
4 INJECTION, SOLUTION INTRAMUSCULAR; INTRAVENOUS EVERY 4 HOURS PRN
Status: DISCONTINUED | OUTPATIENT
Start: 2021-09-01 | End: 2021-09-02

## 2021-09-01 RX ORDER — FLUTICASONE PROPIONATE 50 MCG
1 SPRAY, SUSPENSION (ML) NASAL DAILY
COMMUNITY

## 2021-09-01 RX ORDER — ROPINIROLE 1 MG/1
1 TABLET, FILM COATED ORAL NIGHTLY
Status: DISCONTINUED | OUTPATIENT
Start: 2021-09-01 | End: 2021-09-02 | Stop reason: HOSPADM

## 2021-09-01 RX ORDER — OXYCODONE HYDROCHLORIDE AND ACETAMINOPHEN 5; 325 MG/1; MG/1
1 TABLET ORAL ONCE
Status: COMPLETED | OUTPATIENT
Start: 2021-09-01 | End: 2021-09-01

## 2021-09-01 RX ORDER — SODIUM CHLORIDE 9 MG/ML
25 INJECTION, SOLUTION INTRAVENOUS PRN
Status: DISCONTINUED | OUTPATIENT
Start: 2021-09-01 | End: 2021-09-02 | Stop reason: HOSPADM

## 2021-09-01 RX ORDER — METOPROLOL SUCCINATE 25 MG/1
25 TABLET, EXTENDED RELEASE ORAL DAILY
Refills: 3 | Status: DISCONTINUED | OUTPATIENT
Start: 2021-09-01 | End: 2021-09-02 | Stop reason: HOSPADM

## 2021-09-01 RX ORDER — AMLODIPINE BESYLATE 10 MG/1
10 TABLET ORAL DAILY
Status: DISCONTINUED | OUTPATIENT
Start: 2021-09-01 | End: 2021-09-02 | Stop reason: HOSPADM

## 2021-09-01 RX ORDER — ACETAMINOPHEN 325 MG/1
650 TABLET ORAL EVERY 6 HOURS PRN
Status: DISCONTINUED | OUTPATIENT
Start: 2021-09-01 | End: 2021-09-02 | Stop reason: HOSPADM

## 2021-09-01 RX ORDER — POLYETHYLENE GLYCOL 3350 17 G/17G
17 POWDER, FOR SOLUTION ORAL DAILY PRN
Status: DISCONTINUED | OUTPATIENT
Start: 2021-09-01 | End: 2021-09-02 | Stop reason: HOSPADM

## 2021-09-01 RX ORDER — ACETAMINOPHEN 650 MG/1
650 SUPPOSITORY RECTAL EVERY 6 HOURS PRN
Status: DISCONTINUED | OUTPATIENT
Start: 2021-09-01 | End: 2021-09-02 | Stop reason: HOSPADM

## 2021-09-01 RX ORDER — OXYCODONE HYDROCHLORIDE AND ACETAMINOPHEN 5; 325 MG/1; MG/1
1.5 TABLET ORAL EVERY 6 HOURS PRN
Status: DISCONTINUED | OUTPATIENT
Start: 2021-09-01 | End: 2021-09-02 | Stop reason: HOSPADM

## 2021-09-01 RX ORDER — LIDOCAINE 50 MG/G
OINTMENT TOPICAL PRN
Status: DISCONTINUED | OUTPATIENT
Start: 2021-09-01 | End: 2021-09-02 | Stop reason: HOSPADM

## 2021-09-01 RX ORDER — GABAPENTIN 300 MG/1
300 CAPSULE ORAL 3 TIMES DAILY
Status: DISCONTINUED | OUTPATIENT
Start: 2021-09-01 | End: 2021-09-02 | Stop reason: HOSPADM

## 2021-09-01 RX ORDER — SODIUM CHLORIDE 0.9 % (FLUSH) 0.9 %
5-40 SYRINGE (ML) INJECTION EVERY 12 HOURS SCHEDULED
Status: DISCONTINUED | OUTPATIENT
Start: 2021-09-01 | End: 2021-09-02 | Stop reason: HOSPADM

## 2021-09-01 RX ORDER — FLUTICASONE PROPIONATE 50 MCG
1 SPRAY, SUSPENSION (ML) NASAL DAILY
Status: DISCONTINUED | OUTPATIENT
Start: 2021-09-01 | End: 2021-09-02 | Stop reason: HOSPADM

## 2021-09-01 RX ORDER — CELECOXIB 100 MG/1
100 CAPSULE ORAL 2 TIMES DAILY
Status: DISCONTINUED | OUTPATIENT
Start: 2021-09-01 | End: 2021-09-02 | Stop reason: HOSPADM

## 2021-09-01 RX ORDER — METHOCARBAMOL 750 MG/1
750 TABLET, FILM COATED ORAL 3 TIMES DAILY PRN
Status: DISCONTINUED | OUTPATIENT
Start: 2021-09-01 | End: 2021-09-02 | Stop reason: HOSPADM

## 2021-09-01 RX ADMIN — MORPHINE SULFATE 4 MG: 4 INJECTION, SOLUTION INTRAMUSCULAR; INTRAVENOUS at 23:00

## 2021-09-01 RX ADMIN — OXYCODONE AND ACETAMINOPHEN 1 TABLET: 5; 325 TABLET ORAL at 12:32

## 2021-09-01 RX ADMIN — LOSARTAN POTASSIUM 100 MG: 50 TABLET, FILM COATED ORAL at 21:19

## 2021-09-01 RX ADMIN — SODIUM CHLORIDE, PRESERVATIVE FREE 10 ML: 5 INJECTION INTRAVENOUS at 22:21

## 2021-09-01 RX ADMIN — OXYCODONE AND ACETAMINOPHEN 1.5 TABLET: 5; 325 TABLET ORAL at 21:06

## 2021-09-01 RX ADMIN — GABAPENTIN 300 MG: 300 CAPSULE ORAL at 21:16

## 2021-09-01 RX ADMIN — CELECOXIB 100 MG: 100 CAPSULE ORAL at 21:16

## 2021-09-01 RX ADMIN — METOPROLOL SUCCINATE 25 MG: 25 TABLET, EXTENDED RELEASE ORAL at 21:16

## 2021-09-01 RX ADMIN — FLUTICASONE PROPIONATE 1 SPRAY: 50 SPRAY, METERED NASAL at 22:19

## 2021-09-01 RX ADMIN — DULOXETINE HYDROCHLORIDE 60 MG: 60 CAPSULE, DELAYED RELEASE ORAL at 21:16

## 2021-09-01 RX ADMIN — ENOXAPARIN SODIUM 40 MG: 40 INJECTION SUBCUTANEOUS at 21:16

## 2021-09-01 RX ADMIN — ROPINIROLE HYDROCHLORIDE 1 MG: 1 TABLET, FILM COATED ORAL at 21:16

## 2021-09-01 RX ADMIN — AMLODIPINE BESYLATE 10 MG: 10 TABLET ORAL at 21:16

## 2021-09-01 ASSESSMENT — PAIN DESCRIPTION - LOCATION
LOCATION: BACK
LOCATION: BACK

## 2021-09-01 ASSESSMENT — ENCOUNTER SYMPTOMS
CHEST TIGHTNESS: 0
ABDOMINAL PAIN: 0
BACK PAIN: 1
SHORTNESS OF BREATH: 0
NAUSEA: 0

## 2021-09-01 ASSESSMENT — PAIN DESCRIPTION - PROGRESSION
CLINICAL_PROGRESSION: GRADUALLY WORSENING
CLINICAL_PROGRESSION: GRADUALLY WORSENING

## 2021-09-01 ASSESSMENT — PAIN SCALES - GENERAL
PAINLEVEL_OUTOF10: 10

## 2021-09-01 ASSESSMENT — PAIN DESCRIPTION - ONSET
ONSET: ON-GOING
ONSET: ON-GOING

## 2021-09-01 ASSESSMENT — PAIN DESCRIPTION - DESCRIPTORS
DESCRIPTORS: SHARP
DESCRIPTORS: SHARP

## 2021-09-01 ASSESSMENT — PAIN - FUNCTIONAL ASSESSMENT
PAIN_FUNCTIONAL_ASSESSMENT: PREVENTS OR INTERFERES SOME ACTIVE ACTIVITIES AND ADLS
PAIN_FUNCTIONAL_ASSESSMENT: PREVENTS OR INTERFERES SOME ACTIVE ACTIVITIES AND ADLS

## 2021-09-01 ASSESSMENT — PAIN DESCRIPTION - ORIENTATION
ORIENTATION: LOWER;MID
ORIENTATION: LOWER;MID

## 2021-09-01 ASSESSMENT — PAIN DESCRIPTION - PAIN TYPE
TYPE: ACUTE PAIN
TYPE: ACUTE PAIN

## 2021-09-01 ASSESSMENT — PAIN DESCRIPTION - FREQUENCY
FREQUENCY: CONTINUOUS
FREQUENCY: CONTINUOUS

## 2021-09-01 ASSESSMENT — PAIN DESCRIPTION - DIRECTION: RADIATING_TOWARDS: LEGS

## 2021-09-01 NOTE — TELEPHONE ENCOUNTER
Called ER and discussed patient's case with Dr. Srini Ibanez. I am concerned regarding how quickly patient's leg weakness has worsened and would feel more comfortable if patient was admitted to have MRI of head and further eval of worsening weakness.

## 2021-09-01 NOTE — CARE COORDINATION
SS Note: Covid test negative. Pt to ED for fall and weakness. Pt has stage 2 medtronic lumbar spinal cord stimulator implant. SW met w/pt in ED 15 and spoke from door wearing mask/PPE and explained role. Pt lives alone in single floor condo. PTA, pt is having difficulty completing ADL's but able to drive and has insurance. PCP is Koko Haley and Pharmacy is [facility] in [place]. Pt has following DME: Seated walker w/wheels, [employer]. Pt has walk in shower with bars and shower chair. Past hx of [place] 78 but unsure of agency & UZMA @ [facility]. No hx of 02. Pt would like to go to [facility] if possible for rehab. She has been to [facility] in past but does not prefer to go there. SW completed referral to [facility] and coordinated for PT/OT evals. 1700  Pt accepted @ [facility] Pending Negative Covid. 1722  Covid back and negative. 520 Phyllis Nguyen notified. She notes Rn 2 RN is [phone]. ADAM updated Penelope-RN of POC. YONG (n-17) form completed. 1805  PAS called - Erinne and scheduled trip for pt to [facility]. ETA 3-4 hrs. 65  SW notified that pt's PCP called and spoke to Dr. Jimenez Mckeon & wants further work up of pt. Pt Now to be admitted. ADAM notified [facility]. N-17 placed in soft chart.    Electronically signed by CARISA Bassett on 9/1/2021 at 6:16 PM

## 2021-09-01 NOTE — PROGRESS NOTES
Physical Therapy    Physical Therapy Initial Evaluation/Plan of Care    Room #:  15/15  Patient Name: Len Nyhan  YOB: 1945  MRN: [id number]    Date of Service: 9/1/2021     Tentative placement recommendation: Subacute rehab  Equipment recommendation: Patient has needed equipment       Evaluating Physical Therapist: Shanice George  #[id number]      Specific Provider Orders/Date/Referring Provider :  09/01/21 1315   PT eval and treat Start: 09/01/21 1315, End: 09/01/21 1315, ONE TIME, Standing Count: 1 Occurrences, S    Debby Spine, DO      Admitting Diagnosis:   No admission diagnoses are documented for this encounter. fall x 4 past 10 days and weakness.  Pt has stage 2 medtronic lumbar spinal cord stimulator implant, intractible pain         Patient Active Problem List   Diagnosis    Osteoarthritis of hip    Hip pain    Periprosthetic fracture around internal prosthetic left hip joint (HCC)    Aseptic loosening of prosthetic hip (HCC)    Sleep disorder    Restless legs syndrome (RLS)    Numbness and tingling    Trigger point of extremity    Primary osteoarthritis of right foot    Right foot pain    Shoulder contusion    Shoulder sprain    Shoulder pain    Spondylolisthesis, grade 1    Neuroforaminal stenosis of spine    Lumbar spinal stenosis    Neuropathy involving both lower extremities    Protruded lumbar disc    Lumbar radiculopathy    Facet syndrome, lumbar (HCC)    Status post total hip replacement, left    Sacroiliitis (HCC)    Malignant neoplasm of central portion of left female breast (HCC)    Midline low back pain with bilateral sciatica    Chronic pain syndrome    Class 3 severe obesity due to excess calories with serious comorbidity and body mass index (BMI) of 45.0 to 49.9 in adult (HCC)    Dyspnea on exertion    Tobacco abuse    Pure hypercholesterolemia    Bunionette of right foot    Difficulty walking    Morbid obesity with BMI of 45.0-49.9, adult [facility])    Pulmonary emphysema ([facility] [facility]    AYESHA (obstructive sleep apnea)    Respiratory failure (HCC)    COPD exacerbation (HCC)    Nonrheumatic mitral valve regurgitation    Pulmonary hypertension (HCC)    Pain in both lower extremities    Moderate episode of recurrent major depressive disorder ([facility] [address]        ASSESSMENT of Current Deficits Patient exhibits decreased strength, balance, endurance and pain central low back radiating laterally down to knees with standing, sitting and supine impairing functional mobility, transfers, gait , gait distance and tolerance to activity are barriers to d/c home and require skilled intervention  to attain pre hospital level of function.  Pt has managed pain with stimulator however since falls has not been able to function independently        PHYSICAL THERAPY  PLAN OF CARE       Physical therapy plan of care is established based on physician order,  patient diagnosis and clinical assessment    Current Treatment Recommendations:    -Bed Mobility: Lower extremity exercises , Upper extremity exercises  and Trunk control activities   -Standing Balance: Perform strengthening exercises in standing to promote motor control with or without upper extremity support  and Instruct patient on adequate base of support to maintain balance  -Transfers: Provide instruction on proper hand and foot position for adequate transfer of weight onto lower extremities and use of gait device, Cues for hand placement, technique and safety, Support transfer of weight on to lower extremities and Provide stabilization to prevent fall   -Gait: Gait training, Standing activities to improve: base of support, weight shift, weight bearing  and Exercises to improve hip and knee control   -Endurance: Utilize Supervised activities to increase level of endurance to allow for safe functional mobility including transfers and gait   Core stabilization exercises  PT long term treatment goals are located in below MEDTRONIC LUMBAR SPINAL CORD STIMULATOR IMPLANT (CPT 97538, 23277) (MEDTRONIC) (PT REQUESTS BATTERY IN RIGHT FLANK AREA) performed by Collins Najera DO at [facility] 8      upper and lower dental implants  except 6 teeth in  front    OTHER SURGICAL HISTORY Right 04/15/2015    right ethmoidectomymaaxillary antrostomies frontal recess exploration    OTHER SURGICAL HISTORY Right 12/30/2015    Right lumbar radiofrequency    OTHER SURGICAL HISTORY Left 02/08/2016    lumb radiofreq    OTHER SURGICAL HISTORY Left 11/21/2016    lateral sacral radiofrequency    OTHER SURGICAL HISTORY Right 01/09/2017    radiofrequency    OTHER SURGICAL HISTORY Right 02/08/2017    lumbar facet radiofreq    OTHER SURGICAL HISTORY Left 03/27/2017    lumbar radiofrequency     OTHER SURGICAL HISTORY Left 05/24/2017    SI joint radiofrequency    OTHER SURGICAL HISTORY Right 01/22/2020    si radiofrequency    OVARY REMOVAL  1975    right    REVISION TOTAL HIP ARTHROPLASTY  03/26/2012    orif left hip revision of femoral stem    TOTAL HIP ARTHROPLASTY  03/2012    TOTAL LEFT HIP ARTHROPLASTY WITH PLATELET GEL       SUBJECTIVE:    Precautions: , falls, alarm and O2 , 2 Liters of o2 via nasal cannula   Social history: Patient lives alone   in a [address],[place] [address] in shower grab bars    Equipment owned: [address] [facility]   How much difficulty turning over in bed?: A Lot  How much difficulty sitting down on / standing up from a chair with arms?: A Lot  How much difficulty moving from lying on back to sitting on side of bed?: A Lot  How much help from another person moving to and from a bed to a chair?: A Lot  How much help from another person needed to walk in hospital room?: A Lot  How much help from another person for climbing 3-5 steps with a railing?: Total  AM-PAC Inpatient Mobility Raw Score : 11  AM-PAC Inpatient T-Scale Score : 33.86  Mobility Inpatient CMS 0-100% Score: 72.57  Mobility Inpatient CMS G-Code Modifier : CL    Nursing cleared patient for PT evaluation. The admitting diagnosis and active problem list as listed above have been reviewed prior to the initiation of this evaluation. OBJECTIVE;   Initial Evaluation  Date: 9/1/2021 Treatment Date:     Short Term/ Long Term   Goals   Was pt agreeable to Eval/treatment? Yes  To be met in 5 days   Pain level   10/10   see assessment     Bed Mobility    Rolling: Moderate assist of 1    Supine to sit: Moderate assist of 1    Sit to supine: Moderate assist of 1    Scooting: Minimal assist of 1    Rolling: Supervision     Supine to sit: Supervision     Sit to supine: Supervision     Scooting: Supervision      Transfers Sit to stand:  Moderate assist of 1 x 2 reps   Sit to stand: Supervision      Ambulation    1 side steps using  wheeled walker with Moderate assist of 1   for walker control, balance, weight shift and safety, Patient with flexed posture and antalgic gait Bilateral and cues for upright posture, safety and proper hand placement   50 feet using  wheeled walker with Supervision     ROM Within functional limits        Strength BUE:  refer to OT eval  RLE:  3-/5  LLE:  3-/5  Increase strength in affected mm groups by 1/3 grade   Balance Sitting EOB:  fair    Dynamic Standing:  poor    Sitting EOB:  good    Dynamic Standing: fair wheeled walker      Patient is Alert & Oriented x person, place, time and situation and follows directions    Sensation:  Patient  reports numbness/tingling bilateral lower extremities     Edema:  none noted   Endurance: poor tolerance to activity d/t pain    Vitals: 2 liters nasal cannula   Blood Pressure at rest  Blood Pressure during session    Heart Rate at rest   Heart Rate during session     SPO2 at rest 94%  SPO2 during session 88-92%     Patient education  Patient educated on role of Physical Therapy, risks of immobility, safety and plan of care, importance of mobility while in hospital  and safety      Patient response to education:   Pt verbalized understanding Pt demonstrated skill Pt requires further education in this area   Yes Partial Yes      Treatment:  Patient practiced and was instructed/facilitated in the following treatment: Patient   edge of cart 10 min with supervision       Therapeutic Exercises:  not performed         At end of session, patient on cart with  call light and phone within reach,  all lines and tubes intact, nursing notified. Patient would benefit from continued skilled Physical Therapy to improve functional independence and quality of life. Patient's/ family goals   rehab    Time in  230  Time out  300    Total Treatment Time  5 minutes    Evaluation time includes thorough review of current medical information, gathering information on past medical history/social history and prior level of function, completion of standardized testing/informal observation of tasks, assessment of data, and development of Plan of care and goals.      CPT codes:  Low Complexity PT evaluation (78795)  No treatment    Suzanne Friedman, PT

## 2021-09-01 NOTE — LETTER
[place] Department Medicaid  CERTIFICATION OF NECESSITY  FOR NON-EMERGENCY TRANSPORTATION   BY GROUND AMBULANCE      Individual Information   1. Name: David Eldridge 2. [place] Medicaid Billing Number:    3. Address: [address]      Transportation Provider Information   4. Provider Name: FRANKIE   5. [place] Medicaid Provider Number:  National Provider Identifier (NPI):      Certification  7. Criteria:  During transport, this individual requires:  [] Medical treatment or continuous     supervision by an EMT. [] The administration or regulation of oxygen by another person. [] Supervised protective restraint. 8. Period Beginning Date: 09/02/2021   9. Length  [x] Not more than 1 Day  [] One Year     Additional Information Relevant to Certification   10. Comments or Explanations, If Necessary or Appropriate GENERALIZED WEAKNESS, FALLS RISK, INABILITY TO AMBULATE, BREAST CA, COPD, SPINAL CORD STIMULATOR        Certifying Practitioner Information   11. Name of Practitioner: DR Renny Goodman DO    12. [place] Medicaid Provider Number, If Applicable:  [address] Provider Identifier (NPI): [id number]     Signature Information   14. Date of Signature: 09/02/2021 15. Name of Person Signing: CARISA Mendoza   16. Signature and Professional Designation: Juanjo Carr. Tatyana Vinson, [address] AM.      [facility] [id number]  Rev. 7/2015    [facility] Encounter Date/Time: 9/1/2021 [facility] Account: [de-identified]    MRN: [id number]    Patient: David Eldridge    Contact Serial #: [id number]      ENCOUNTER          Patient Class: Observation Private Enc?   No Unit  BD: [address] [address]   Hospital Service: Med/Surg   Encounter DX: General weakness [R53.1]   ADM Provider: Indra Abraham DO   Procedure:     ATT Provider: Martinez Joaquin MD   REF Provider:        Admission DX: General weakness, Unable to ambulate, Acute respiratory failure with hypoxia ([facility] [address] Multiple falls, Chronic low back pain without sciatica, unspecified back pain laterality and DX codes: R53.1, R26.2, J96.01, R29.6, M54.5, G89.29      PATIENT  Name: Maria E Ibarra : 1945 (75 yrs)   Address: [address] [place] [address] Sex: Female   City: [address] [place] [address]         Marital Status:    Employer: RETIRED         [employer]: Non-Yarsani   Primary Care Provider: Nisha Doshi MD         Primary Phone: [address]   Contact Name Legal Guardian? Relationship to Patient Home Phone Work Phone   1. StewartLeanne  2. Urvashi Domingo      Other  Brother/Sister [phone]              GUARANTOR            Guarantor: Maria E Ibarra     : 1945   Address: [place] [address] Sex: Female     [place] [address]     Relation to Patient: Self       Home Phone: [phone]   Guarantor ID: [id number]       Work Phone:     Guarantor Employer: RETIRED         Status: RETIRED      COVERAGE        PRIMARY INSURANCE   Payor: MEDICARE Plan: MEDICARE PART A AND B   Payor Address: [facility] [address],  [facility], [facility] [address]       Group Number:   Insurance Type: INDEMNITY   Subscriber Name: Asher Carroll : 1945   Subscriber ID: [id number] Pat. Rel. to Sub: Self   SECONDARY INSURANCE   Payor: [employer] Plan: Expand Beyond Address:   [id number], [place] [id number]          Group Number: [id number] Insurance Type: INDEMNITY   Subscriber Name: Asher Carroll : 1945   Subscriber ID: [id number] Pat.  Rel. to Sub: SELF

## 2021-09-01 NOTE — ED PROVIDER NOTES
80-year-old female presenting from a fall that occurred at home. She says she had weakness to both of her legs, felt like they were giving out she lowered self to the ground. Did not actually fall or hit her head or lose consciousness. Says she is fallen several times in the last couple of weeks. She recently had a later placed for her low back and her chronic pain. She remains on oral pain medication as well. No loss of bowel or bladder control, no saddle anesthesia, no focal neurologic deficit, no urinary retention. No fevers or chills. This is a recurrent problem, persistent, moderate severity, ongoing for last couple weeks, associated with her chronic back issues. Family History   Problem Relation Age of Onset    Heart Failure Mother     Parkinsonism Father     Diabetes Father     Other Brother         PVD     Past Surgical History:   Procedure Laterality Date    APPENDECTOMY      BREAST SURGERY Left 03/2016    lumpectomy for cancer with chemo & radiation last chemo in july     CHOLECYSTECTOMY  2009    lap    COLONOSCOPY  2011    diverticulosis    COSMETIC SURGERY Bilateral     blephoroplasty    EYE SURGERY Bilateral     cataract extraction    HAND SURGERY      CTS B/L    HYSTERECTOMY      JOINT REPLACEMENT  03/04/2007    [place] Knees. [place].  Hips    KNEE ARTHROSCOPY Bilateral     LAP BAND  2007    LUMBAR FUSION  2019    done at [address] Left 08/19/2015    sacroiliac joint #1    NERVE BLOCK  08/19/2015    lumbar epidural #1    NERVE BLOCK N/A 09/09/2015    lumbar epidural nerve block #3    NERVE BLOCK N/A 10/26/2015    paravertebral facet bilateral lumbar #1    NERVE BLOCK Bilateral 11/02/2015    lumb facet #2    NERVE BLOCK Bilateral 11/09/2015    paravertebral facet block lumbar #3    NERVE BLOCK Bilateral 04/13/2016    Bilateral sacroiliac joint injection #1    NERVE BLOCK Bilateral 08/31/2016    SI injection #2    NERVE BLOCK Bilateral 10/03/2016    si inj #3    NERVE SURGERY Right 01/22/2020    RADIOFREQUENCY, SACROILIAC JOINT RIGHT AT S1, S2, S3 performed by Nani Luong DO at [address] Right 7/7/2021    STAGE 2 MEDTRONIC LUMBAR SPINAL CORD STIMULATOR IMPLANT (CPT 48976, 36718) ([employer]) (PT REQUESTS BATTERY IN RIGHT FLANK AREA) performed by Nani Luong DO at [facility] 8      upper and lower dental implants  except 6 teeth in  front    OTHER SURGICAL HISTORY Right 04/15/2015    right ethmoidectomymaaxillary antrostomies frontal recess exploration    OTHER SURGICAL HISTORY Right 12/30/2015    Right lumbar radiofrequency    OTHER SURGICAL HISTORY Left 02/08/2016    lumb radiofreq    OTHER SURGICAL HISTORY Left 11/21/2016    lateral sacral radiofrequency    OTHER SURGICAL HISTORY Right 01/09/2017    radiofrequency    OTHER SURGICAL HISTORY Right 02/08/2017    lumbar facet radiofreq    OTHER SURGICAL HISTORY Left 03/27/2017    lumbar radiofrequency     OTHER SURGICAL HISTORY Left 05/24/2017    SI joint radiofrequency    OTHER SURGICAL HISTORY Right 01/22/2020    si radiofrequency    OVARY REMOVAL  1975    right    REVISION TOTAL HIP ARTHROPLASTY  03/26/2012    orif left hip revision of femoral stem    TOTAL HIP ARTHROPLASTY  03/2012    TOTAL LEFT HIP ARTHROPLASTY WITH PLATELET GEL       Review of Systems   Constitutional: Negative for appetite change and fever. Respiratory: Negative for chest tightness and shortness of breath. Cardiovascular: Negative for chest pain and leg swelling. Gastrointestinal: Negative for abdominal pain and nausea. Musculoskeletal: Positive for back pain. Neurological: Positive for weakness. All other systems reviewed and are negative. Physical Exam  Constitutional:       General: She is not in acute distress. Appearance: She is well-developed. HENT:      Head: Normocephalic and atraumatic.    Eyes: colon, Hyperlipidemia, Hypertension, Pneumonia, Restless leg syndrome, Sleep apnea, and Thyroid disease. Past Surgical History:  has a past surgical history that includes Appendectomy; Hand surgery; Hysterectomy; Knee arthroscopy (Bilateral); Lap Band (2007); Ovary removal (1975); other surgical history; Cholecystectomy (2009); Colonoscopy (2011); Total hip arthroplasty (03/2012); Revision total hip arthroplasty (03/26/2012); Eye surgery (Bilateral); other surgical history (Right, 04/15/2015); Nerve Block (Left, 08/19/2015); Nerve Block (08/19/2015); Nerve Block (N/A, 09/09/2015); Nerve Block (N/A, 10/26/2015); Nerve Block (Bilateral, 11/02/2015); Nerve Block (Bilateral, 11/09/2015); other surgical history (Right, 12/30/2015); other surgical history (Left, 02/08/2016); Nerve Block (Bilateral, 04/13/2016); Nerve Block (Bilateral, 08/31/2016); Nerve Block (Bilateral, 10/03/2016); Breast surgery (Left, 03/2016); other surgical history (Left, 11/21/2016); other surgical history (Right, 01/09/2017); other surgical history (Right, 02/08/2017); other surgical history (Left, 03/27/2017); other surgical history (Left, 05/24/2017); Cosmetic surgery (Bilateral); joint replacement (03/04/2007); other surgical history (Right, 01/22/2020); Nerve Surgery (Right, 01/22/2020); lumbar fusion (2019); and Nerve Surgery (Right, 7/7/2021). Social History:  reports that she has been smoking cigarettes. She has a 31.00 pack-year smoking history. She has never used smokeless tobacco. She reports previous alcohol use of about 1.0 standard drinks of alcohol per week. She reports that she does not use drugs. Family History: family history includes Diabetes in her father; Heart Failure in her mother; Other in her brother; Parkinsonism in her father. The patients home medications have been reviewed.     Allergies: Demerol [meperidine hcl], Meperidine, and Nabumetone    -------------------------------------------------- RESULTS -------------------------------------------------    Lab  Results for orders placed or performed during the hospital encounter of 09/01/21   CBC auto differential   Result Value Ref Range    WBC 7.0 4.5 - 11.5 E9/L    RBC 5.35 3.50 - 5.50 E12/L    Hemoglobin 15.5 11.5 - 15.5 g/dL    Hematocrit 45.8 34.0 - 48.0 %    MCV 85.6 80.0 - 99.9 fL    MCH 29.0 26.0 - 35.0 pg    MCHC 33.8 32.0 - 34.5 %    RDW 12.5 11.5 - 15.0 fL    Platelets 785 837 - 011 E9/L    MPV 10.5 7.0 - 12.0 fL    Neutrophils % 65.6 43.0 - 80.0 %    Immature Granulocytes % 1.0 0.0 - 5.0 %    Lymphocytes % 22.8 20.0 - 42.0 %    Monocytes % 7.2 2.0 - 12.0 %    Eosinophils % 2.0 0.0 - 6.0 %    Basophils % 1.4 0.0 - 2.0 %    Neutrophils Absolute 4.57 1.80 - 7.30 E9/L    Immature Granulocytes # 0.07 E9/L    Lymphocytes Absolute 1.59 1.50 - 4.00 E9/L    Monocytes Absolute 0.50 0.10 - 0.95 E9/L    Eosinophils Absolute 0.14 0.05 - 0.50 E9/L    Basophils Absolute 0.10 0.00 - 0.20 E9/L   Comprehensive metabolic panel   Result Value Ref Range    Sodium 134 132 - 146 mmol/L    Potassium 4.3 3.5 - 5.0 mmol/L    Chloride 92 (L) 98 - 107 mmol/L    CO2 35 (H) 22 - 29 mmol/L    Anion Gap 7 7 - 16 mmol/L    Glucose 94 74 - 99 mg/dL    BUN 30 (H) 6 - 23 mg/dL    CREATININE 0.9 0.5 - 1.0 mg/dL    GFR Non-African American >60 >=60 mL/min/1.73    GFR African American >60     Calcium 8.8 8.6 - 10.2 mg/dL    Total Protein 6.3 (L) 6.4 - 8.3 g/dL    Albumin 3.9 3.5 - 5.2 g/dL    Total Bilirubin 0.3 0.0 - 1.2 mg/dL    Alkaline Phosphatase 67 35 - 104 U/L    ALT 14 0 - 32 U/L    AST 25 0 - 31 U/L   Troponin   Result Value Ref Range    Troponin, High Sensitivity 106 (H) 0 - 9 ng/L   Urinalysis   Result Value Ref Range    Color, UA Yellow Straw/Yellow    Clarity, UA SLCLOUDY Clear    Glucose, Ur Negative Negative mg/dL    Bilirubin Urine Negative Negative    Ketones, Urine Negative Negative mg/dL    Specific Gravity, UA 1.015 1.005 - 1.030    Blood, Urine Negative Negative    pH, UA 5.5 5.0 - 9.0    Protein, UA Negative Negative mg/dL    Urobilinogen, Urine 0.2 <2.0 E.U./dL    Nitrite, Urine Negative Negative    Leukocyte Esterase, Urine Negative Negative   Microscopic Urinalysis   Result Value Ref Range    WBC, UA 0-1 0 - 5 /HPF    RBC, UA 0-1 0 - 2 /HPF    Epithelial Cells, UA RARE /HPF    Bacteria, UA RARE (A) None Seen /HPF   Troponin   Result Value Ref Range    Troponin, High Sensitivity 100 (H) 0 - 9 ng/L   Troponin   Result Value Ref Range    Troponin, High Sensitivity 102 (H) 0 - 9 ng/L   EKG 12 Lead   Result Value Ref Range    Ventricular Rate 84 BPM    Atrial Rate 84 BPM    P-R Interval 150 ms    QRS Duration 86 ms    Q-T Interval 388 ms    QTc Calculation (Bazett) 458 ms    P Axis 44 degrees    R Axis 15 degrees    T Axis 34 degrees       Radiology  No orders to display       ------------------------- NURSING NOTES AND VITALS REVIEWED ---------------------------  Date / Time Roomed:  9/1/2021  9:19 AM  ED Bed Assignment:  15/15    The nursing notes within the ED encounter and vital signs as below have been reviewed. Patient Vitals for the past 24 hrs:   BP Temp Temp src Pulse Resp SpO2   09/01/21 0939 -- -- -- -- -- 95 %   09/01/21 0928 118/85 98.3 °F (36.8 °C) Oral 82 16 (!) 88 %       Oxygen Saturation Interpretation: Normal      ------------------------------------------ PROGRESS NOTES ------------------------------------------  Re-evaluation(s):    Patients symptoms show no change  Repeat physical examination is not changed      Patients symptoms show no change  Repeat physical examination shows pt sleeping comfortably    I have spoken with the patient and discussed todays results, in addition to providing specific details for the plan of care and counseling regarding the diagnosis and prognosis.   Their questions are answered at this time and they are agreeable with the plan.      --------------------------------- ADDITIONAL PROVIDER NOTES ---------------------------------  Consultations:  Spoke with Social work -- we will attempt placement for rehab. This patient's ED course included: a personal history and physicial examination, re-evaluation prior to disposition and multiple bedside re-evaluations    This patient has remained hemodynamically stable and been closely monitored during their ED course. Clinical Impression  1. General weakness    2. Multiple falls    3. Chronic low back pain without sciatica, unspecified back pain laterality          Disposition  Patient's disposition: per social work  Patient's condition is stable.          Eveline Guerra, DO  09/01/21 2744

## 2021-09-01 NOTE — PROGRESS NOTES
[address] [facility]  [address] [facility]         [id number]                                                   Patient Name: Len Nyhan     MRN: [id number]     : 1945     Room: 15/15       Evaluating OT: Ganesh Pickett OTR/L; [id number]       Referring Provider and Orders/Date:   OT eval and treat Start: 21 1315, End: 21 1315, ONE TIME, Standing Count: 1 Occurrences, R    Debby Spine, DO     Diagnosis: No diagnosis found. Surgery: 21 Procedure(s):  STAGE 2 MEDTRONIC LUMBAR SPINAL CORD STIMULATOR IMPLANT (MEDTRONIC)      Pertinent Medical History:        Past Medical History:   Diagnosis Date    Adverse effect of anesthetic     difficulty waking up if lying flat post op    Arthritis     Bronchitis     Cancer ([facility] [address] 2016    breast left    Diverticula of colon 2011    Hyperlipidemia     Hypertension     Pneumonia     Restless leg syndrome     Sleep apnea     CPAP    Thyroid disease           Past Surgical History:   Procedure Laterality Date    APPENDECTOMY      BREAST SURGERY Left 2016    lumpectomy for cancer with chemo & radiation last chemo in july     CHOLECYSTECTOMY  2009    lap    COLONOSCOPY  2011    diverticulosis    COSMETIC SURGERY Bilateral     blephoroplasty    EYE SURGERY Bilateral     cataract extraction    HAND SURGERY      CTS B/L    HYSTERECTOMY      JOINT REPLACEMENT  2007    [place] Knees. [place].  Hips    KNEE ARTHROSCOPY Bilateral     LAP BAND      LUMBAR FUSION  2019    done at [address] [facility] Left 2015    sacroiliac joint #1    NERVE BLOCK  2015    lumbar epidural #1    NERVE BLOCK N/A 2015    lumbar epidural nerve block #3    NERVE BLOCK N/A 10/26/2015    paravertebral facet bilateral lumbar #1    NERVE BLOCK Bilateral 2015    lumb facet #2    NERVE BLOCK Bilateral 2015    paravertebral facet block lumbar #3    NERVE BLOCK Bilateral 04/13/2016    Bilateral sacroiliac joint injection #1    NERVE BLOCK Bilateral 08/31/2016    SI injection #2    NERVE BLOCK Bilateral 10/03/2016    si inj #3    NERVE SURGERY Right 01/22/2020    RADIOFREQUENCY, SACROILIAC JOINT RIGHT AT S1, S2, S3 performed by Sari Eaton DO at [address] [facility] Right 7/7/2021    STAGE 2 MEDTRONIC LUMBAR SPINAL CORD STIMULATOR IMPLANT (CPT 83727, 92020) (MEDTRONIC) (PT REQUESTS BATTERY IN RIGHT FLANK AREA) performed by Sari Eatno DO at [facility] 8      upper and lower dental implants  except 6 teeth in  front    OTHER SURGICAL HISTORY Right 04/15/2015    right ethmoidectomymaaxillary antrostomies frontal recess exploration    OTHER SURGICAL HISTORY Right 12/30/2015    Right lumbar radiofrequency    OTHER SURGICAL HISTORY Left 02/08/2016    lumb radiofreq    OTHER SURGICAL HISTORY Left 11/21/2016    lateral sacral radiofrequency    OTHER SURGICAL HISTORY Right 01/09/2017    radiofrequency    OTHER SURGICAL HISTORY Right 02/08/2017    lumbar facet radiofreq    OTHER SURGICAL HISTORY Left 03/27/2017    lumbar radiofrequency     OTHER SURGICAL HISTORY Left 05/24/2017    SI joint radiofrequency    OTHER SURGICAL HISTORY Right 01/22/2020    si radiofrequency    OVARY REMOVAL  1975    right    REVISION TOTAL HIP ARTHROPLASTY  03/26/2012    orif left hip revision of femoral stem    TOTAL HIP ARTHROPLASTY  03/2012    TOTAL LEFT HIP ARTHROPLASTY WITH PLATELET GEL       Precautions:  Fall Risk, 2.5L, confusion and aphasic    Recommended placement: subacute vs IRF    Assessment of current deficits     [x] Functional mobility  [x]ADLs  [x] Strength               [x]Cognition     [x] Functional transfers   [x] IADLs         [x] Safety Awareness   [x]Endurance     [] Fine Coordination              [x] Balance      [] Vision/perception   []Sensation      [x]Gross Motor family assist or assist with IADLs at ME. Bathroom setup: walk in shower with bars and shower chair. Standard toilet    DME owned: walker     Prior Level of Function: indep with ADLs , min A with IADLs; ambulated indep with walker or furniture walking   Driving: yes   Occupation: retired   Enjoys: \"purses\" but suspecting that it was mentioned because she was holding it at the time    Pain Level: 8/10; back pain-nursing made aware and pt positioned up in bed for pain relief  Cognition: A&O: 2/4 to person and situation; however, pt reports that coming to the hospital was hazy for her; Follows 2 step directions and very fatigued and drowsy. High fall risk due to current level   Memory:  Fair-   Sequencing:  Fair-   Problem solving:  Fair- suspected due to alertness level   Judgement/safety: fair-    AM-PAC Daily Activity Inpatient   How much help for putting on and taking off regular lower body clothing?: A Lot  How much help for Bathing?: A Lot  How much help for Toileting?: Total  How much help for putting on and taking off regular upper body clothing?: A Little  How much help for taking care of personal grooming?: A Little  How much help for eating meals?: None  AM-PAC Inpatient Daily Activity Raw Score: 15  AM-PAC Inpatient ADL T-Scale Score : 34.69  ADL Inpatient CMS 0-100% Score: 56.46  ADL Inpatient CMS G-Code Modifier : CK     Functional Assessment:     Initial Eval Status  Date: 9/1/2021   Treatment Status  Date: STGs = LTGs  Time frame: 10-14 days   Feeding Independent physically but did not eat during eval  NA-PLOF   Grooming Stand by Assist sitting EOB to clean face and comb hair in back.  Falling asleep with fall risk  Independent    UB Dressing Minimal Assist with pt personal jacket with assist to pull around back from sitting EOB  Stand by Assist    LB Dressing Maximal Assist with pt able to slip clogs on and off, but assist for all other parts with high fall risk and fatigue  Minimal Assist Bathing Moderate Assist suspected due to physical performance  Stand by Assist    Toileting Dependent with assist for all parts from sitting/standing  Maximal Assist    Bed Mobility  Supine to sit: Moderate Assist   Sit to supine: Moderate Assist  With trunk assist and cues for hand/feet placement. Pt falling asleep and difficult to remain alert. High fall risk   Supine to sit: Stand by Assist   Sit to supine: Stand by Assist    Functional Transfers Moderate Assist for sit to stand from bed with use of walker. High fall risk with pt fatigue. Min Assist    Functional Mobility Moderate Assist for only side steps to [facility]. High fall risk and sitting with impulsivity  Minimal Assist    Balance Sitting:     Static:  fair    Dynamic:fair-  Standing: fair-  Sitting:     Static:  good    Dynamic:fair+  Standing: fair   Activity Tolerance Vitals with activity:on 2L with 92% in supine; 118/85 HR 77  Standing tolerance <1min with safety  Increase standing tolerance for >4min for carry over into toileting, functional tranfers and indep in ADLs   Visual/  Perceptual Glasses: Present; WFL    Reports change in vision since admission: No     NA   [place] UE Strengthening  4-/5 generally  4+/5MMT generally for carry over into self care, functional transfers and functional mobility with AD. Hand Dominance  [x] Right  [] Left    AROM (PROM) Strength Additional Info:    RUE  WFL with limitations in proximal shoulder planes 4-/5 Fair  and  FMC/dexterity noted during ADL tasks  Opposition [x] Intact [] Impaired  Finger to nose [x] Intact [] Impaired     LUE WFL with limitations in proximal shoulders  4-/5 Fair  and FMC/dexterity noted during ADL tasks  Opposition [x] Intact [] Impaired  Finger to nose [x] Intact [] Impaired     Hearing: WFL   Sensation:  No c/o numbness or tingling   Tone: WFL   Edema: none    Comments: Upon arrival patient supine in bed sleeping. Pt required min A for most UB ADLs and max A LB ADLs tasks. and goals.             [place] Britney OTR/L; [id number]

## 2021-09-01 NOTE — ED NOTES
Patient refused lumbar xray, patient screams stating it is too painful to lie down to complete     Natali Santiago, FLYNN  09/01/21 1925

## 2021-09-02 ENCOUNTER — APPOINTMENT (OUTPATIENT)
Dept: CT IMAGING | Age: 76
End: 2021-09-02
Payer: MEDICARE

## 2021-09-02 VITALS
HEART RATE: 93 BPM | SYSTOLIC BLOOD PRESSURE: 167 MMHG | OXYGEN SATURATION: 92 % | TEMPERATURE: 98.4 F | RESPIRATION RATE: 18 BRPM | DIASTOLIC BLOOD PRESSURE: 88 MMHG

## 2021-09-02 PROCEDURE — 6370000000 HC RX 637 (ALT 250 FOR IP): Performed by: INTERNAL MEDICINE

## 2021-09-02 PROCEDURE — 97530 THERAPEUTIC ACTIVITIES: CPT

## 2021-09-02 PROCEDURE — G0378 HOSPITAL OBSERVATION PER HR: HCPCS

## 2021-09-02 PROCEDURE — 99236 HOSP IP/OBS SAME DATE HI 85: CPT | Performed by: INTERNAL MEDICINE

## 2021-09-02 PROCEDURE — 6360000002 HC RX W HCPCS: Performed by: INTERNAL MEDICINE

## 2021-09-02 PROCEDURE — 94640 AIRWAY INHALATION TREATMENT: CPT

## 2021-09-02 PROCEDURE — 97535 SELF CARE MNGMENT TRAINING: CPT

## 2021-09-02 PROCEDURE — 96375 TX/PRO/DX INJ NEW DRUG ADDON: CPT

## 2021-09-02 PROCEDURE — 96376 TX/PRO/DX INJ SAME DRUG ADON: CPT

## 2021-09-02 PROCEDURE — 2700000000 HC OXYGEN THERAPY PER DAY

## 2021-09-02 RX ORDER — MORPHINE SULFATE 4 MG/ML
3 INJECTION, SOLUTION INTRAMUSCULAR; INTRAVENOUS EVERY 6 HOURS PRN
Status: DISCONTINUED | OUTPATIENT
Start: 2021-09-02 | End: 2021-09-02 | Stop reason: HOSPADM

## 2021-09-02 RX ORDER — LORAZEPAM 2 MG/ML
1 INJECTION INTRAMUSCULAR EVERY 4 HOURS PRN
Status: DISCONTINUED | OUTPATIENT
Start: 2021-09-02 | End: 2021-09-02 | Stop reason: HOSPADM

## 2021-09-02 RX ADMIN — IPRATROPIUM BROMIDE 0.5 MG: 0.5 SOLUTION RESPIRATORY (INHALATION) at 06:39

## 2021-09-02 RX ADMIN — MORPHINE SULFATE 4 MG: 4 INJECTION, SOLUTION INTRAMUSCULAR; INTRAVENOUS at 06:55

## 2021-09-02 RX ADMIN — IPRATROPIUM BROMIDE 0.5 MG: 0.5 SOLUTION RESPIRATORY (INHALATION) at 10:26

## 2021-09-02 RX ADMIN — OXYCODONE AND ACETAMINOPHEN 1.5 TABLET: 5; 325 TABLET ORAL at 05:29

## 2021-09-02 RX ADMIN — IPRATROPIUM BROMIDE 0.5 MG: 0.5 SOLUTION RESPIRATORY (INHALATION) at 13:36

## 2021-09-02 RX ADMIN — LORAZEPAM 1 MG: 2 INJECTION INTRAMUSCULAR; INTRAVENOUS at 11:13

## 2021-09-02 ASSESSMENT — PAIN DESCRIPTION - PROGRESSION
CLINICAL_PROGRESSION: GRADUALLY WORSENING
CLINICAL_PROGRESSION: NOT CHANGED
CLINICAL_PROGRESSION: NOT CHANGED

## 2021-09-02 ASSESSMENT — PAIN DESCRIPTION - FREQUENCY
FREQUENCY: CONTINUOUS

## 2021-09-02 ASSESSMENT — PAIN DESCRIPTION - LOCATION
LOCATION: BACK

## 2021-09-02 ASSESSMENT — PAIN DESCRIPTION - DIRECTION
RADIATING_TOWARDS: LEGS

## 2021-09-02 ASSESSMENT — PAIN DESCRIPTION - PAIN TYPE
TYPE: ACUTE PAIN
TYPE: ACUTE PAIN;CHRONIC PAIN
TYPE: ACUTE PAIN
TYPE: ACUTE PAIN

## 2021-09-02 ASSESSMENT — PAIN DESCRIPTION - ORIENTATION
ORIENTATION: LOWER
ORIENTATION: LOWER;MID

## 2021-09-02 ASSESSMENT — PAIN DESCRIPTION - DESCRIPTORS
DESCRIPTORS: PATIENT UNABLE TO DESCRIBE
DESCRIPTORS: SHARP

## 2021-09-02 ASSESSMENT — PAIN - FUNCTIONAL ASSESSMENT
PAIN_FUNCTIONAL_ASSESSMENT: PREVENTS OR INTERFERES SOME ACTIVE ACTIVITIES AND ADLS
PAIN_FUNCTIONAL_ASSESSMENT: PREVENTS OR INTERFERES SOME ACTIVE ACTIVITIES AND ADLS
PAIN_FUNCTIONAL_ASSESSMENT: ACTIVITIES ARE NOT PREVENTED

## 2021-09-02 ASSESSMENT — PAIN DESCRIPTION - ONSET
ONSET: ON-GOING

## 2021-09-02 ASSESSMENT — PAIN SCALES - GENERAL
PAINLEVEL_OUTOF10: 10
PAINLEVEL_OUTOF10: 8
PAINLEVEL_OUTOF10: 4
PAINLEVEL_OUTOF10: 8
PAINLEVEL_OUTOF10: 4
PAINLEVEL_OUTOF10: 8
PAINLEVEL_OUTOF10: 8

## 2021-09-02 NOTE — H&P
[facility] [place] [address] [facility]   History and Physical      CHIEF COMPLAINT:  Back pain  Informant(s) for H&P: Patient    History of Present Illness:  77 y.o. female with a history of breast cancer, hypertension, depression, degenerative joint disease, COPD, AYESHA, pulmonary hypertension presents with complaints of back pain. Patient states she has had chronic back pain, however it became worse in the last few days especially all day today after she fell. Patient states she has fallen several times, 4 times in the last week. She reports her legs getting weak and then she falls. No complaints of dizziness or lightheadedness, no chest pain, shortness of breath or palpitations. No history of vertigo. Since the fall, her back pain has been persistent and she has been unable to ambulate. She denies any loss of sphincteric tone. No lower extremity weakness, her mobility is essentially limited by her pain, and she is unable to lay on her back due to severe back pain. Patient has a stage II Medtronic lumbar spinal cord stimulator that was implanted in July 2021 for postlaminectomy syndrome. Her vital signs were stable in the ED today. Her labs showed normal BNP, CBC, CMP, initial troponin was 106 which is elevated, repeat troponin is 102 and 100. Rapid Covid test was negative. UA is unremarkable. REVIEW OF SYSTEMS:  no fevers, chills, cp, sob, n/v, ha, vision/hearing changes, wt changes, hot/cold flashes, other open skin lesions, diarrhea, constipation, dysuria/hematuria unless noted in HPI. Complete ROS performed with the patient and is otherwise negative.       PMH:  Past Medical History:   Diagnosis Date    Adverse effect of anesthetic     difficulty waking up if lying flat post op    Arthritis     Bronchitis     Cancer ([facility] [address] 2016    breast left    Diverticula of colon 2011    Hyperlipidemia     Hypertension     Pneumonia     Restless leg syndrome     Sleep apnea     CPAP gabapentin (NEURONTIN) 300 MG capsule Take 300 mg by mouth 3 times daily. 10/15/20  Yes Historical Provider, MD   SPIRIVA RESPIMAT 2.5 MCG/ACT AERS inhaler inhale 2 puffs by mouth once daily 1/25/21  Yes Sendy Glez MD   nebivolol (BYSTOLIC) 5 MG tablet TAKE 1 TABLET BY MOUTH ONCE DAILY 1/7/21  Yes Sendy Glez MD   furosemide (LASIX) 20 MG tablet Take 20 mg by mouth 2 times daily 12/22/20  Yes Historical Provider, MD   BIOTIN PO Take 3,000 mg by mouth daily   Yes Historical Provider, MD   lidocaine (XYLOCAINE) 5 % ointment Apply topically to hands and low back as needed. 2/29/16  Yes VAIBHAV Agarwal - CNP   Handicap Placard MISC by Does not apply route Unable to ambulate more than 40 feet without difficulty  Expires 11/25/2024 11/25/19   Sendy Glez MD       Allergies:    Demerol [meperidine hcl], Meperidine, and Nabumetone    Social History:    reports that she has been smoking cigarettes. She has a 31.00 pack-year smoking history. She has never used smokeless tobacco. She reports previous alcohol use of about 1.0 standard drinks of alcohol per week. She reports that she does not use drugs. Family History:   family history includes Diabetes in her father; Heart Failure in her mother; Other in her brother; Parkinsonism in her father.      PHYSICAL EXAM:  Vitals:  BP (!) 169/84   Pulse 81   Temp 97.7 °F (36.5 °C)   Resp 20   SpO2 98%     General Appearance: alert and oriented to person, place and time and in painful acute distress  Skin: warm and dry  Head: normocephalic and atraumatic  Eyes: pupils equal, round, and reactive to light, extraocular eye movements intact, conjunctivae normal  Neck: neck supple and non tender without mass   Pulmonary/Chest: clear to auscultation bilaterally- no wheezes, rales or rhonchi, normal air movement, no respiratory distress  Cardiovascular: normal rate, normal S1 and S2 and no carotid bruits  Abdomen: soft, non-tender, non-distended, normal bowel sounds, no masses or organomegaly  Extremities: Patient laying on her side no cyanosis, and mobility limited due to back pain, no clubbing and no edema  Neurologic: no cranial nerve deficit and speech normal    LABS:  Recent Labs     09/01/21  1048      K 4.3   CL 92*   CO2 35*   BUN 30*   CREATININE 0.9   GLUCOSE 94   CALCIUM 8.8       Recent Labs     09/01/21  1048   WBC 7.0   RBC 5.35   HGB 15.5   HCT 45.8   MCV 85.6   MCH 29.0   MCHC 33.8   RDW 12.5      MPV 10.5       No results for input(s): POCGLU in the last 72 hours. Radiology: No results found. EKG: Normal sinus rhythm with occasional PVCs    ASSESSMENT:    1. Acute on chronic back pain: Patient with known history of degenerative joint disease, has had multiple interventions for her chronic back pain including epidural injections, PT, underwent L2-L3 laminectomy and fusion with osteotomies, lumbar body fusion and arthrodesis. Patient is on Percocet 7.5/325 every 6 as needed at home, and as well has a lumbar spine spinal stimulator. 2.  Recurrent falls: Most likely due to physical deconditioning. Patient has no obvious neurological deficits in the lower extremities. 3.  COPD without acute exacerbation: Continue home inhalers  4. Essential hypertension: Stable. Continue home dose of  5. History of breast cancer, in remission for 5 years. 6.  History of obstructive sleep apnea and OHS: Stable  7. History of depression    PLAN:    1. We will get CT thoracic and lumbar spine to evaluate for any fractures or significant postsurgical changes. 2. Pain control with IV morphine 4 mg every 4 as needed  3. PT/OT  4. Continue other home medications    Code Status: Full code  DVT prophylaxis: Lovenox subcu    NOTE: This report was transcribed using voice recognition software.  Every effort was made to ensure accuracy; however, inadvertent computerized transcription errors may be present.     Electronically signed by Gloria Chang MD on 9/1/2021 at 8:50 PM

## 2021-09-02 NOTE — DISCHARGE SUMMARY
[facility] Physician Discharge Summary       Nisha Doshi MD  [address] [facility] [id number]    Call  As needed    [facility]  [address] [address]  [phone]          Activity level: Slowly increase as tolerated    Diet: ADULT DIET; Regular    Labs: None are pending at the discharge    Condition at discharge: Stable    Dispo: ecf    Patient ID:  Maria E Ibarra  [id number]  89 y.o.  1945    Admit date: 9/1/2021    Discharge date and time:  9/2/2021  2:08 PM    Admission Diagnoses: Principal Problem:    Unable to ambulate  Resolved Problems:    * No resolved hospital problems. *      Discharge Diagnoses: Principal Problem:    Unable to ambulate  Resolved Problems:    * No resolved hospital problems. *      Consults:  IP CONSULT TO SOCIAL WORK    Procedures: None significant except if described in hospital course. Hospital Course: History of present illness from History and Physical:  77 y.o. female with a history of breast cancer, hypertension, depression, degenerative joint disease, COPD, AYESHA, pulmonary hypertension presents with complaints of back pain. Patient states she has had chronic back pain, however it became worse in the last few days especially all day today after she fell. Patient states she has fallen several times, 4 times in the last week. She reports her legs getting weak and then she falls. No complaints of dizziness or lightheadedness, no chest pain, shortness of breath or palpitations. No history of vertigo. Since the fall, her back pain has been persistent and she has been unable to ambulate. She denies any loss of sphincteric tone. No lower extremity weakness, her mobility is essentially limited by her pain, and she is unable to lay on her back due to severe back pain.   Patient has a stage II Medtronic lumbar spinal cord stimulator that was implanted in July 2021 for postlaminectomy syndrome. Her vital signs were stable in the ED today. Her labs showed normal BNP, CBC, CMP, initial troponin was 106 which is elevated, repeat troponin is 102 and 100. Rapid Covid test was negative. UA is unremarkable. 1 Acute on chronic back pain with degenerative joint disease multiple interventions in the past including epidurals PT laminectomy of L2-L3 including fusion with osteotomies lumbar body fusion and arthrodesis. Chronic narcotic at home includes Percocet 7.5/325 every 6 hours as needed and lumbar spine stimulator. She has gotten deconditioned which probably is what has led to her increased falling I question if she is overusing narcotics which could easily contribute to this as well as undertreated depression. She will have PT OT and then either discharge home or to facility. Work-up here included CT thoracic and lumbar spine that was refused in am of 9/2 by patient  On admission in the evening of 9/1 she was started on morphine IV for every 4 which we then weaned to 3 every 6 as needed    She had refused CAT scan twice before I approached her with the nurse. She understands the risks and benefits of further back pain work-up. At that moment she was willing to try again since she was able to sit lie comfortably in the bed. However she changed her mind despite symptom relieving medication both narcotics and anxiolytics. .  While I was discussing her situation with the patient I gave her empathy explanation. I received permission from the patient for the nurse to contact her next of kin which is one of her out-of-town children  2. COPD without acute exacerbation continue home inhalers. 3.  Essential hypertension continue home medication and monitor. 4.  History of breast cancer in remission for 5 years follow-up outpatient. 5.  Obstructive sleep apnea and obesity hypoventilation syndrome assess compliance to treatment.   6.  Depression consider psychiatric outpatient or adjustment by primary care      Discharge Exam:  Vitals:    09/02/21 0115 09/02/21 0522 09/02/21 0651 09/02/21 1338   BP:  (!) 159/90     Pulse:  87     Resp: 14 14 14    Temp:  98 °F (36.7 °C)     TempSrc:  Oral     SpO2:    92%       No acute distress moist membranes   Normocephalic, without obvious abnormality, atraumatic, external ears without lesions,   Neck supple no cervical lymphadenopathy  Heart has a regular rate and rhythm no murmur  Lungs are clear to auscultation bilaterally with equal movements. Abdomen is soft nontender nondistended no rebound or guarding. No  significant peripheral edema good peripheral perfusion. No significant rashes or new skin lesions. No new focality on neuro exam which is overall grossly intact. Affect and mood seem to be appropriate     No intake/output data recorded. I/O this shift:  In: -   Out: 100 [Urine:100]      LABS:  Recent Labs     09/01/21  1048      K 4.3   CL 92*   CO2 35*   BUN 30*   CREATININE 0.9   GLUCOSE 94   CALCIUM 8.8       Recent Labs     09/01/21  1048   WBC 7.0   RBC 5.35   HGB 15.5   HCT 45.8   MCV 85.6   MCH 29.0   MCHC 33.8   RDW 12.5      MPV 10.5       No results for input(s): POCGLU in the last 72 hours. Imaging:  XR CHEST (2 VW)    Result Date: 9/1/2021  EXAMINATION: TWO XRAY VIEWS OF THE CHEST 9/1/2021 7:01 pm COMPARISON: 08/11/2020 portable chest. HISTORY: ORDERING SYSTEM PROVIDED HISTORY: hypoxia TECHNOLOGIST PROVIDED HISTORY: Reason for exam:->hypoxia FINDINGS: Radiopacity compatible with a port projects over the medial left subphrenic upper abdominal quadrant. Cardiopericardial silhouette size is within normal limits. No pneumothorax, pleural effusion or overt pulmonary edema. Mild bibasilar subsegmental atelectasis and/or scarring. Perhaps mild central pulmonary vascular congestion, probably not significantly different compared to prior study. No acute osseous abnormality.  Though potentially exaggerated by relative upper shocked technique, there is narrowing of the right subacromial joint space. No pneumonia, pneumothorax or overt CHF. Stable relative elevation of the right hemidiaphragm. Somewhat diminished lung volumes. Patient Instructions:   Current Discharge Medication List      CONTINUE these medications which have NOT CHANGED    Details   fluticasone (FLONASE) 50 MCG/ACT nasal spray 1 spray by Each Nostril route daily      rOPINIRole (REQUIP) 1 MG tablet Take 1 tablet by mouth nightly  Qty: 90 tablet, Refills: 3    Associated Diagnoses: Restless leg syndrome      losartan (COZAAR) 100 MG tablet Take 1 tablet by mouth daily  Qty: 90 tablet, Refills: 5      amLODIPine (NORVASC) 10 MG tablet Take 1 tablet by mouth daily  Qty: 90 tablet, Refills: 1      oxyCODONE-acetaminophen (PERCOCET) 7.5-325 MG per tablet take 1 tablet by mouth every 6 hours if needed      cetirizine (ZYRTEC) 5 MG tablet Take 1 tablet by mouth daily  Qty: 30 tablet, Refills: 5    Associated Diagnoses: Voice hoarseness      !! DULoxetine (CYMBALTA) 30 MG extended release capsule take 1 capsule by mouth once daily  Qty: 30 capsule, Refills: 3    Associated Diagnoses: Moderate episode of recurrent major depressive disorder ([address]      ! ! DULoxetine (CYMBALTA) 60 MG extended release capsule TAKE 1 CAPSULE BY MOUTH  NIGHTLY  Qty: 90 capsule, Refills: 1    Comments: Requesting 1 year supply  Associated Diagnoses: Moderate episode of recurrent major depressive disorder ([facility])      methocarbamol (ROBAXIN) 750 MG tablet take 1 tablet by mouth every 12 hours if needed      celecoxib (CELEBREX) 200 MG capsule take 1 capsule by mouth twice a day  Qty: 60 capsule, Refills: 3      diclofenac sodium (VOLTAREN) 1 % GEL apply 4 grams to KNEE four times a day AS NEEDED FOR PAIN      gabapentin (NEURONTIN) 300 MG capsule Take 300 mg by mouth 3 times daily.       SPIRIVA RESPIMAT 2.5 MCG/ACT AERS inhaler inhale 2 puffs by mouth once daily  Qty: 4 g, Refills: 3    Associated Diagnoses: Pulmonary emphysema, unspecified emphysema type (HCC)      nebivolol (BYSTOLIC) 5 MG tablet TAKE 1 TABLET BY MOUTH ONCE DAILY  Qty: 90 tablet, Refills: 3    Comments: Requesting 1 year supply      furosemide (LASIX) 20 MG tablet Take 20 mg by mouth 2 times daily      BIOTIN PO Take 3,000 mg by mouth daily      lidocaine (XYLOCAINE) 5 % ointment Apply topically to hands and low back as needed. Qty: 1 Tube, Refills: 3      Handicap Placard MISC by Does not apply route Unable to ambulate more than 40 feet without difficulty  Expires 11/25/2024  Qty: 1 each, Refills: 0       !! - Potential duplicate medications found. Please discuss with provider. STOP taking these medications       amoxicillin (AMOXIL) 500 MG capsule Comments:   Reason for Stopping:         PROAIR  (90 Base) MCG/ACT inhaler Comments:   Reason for Stopping:                 Note that more than 30 minutes was spent in preparing discharge papers, discussing discharge with patient, medication review, etc.    NOTE: This report was transcribed using voice recognition software. Every effort was made to ensure accuracy; however, inadvertent computerized transcription errors may be present.      Signed:  Electronically signed by Lory Decker MD on 9/2/2021 at 2:08 PM

## 2021-09-02 NOTE — CARE COORDINATION
SS NOTE: This pt has been accepted to [facility]. Pt will need NO PRECERT, a signed YONG, current PT/OT notes and SW will need a DISCHARGE ORDER TO COMPLETE THE HENS. CARISA Payan.9/2/2021.10:41 AM.

## 2021-09-02 NOTE — CARE COORDINATION
SS NOTE: Arrangements completed for pt to be transferred to [facility] today at [facility] via [employer]. Pt will need NO PRECERT, a signed YONG, current PT/OT notes and SW completed the PASRR. CARISA Reyes.9/2/2021.12:04 PM.

## 2021-09-02 NOTE — PROGRESS NOTES
OT BEDSIDE TREATMENT NOTE      Date:2021  Patient Name: Durga Estrada  MRN: [id number]  : 1945  Room: [address] [place] [address]        Evaluating OT: Elsy Masters OTR/ELIAZAR; [id number]        Referring Provider and Orders/Date:   OT eval and treat Start: 21 1315, End: 21 1315, ONE TIME, Standing Count: 1 Occurrences, R    Jennifer Munoz DO     Diagnosis: No diagnosis found. Surgery: 21 Procedure(s):  STAGE 2 MEDTRONIC LUMBAR SPINAL CORD STIMULATOR IMPLANT (MEDTRONIC)      Pertinent Medical History:        Past Medical History        Past Medical History:   Diagnosis Date    Adverse effect of anesthetic       difficulty waking up if lying flat post op    Arthritis      Bronchitis      Cancer ([facility] [address] 2016     breast left    Diverticula of colon     Hyperlipidemia      Hypertension      Pneumonia      Restless leg syndrome      Sleep apnea       CPAP    Thyroid disease               Past Surgical History         Past Surgical History:   Procedure Laterality Date    APPENDECTOMY        BREAST SURGERY Left 2016     lumpectomy for cancer with chemo & radiation last chemo in july     CHOLECYSTECTOMY   2009     lap    COLONOSCOPY   2011     diverticulosis    COSMETIC SURGERY Bilateral       blephoroplasty    EYE SURGERY Bilateral       cataract extraction    HAND SURGERY         CTS B/L    HYSTERECTOMY        JOINT REPLACEMENT   2007     [place]. Knees. [place].  Hips    KNEE ARTHROSCOPY Bilateral      LAP BAND       LUMBAR FUSION   2019     done at [facility] Left 2015     sacroiliac joint #1    NERVE BLOCK   2015     lumbar epidural #1    NERVE BLOCK N/A 2015     lumbar epidural nerve block #3    NERVE BLOCK N/A 10/26/2015     paravertebral facet bilateral lumbar #1    NERVE BLOCK Bilateral 2015     lumb facet #2    NERVE BLOCK Bilateral 2015     paravertebral facet block lumbar #3    NERVE BLOCK Bilateral 2016   Bilateral sacroiliac joint injection #1    NERVE BLOCK Bilateral 08/31/2016     SI injection #2    NERVE BLOCK Bilateral 10/03/2016     si inj #3    NERVE SURGERY Right 01/22/2020     RADIOFREQUENCY, SACROILIAC JOINT RIGHT AT S1, S2, S3 performed by Preethi Forrester DO at [address] [place] [address],[address] Right 7/7/2021     STAGE 2 MEDTRONIC LUMBAR SPINAL CORD STIMULATOR IMPLANT (CPT 08387, 50292) (MEDTRONIC) (PT REQUESTS BATTERY IN RIGHT FLANK AREA) performed by Preethi Forrester DO at [facility] [place] [facility]         upper and lower dental implants  except 6 teeth in  front    OTHER SURGICAL HISTORY Right 04/15/2015     right ethmoidectomymaaxillary antrostomies frontal recess exploration    OTHER SURGICAL HISTORY Right 12/30/2015     Right lumbar radiofrequency    OTHER SURGICAL HISTORY Left 02/08/2016     lumb radiofreq    OTHER SURGICAL HISTORY Left 11/21/2016     lateral sacral radiofrequency    OTHER SURGICAL HISTORY Right 01/09/2017     radiofrequency    OTHER SURGICAL HISTORY Right 02/08/2017     lumbar facet radiofreq    OTHER SURGICAL HISTORY Left 03/27/2017     lumbar radiofrequency     OTHER SURGICAL HISTORY Left 05/24/2017     SI joint radiofrequency    OTHER SURGICAL HISTORY Right 01/22/2020     si radiofrequency    OVARY REMOVAL   1975     right    REVISION TOTAL HIP ARTHROPLASTY   03/26/2012     orif left hip revision of femoral stem    TOTAL HIP ARTHROPLASTY   03/2012     TOTAL LEFT HIP ARTHROPLASTY WITH PLATELET GEL           Precautions:  Fall Risk, 2.5L, confusion at eval and treatment and aphasic at eval    Recommended placement: subacute vs IRF      Assessment of current deficits     [x]? Functional mobility           [x]? ADLs           [x]? Strength                   [x]? Cognition     [x]? Functional transfers         [x]? IADLs         [x]? Safety Awareness   [x]? Endurance     []? Fine Coordination                        [x]? Balance      []? Vision/perception    []? Sensation       [x]? Gross Motor Coordination            []? ROM           [x]? Delirium                   []? Motor Control      OT PLAN OF CARE   OT POC based on physician orders, patient diagnosis and results of clinical assessment     Frequency/Duration 1-3 days/wk for 2 weeks PRN   Specific OT Treatment Interventions to include:   * Instruction/training on adapted ADL techniques and AE recommendations to increase functional independence within precautions       * Training on energy conservation strategies, correct breathing pattern and techniques to improve independence/tolerance for self-care routine  * Functional transfer/mobility training/DME recommendations for increased independence, safety, and fall prevention  * Patient/Family education to increase follow through with safety techniques and functional independence  * Recommendation of environmental modifications for increased safety with functional transfers/mobility and ADLs  * Cognitive retraining/development of therapeutic activities to improve problem solving, judgement, memory, and attention for increased safety/participation in ADL/IADL tasks  * Therapeutic exercise to improve motor endurance, ROM, and functional strength for ADLs/functional transfers  * Therapeutic activities to facilitate/challenge dynamic balance, stand tolerance for increased safety and independence with ADLs  * Therapeutic activities to facilitate gross/fine motor skills for increased independence with ADLs  * Neuro-muscular re-education: facilitation of righting/equilibrium reactions, midline orientation, scapular stability/mobility, normalization of muscle tone, and facilitation of volitional active controled movement      Recommended Adaptive Equipment/DME:  TBD       Home Living: Pt confused and possible poor historian throughout eval. Pt falling sleep from both supine and seated positions with differing levels of orientation throughout.  Pt lives at home alone in a 1 story home with 1 step to enter with rail. She can have check in family assist or assist with IADLs at DE. Bathroom setup: walk in shower with bars and shower chair. Standard toilet    DME owned: walker      Prior Level of Function: indep with ADLs , min A with IADLs; ambulated indep with walker or furniture walking   Driving: yes   Occupation: retired   Enjoys: \"purses\" but suspecting that it was mentioned because she was holding it at the time at eval     Pain Level: 10/10; back pain-nursing made aware and pt positioned up in bed for pain relief  Cognition: A&O: 2/4 to person and situation; however, pt reports that coming to the hospital was hazy for her; Follows 2 step directions and very fatigued and drowsy. High fall risk due to current level              Memory:  Fair-              Sequencing:  Fair-              Problem solving:  Fair- suspected due to alertness level              Judgement/safety: fair-     AM-PAC Daily Activity Inpatient   How much help for putting on and taking off regular lower body clothing?: A Lot  How much help for Bathing?: A Lot  How much help for Toileting?: Total  How much help for putting on and taking off regular upper body clothing?: A Lot  How much help for taking care of personal grooming?: A Little  How much help for eating meals?: None  AM-PAC Inpatient Daily Activity Raw Score: 14                Functional Assessment:      Initial Eval Status  Date: 9/1/2021   Treatment Status  Date:9/2/2021 STGs = LTGs  Time frame: 10-14 days   Feeding Independent physically but did not eat during eval N/T  NA-PLOF   Grooming Stand by Assist sitting EOB to clean face and comb hair in back.  Falling asleep with fall risk Min A to don shampoo cap; pt able to wash hair with R hand d/t L hand holding onto bedrail; pt required cuing to brush hair when seated EOB  Independent    UB Dressing Minimal Assist with pt personal jacket with assist to pull around back from sitting EOB  Mod A to doff nightgown and don hospital gown d/t incontinence of urine Stand by Assist    LB Dressing Maximal Assist with pt able to slip clogs on and off, but assist for all other parts with high fall risk and fatigue N/T d/t incontinence of urine  Minimal Assist    Bathing Moderate Assist suspected due to physical performance  Mod A ; pt able to wash UB with cuing for sequencing; assist to wash back and LE's when seated EOB; assist for pericare d/t incontinence of urine on RTB Stand by Assist    Toileting Dependent with assist for all parts from sitting/standing Dep for side rolling for placement on/off bedpan  Maximal Assist    Bed Mobility  Supine to sit: Moderate Assist   Sit to supine: Moderate Assist  With trunk assist and cues for hand/feet placement. Pt falling asleep and difficult to remain alert. High fall risk  Max A for supine to sit with assist to guide LE's to EOB and UB to sitting, although pt unable to maintain upright sitting balance with strong L lateral lean noted with assist of bed to support UB to partial midline position. Mod A x 1-2 for side rolling for placement on/off bedpan, assist to change gowns and change chux pads d/t incontinence of urine; pt would not lay on back for use of bedpan and urine uncontained Supine to sit: Stand by Assist   Sit to supine: Stand by Assist    Functional Transfers Moderate Assist for sit to stand from bed with use of walker. High fall risk with pt fatigue.   N/T; pt demo'd poor sitting balance at EOB; pt demo's strong L lateral lean with HOB elevated for support Min Assist    Functional Mobility Moderate Assist for only side steps to [facility].  High fall risk and sitting with impulsivity  N/T Minimal Assist    Balance Sitting:     Static:  fair    Dynamic:fair-  Standing: fair- Sitting:     Static:  poor    Dynamic:poor  Standing:N/T Sitting:     Static:  good    Dynamic:fair+  Standing: fair   Activity Tolerance Vitals with activity:on 2L with 92% in supine; 118/85 HR 77  Standing tolerance <1min with safety Fair minus; pt confused; pt's O2 sats at 80% on room air on arrival; nsg aware and placed pt on 2L O2  Increase standing tolerance for >4min for carry over into toileting, functional tranfers and indep in ADLs   Visual/  Perceptual Glasses: Present; WFL     Reports change in vision since admission: No      NA   [place] UE Strengthening  4-/5 generally   4+/5MMT generally for carry over into self care, functional transfers and functional mobility with AD.       Hand Dominance  [x]? Right   []? Left     AROM (PROM) Strength Additional Info:    RUE  WFL with limitations in proximal shoulder planes 4-/5 Fair  and  FMC/dexterity noted during ADL tasks  Opposition [x]? Intact []? Impaired  Finger to nose [x]? Intact []? Impaired      LUE WFL with limitations in proximal shoulders  4-/5 Fair  and FMC/dexterity noted during ADL tasks  Opposition [x]? Intact []? Impaired  Finger to nose [x]? Intact []? Impaired      Hearing: WFL   Sensation:  No c/o numbness or tingling   Tone: WFL   Edema: none      Comments: Patient cleared by nursing staff. Upon arrival pt in L sidelying position. Pt agreeable to OT tx session with mod encouragement provided. Pt educated with regards to bed mobility, hand placement, safety awareness, static sitting balance,    ADL retraining,  grooming tasks, UE/LE bathing, UE dressing, toileting/hygiene, ECT's. At end of session pt in L sidelying position  with all lines and tubes intact, call light within reach. Overall, pt demonstrated decreased independence and safety during completion of ADL/functional transfers/mobility tasks. Pt would benefit from continued skilled OT to increase safety and independence with completion of ADL/IADL tasks for functional independence and quality of life. Pt required cues and education as noted above for safe facilitation and completion of tasks.  Therapist provided skilled monitoring of patient's response during treatment session. Prior to and at the end of session, environmental modifications completed for patients safety and efficiency of treatment session. Overall, patient demonstrates moderate difficulties with completion of BADLs and IADLs. Factors contributing to these difficulties include urinary incontinence, decreased endurance, and generalized weakness. As noted above, patient likely to benefit from further OT intervention to increase independence, safety, and overall quality of life. Treatment:     ? Bed mobility: Facilitated bed mobility with cues for proper body mechanics and sequencing to prepare for ADL completion. ? ADL completion: Self-care retraining for the above-mentioned ADLs; training on proper hand placement, safety technique, sequencing, and energy conservation techniques. ? Postural Balance: Sitting balance retraining to improve righting reactions with postural changes during ADLs. ? Skilled positioning: Proper positioning to improve interaction with environment, overall functioning     · Pt has made fair minus/poor progress towards set goals   · OT 1-3x/week for 5-7 days during hospitalization     Treatment Time also includes thorough review of current medical information, gathering information on past medical history/social history and prior level of function, informal observation of tasks, assessment of data and education on plan of care and goals.     Treatment Time In: 9:49 AM     Treatment Time Out: 10:17 AM            Treatment Charges: Mins Units   ADL/Home Mgt     56073 20 1   Thera Activities     36138 8 1   Ther Ex                 80247     Manual Therapy    19815     Neuro Re-ed         08649     Orthotic manage/training                               17367     Non Billable Time     Total Timed Treatment 28 [address] [facility], [facility]/ #[id number]   [facility]

## 2021-09-02 NOTE — PROGRESS NOTES
Nurse to nurse give to [facility] AYLA at [facility], pickup scheduled for 4 pm.

## 2021-09-02 NOTE — DISCHARGE INSTR - COC
Continuity of Care Form    Patient Name: Dinorah Beck   :  1945  MRN:  [id number]    Admit date:  2021  Discharge date:  2021    Code Status Order: Full Code   Advance Directives:     Admitting Physician:  Orlando Barger DO  PCP: Jeanette Pang. MD Tera    Discharging Nurse: [facility] Unit/Room#: 0314/0314-02  Discharging Unit Phone Number: [phone]    Emergency Contact:   Extended Emergency Contact Information  Primary Emergency Contact: Glen Roman, [facility] Prudencio Pacheco [address] Phone: [phone]  Mobile Phone: [phone]  Relation: Other  Secondary Emergency Contact: Jocelyn Perdomo  [place] Phone: [phone]  Relation: Brother/Sister    Past Surgical History:  Past Surgical History:   Procedure Laterality Date    APPENDECTOMY      BREAST SURGERY Left 2016    lumpectomy for cancer with chemo & radiation last chemo in     lap    COLONOSCOPY      diverticulosis    COSMETIC SURGERY Bilateral     blephoroplasty    EYE SURGERY Bilateral     cataract extraction    HAND SURGERY      CTS B/L    HYSTERECTOMY      JOINT REPLACEMENT  2007    [place] Knees. [place].  Hips    KNEE ARTHROSCOPY Bilateral     LAP BAND      LUMBAR FUSION  2019    done at [facility] Left 2015    sacroiliac joint #1    NERVE BLOCK  2015    lumbar epidural #1    NERVE BLOCK N/A 2015    lumbar epidural nerve block #3    NERVE BLOCK N/A 10/26/2015    paravertebral facet bilateral lumbar #1    NERVE BLOCK Bilateral 2015    lumb facet #2    NERVE BLOCK Bilateral 2015    paravertebral facet block lumbar #3    NERVE BLOCK Bilateral 2016    Bilateral sacroiliac joint injection #1    NERVE BLOCK Bilateral 2016    SI injection #2    NERVE BLOCK Bilateral 10/03/2016    si inj #3    NERVE SURGERY Right 2020    RADIOFREQUENCY, SACROILIAC JOINT RIGHT AT S1, S2, S3 performed by Abbi Cabrera DO at [address] Right 7/7/2021    STAGE 2 MEDTRONIC LUMBAR SPINAL CORD STIMULATOR IMPLANT (CPT 42657, 20942) (MEDTRONIC) (PT REQUESTS BATTERY IN RIGHT FLANK AREA) performed by Abbi Cabrera DO at [facility]      upper and lower dental implants  except 6 teeth in  front    OTHER SURGICAL HISTORY Right 04/15/2015    right ethmoidectomymaaxillary antrostomies frontal recess exploration    OTHER SURGICAL HISTORY Right 12/30/2015    Right lumbar radiofrequency    OTHER SURGICAL HISTORY Left 02/08/2016    lumb radiofreq    OTHER SURGICAL HISTORY Left 11/21/2016    lateral sacral radiofrequency    OTHER SURGICAL HISTORY Right 01/09/2017    radiofrequency    OTHER SURGICAL HISTORY Right 02/08/2017    lumbar facet radiofreq    OTHER SURGICAL HISTORY Left 03/27/2017    lumbar radiofrequency     OTHER SURGICAL HISTORY Left 05/24/2017    SI joint radiofrequency    OTHER SURGICAL HISTORY Right 01/22/2020    si radiofrequency    OVARY REMOVAL  1975    right    REVISION TOTAL HIP ARTHROPLASTY  03/26/2012    orif left hip revision of femoral stem    TOTAL HIP ARTHROPLASTY  03/2012    TOTAL LEFT HIP ARTHROPLASTY WITH PLATELET GEL       Immunization History:   Immunization History   Administered Date(s) Administered    COVID-19, Moderna, PF, 100mcg/0.5mL 03/04/2021, 04/01/2021    Influenza Vaccine, unspecified formulation 12/12/2015    Influenza, High Dose (Fluzone 65 yrs and older) 10/25/2018, 09/18/2020    Influenza, Quadv, IM, PF (6 mo and older Fluzone, Flulaval, Fluarix, and 3 yrs and older Afluria) 09/18/2020    Influenza, Triv, inactivated, subunit, adjuvanted, IM (Fluad 65 yrs and older) 09/23/2019    Pneumococcal Conjugate 13-valent ([id number]) 08/03/2018    Pneumococcal Polysaccharide (Aacjoxakp14) 10/07/2020    Tdap (Boostrix, Adacel) 07/13/2012, 06/15/2015       Active Problems:  Patient Active Problem List Diagnosis Code    Osteoarthritis of hip M16.9    Hip pain M25.559    Periprosthetic fracture around internal prosthetic left hip joint ([facility] [address] M97. 02XA    Aseptic loosening of prosthetic hip ([facility]) T84.038A, Z96.649    Sleep disorder G47.9    Restless legs syndrome (RLS) G25.81    Numbness and tingling R20.0, R20.2    Trigger point of extremity M79.609    Primary osteoarthritis of right foot M19.071    Right foot pain M79.671    Shoulder contusion S40.019A    Shoulder sprain S43.409A    Shoulder pain M25.519    Spondylolisthesis, grade 1 M43.10    Neuroforaminal stenosis of spine M48.00    Lumbar spinal stenosis M48.061    Neuropathy involving both lower extremities G57.93    Protruded lumbar disc M51.26    Lumbar radiculopathy M54.16    Facet syndrome, lumbar ([facility]) M47.816    Status post total hip replacement, left Z96.642    Sacroiliitis ([facility]) M46.1    Malignant neoplasm of central portion of left female breast ([facility]) C50.112    Midline low back pain with bilateral sciatica M54.41, M54.42    Chronic pain syndrome G89.4    Class 3 severe obesity due to excess calories with serious comorbidity and body mass index (BMI) of 45.0 to 49.9 in adult ([facility]) E66.01, Z68.42    Dyspnea on exertion R06.00    Tobacco abuse Z72.0    Pure hypercholesterolemia E78.00    Bunionette of right foot M21.621    Difficulty walking R26.2    Morbid obesity with BMI of 45.0-49.9, adult ([facility]) E66.01, Z68.42    Pulmonary emphysema ([facility]) J43.9    AYESHA (obstructive sleep apnea) G47.33    Respiratory failure ([facility]) J96.90    COPD exacerbation ([facility]) J44.1    Nonrheumatic mitral valve regurgitation I34.0    Pulmonary hypertension ([facility]) I27.20    Pain in both lower extremities M79.604, M79.605    Moderate episode of recurrent major depressive disorder ([facility] [address] F33.1    Unable to ambulate R26.2       Isolation/Infection:   Isolation          No Isolation        Patient Infection Status     Infection Onset Added Last Indicated Last Indicated By Review Planned Expiration Resolved Resolved By    None active    Resolved    COVID-19 Rule Out 08/06/20 08/06/20 08/07/20 COVID-19 (Ordered)   08/07/20 Rule-Out Test Resulted          Nurse Assessment:  Last Vital Signs: BP (!) 159/90   Pulse 87   Temp 98 °F (36.7 °C) (Oral)   Resp 14   SpO2 98%     Last documented pain score (0-10 scale): Pain Level: 10  Last Weight:   Wt Readings from Last 1 Encounters:   08/18/21 173 lb (78.5 kg)     Mental Status:  disoriented and alert    IV Access:  - None    Nursing Mobility/ADLs:  Walking   Assisted  Transfer  Assisted  Bathing  Assisted  Dressing  Assisted  Toileting  Assisted  Feeding  Independent  Med Admin  Assisted  Med Delivery   whole    Wound Care Documentation and Therapy:        Elimination:  Continence:   · Bowel: No  · Bladder: No  Urinary Catheter: None   Colostomy/Ileostomy/Ileal Conduit: No       Date of Last BM: ***  No intake or output data in the 24 hours ending 09/02/21 1207  No intake/output data recorded. Safety Concerns: At Risk for Falls    Impairments/Disabilities:      [facility] Impairments/Disabilities:[id number]}    Nutrition Therapy:  Current Nutrition Therapy:   - Oral Diet:  General    Routes of Feeding: Oral  Liquids: Thin Liquids  Daily Fluid Restriction: no  Last Modified Barium Swallow with Video (Video Swallowing Test): not done    Treatments at the Time of Hospital Discharge:   Respiratory Treatments: ***  Oxygen Therapy:  is on oxygen at 2 L/min per nasal cannula.   Ventilator:    - No ventilator support    Rehab Therapies: Physical Therapy and Occupational Therapy  Weight Bearing Status/Restrictions: No weight bearing restirctions  Other Medical Equipment (for information only, NOT a DME order):  {EQUIPMENT:[id number]}  Other Treatments: ***    Patient's personal belongings (please select all that are sent with patient):  {[facility] DME Belongings:[id number]}    RN SIGNATURE:  Electronically signed by Luis Seals Hennaautumn Amin on 9/2/21 at 1:16 PM EDT    CASE MANAGEMENT/SOCIAL WORK SECTION    Inpatient Status Date: ***    Readmission Risk Assessment Score:  Readmission Risk              Risk of Unplanned Readmission:  0           Discharging to Facility/ Agency   · [address]  · [place] [address]: [phone]  · FAX: [phone]     HD Facility (if applicable)   · Name:  · Address:  · Dialysis Schedule:  · Phone:  · Fax:    / signature: Electronically signed by Elvis Jackson on 9/2/21 at 12:08 PM EDT    PHYSICIAN SECTION    Prognosis: Good    Condition at Discharge: Stable    Rehab Potential (if transferring to Rehab): Good    Recommended Labs or Other Treatments After Discharge: ***    Physician Certification: I certify the above information and transfer of Shoshanaolean Friday  is necessary for the continuing treatment of the diagnosis listed and that she requires [place] for less 30 days.      Update Admission H&P: {[facility] DME Changes in [facility]:[id number]}    PHYSICIAN SIGNATURE:  Electronically signed by Grace Guerra MD on 9/2/21 at 2:17 PM EDT

## 2021-09-03 LAB — URINE CULTURE, ROUTINE: NORMAL

## 2021-09-15 NOTE — TELEPHONE ENCOUNTER
Melissa called back . She said can be reached at [phone]. She is planning on going back to the hospital this afternoon around 2-3 pm.  There is a Dr there that she saw yesterday and she is waiting on him to be there.   She doesn't want to upset you but she had reasons for leaving Patient Seen in: [facility] Emergency Department    History   Patient presents with:  Abdomen/Flank Pain      HPI    The patient with a history of cholecystectomy and appendectomy presents to the ED complaining of pain to her right upper quadrant organ systems are reviewed and are negative. Constitutional and vital signs reviewed. Social History and Family History elements reviewed from today, pertinent positives to the presenting problem noted.     Physical Exam     ED Triage Vitals [09/14/ components:       Result Value    Glucose 102 (*)     Potassium 3.4 (*)     All other components within normal limits   CBC W/ DIFFERENTIAL - Abnormal; Notable for the following components:    HGB 10.2 (*)     HCT 34.6 (*)     MCV 75.7 (*)     MCH 22.3 (*) recent pertinent discharge summaries, testing, and procedures and reviewed those reports. Complicating Factors: The patient already has does not have any pertinent problems on file. to contribute to the complexity of this ED evaluation.     ED Course: Pa

## 2021-09-20 ENCOUNTER — TELEPHONE (OUTPATIENT)
Dept: FAMILY MEDICINE CLINIC | Age: 76
End: 2021-09-20

## 2021-09-20 NOTE — TELEPHONE ENCOUNTER
Alanna/[facility] called to ask if Dr. Mona Graves will follow for home care orders, PT, OT. Patient discharged from [facility] 9/18/21.     Last seen 8/18/2021  Next appt 2/23/2022

## 2021-09-21 RX ORDER — CELECOXIB 200 MG/1
CAPSULE ORAL
Qty: 60 CAPSULE | Refills: 3 | Status: SHIPPED
Start: 2021-09-21 | End: 2022-03-22

## 2021-10-02 DIAGNOSIS — F33.1 MODERATE EPISODE OF RECURRENT MAJOR DEPRESSIVE DISORDER (HCC): ICD-10-CM

## 2021-10-05 RX ORDER — DULOXETIN HYDROCHLORIDE 60 MG/1
60 CAPSULE, DELAYED RELEASE ORAL NIGHTLY
Qty: 90 CAPSULE | Refills: 3 | Status: SHIPPED
Start: 2021-10-05 | End: 2022-08-23

## 2021-11-09 ENCOUNTER — INITIAL CONSULT (OUTPATIENT)
Dept: NEUROSURGERY | Age: 76
End: 2021-11-09
Payer: MEDICARE

## 2021-11-09 VITALS
OXYGEN SATURATION: 92 % | HEART RATE: 98 BPM | SYSTOLIC BLOOD PRESSURE: 128 MMHG | HEIGHT: 60 IN | BODY MASS INDEX: 31.61 KG/M2 | DIASTOLIC BLOOD PRESSURE: 82 MMHG | WEIGHT: 161 LBS | TEMPERATURE: 97.8 F | RESPIRATION RATE: 18 BRPM

## 2021-11-09 DIAGNOSIS — M54.41 CHRONIC MIDLINE LOW BACK PAIN WITH BILATERAL SCIATICA: Primary | ICD-10-CM

## 2021-11-09 DIAGNOSIS — G89.29 CHRONIC MIDLINE LOW BACK PAIN WITH BILATERAL SCIATICA: Primary | ICD-10-CM

## 2021-11-09 DIAGNOSIS — M54.42 CHRONIC MIDLINE LOW BACK PAIN WITH BILATERAL SCIATICA: Primary | ICD-10-CM

## 2021-11-09 PROCEDURE — 99213 OFFICE O/P EST LOW 20 MIN: CPT | Performed by: STUDENT IN AN ORGANIZED HEALTH CARE EDUCATION/TRAINING PROGRAM

## 2021-11-09 ASSESSMENT — ENCOUNTER SYMPTOMS
PHOTOPHOBIA: 0
SHORTNESS OF BREATH: 0
BACK PAIN: 1
ABDOMINAL PAIN: 0
TROUBLE SWALLOWING: 0

## 2021-11-09 NOTE — PROGRESS NOTES
Subjective:      Patient ID: Kala Gaitan is a 77 y.o. female with a PMH of spinal cord stimulator placement 7/2021 presents with chronic low back pain. She states the pain is in her lower back and radiates down both legs. Patient states the stimulator rep recently adjusted the settings on the stimulator and this helped the nerve pain which is now improved. Patient is still having some pain but she is attributing this pain to arthritis. She states the pain is worse with weather changes and better after taking a hot shower/bath. Does state she is having some weakness in her legs and that she mostly uses a wheelchair to get around. Patient currently in PT which she states is helping her with the weakness. She is currently following with pain clinic. Denies any bowel or bladder incontinence. Patient is a current smoker and smokes about 1ppd. She denies any blood thinner usage. Review of Systems   Constitutional: Negative for fever. HENT: Negative for trouble swallowing. Eyes: Negative for photophobia. Respiratory: Negative for shortness of breath. Cardiovascular: Negative for chest pain. Gastrointestinal: Negative for abdominal pain. Endocrine: Negative for heat intolerance. Genitourinary: Negative for flank pain. Musculoskeletal: Positive for back pain and gait problem (ambulates with walker). Negative for myalgias. Skin: Negative for wound. Neurological: Positive for weakness. Negative for numbness and headaches. Psychiatric/Behavioral: Negative for confusion. Objective:   Physical Exam  HENT:      Head: Normocephalic. Eyes:      Pupils: Pupils are equal, round, and reactive to light. Cardiovascular:      Rate and Rhythm: Normal rate. Pulmonary:      Effort: Pulmonary effort is normal.   Abdominal:      General: There is no distension. Musculoskeletal:      Cervical back: Normal range of motion. Skin:     General: Skin is warm and dry.    Neurological:      Mental Status: She is alert. Comments: A&Ox3  CN3-12 intact  Motor Strength full   Sensation intact to light touch   Reflexes normal  (-) Bilateral straight leg test  Ambulates with walker   Psychiatric:         Thought Content: Thought content normal.         Assessment:      -Jenifer Alva is a 76 y/o who presents with chronic low back pain with bilateral radiculopathy. Patient states this nerve pain has improved once spinal cord stimulator was placed and adjusted. Now attributes the pain she is having to arthritis. Is currently in physical therapy and following with pain clinic. Plan:      -Pain control and expectations discussed  -Continue PT  -Continue following with pain clinic  -OARRS reviewed  -Patient was instructed to follow up if symptoms worsen  -Please call with any questions or concerns.          Floyd Barker PA-C

## 2021-11-11 RX ORDER — AMLODIPINE BESYLATE 5 MG/1
TABLET ORAL
Qty: 90 TABLET | Refills: 3 | OUTPATIENT
Start: 2021-11-11

## 2021-11-11 NOTE — TELEPHONE ENCOUNTER
I did not refill med as patient is on amlodipine 10mg and not 5mg. Called patient to discuss need for appointment . No answer.  LMOM for patient to call back with best number and time to reach them

## 2021-11-15 ENCOUNTER — TELEPHONE (OUTPATIENT)
Dept: FAMILY MEDICINE CLINIC | Age: 76
End: 2021-11-15

## 2021-11-15 NOTE — TELEPHONE ENCOUNTER
----- Message from Pia Lockhart sent at 11/12/2021  5:11 PM EST -----  Subject: Message to Provider    QUESTIONS  Information for Provider? PATIENT RETURNING PHONE CALL A/H @5:10PM STATED   SHE WOULD CALL BACK MONDAY MORNING  ---------------------------------------------------------------------------  --------------  CALL BACK INFO  What is the best way for the office to contact you? OK to leave message on   voicemail  Preferred Call Back Phone Number?  [phone]  ---------------------------------------------------------------------------  --------------  SCRIPT ANSWERS  undefined

## 2021-12-06 RX ORDER — LOSARTAN POTASSIUM AND HYDROCHLOROTHIAZIDE 25; 100 MG/1; MG/1
TABLET ORAL
Qty: 90 TABLET | Refills: 3 | OUTPATIENT
Start: 2021-12-06

## 2021-12-13 ENCOUNTER — TELEPHONE (OUTPATIENT)
Dept: FAMILY MEDICINE CLINIC | Age: 76
End: 2021-12-13

## 2021-12-13 NOTE — TELEPHONE ENCOUNTER
Patient left message on voicemail stating she has taken a whole box of mucinex and seems to work but when stops taking it congestion comes back.  Asking if something can be called in?

## 2021-12-14 ENCOUNTER — HOSPITAL ENCOUNTER (OUTPATIENT)
Dept: GENERAL RADIOLOGY | Age: 76
Discharge: HOME | End: 2021-12-16
Payer: MEDICARE

## 2021-12-14 PROCEDURE — G0279 TOMOSYNTHESIS, MAMMO: HCPCS

## 2021-12-22 RX ORDER — LOSARTAN POTASSIUM AND HYDROCHLOROTHIAZIDE 25; 100 MG/1; MG/1
TABLET ORAL
Qty: 90 TABLET | Refills: 3 | OUTPATIENT
Start: 2021-12-22

## 2022-01-05 ENCOUNTER — OFFICE VISIT (OUTPATIENT)
Dept: FAMILY MEDICINE CLINIC | Age: 77
End: 2022-01-05
Payer: MEDICARE

## 2022-01-05 ENCOUNTER — TELEPHONE (OUTPATIENT)
Dept: FAMILY MEDICINE CLINIC | Age: 77
End: 2022-01-05

## 2022-01-05 VITALS
SYSTOLIC BLOOD PRESSURE: 136 MMHG | OXYGEN SATURATION: 93 % | DIASTOLIC BLOOD PRESSURE: 84 MMHG | TEMPERATURE: 97.2 F | WEIGHT: 152 LBS | RESPIRATION RATE: 20 BRPM | HEART RATE: 95 BPM | HEIGHT: 60 IN | BODY MASS INDEX: 29.84 KG/M2

## 2022-01-05 DIAGNOSIS — C50.112 MALIGNANT NEOPLASM OF CENTRAL PORTION OF LEFT FEMALE BREAST, UNSPECIFIED ESTROGEN RECEPTOR STATUS (HCC): ICD-10-CM

## 2022-01-05 DIAGNOSIS — F33.1 MODERATE EPISODE OF RECURRENT MAJOR DEPRESSIVE DISORDER (HCC): ICD-10-CM

## 2022-01-05 DIAGNOSIS — B96.89 ACUTE BACTERIAL SINUSITIS: ICD-10-CM

## 2022-01-05 DIAGNOSIS — J01.90 ACUTE BACTERIAL SINUSITIS: ICD-10-CM

## 2022-01-05 DIAGNOSIS — R26.89 BALANCE DISORDER: Primary | ICD-10-CM

## 2022-01-05 PROCEDURE — 4004F PT TOBACCO SCREEN RCVD TLK: CPT | Performed by: FAMILY MEDICINE

## 2022-01-05 PROCEDURE — G8427 DOCREV CUR MEDS BY ELIG CLIN: HCPCS | Performed by: FAMILY MEDICINE

## 2022-01-05 PROCEDURE — 1123F ACP DISCUSS/DSCN MKR DOCD: CPT | Performed by: FAMILY MEDICINE

## 2022-01-05 PROCEDURE — G8417 CALC BMI ABV UP PARAM F/U: HCPCS | Performed by: FAMILY MEDICINE

## 2022-01-05 PROCEDURE — G8484 FLU IMMUNIZE NO ADMIN: HCPCS | Performed by: FAMILY MEDICINE

## 2022-01-05 PROCEDURE — 99214 OFFICE O/P EST MOD 30 MIN: CPT | Performed by: FAMILY MEDICINE

## 2022-01-05 PROCEDURE — 3017F COLORECTAL CA SCREEN DOC REV: CPT | Performed by: FAMILY MEDICINE

## 2022-01-05 PROCEDURE — 1090F PRES/ABSN URINE INCON ASSESS: CPT | Performed by: FAMILY MEDICINE

## 2022-01-05 PROCEDURE — G8400 PT W/DXA NO RESULTS DOC: HCPCS | Performed by: FAMILY MEDICINE

## 2022-01-05 PROCEDURE — 4040F PNEUMOC VAC/ADMIN/RCVD: CPT | Performed by: FAMILY MEDICINE

## 2022-01-05 PROCEDURE — 90694 VACC AIIV4 NO PRSRV 0.5ML IM: CPT | Performed by: FAMILY MEDICINE

## 2022-01-05 PROCEDURE — G0008 ADMIN INFLUENZA VIRUS VAC: HCPCS | Performed by: FAMILY MEDICINE

## 2022-01-05 RX ORDER — SODIUM, POTASSIUM,MAG SULFATES 17.5-3.13G
SOLUTION, RECONSTITUTED, ORAL ORAL
COMMUNITY
Start: 2021-10-18 | End: 2022-09-15 | Stop reason: CLARIF

## 2022-01-05 RX ORDER — AMOXICILLIN AND CLAVULANATE POTASSIUM 875; 125 MG/1; MG/1
1 TABLET, FILM COATED ORAL 2 TIMES DAILY
Qty: 20 TABLET | Refills: 0 | Status: SHIPPED | OUTPATIENT
Start: 2022-01-05 | End: 2022-01-15

## 2022-01-05 NOTE — PROGRESS NOTES
[address] [facility]  [phone]   Mary Martínez MD     Patient: Fozia Davila  YOB: 1945  Visit Date: 1/5/22    Rosario Macias is a 77y.o. year old female here today for   Chief Complaint   Patient presents with    Sinus Problem       HPI  Was in [address] - was there for a week. Walking ok with a walker. Still working on her balance. Wont do an MRI due to multiple metallic componenets. Still losing weight. Had a colonoscopy with Dr. Jono Jonas. A month ago. Follow up scheduled with GI and heme/onc. Mammogram was negative. Has ent and dentist appointment scheduled. Thinks her dental implant is causing sinus infections. No chest pain, shortness of breath. No change in bowel habits. Review of Systems   Constitutional: Negative for chills and fever. Respiratory: Positive for shortness of breath (at baseline). Negative for cough and wheezing. Cardiovascular: Negative for chest pain, palpitations and leg swelling. Gastrointestinal: Negative for abdominal pain, constipation, diarrhea, nausea and vomiting. Musculoskeletal: Positive for back pain and gait problem.        Current Outpatient Medications on File Prior to Visit   Medication Sig Dispense Refill    SUPREP BOWEL PREP KIT 17.5-3.13-1.6 GM/177ML SOLN DRINK THE contents of 1 KIT ( 2 BOTTLES ) by mouth as directed by prescriber      DULoxetine (CYMBALTA) 60 MG extended release capsule TAKE 1 CAPSULE BY MOUTH  NIGHTLY 90 capsule 3    celecoxib (CELEBREX) 200 MG capsule take 1 capsule by mouth twice a day 60 capsule 3    fluticasone (FLONASE) 50 MCG/ACT nasal spray 1 spray by Each Nostril route daily      rOPINIRole (REQUIP) 1 MG tablet Take 1 tablet by mouth nightly 90 tablet 3    losartan (COZAAR) 100 MG tablet Take 1 tablet by mouth daily 90 tablet 5    amLODIPine (NORVASC) 10 MG tablet Take 1 tablet by mouth daily 90 tablet 1    oxyCODONE-acetaminophen (PERCOCET) 7.5-325 MG per tablet take 1 tablet by mouth every 6 hours if needed      cetirizine (ZYRTEC) 5 MG tablet Take 1 tablet by mouth daily 30 tablet 5    DULoxetine (CYMBALTA) 30 MG extended release capsule take 1 capsule by mouth once daily 30 capsule 3    methocarbamol (ROBAXIN) 750 MG tablet take 1 tablet by mouth every 12 hours if needed      SPIRIVA RESPIMAT 2.5 MCG/ACT AERS inhaler inhale 2 puffs by mouth once daily 4 g 3    nebivolol (BYSTOLIC) 5 MG tablet TAKE 1 TABLET BY MOUTH ONCE DAILY 90 tablet 3    furosemide (LASIX) 20 MG tablet Take 20 mg by mouth 2 times daily      lidocaine (XYLOCAINE) 5 % ointment Apply topically to hands and low back as needed. 1 Tube 3     No current facility-administered medications on file prior to visit. Allergies   Allergen Reactions    Demerol [Meperidine Hcl] Other (See Comments)     Hallucinations, anxiety        Meperidine Other (See Comments)    Nabumetone Hives and Rash       Past medical, surgical, socialand/or family history reviewed, updated as needed, and are non-contributory (unless otherwise stated). Medications, allergies, and problem list also reviewed and updated as needed in patient's record. Wt Readings from Last 3 Encounters:   01/05/22 152 lb (68.9 kg)   11/09/21 161 lb (73 kg)   08/18/21 173 lb (78.5 kg)                   /84   Pulse 95   Temp 97.2 °F (36.2 °C)   Resp 20   Ht 5' (1.524 m)   Wt 152 lb (68.9 kg)   SpO2 93%   BMI 29.69 kg/m²        Physical Exam  Vitals and nursing note reviewed. Constitutional:       General: She is not in acute distress. Appearance: She is well-developed. She is not diaphoretic. Cardiovascular:      Rate and Rhythm: Normal rate and regular rhythm. Heart sounds: Murmur heard. No friction rub. Pulmonary:      Effort: Pulmonary effort is normal. No respiratory distress. Breath sounds: No wheezing or rales.    Abdominal:      General: Bowel sounds are normal. There is no distension. Palpations: Abdomen is soft. There is no mass. Tenderness: There is no abdominal tenderness. There is no guarding. Musculoskeletal:         General: Normal range of motion. Right lower leg: Edema (trace) present. Left lower leg: Edema (trace) present. Results for orders placed or performed in visit on 12/02/21   Lipase   Result Value Ref Range    Lipase 16 13 - 60 U/L       ASSESSMENT/PLAN  [place] Jay was seen today for sinus problem. Diagnoses and all orders for this visit:    Balance disorder  -     CT HEAD WO CONTRAST; Future  Cannot do MRI per patient due to various implants. Will check CT scan for signs of remote infarctions vs other intracranial pathology. Moderate episode of recurrent major depressive disorder ([address]   On cymbalta. Concern that depression is causing weight loss with decreased appetite. However will continue to rule out various malignancies as cause for weight loss     Malignant neoplasm of central portion of left female breast, unspecified estrogen receptor status ([address]   - Mammogram negative. Has follow up with heme/onc scheduled. Acute bacterial sinusitis  -     amoxicillin-clavulanate (AUGMENTIN) 875-125 MG per tablet; Take 1 tablet by mouth 2 times daily for 10 days    Other orders  -     INFLUENZA, QUADV, ADJUVANTED, 65 YRS =, IM, PF, PREFILL SYR, 0.5ML (FLUAD)            Phone/MyChart follow up if tests abnormal.    Return in about 6 weeks (around 2/16/2022). or sooner if necessary. I have reviewed myfindings and recommendations with Bridget Mcmillan.  Silvino Israel MD, M.D

## 2022-01-05 NOTE — TELEPHONE ENCOUNTER
----- Message from Salvador Nj sent at 1/5/2022 12:44 PM EST -----  Subject: Appointment Request    Reason for Call: Urgent Cough Cold    QUESTIONS  Type of Appointment? Established Patient  Reason for appointment request? No appointments available during search  Additional Information for Provider? patient wants an appointment for   sinus drainage and follow up nothing available wants a call back to   schedule   ---------------------------------------------------------------------------  --------------  CALL BACK INFO  What is the best way for the office to contact you? OK to leave message on   voicemail  Preferred Call Back Phone Number? [phone]  ---------------------------------------------------------------------------  --------------  SCRIPT ANSWERS  Relationship to Patient? Self  Are you currently unable to finish sentences due to any difficulty   breathing? No  Are you unable to swallow liquids? No  Are you having fevers (100.4 or greater), chills, or sweats? No  Do you have COPD, asthma or a chronic lung condition? No  Have your symptoms been present for more than 5 days? Yes   Have you been diagnosed with, awaiting test results for, or told that you   are suspected of having COVID-19 (Coronavirus)? (If patient has tested   negative or was tested as a requirement for work, school, or travel and   not based on symptoms, answer no)? No  Within the past two weeks have you developed any of the following symptoms   (answer no if symptoms have been present longer than 2 weeks or began   more than 2 weeks ago)? Fever or Chills, Cough, Shortness of breath or   difficulty breathing, Loss of taste or smell, Sore throat, Nasal   congestion, Sneezing or runny nose, Fatigue or generalized body aches   (answer no if pain is specific to a body part e.g. back pain), Diarrhea,   Headache? No  Have you had close contact with someone with COVID-19 in the last 14 days?    No  (Service Expert  click yes below to proceed with [employer] As Usual   Scheduling)?  Yes

## 2022-01-11 ASSESSMENT — ENCOUNTER SYMPTOMS
BACK PAIN: 1
SHORTNESS OF BREATH: 1
DIARRHEA: 0
WHEEZING: 0
NAUSEA: 0
VOMITING: 0
ABDOMINAL PAIN: 0
COUGH: 0
CONSTIPATION: 0

## 2022-02-11 NOTE — TELEPHONE ENCOUNTER
Last Appointment:  1/5/2022  Future Appointments   Date Time Provider Daniela Adams   2/14/2022  3:15 PM [facility] CT RM 2 GE 16 [facility]   2/23/2022  1:00 PM Andrey Vora MD [facility]

## 2022-02-14 ENCOUNTER — HOSPITAL ENCOUNTER (OUTPATIENT)
Dept: CT IMAGING | Age: 77
Discharge: HOME | End: 2022-02-14
Payer: MEDICARE

## 2022-02-14 DIAGNOSIS — R26.89 BALANCE DISORDER: ICD-10-CM

## 2022-02-14 PROCEDURE — 70450 CT HEAD/BRAIN W/O DYE: CPT

## 2022-02-14 RX ORDER — AMLODIPINE BESYLATE 10 MG/1
TABLET ORAL
Qty: 90 TABLET | Refills: 1 | Status: SHIPPED
Start: 2022-02-14 | End: 2022-09-15 | Stop reason: CLARIF

## 2022-02-14 RX ORDER — NEBIVOLOL 5 MG/1
TABLET ORAL
Qty: 90 TABLET | Refills: 3 | Status: SHIPPED
Start: 2022-02-14 | End: 2022-08-11 | Stop reason: ALTCHOICE

## 2022-02-23 ENCOUNTER — OFFICE VISIT (OUTPATIENT)
Dept: FAMILY MEDICINE CLINIC | Age: 77
End: 2022-02-23
Payer: MEDICARE

## 2022-02-23 VITALS
HEART RATE: 78 BPM | HEIGHT: 60 IN | BODY MASS INDEX: 30.23 KG/M2 | SYSTOLIC BLOOD PRESSURE: 120 MMHG | RESPIRATION RATE: 18 BRPM | WEIGHT: 154 LBS | DIASTOLIC BLOOD PRESSURE: 80 MMHG

## 2022-02-23 DIAGNOSIS — M46.1 SACROILIITIS (HCC): ICD-10-CM

## 2022-02-23 DIAGNOSIS — R73.9 HYPERGLYCEMIA: ICD-10-CM

## 2022-02-23 DIAGNOSIS — J43.9 PULMONARY EMPHYSEMA, UNSPECIFIED EMPHYSEMA TYPE (HCC): ICD-10-CM

## 2022-02-23 DIAGNOSIS — Z00.00 MEDICARE ANNUAL WELLNESS VISIT, SUBSEQUENT: Primary | ICD-10-CM

## 2022-02-23 LAB — HBA1C MFR BLD: 5.5 %

## 2022-02-23 PROCEDURE — G8484 FLU IMMUNIZE NO ADMIN: HCPCS | Performed by: FAMILY MEDICINE

## 2022-02-23 PROCEDURE — G0439 PPPS, SUBSEQ VISIT: HCPCS | Performed by: FAMILY MEDICINE

## 2022-02-23 PROCEDURE — 83036 HEMOGLOBIN GLYCOSYLATED A1C: CPT | Performed by: FAMILY MEDICINE

## 2022-02-23 PROCEDURE — 4040F PNEUMOC VAC/ADMIN/RCVD: CPT | Performed by: FAMILY MEDICINE

## 2022-02-23 PROCEDURE — 3017F COLORECTAL CA SCREEN DOC REV: CPT | Performed by: FAMILY MEDICINE

## 2022-02-23 PROCEDURE — 1123F ACP DISCUSS/DSCN MKR DOCD: CPT | Performed by: FAMILY MEDICINE

## 2022-02-23 RX ORDER — PANTOPRAZOLE SODIUM 20 MG/1
20 TABLET, DELAYED RELEASE ORAL 2 TIMES DAILY
COMMUNITY
End: 2022-09-15 | Stop reason: CLARIF

## 2022-02-23 ASSESSMENT — PATIENT HEALTH QUESTIONNAIRE - PHQ9
SUM OF ALL RESPONSES TO PHQ9 QUESTIONS 1 & 2: 0
SUM OF ALL RESPONSES TO PHQ QUESTIONS 1-9: 0
2. FEELING DOWN, DEPRESSED OR HOPELESS: 0
1. LITTLE INTEREST OR PLEASURE IN DOING THINGS: 0

## 2022-02-23 NOTE — PROGRESS NOTES
Medicare Annual Wellness Visit    Ernst Lala is here for Medicare [address] Janis Lerner was seen today for medicare awv. Diagnoses and all orders for this visit:    Medicare annual wellness visit, subsequent    Hyperglycemia  -     POCT glycosylated hemoglobin (Hb A1C)    Sacroiliitis (HCC)   - Continues to have low back pain and sees pain specialists. Pulmonary emphysema, unspecified emphysema type ([address]   - Stable on spiriva. Sees pulm. Counseled on importance of smoking cessation. Recommendations for Preventive Services Due: see orders and patient instructions/AVS.  Recommended screening schedule for the next 5-10 years is provided to the patient in written form: see Patient Instructions/AVS.     Return in 3 months (on 5/23/2022) for Medicare Annual Wellness Visit in 1 year. Reviewed and updated this visit:  Tobacco  Allergies  Meds  Med Hx  Surg Hx  Soc Hx  Fam Hx      Subjective   The following acute and/or chronic problems were also addressed today:  Weight has been stable since the holidays. Had EGD /colonoscoy showed barets. Feeling overall better. Has seen heme/onc. No evidence of malignancy. No chest pain or sob. Has cpap at night. Uses cpap eery night. Getting around ok. Had one fall a couple months after slipping on a rug. Patient's complete Health Risk Assessment and screening values have been reviewed and are found in Flowsheets. The following problems were reviewed today and where indicated follow up appointments were made and/or referrals ordered.     Positive Risk Factor Screenings with Interventions:    Fall Risk:  Do you feel unsteady or are you worried about falling? : no  2 or more falls in past year?: (!) yes  Fall with injury in past year?: (!) yes     Fall Risk Interventions:    · Home safety tips provided        Tobacco Use:     Tobacco Use: High Risk    Smoking Tobacco Use: Current Every Day Smoker    Smokeless Tobacco Use: Never Used E-Cigarettes/Vaping Use     Questions Responses    E-Cigarette/Vaping Use Never User    Start Date     Passive Exposure     Quit Date     Counseling Given     Comments         Substance Abuse - Tobacco Interventions:  patient agrees to think about his/her triggers for tobacco use, why he/she should quit, and learn more about the harmful effects of tobacco          Health Habits/Nutrition:     Physical Activity: Inactive    Days of Exercise per Week: 0 days    Minutes of Exercise per Session: 0 min     Have you lost any weight without trying in the past 3 months?: No  Body mass index: (!) 30.07  Have you seen the dentist within the past year?: Yes    Health Habits/Nutrition Interventions:  · Nutritional issues:  had been losing weight. no things have stabilized. Objective   Vitals:    02/23/22 1308   BP: 120/80   Site: Left Upper Arm   Position: Sitting   Pulse: 78   Resp: 18   Weight: 154 lb (69.9 kg)   Height: 5' (1.524 m)      Body mass index is 30.08 kg/m². General Appearance: alert and oriented to person, place and time, well-developed and well-nourished, in no acute distress  Pulmonary/Chest: clear to auscultation bilaterally- no wheezes, rales or rhonchi, normal air movement, no respiratory distress  Cardiovascular: normal rate, normal S1 and S2 and no murmurs  Abdomen: soft, non-tender, non-distended, normal bowel sounds, no masses or organomegaly  Extremities: no cyanosis, no clubbing and no edema      Allergies   Allergen Reactions    Demerol [Meperidine Hcl] Other (See Comments)     Hallucinations, anxiety        Meperidine Other (See Comments)    Nabumetone Hives and Rash     Prior to Visit Medications    Medication Sig Taking?  Authorizing Provider   pantoprazole (PROTONIX) 20 MG tablet Take 20 mg by mouth in the morning and at bedtime Yes Historical Provider, MD   amLODIPine (NORVASC) 10 MG tablet take 1 tablet by mouth once daily Yes Shweta Zacarias MD   nebivolol (BYSTOLIC) 5 MG tablet TAKE 1 TABLET BY MOUTH ONCE DAILY Yes Ranjith Marte MD   SUPREP BOWEL PREP KIT 17.5-3.13-1.6 GM/177ML SOLN DRINK THE contents of 1 KIT ( 2 BOTTLES ) by mouth as directed by prescriber Yes Historical Provider, MD   DULoxetine (CYMBALTA) 60 MG extended release capsule TAKE 1 CAPSULE BY MOUTH  NIGHTLY Yes Ranjith Marte MD   celecoxib (CELEBREX) 200 MG capsule take 1 capsule by mouth twice a day Yes Ranjith Marte MD   fluticasone (FLONASE) 50 MCG/ACT nasal spray 1 spray by Each Nostril route daily Yes Historical Provider, MD   rOPINIRole (REQUIP) 1 MG tablet Take 1 tablet by mouth nightly Yes Ranjith Marte MD   losartan (COZAAR) 100 MG tablet Take 1 tablet by mouth daily Yes Ranjith Marte MD   oxyCODONE-acetaminophen (PERCOCET) 7.5-325 MG per tablet take 1 tablet by mouth every 6 hours if needed Yes Historical Provider, MD   cetirizine (ZYRTEC) 5 MG tablet Take 1 tablet by mouth daily Yes Ranjith Marte MD   DULoxetine (CYMBALTA) 30 MG extended release capsule take 1 capsule by mouth once daily Yes Ranjith Marte MD   methocarbamol (ROBAXIN) 750 MG tablet take 1 tablet by mouth every 12 hours if needed Yes Historical Provider, MD   SPIRIVA RESPIMAT 2.5 MCG/ACT AERS inhaler inhale 2 puffs by mouth once daily Yes Ranjith Marte MD   furosemide (LASIX) 20 MG tablet Take 20 mg by mouth 2 times daily Yes Historical Provider, MD   lidocaine (XYLOCAINE) 5 % ointment Apply topically to hands and low back as needed.  Yes Renae Novoa, VAIBHAV - [facility] (Including outside providers/suppliers regularly involved in providing care):   Patient Care Team:  Ranjith Marte MD as PCP - General (Family Medicine)  Ranjith Marte MD as PCP - [facility]  Fredy Dewitt as Nurse Mark Wolf MD as Consulting Physician (Cardiology)

## 2022-02-23 NOTE — PATIENT INSTRUCTIONS
Personalized Preventive Plan for Kenny Nava - 2/23/2022  Medicare offers a range of preventive health benefits. Some of the tests and screenings are paid in full while other may be subject to a deductible, co-insurance, and/or copay. Some of these benefits include a comprehensive review of your medical history including lifestyle, illnesses that may run in your family, and various assessments and screenings as appropriate. After reviewing your medical record and screening and assessments performed today your provider may have ordered immunizations, labs, imaging, and/or referrals for you. A list of these orders (if applicable) as well as your Preventive Care list are included within your After Visit Summary for your review. Other Preventive Recommendations:    · A preventive eye exam performed by an eye specialist is recommended every 1-2 years to screen for glaucoma; cataracts, macular degeneration, and other eye disorders. · A preventive dental visit is recommended every 6 months. · Try to get at least 150 minutes of exercise per week or 10,000 steps per day on a pedometer . · Order or download the FREE \"Exercise & Physical Activity: Your Everyday Guide\" from [employer]. Call 4-944.875.4302 or search [employer] online. · You need 6065-1762 mg of calcium and 8190-7410 IU of vitamin D per day. It is possible to meet your calcium requirement with diet alone, but a vitamin D supplement is usually necessary to meet this goal.  · When exposed to the sun, use a sunscreen that protects against both UVA and UVB radiation with an SPF of 30 or greater. Reapply every 2 to 3 hours or after sweating, drying off with a towel, or swimming. · Always wear a seat belt when traveling in a car. Always wear a helmet when riding a bicycle or motorcycle.

## 2022-03-17 DIAGNOSIS — G89.29 CHRONIC LOW BACK PAIN, UNSPECIFIED BACK PAIN LATERALITY, UNSPECIFIED WHETHER SCIATICA PRESENT: Primary | ICD-10-CM

## 2022-03-17 DIAGNOSIS — M54.50 CHRONIC LOW BACK PAIN, UNSPECIFIED BACK PAIN LATERALITY, UNSPECIFIED WHETHER SCIATICA PRESENT: Primary | ICD-10-CM

## 2022-03-17 NOTE — TELEPHONE ENCOUNTER
Patient called and would like referral to Dr. Phoenix Arredondo at [facility] for her back, states she had xrays and its \"pretty messed up\" they wont schedule until she has referral from you?       Fax  [phone]

## 2022-03-22 RX ORDER — CELECOXIB 200 MG/1
CAPSULE ORAL
Qty: 60 CAPSULE | Refills: 3 | Status: SHIPPED
Start: 2022-03-22 | End: 2022-09-09 | Stop reason: SDUPTHER

## 2022-04-13 DIAGNOSIS — F33.1 MODERATE EPISODE OF RECURRENT MAJOR DEPRESSIVE DISORDER (HCC): ICD-10-CM

## 2022-04-14 RX ORDER — DULOXETIN HYDROCHLORIDE 30 MG/1
CAPSULE, DELAYED RELEASE ORAL
Qty: 90 CAPSULE | Refills: 1 | Status: SHIPPED
Start: 2022-04-14 | End: 2022-09-15 | Stop reason: CLARIF

## 2022-04-20 ENCOUNTER — OFFICE VISIT (OUTPATIENT)
Dept: FAMILY MEDICINE CLINIC | Age: 77
End: 2022-04-20
Payer: MEDICARE

## 2022-04-20 VITALS
OXYGEN SATURATION: 100 % | HEIGHT: 60 IN | BODY MASS INDEX: 30.63 KG/M2 | RESPIRATION RATE: 18 BRPM | DIASTOLIC BLOOD PRESSURE: 86 MMHG | TEMPERATURE: 97.4 F | WEIGHT: 156 LBS | HEART RATE: 79 BPM | SYSTOLIC BLOOD PRESSURE: 114 MMHG

## 2022-04-20 DIAGNOSIS — Z01.810 PREOPERATIVE CARDIOVASCULAR EXAMINATION: ICD-10-CM

## 2022-04-20 DIAGNOSIS — K12.2 ORAL FISTULA: ICD-10-CM

## 2022-04-20 DIAGNOSIS — Z01.810 PREOPERATIVE CARDIOVASCULAR EXAMINATION: Primary | ICD-10-CM

## 2022-04-20 DIAGNOSIS — I27.20 PULMONARY HYPERTENSION (HCC): ICD-10-CM

## 2022-04-20 LAB
ALBUMIN SERPL-MCNC: 4.4 G/DL (ref 3.5–5.2)
ALP BLD-CCNC: 80 U/L (ref 35–104)
ALT SERPL-CCNC: 15 U/L (ref 0–32)
ANION GAP SERPL CALCULATED.3IONS-SCNC: 9 MMOL/L (ref 7–16)
AST SERPL-CCNC: 29 U/L (ref 0–31)
BASOPHILS ABSOLUTE: 0.05 E9/L (ref 0–0.2)
BASOPHILS RELATIVE PERCENT: 0.8 % (ref 0–2)
BILIRUB SERPL-MCNC: 0.3 MG/DL (ref 0–1.2)
BUN BLDV-MCNC: 20 MG/DL (ref 6–23)
CALCIUM SERPL-MCNC: 9.2 MG/DL (ref 8.6–10.2)
CHLORIDE BLD-SCNC: 97 MMOL/L (ref 98–107)
CO2: 32 MMOL/L (ref 22–29)
CREAT SERPL-MCNC: 0.8 MG/DL (ref 0.5–1)
EOSINOPHILS ABSOLUTE: 0.22 E9/L (ref 0.05–0.5)
EOSINOPHILS RELATIVE PERCENT: 3.4 % (ref 0–6)
GFR AFRICAN AMERICAN: >60
GFR NON-AFRICAN AMERICAN: >60 ML/MIN/1.73
GLUCOSE BLD-MCNC: 89 MG/DL (ref 74–99)
HCT VFR BLD CALC: 47.1 % (ref 34–48)
HEMOGLOBIN: 15.4 G/DL (ref 11.5–15.5)
IMMATURE GRANULOCYTES #: 0.02 E9/L
IMMATURE GRANULOCYTES %: 0.3 % (ref 0–5)
LYMPHOCYTES ABSOLUTE: 1.73 E9/L (ref 1.5–4)
LYMPHOCYTES RELATIVE PERCENT: 27 % (ref 20–42)
MCH RBC QN AUTO: 32.6 PG (ref 26–35)
MCHC RBC AUTO-ENTMCNC: 32.7 % (ref 32–34.5)
MCV RBC AUTO: 99.6 FL (ref 80–99.9)
MONOCYTES ABSOLUTE: 0.65 E9/L (ref 0.1–0.95)
MONOCYTES RELATIVE PERCENT: 10.2 % (ref 2–12)
NEUTROPHILS ABSOLUTE: 3.73 E9/L (ref 1.8–7.3)
NEUTROPHILS RELATIVE PERCENT: 58.3 % (ref 43–80)
PDW BLD-RTO: 13.5 FL (ref 11.5–15)
PLATELET # BLD: 212 E9/L (ref 130–450)
PMV BLD AUTO: 9.4 FL (ref 7–12)
POTASSIUM SERPL-SCNC: 4.9 MMOL/L (ref 3.5–5)
RBC # BLD: 4.73 E12/L (ref 3.5–5.5)
SODIUM BLD-SCNC: 138 MMOL/L (ref 132–146)
TOTAL PROTEIN: 7.2 G/DL (ref 6.4–8.3)
WBC # BLD: 6.4 E9/L (ref 4.5–11.5)

## 2022-04-20 PROCEDURE — 3017F COLORECTAL CA SCREEN DOC REV: CPT | Performed by: FAMILY MEDICINE

## 2022-04-20 PROCEDURE — 93000 ELECTROCARDIOGRAM COMPLETE: CPT | Performed by: FAMILY MEDICINE

## 2022-04-20 PROCEDURE — G8417 CALC BMI ABV UP PARAM F/U: HCPCS | Performed by: FAMILY MEDICINE

## 2022-04-20 PROCEDURE — 4004F PT TOBACCO SCREEN RCVD TLK: CPT | Performed by: FAMILY MEDICINE

## 2022-04-20 PROCEDURE — 99213 OFFICE O/P EST LOW 20 MIN: CPT | Performed by: FAMILY MEDICINE

## 2022-04-20 PROCEDURE — G8427 DOCREV CUR MEDS BY ELIG CLIN: HCPCS | Performed by: FAMILY MEDICINE

## 2022-04-20 PROCEDURE — 4040F PNEUMOC VAC/ADMIN/RCVD: CPT | Performed by: FAMILY MEDICINE

## 2022-04-20 PROCEDURE — 1090F PRES/ABSN URINE INCON ASSESS: CPT | Performed by: FAMILY MEDICINE

## 2022-04-20 PROCEDURE — G8400 PT W/DXA NO RESULTS DOC: HCPCS | Performed by: FAMILY MEDICINE

## 2022-04-20 PROCEDURE — 1123F ACP DISCUSS/DSCN MKR DOCD: CPT | Performed by: FAMILY MEDICINE

## 2022-04-20 RX ORDER — FAMOTIDINE 40 MG/1
TABLET, FILM COATED ORAL
COMMUNITY
Start: 2022-03-29 | End: 2022-09-09 | Stop reason: SDUPTHER

## 2022-04-20 ASSESSMENT — ENCOUNTER SYMPTOMS
DIARRHEA: 0
BACK PAIN: 1
CONSTIPATION: 0
COUGH: 0
WHEEZING: 0
NAUSEA: 0
ABDOMINAL PAIN: 0
VOMITING: 0
SHORTNESS OF BREATH: 1

## 2022-04-20 NOTE — PROGRESS NOTES
[address] [place] [address]  [phone]   Angelique Rivero MD     Patient: Giselle Gallagher  YOB: 1945  Visit Date: 4/20/22    Jose Aguilar is a 77y.o. year old female here today for   Chief Complaint   Patient presents with    Surgical Clearance       HPI  Patient is a 77year old female here for preop . She has a history of COPD , mod to severe mitral regurge and pulmonary hypertension. No shortness of breath or chest pain. She cannot walk up a flight of stairs but reports this is 2/2 to back pain. No fevers, chills, nausea. Vomiting. Has drainage in her mouth from her fistula. Patient has a left oral antral fistula and chronic left maxillary sinusitis and plan is for left maxillary antrostomy with tissue removal , closure left oral antral fistula with buccal mucosal advancement flap and possible turbinate graft. Dr. Zoe Reyes will be doing surgery. No issues with anesthesia in the past.   Surgery is not yet scheduled. Planning for may 9. Surgery center at the [address] [place] [address] group    Can walk half a block without feeling short of breath but has to rest due to back pain. Cannot stand very long. Last saw Dr. Marlon Nog last summer. Review of Systems   Constitutional: Negative for chills and fever. Respiratory: Positive for shortness of breath (at baseline). Negative for cough and wheezing. Cardiovascular: Negative for chest pain, palpitations and leg swelling. Gastrointestinal: Negative for abdominal pain, constipation, diarrhea, nausea and vomiting. Musculoskeletal: Positive for back pain and gait problem.        Current Outpatient Medications on File Prior to Visit   Medication Sig Dispense Refill    famotidine (PEPCID) 40 MG tablet take 1 tablet by mouth once daily      DULoxetine (CYMBALTA) 30 MG extended release capsule TAKE 1 CAPSULE BY MOUTH  DAILY 90 capsule 1    celecoxib (CELEBREX) 200 MG capsule take 1 capsule by mouth twice a day 60 capsule 3    pantoprazole (PROTONIX) 20 MG tablet Take 20 mg by mouth in the morning and at bedtime      amLODIPine (NORVASC) 10 MG tablet take 1 tablet by mouth once daily 90 tablet 1    nebivolol (BYSTOLIC) 5 MG tablet TAKE 1 TABLET BY MOUTH ONCE DAILY 90 tablet 3    SUPREP BOWEL PREP KIT 17.5-3.13-1.6 GM/177ML SOLN DRINK THE contents of 1 KIT ( 2 BOTTLES ) by mouth as directed by prescriber      DULoxetine (CYMBALTA) 60 MG extended release capsule TAKE 1 CAPSULE BY MOUTH  NIGHTLY 90 capsule 3    fluticasone (FLONASE) 50 MCG/ACT nasal spray 1 spray by Each Nostril route daily      rOPINIRole (REQUIP) 1 MG tablet Take 1 tablet by mouth nightly 90 tablet 3    losartan (COZAAR) 100 MG tablet Take 1 tablet by mouth daily 90 tablet 5    oxyCODONE-acetaminophen (PERCOCET) 7.5-325 MG per tablet take 1 tablet by mouth every 6 hours if needed      cetirizine (ZYRTEC) 5 MG tablet Take 1 tablet by mouth daily 30 tablet 5    methocarbamol (ROBAXIN) 750 MG tablet take 1 tablet by mouth every 12 hours if needed      SPIRIVA RESPIMAT 2.5 MCG/ACT AERS inhaler inhale 2 puffs by mouth once daily 4 g 3    furosemide (LASIX) 20 MG tablet Take 20 mg by mouth 2 times daily      lidocaine (XYLOCAINE) 5 % ointment Apply topically to hands and low back as needed. 1 Tube 3     No current facility-administered medications on file prior to visit. Allergies   Allergen Reactions    Demerol [Meperidine Hcl] Other (See Comments)     Hallucinations, anxiety        Meperidine Other (See Comments)    Nabumetone Hives and Rash       Past medical, surgical, socialand/or family history reviewed, updated as needed, and are non-contributory (unless otherwise stated). Medications, allergies, and problem list also reviewed and updated as needed in patient's record.     Wt Readings from Last 3 Encounters:   04/20/22 156 lb (70.8 kg)   02/23/22 154 lb (69.9 kg)   01/05/22 152 lb (68.9 kg)                   /86 Pulse 79   Temp 97.4 °F (36.3 °C)   Resp 18   Ht 5' (1.524 m)   Wt 156 lb (70.8 kg)   SpO2 100%   BMI 30.47 kg/m²        Physical Exam  Vitals and nursing note reviewed. Constitutional:       General: She is not in acute distress. Appearance: She is well-developed. She is not diaphoretic. Cardiovascular:      Rate and Rhythm: Normal rate and regular rhythm. Heart sounds: Murmur heard. No friction rub. Pulmonary:      Effort: Pulmonary effort is normal. No respiratory distress. Breath sounds: No wheezing or rales. Abdominal:      General: Bowel sounds are normal. There is no distension. Palpations: Abdomen is soft. There is no mass. Tenderness: There is no abdominal tenderness. There is no guarding. Musculoskeletal:         General: Normal range of motion. Right lower leg: Edema (trace) present. Left lower leg: Edema (trace) present. Results for orders placed or performed in visit on 02/23/22   POCT glycosylated hemoglobin (Hb A1C)   Result Value Ref Range    Hemoglobin A1C 5.5 %       ASSESSMENT/PLAN  Sheldon Moss was seen today for surgical clearance. Diagnoses and all orders for this visit:    Preoperative cardiovascular examination  -     EKG 12 Lead - EKG with no acute ischemic changes. -     CBC with Auto Differential; Future  -     Comprehensive Metabolic Panel; Future  Los Lerner was reexamined preoperatively today, 4/20/22. There is no substantial change in her physical status since the time of her pre-anesthesia testing, allergies to drugs and biologics were reviewed on chart and are unchanged. She is fit for the proposed surgical procedure. Los Lerner has no further questions regarding the risks, benefits, nature and alternatives of surgery and wishes to proceed. Pulmonary hypertension ([address]   - Last echo done in Jun 2021. Seeing pulm . Most likely related to lung disease and AYESHA . Patient uses CPAP nightly.    Oral fistula  - EKG 12 Lead            Phone/MyChart follow up if tests abnormal.    Return in about 2 months (around 6/20/2022). or sooner if necessary. I have reviewed myfindings and recommendations with Bridget Pang.  Garry Jordan MD, M.D

## 2022-04-20 NOTE — PATIENT INSTRUCTIONS
Call the [facility] office to schedule at [facility] care   The number is [phone]   Tell them to schedule you with anyone that is covering for me. Work on quitting smoking at least until your surgery and for a couple weeks after.

## 2022-06-01 DIAGNOSIS — Z12.2 SCREENING FOR LUNG CANCER: Primary | ICD-10-CM

## 2022-06-01 NOTE — PROGRESS NOTES
D/w patient. She is agreeable. Plans to follow Dr Aly Edwards  Interested in smoking cessation, may make appt sooner w/ myself. LDCT ordered.

## 2022-06-30 DIAGNOSIS — G25.81 RESTLESS LEG SYNDROME: ICD-10-CM

## 2022-07-05 RX ORDER — ROPINIROLE 1 MG/1
TABLET, FILM COATED ORAL
Qty: 90 TABLET | Refills: 0 | Status: SHIPPED
Start: 2022-07-05 | End: 2022-09-09 | Stop reason: SDUPTHER

## 2022-07-19 ENCOUNTER — TELEPHONE (OUTPATIENT)
Dept: FAMILY MEDICINE CLINIC | Age: 77
End: 2022-07-19

## 2022-07-19 NOTE — TELEPHONE ENCOUNTER
Called pt's dgt and was advised they'd like to continue to follow with Dr Elayne Tan. Message forwarded to [facility] office for scheduling.

## 2022-07-19 NOTE — TELEPHONE ENCOUNTER
----- Message from April Penny sent at 7/19/2022  8:34 AM EDT -----  Subject: Appointment Request    Reason for Call: Established Patient Appointment needed: Urgent (Patient   Request) Hospital Follow Up    QUESTIONS    Reason for appointment request? No appointments available during search     Additional Information for Provider? Patient's daughter Geoff Mcgee called in to   get patient a hospital follow up appointment, patient discharged from   hospital on 7/17, admitted for bladder infection and they placed a heart   stent. Patient and daughter would like to stay in [facility].  Please call   daughter Geoff Mcgee back with available appointment options for a hospital   follow up, thank you  ---------------------------------------------------------------------------  --------------  [address] [employer]  [phone]; OK to leave message on voicemail  ---------------------------------------------------------------------------  --------------  SCRIPT ANSWERS  COVID Screen: Minerva Zuluaga

## 2022-08-01 ENCOUNTER — OFFICE VISIT (OUTPATIENT)
Dept: FAMILY MEDICINE CLINIC | Age: 77
End: 2022-08-01
Payer: MEDICARE

## 2022-08-01 VITALS
HEIGHT: 60 IN | OXYGEN SATURATION: 93 % | HEART RATE: 80 BPM | SYSTOLIC BLOOD PRESSURE: 148 MMHG | BODY MASS INDEX: 29.64 KG/M2 | DIASTOLIC BLOOD PRESSURE: 106 MMHG | TEMPERATURE: 98.4 F | WEIGHT: 151 LBS

## 2022-08-01 DIAGNOSIS — I27.20 PULMONARY HYPERTENSION (HCC): ICD-10-CM

## 2022-08-01 DIAGNOSIS — R53.81 PHYSICAL DECONDITIONING: Primary | ICD-10-CM

## 2022-08-01 PROBLEM — I21.4 NSTEMI (NON-ST ELEVATED MYOCARDIAL INFARCTION) (HCC): Status: ACTIVE | Noted: 2022-08-01

## 2022-08-01 PROCEDURE — 1123F ACP DISCUSS/DSCN MKR DOCD: CPT

## 2022-08-01 PROCEDURE — 99214 OFFICE O/P EST MOD 30 MIN: CPT

## 2022-08-01 PROCEDURE — 99213 OFFICE O/P EST LOW 20 MIN: CPT

## 2022-08-01 RX ORDER — AMLODIPINE BESYLATE 5 MG/1
5 TABLET ORAL DAILY
COMMUNITY
Start: 2022-07-31 | End: 2022-09-09 | Stop reason: SDUPTHER

## 2022-08-01 RX ORDER — METOPROLOL SUCCINATE 25 MG/1
25 TABLET, EXTENDED RELEASE ORAL DAILY
COMMUNITY
Start: 2022-07-15 | End: 2022-09-09 | Stop reason: SDUPTHER

## 2022-08-01 RX ORDER — ROPINIROLE 3 MG/1
3 TABLET, FILM COATED ORAL DAILY
COMMUNITY
Start: 2022-07-28

## 2022-08-01 RX ORDER — LOSARTAN POTASSIUM 50 MG/1
50 TABLET ORAL DAILY
COMMUNITY
Start: 2022-07-27

## 2022-08-01 RX ORDER — ASPIRIN 81 MG
81 TABLET,CHEWABLE ORAL DAILY
COMMUNITY
Start: 2022-07-27 | End: 2022-09-09 | Stop reason: SDUPTHER

## 2022-08-01 RX ORDER — ATORVASTATIN CALCIUM 40 MG/1
40 TABLET, FILM COATED ORAL DAILY
COMMUNITY
Start: 2022-07-15 | End: 2022-09-09 | Stop reason: SDUPTHER

## 2022-08-01 SDOH — ECONOMIC STABILITY: TRANSPORTATION INSECURITY
IN THE PAST 12 MONTHS, HAS THE LACK OF TRANSPORTATION KEPT YOU FROM MEDICAL APPOINTMENTS OR FROM GETTING MEDICATIONS?: NO

## 2022-08-01 SDOH — ECONOMIC STABILITY: TRANSPORTATION INSECURITY
IN THE PAST 12 MONTHS, HAS LACK OF TRANSPORTATION KEPT YOU FROM MEETINGS, WORK, OR FROM GETTING THINGS NEEDED FOR DAILY LIVING?: NO

## 2022-08-01 SDOH — ECONOMIC STABILITY: FOOD INSECURITY: WITHIN THE PAST 12 MONTHS, THE FOOD YOU BOUGHT JUST DIDN'T LAST AND YOU DIDN'T HAVE MONEY TO GET MORE.: NEVER TRUE

## 2022-08-01 SDOH — ECONOMIC STABILITY: FOOD INSECURITY: WITHIN THE PAST 12 MONTHS, YOU WORRIED THAT YOUR FOOD WOULD RUN OUT BEFORE YOU GOT MONEY TO BUY MORE.: NEVER TRUE

## 2022-08-01 ASSESSMENT — LIFESTYLE VARIABLES: HOW OFTEN DO YOU HAVE A DRINK CONTAINING ALCOHOL: NEVER

## 2022-08-01 ASSESSMENT — ENCOUNTER SYMPTOMS
SHORTNESS OF BREATH: 0
CHEST TIGHTNESS: 0
ABDOMINAL PAIN: 0
ABDOMINAL DISTENTION: 0
BACK PAIN: 1
NAUSEA: 0
COUGH: 0
VOMITING: 0

## 2022-08-01 ASSESSMENT — SOCIAL DETERMINANTS OF HEALTH (SDOH): HOW HARD IS IT FOR YOU TO PAY FOR THE VERY BASICS LIKE FOOD, HOUSING, MEDICAL CARE, AND HEATING?: NOT HARD AT ALL

## 2022-08-01 NOTE — PATIENT INSTRUCTIONS
PLEASE CALL OUR OFFICE WITH YOUR MEDICATION LIST    Please continue to check your blood pressure at home

## 2022-08-01 NOTE — PROGRESS NOTES
[facility]  FAMILY MEDICINE RESIDENCY PROGRAM  DATE OF VISIT : 2022    Patient : Ingris Cruz   Age : 77 y.o.  : 1945   MRN : [id number]   ______________________________________________________________________    Chief Complaint:   Chief Complaint   Patient presents with    Follow-Up from Hospital       HPI:   History obtained from the patient. Ingris Cruz is a 77 y.o. female with PMHx of NSTEMI, COPD, PHTN and osteoarthritis. Today, she presents to the clinic for follow-up of hospital admission. Patient reports she was in the hospital for 2 weeks beginning 2022. She states that she had a fall in her home and was down for 2 days before her children had a friend check-in on her. She was then admitted to [facility] for altered mental status. On admission was found to have hypokalemia, UTI, and elevated bp (systolic 871F). Patient lives at home alone. She was treated and discharged. Two days later, she was re-admitted to [facility] for a NSTEMI. She is unable to provide a thorough history of this encounter. On admission a stent was placed. She was started on Metoprolol 25mg. She was sent to nursing rehab [facility]) from  to . She states she is now feeling fine. She denies: HA, vision changes, CP, SOB, hematuria or changes to urination. She has upcoming appointments with Dr. Kaylee Carroll cardiology on 2022. She uses a walker to ambulate. She complains of general weakness. She would like a referral for physical therapy. She was told that she is not a candidate for a DEXA scan due to metal in her back, hips, knees, thigh and dental implants.  She states that she had a DEXA several years ago that was normal.     Past Medical History:  Past Medical History:   Diagnosis Date    Adverse effect of anesthetic     difficulty waking up if lying flat post op    Arthritis     Bronchitis     Cancer ([facility] [address] 2016    breast left    Diverticula of colon  Hyperlipidemia     Hypertension     Pneumonia     Restless leg syndrome     Sleep apnea     CPAP    Thyroid disease        Past Surgical History:  Past Surgical History:   Procedure Laterality Date    APPENDECTOMY      BREAST SURGERY Left 03/2016    lumpectomy for cancer with chemo & radiation last chemo in july     CHOLECYSTECTOMY  2009    lap    COLONOSCOPY  2011    diverticulosis    COSMETIC SURGERY Bilateral     blephoroplasty    EYE SURGERY Bilateral     cataract extraction    HAND SURGERY      CTS B/L    HYSTERECTOMY (CERVIX STATUS UNKNOWN)      JOINT REPLACEMENT  03/04/2007    [place] Knees. [place].  Hips    KNEE ARTHROSCOPY Bilateral     LAP BAND  2007    LUMBAR FUSION  2019    done at [address] Left 08/19/2015    sacroiliac joint #1    NERVE BLOCK  08/19/2015    lumbar epidural #1    NERVE BLOCK N/A 09/09/2015    lumbar epidural nerve block #3    NERVE BLOCK N/A 10/26/2015    paravertebral facet bilateral lumbar #1    NERVE BLOCK Bilateral 11/02/2015    lumb facet #2    NERVE BLOCK Bilateral 11/09/2015    paravertebral facet block lumbar #3    NERVE BLOCK Bilateral 04/13/2016    Bilateral sacroiliac joint injection #1    NERVE BLOCK Bilateral 08/31/2016    SI injection #2    NERVE BLOCK Bilateral 10/03/2016    si inj #3    NERVE SURGERY Right 01/22/2020    RADIOFREQUENCY, SACROILIAC JOINT RIGHT AT S1, S2, S3 performed by Jhonny Somers DO at [facility] Right 7/7/2021    STAGE 2 [employer] LUMBAR SPINAL CORD STIMULATOR IMPLANT (CPT 70532, 88848) ([employer]) (PT REQUESTS BATTERY IN RIGHT FLANK AREA) performed by Jhonny Somers DO at [address]      upper and lower dental implants  except 6 teeth in  front    OTHER SURGICAL HISTORY Right 04/15/2015    right ethmoidectomymaaxillary antrostomies frontal recess exploration    OTHER SURGICAL HISTORY Right 12/30/2015    Right lumbar radiofrequency    OTHER SURGICAL HISTORY Left 02/08/2016 lumb radiofreq    OTHER SURGICAL HISTORY Left 11/21/2016    lateral sacral radiofrequency    OTHER SURGICAL HISTORY Right 01/09/2017    radiofrequency    OTHER SURGICAL HISTORY Right 02/08/2017    lumbar facet radiofreq    OTHER SURGICAL HISTORY Left 03/27/2017    lumbar radiofrequency     OTHER SURGICAL HISTORY Left 05/24/2017    SI joint radiofrequency    OTHER SURGICAL HISTORY Right 01/22/2020    si radiofrequency    OVARY REMOVAL  1975    right    REVISION TOTAL HIP ARTHROPLASTY  03/26/2012    orif left hip revision of femoral stem    TOTAL HIP ARTHROPLASTY  03/2012    TOTAL LEFT HIP ARTHROPLASTY WITH PLATELET GEL       Family History:  Family History   Problem Relation Age of Onset    Heart Failure Mother     Parkinsonism Father     Diabetes Father     Other Brother         PVD       Social History:  Social History     Socioeconomic History    Marital status:      Spouse name: None    Number of children: None    Years of education: None    Highest education level: None   Tobacco Use    Smoking status: Every Day     Packs/day: 1.00     Years: 31.00     Pack years: 31.00     Types: Cigarettes    Smokeless tobacco: Never    Tobacco comments:     quit for 10 years, but back at it. Vaping Use    Vaping Use: Never used   Substance and Sexual Activity    Alcohol use: Not Currently     Alcohol/week: 1.0 standard drink     Types: 1 Glasses of wine per week     Comment: 4 cups of coffee a day     Drug use: No   Social History Narrative    Lives alone in [place]. Retired . Social Determinants of Health     Financial Resource Strain: Low Risk     Difficulty of Paying Living Expenses: Not hard at all   Food Insecurity: No Food Insecurity    Worried About [address] in the Last Year: Never true    [facility] in the Last Year: Never true   Transportation Needs: No Transportation Needs    Lack of Transportation (Medical): No    Lack of Transportation (Non-Medical):  No   Physical Activity: Inactive    Days of Exercise per Week: 0 days    Minutes of Exercise per Session: 0 min       Allergies: Allergies   Allergen Reactions    Demerol [Meperidine Hcl] Other (See Comments)     Hallucinations, anxiety        Meperidine Other (See Comments)    Nabumetone Hives and Rash       Medications:    Current Outpatient Medications   Medication Sig Dispense Refill    amLODIPine (NORVASC) 5 MG tablet Take 5 mg by mouth in the morning. ASPIRIN LOW DOSE 81 MG chewable tablet Take 81 mg by mouth daily      atorvastatin (LIPITOR) 40 MG tablet Take 40 mg by mouth in the morning. losartan (COZAAR) 50 MG tablet Take 50 mg by mouth in the morning. metoprolol succinate (TOPROL XL) 25 MG extended release tablet Take 25 mg by mouth in the morning. 0.5 tablets oral, once a day. rOPINIRole (REQUIP) 3 MG tablet Take 3 mg by mouth in the morning. famotidine (PEPCID) 40 MG tablet take 1 tablet by mouth once daily      celecoxib (CELEBREX) 200 MG capsule take 1 capsule by mouth twice a day 60 capsule 3    amLODIPine (NORVASC) 10 MG tablet take 1 tablet by mouth once daily 90 tablet 1    DULoxetine (CYMBALTA) 60 MG extended release capsule TAKE 1 CAPSULE BY MOUTH  NIGHTLY 90 capsule 3    fluticasone (FLONASE) 50 MCG/ACT nasal spray 1 spray by Each Nostril route daily      oxyCODONE-acetaminophen (PERCOCET) 7.5-325 MG per tablet take 1 tablet by mouth every 6 hours if needed      methocarbamol (ROBAXIN) 750 MG tablet take 1 tablet by mouth every 12 hours if needed      SPIRIVA RESPIMAT 2.5 MCG/ACT AERS inhaler inhale 2 puffs by mouth once daily 4 g 3    furosemide (LASIX) 20 MG tablet Take 20 mg by mouth 2 times daily      lidocaine (XYLOCAINE) 5 % ointment Apply topically to hands and low back as needed.  1 Tube 3    rOPINIRole (REQUIP) 1 MG tablet TAKE 1 TABLET BY MOUTH AT  NIGHT (Patient not taking: Reported on 8/1/2022) 90 tablet 0    DULoxetine (CYMBALTA) 30 MG extended release capsule TAKE 1 CAPSULE BY MOUTH  DAILY (Patient not taking: Reported on 8/1/2022) 90 capsule 1    pantoprazole (PROTONIX) 20 MG tablet Take 20 mg by mouth in the morning and at bedtime (Patient not taking: Reported on 8/1/2022)      nebivolol (BYSTOLIC) 5 MG tablet TAKE 1 TABLET BY MOUTH ONCE DAILY 90 tablet 3    SUPREP BOWEL PREP KIT 17.5-3.13-1.6 GM/177ML SOLN DRINK THE contents of 1 KIT ( 2 BOTTLES ) by mouth as directed by prescriber      losartan (COZAAR) 100 MG tablet Take 1 tablet by mouth daily (Patient not taking: Reported on 8/1/2022) 90 tablet 5    cetirizine (ZYRTEC) 5 MG tablet Take 1 tablet by mouth daily (Patient not taking: Reported on 8/1/2022) 30 tablet 5     No current facility-administered medications for this visit. Review of Systems:  Review of Systems   Constitutional:  Negative for appetite change. Eyes:  Negative for visual disturbance. Respiratory:  Negative for cough, chest tightness and shortness of breath. Cardiovascular:  Negative for chest pain and leg swelling. Gastrointestinal:  Negative for abdominal distention, abdominal pain, nausea and vomiting. Genitourinary:  Negative for difficulty urinating, dysuria and urgency. Musculoskeletal:  Positive for back pain and myalgias. Skin:  Positive for rash. Patient states rash on left side of her chest due to radiation years ago   Neurological:  Positive for weakness. Negative for dizziness and headaches. Psychiatric/Behavioral:  Negative for confusion. ______________________________________________________________________    Physical Exam:    Vitals: BP (!) 148/106 (Site: Right Upper Arm, Position: Sitting, Cuff Size: Medium Adult)   Pulse 80   Temp 98.4 °F (36.9 °C) (Temporal)   Ht 5' (1.524 m)   Wt 151 lb (68.5 kg)   SpO2 93%   BMI 29.49 kg/m²   Physical Exam  Constitutional:       Appearance: Normal appearance. She is well-groomed. Cardiovascular:      Rate and Rhythm: Normal rate and regular rhythm.       Heart sounds: S1 normal and S2 normal. Murmur heard. Pulmonary:      Effort: Pulmonary effort is normal.      Breath sounds: Normal breath sounds. Abdominal:      General: There is no distension. Tenderness: There is no abdominal tenderness. Musculoskeletal:      Right lower leg: No edema. Left lower leg: No edema. Comments: 4/5 biceps strength  5/5 triceps  5/5 trapezius   Neurological:      Cranial Nerves: Cranial nerves are intact. Gait: Gait abnormal.   Psychiatric:         Behavior: Behavior is cooperative.     ______________________________________________________________________    Assessment & Plan:  1. Physical deconditioning  - [facility], [facility], [place] [address]    2. Pulmonary Hypertension  -Patient will call our office to update medications  -BP in office 148/106  -Return in 4 weeks for follow-up      Return to Office: Return in about 4 weeks (around 8/29/2022) for Blood pressure follow-up.     Baron Lawrence, DO   This case was discussed with Dr. France Mendoza

## 2022-08-02 ENCOUNTER — TELEPHONE (OUTPATIENT)
Dept: FAMILY MEDICINE CLINIC | Age: 77
End: 2022-08-02

## 2022-08-02 NOTE — TELEPHONE ENCOUNTER
Dari called in to let you know changes in her medication.   Amlodipine 10mg  Losartan Potassium 50mg  No longer taking Bystolic      Thank you

## 2022-08-03 NOTE — PROGRESS NOTES
S: 77 y.o. female presents today for: hospital followup        O: VS: BP (!) 148/106 (Site: Right Upper Arm, Position: Sitting, Cuff Size: Medium Adult)   Pulse 80   Temp 98.4 °F (36.9 °C) (Temporal)   Ht 5' (1.524 m)   Wt 151 lb (68.5 kg)   SpO2 93%   BMI 29.49 kg/m²     AAO/NAD, appropriate affect for mood  Radiation rash on left upper chest/neck. CV:  RRR, systolic murmur  Resp: CTAB  RUE weakness proximally 2/2 old injury otherwise 5/5. Antalgic gait/unsteady appearing. Impression/Plan:   1) Deconditioning - rec home PT/OT. 2) Hypertension - patient doesn't recall meds, will call in at home and review w/ office as there have been multiple changes for her. Attending Physician Statement  I have discussed the case, including pertinent history and exam findings with the resident. I also have seen the patient and performed key portions of the examination. I agree with the documented assessment and plan.       Gregory Sherman MD

## 2022-08-10 ENCOUNTER — TELEPHONE (OUTPATIENT)
Dept: FAMILY MEDICINE CLINIC | Age: 77
End: 2022-08-10

## 2022-08-10 NOTE — TELEPHONE ENCOUNTER
Our office attempted to reach patient to schedule an urgent appointment. Unable to reach patient. I sent a message through [address] as well. Thank you!

## 2022-08-11 NOTE — TELEPHONE ENCOUNTER
Left a detailed message on voice mail with Dr. Rafaela lerma . Requested a return call to assist patient with scheduling an appointment with our office.

## 2022-08-12 ENCOUNTER — OFFICE VISIT (OUTPATIENT)
Dept: FAMILY MEDICINE CLINIC | Age: 77
End: 2022-08-12
Payer: MEDICARE

## 2022-08-12 VITALS
WEIGHT: 154 LBS | SYSTOLIC BLOOD PRESSURE: 126 MMHG | RESPIRATION RATE: 18 BRPM | HEART RATE: 84 BPM | TEMPERATURE: 97.8 F | BODY MASS INDEX: 30.23 KG/M2 | OXYGEN SATURATION: 92 % | HEIGHT: 60 IN | DIASTOLIC BLOOD PRESSURE: 78 MMHG

## 2022-08-12 DIAGNOSIS — H53.2 DIPLOPIA: Primary | ICD-10-CM

## 2022-08-12 PROCEDURE — G8427 DOCREV CUR MEDS BY ELIG CLIN: HCPCS | Performed by: FAMILY MEDICINE

## 2022-08-12 PROCEDURE — 4004F PT TOBACCO SCREEN RCVD TLK: CPT | Performed by: FAMILY MEDICINE

## 2022-08-12 PROCEDURE — G8417 CALC BMI ABV UP PARAM F/U: HCPCS | Performed by: FAMILY MEDICINE

## 2022-08-12 PROCEDURE — G8400 PT W/DXA NO RESULTS DOC: HCPCS | Performed by: FAMILY MEDICINE

## 2022-08-12 PROCEDURE — 99212 OFFICE O/P EST SF 10 MIN: CPT | Performed by: FAMILY MEDICINE

## 2022-08-12 PROCEDURE — 1123F ACP DISCUSS/DSCN MKR DOCD: CPT | Performed by: FAMILY MEDICINE

## 2022-08-12 PROCEDURE — 1090F PRES/ABSN URINE INCON ASSESS: CPT | Performed by: FAMILY MEDICINE

## 2022-08-12 PROCEDURE — 99213 OFFICE O/P EST LOW 20 MIN: CPT | Performed by: FAMILY MEDICINE

## 2022-08-12 PROCEDURE — 3017F COLORECTAL CA SCREEN DOC REV: CPT | Performed by: FAMILY MEDICINE

## 2022-08-12 NOTE — PROGRESS NOTES
S: 77 y.o. female here for blurry vision in both eyes for 1 week. Drape over her eyes  occurs at night. At night has \"yellow fluoroscence\" Color in visual fields. She has an appmt next week with eye care. 2.5-3 weeks ago a cardiac stent was placed at [facility]. She was prescribed plavix and aspirin. O: VS: /78 (Site: Right Upper Arm, Position: Sitting, Cuff Size: Medium Adult)   Pulse 84   Temp 97.8 °F (36.6 °C) (Temporal)   Resp 18   Ht 5' (1.524 m)   Wt 154 lb (69.9 kg)   SpO2 92%   BMI 30.08 kg/m²    General: NAD   CV:  RRR, no gallops, rubs, or murmurs   Resp: CTAB no R/R/W   Abd:  Soft, nontender, no masses    Ext:  no C/C/E   Neuro exam-nonfocal   HEENT-perrl, eomi, peripheral vision intact   Fundus exam-normal  Impression/Plan:   1. Bilateral diplopia-pt to see ophthalmology this upcoming week. Advised pt to call eye care tomorrow. Consider CT imaging of head/neck    Rto 2 weeks      Attending Physician Statement  I have discussed the case, including pertinent history and exam findings with the resident. I also have seen the patient and performed key portions of the examination. I agree with the documented assessment and plan.         Juliane Rubi MD

## 2022-08-12 NOTE — PROGRESS NOTES
[address] [facility]  Department of [facility]  Family Medicine Residency Program    Date of Visit: 8/12/2022     Chief Complaint     Chief Complaint   Patient presents with    Hypertension     Trouble with her vision like a haze over them   Patient has an eye doctor appointment next week           History of Present Illness     HPI:  77 y.o. female presents due to concerns for vision changes. Vision changes  Patient states that recently she has felt as though there is an \" eye curtain\" that falls over her eyes. Notes that it has mostly been happening at night ; overall probably 4-5 times   Describes the curtain as yellow in color  Has also noted floaters around her eyes ( black spots) , though not often. She denies any other vision changes including blurry vision, headaches, dizziness, fevers, chills or any acute eye pain. Of note, patient with hx of HLD and recently had stents placed 2-3 weeks ago. She is presently taking aspirin and plavix ; cannot find history in chart as this procedure was performed at [facility] per patient. Feels that she has noted more vision changes since the new changes in her medications    No other concerns/ complaints at this time. Social History     Tobacco Use    Smoking status: Every Day     Packs/day: 1.00     Years: 31.00     Pack years: 31.00     Types: Cigarettes    Smokeless tobacco: Never    Tobacco comments:     quit for 10 years, but back at it. Vaping Use    Vaping Use: Never used   Substance Use Topics    Alcohol use: Not Currently     Alcohol/week: 1.0 standard drink     Types: 1 Glasses of wine per week     Comment: 4 cups of coffee a day     Drug use: No       ROS:    Reviewed, pertinent as above otherwise negative    Objective:    VS:  Blood pressure 126/78, pulse 84, temperature 97.8 °F (36.6 °C), temperature source Temporal, resp. rate 18, height 5' (1.524 m), weight 154 lb (69.9 kg), SpO2 92 %, not currently breastfeeding. Physical Exam  Constitutional:       Appearance: She is not ill-appearing. HENT:      Head: Normocephalic and atraumatic. Eyes:      General: No scleral icterus. Right eye: No discharge. Left eye: No discharge. Extraocular Movements: Extraocular movements intact. Conjunctiva/sclera: Conjunctivae normal.      Pupils: Pupils are equal, round, and reactive to light. Comments: Fundoscopic exam normal , no torturous arteries or veins noted   No notable hemorrhaging  No tenderness to palpation of eyes  No sensitivity to light   Neck:      Vascular: No carotid bruit. Cardiovascular:      Rate and Rhythm: Normal rate and regular rhythm. Pulses: Normal pulses. Heart sounds: Normal heart sounds. No murmur heard. No gallop. Pulmonary:      Effort: Pulmonary effort is normal. No respiratory distress. Breath sounds: Normal breath sounds. No wheezing. Abdominal:      General: Bowel sounds are normal. There is no distension. Palpations: Abdomen is soft. Tenderness: There is no abdominal tenderness. Neurological:      Mental Status: She is alert and oriented to person, place, and time. Cranial Nerves: No cranial nerve deficit. Comments: Normal pupillary reflexes   Normal peripheral vision/ visual field testing. No hemianopia noted     Assessment/Plan:    1. Diplopia  Patient with vision changes and \"curtain\" over eyes for the past 1-2 weeks , happening on multiple occasions  No obvious changes noted during examination  She does have an eye exam scheduled with [address] [place] [address] in the next few days, advise to go sooner   Continue with present medications  If patient notices further changes, advise go to ER.    RTO 1-2 weeks or sooner prn for any persistent, new, or worsening symptoms. Please see Patient Instructions for further counseling and information given.        Advised to please be adherent to the treatment plans discussed today, and please call with any questions or concerns, letting the office know of any reasons that the plans may not be followed. The risks of untreated conditions include worsening illness, injury, disability, and possibly, death. Please call if symptoms change in any way, worsen, or fail to completely resolve, as this could necessitate a change to treatment plans. Patient and/or caregiver expressed understanding. Indications and proper use of medication(s) reviewed. Potential side-effects and risks of medication(s) also explained. Patient and/or caregiver was instructed to call if any new symptoms develop prior to next visit. Health risk factors discussed and addressed.      Electronically signed by Estrella Tanner MD PGY 3 on 8/12/2022 at 3:39 PM  This case was discussed with attending physician: Dr. Stover Asp

## 2022-08-12 NOTE — Clinical Note
Hi Dr. Tang Darby, just confirming. In your note you wrote CT head and neck? I thought we decided to wait ? IF not, please do let me know because due to our decision, I did not order it. Thank you!

## 2022-08-23 DIAGNOSIS — F33.1 MODERATE EPISODE OF RECURRENT MAJOR DEPRESSIVE DISORDER (HCC): ICD-10-CM

## 2022-08-23 RX ORDER — DULOXETIN HYDROCHLORIDE 60 MG/1
CAPSULE, DELAYED RELEASE ORAL
Qty: 90 CAPSULE | Refills: 3 | Status: SHIPPED | OUTPATIENT
Start: 2022-08-23

## 2022-09-09 DIAGNOSIS — G25.81 RESTLESS LEG SYNDROME: ICD-10-CM

## 2022-09-09 RX ORDER — METOPROLOL SUCCINATE 25 MG/1
25 TABLET, EXTENDED RELEASE ORAL DAILY
Qty: 30 TABLET | Refills: 1 | Status: SHIPPED | OUTPATIENT
Start: 2022-09-09

## 2022-09-09 RX ORDER — ROPINIROLE 1 MG/1
TABLET, FILM COATED ORAL
Qty: 90 TABLET | Refills: 0 | Status: SHIPPED
Start: 2022-09-09 | End: 2022-09-15 | Stop reason: CLARIF

## 2022-09-09 RX ORDER — LOSARTAN POTASSIUM 100 MG/1
100 TABLET ORAL DAILY
Qty: 90 TABLET | Refills: 5 | Status: SHIPPED
Start: 2022-09-09 | End: 2022-09-15 | Stop reason: CLARIF

## 2022-09-09 RX ORDER — AMLODIPINE BESYLATE 5 MG/1
5 TABLET ORAL DAILY
Qty: 30 TABLET | Refills: 1 | Status: SHIPPED | OUTPATIENT
Start: 2022-09-09

## 2022-09-09 RX ORDER — FAMOTIDINE 40 MG/1
TABLET, FILM COATED ORAL
Qty: 30 TABLET | Refills: 3 | Status: SHIPPED | OUTPATIENT
Start: 2022-09-09

## 2022-09-09 RX ORDER — ATORVASTATIN CALCIUM 40 MG/1
40 TABLET, FILM COATED ORAL DAILY
Qty: 30 TABLET | Refills: 1 | Status: SHIPPED
Start: 2022-09-09 | End: 2022-11-03

## 2022-09-09 RX ORDER — FUROSEMIDE 20 MG/1
20 TABLET ORAL 2 TIMES DAILY
Qty: 60 TABLET | Refills: 1 | Status: SHIPPED
Start: 2022-09-09 | End: 2022-11-03

## 2022-09-09 RX ORDER — ASPIRIN 81 MG
81 TABLET,CHEWABLE ORAL DAILY
Qty: 30 TABLET | Refills: 1 | Status: SHIPPED | OUTPATIENT
Start: 2022-09-09

## 2022-09-09 RX ORDER — CELECOXIB 200 MG/1
CAPSULE ORAL
Qty: 60 CAPSULE | Refills: 3 | Status: SHIPPED | OUTPATIENT
Start: 2022-09-09

## 2022-09-12 ENCOUNTER — OFFICE VISIT (OUTPATIENT)
Dept: FAMILY MEDICINE CLINIC | Age: 77
End: 2022-09-12
Payer: MEDICARE

## 2022-09-12 VITALS
DIASTOLIC BLOOD PRESSURE: 83 MMHG | TEMPERATURE: 97.5 F | RESPIRATION RATE: 16 BRPM | SYSTOLIC BLOOD PRESSURE: 124 MMHG | HEIGHT: 60 IN | WEIGHT: 153 LBS | HEART RATE: 83 BPM | BODY MASS INDEX: 30.04 KG/M2

## 2022-09-12 DIAGNOSIS — Z12.31 ENCOUNTER FOR SCREENING MAMMOGRAM FOR MALIGNANT NEOPLASM OF BREAST: Primary | ICD-10-CM

## 2022-09-12 DIAGNOSIS — I10 PRIMARY HYPERTENSION: ICD-10-CM

## 2022-09-12 DIAGNOSIS — Z87.891 PERSONAL HISTORY OF TOBACCO USE: ICD-10-CM

## 2022-09-12 DIAGNOSIS — I25.10 CORONARY ARTERY DISEASE INVOLVING NATIVE HEART WITHOUT ANGINA PECTORIS, UNSPECIFIED VESSEL OR LESION TYPE: ICD-10-CM

## 2022-09-12 PROCEDURE — 1090F PRES/ABSN URINE INCON ASSESS: CPT | Performed by: FAMILY MEDICINE

## 2022-09-12 PROCEDURE — G8417 CALC BMI ABV UP PARAM F/U: HCPCS | Performed by: FAMILY MEDICINE

## 2022-09-12 PROCEDURE — 4004F PT TOBACCO SCREEN RCVD TLK: CPT | Performed by: FAMILY MEDICINE

## 2022-09-12 PROCEDURE — 1123F ACP DISCUSS/DSCN MKR DOCD: CPT | Performed by: FAMILY MEDICINE

## 2022-09-12 PROCEDURE — G0296 VISIT TO DETERM LDCT ELIG: HCPCS | Performed by: FAMILY MEDICINE

## 2022-09-12 PROCEDURE — 3017F COLORECTAL CA SCREEN DOC REV: CPT | Performed by: FAMILY MEDICINE

## 2022-09-12 PROCEDURE — 99212 OFFICE O/P EST SF 10 MIN: CPT | Performed by: FAMILY MEDICINE

## 2022-09-12 PROCEDURE — G8400 PT W/DXA NO RESULTS DOC: HCPCS | Performed by: FAMILY MEDICINE

## 2022-09-12 PROCEDURE — G8427 DOCREV CUR MEDS BY ELIG CLIN: HCPCS | Performed by: FAMILY MEDICINE

## 2022-09-12 PROCEDURE — 99213 OFFICE O/P EST LOW 20 MIN: CPT | Performed by: FAMILY MEDICINE

## 2022-09-12 NOTE — PROGRESS NOTES
[address] [facility]  [phone]   Paula Schilder , MD     Patient: Aidan Vann  YOB: 1945  Visit Date: 9/12/22    Jac is a 77y.o. year old female here today for   Chief Complaint   Patient presents with    Hypertension     F/u       HPI  Patient is a 77year old femael with history of NSTEMI with recent stent , COPD, chronic pain and history of breast cancer. July was in the hospital 2 weeks then 1 week in rehab. Had a uti caused confusion, fallen into living room. Sat there for 2 days   Came home and then went back to the hospital. Found to need a stent. Since she had stent placed she feels like a new person. Does not get tired and does not have shortness of breath. Taking asa and plavix. Many of her blood pressure medications were adjusted . Blood pressure is doing well. No chest pain, sob, dizziness, lightheadedness. Can't get around because of pain. Thinking about moving to [place] [id number]. No falls since she was hospitalized. No surgeries until July of next year. Has not schedule lung CT . Review of Systems   Constitutional:  Negative for chills and fever. Respiratory:  Negative for cough, shortness of breath (at baseline) and wheezing. Cardiovascular:  Negative for chest pain, palpitations and leg swelling. Gastrointestinal:  Negative for abdominal pain, constipation, diarrhea, nausea and vomiting. Musculoskeletal:  Positive for back pain and gait problem.      Current Outpatient Medications on File Prior to Visit   Medication Sig Dispense Refill    celecoxib (CELEBREX) 200 MG capsule take 1 capsule by mouth twice a day 60 capsule 3    amLODIPine (NORVASC) 5 MG tablet Take 1 tablet by mouth daily 30 tablet 1    atorvastatin (LIPITOR) 40 MG tablet Take 1 tablet by mouth daily 30 tablet 1    famotidine (PEPCID) 40 MG tablet take 1 tablet by mouth once daily 30 tablet 3    furosemide (LASIX) 20 MG tablet Take 1 tablet by mouth 2 times daily (Patient taking differently: Take 20 mg by mouth daily) 60 tablet 1    metoprolol succinate (TOPROL XL) 25 MG extended release tablet Take 1 tablet by mouth daily 0.5 tablets oral, once a day 30 tablet 1    ASPIRIN LOW DOSE 81 MG chewable tablet Take 1 tablet by mouth daily 30 tablet 1    DULoxetine (CYMBALTA) 60 MG extended release capsule TAKE 1 CAPSULE BY MOUTH AT  NIGHT 90 capsule 3    losartan (COZAAR) 50 MG tablet Take 50 mg by mouth in the morning. rOPINIRole (REQUIP) 3 MG tablet Take 3 mg by mouth in the morning. fluticasone (FLONASE) 50 MCG/ACT nasal spray 1 spray by Each Nostril route daily      oxyCODONE-acetaminophen (PERCOCET) 7.5-325 MG per tablet take 1 tablet by mouth every 6 hours if needed      methocarbamol (ROBAXIN) 750 MG tablet take 1 tablet by mouth every 12 hours if needed      SPIRIVA RESPIMAT 2.5 MCG/ACT AERS inhaler inhale 2 puffs by mouth once daily 4 g 3    lidocaine (XYLOCAINE) 5 % ointment Apply topically to hands and low back as needed. 1 Tube 3     No current facility-administered medications on file prior to visit. Allergies   Allergen Reactions    Demerol [Meperidine Hcl] Other (See Comments)     Hallucinations, anxiety        Meperidine Other (See Comments)    Nabumetone Hives and Rash       Past medical, surgical, socialand/or family history reviewed, updated as needed, and are non-contributory (unless otherwise stated). Medications, allergies, and problem list also reviewed and updated as needed in patient's record. Wt Readings from Last 3 Encounters:   09/12/22 153 lb (69.4 kg)   08/12/22 154 lb (69.9 kg)   08/01/22 151 lb (68.5 kg)                   /83 (Site: Left Upper Arm, Position: Sitting, Cuff Size: Medium Adult)   Pulse 83   Temp 97.5 °F (36.4 °C) (Temporal)   Resp 16   Ht 5' (1.524 m)   Wt 153 lb (69.4 kg)   BMI 29.88 kg/m²     [facility] colonoscopy     Gets reocrds and look at labs that are due. Due for mammogram.    Physical Exam  Vitals and nursing note reviewed. Constitutional:       General: She is not in acute distress. Appearance: She is well-developed. She is not diaphoretic. Cardiovascular:      Rate and Rhythm: Normal rate and regular rhythm. Heart sounds: Murmur heard. No friction rub. Pulmonary:      Effort: Pulmonary effort is normal. No respiratory distress. Breath sounds: No wheezing or rales. Abdominal:      General: Bowel sounds are normal. There is no distension. Palpations: Abdomen is soft. There is no mass. Tenderness: There is no abdominal tenderness. There is no guarding. Musculoskeletal:         General: Normal range of motion. Right lower leg: Edema (trace) present. Left lower leg: Edema (trace) present. Results for orders placed or performed in visit on 06/13/22   Hepatic Function Panel   Result Value Ref Range    Total Protein 6.8 6.4 - 8.3 g/dL    Albumin 4.5 3.5 - 5.2 g/dL    Alkaline Phosphatase 71 35 - 104 U/L    ALT 12 0 - 32 U/L    AST 22 0 - 31 U/L    Total Bilirubin 0.4 0.0 - 1.2 mg/dL    Bilirubin, Direct <0.2 0.0 - 0.3 mg/dL    Bilirubin, Indirect see below 0.0 - 1.0 mg/dL       ASSESSMENT/PLAN  Deon Cobian was seen today for hypertension. Diagnoses and all orders for this visit:    Encounter for screening mammogram for malignant neoplasm of breast  -     ALISSA DIGITAL SCREEN W OR WO CAD BILATERAL; Future    Personal history of tobacco use  -     [place] VISIT TO DISCUSS LUNG CA SCREEN W LDCT  -     CT Lung Screen (Annual); Future    Coronary artery disease involving native heart without angina pectoris, unspecified vessel or lesion type   - Patient on metoprolol, losartan, plavix and asa after stent placement. Will obtain records from [facility]. Primary hypertension   - Bp well controlled on current meds. Will get records to see if any labs are due at this time.          Phone/MyChart follow up if tests abnormal.    Return in about 3 months (around 12/12/2022). or sooner if necessary. I have reviewed myfindings and recommendations with Bridget Cheney. Deyvi Lares MD, M.D    Low Dose CT (LDCT) Lung Screening criteria met:     Age 50-77(Medicare) or 50-80 ([facility] [place])   Pack year smoking >20   Still smoking or less than 15 year since quit   No sign or symptoms of lung cancer   > 11 months since last LDCT     Risks and benefits of lung cancer screening with LDCT scans discussed:    Significance of positive screen - False-positive LDCT results often occur. 95% of all positive results do not lead to a diagnosis of cancer. Usually further imaging can resolve most false-positive results; however, some patients may require invasive procedures. Over diagnosis risk - 10% to 12% of screen-detected lung cancer cases are over diagnosed--that is, the cancer would not have been detected in the patient's lifetime without the screening. Need for follow up screens annually to continue lung cancer screening effectiveness     Risks associated with radiation from annual LDCT- Radiation exposure is about the same as for a mammogram, which is about 1/3 of the annual background radiation exposure from everyday life. Starting screening at age 54 is not likely to increase cancer risk from radiation exposure. Patients with comorbidities resulting in life expectancy of < 10 years, or that would preclude treatment of an abnormality identified on CT, should not be screened due to lack of benefit.     To obtain maximal benefit from this screening, smoking cessation and long-term abstinence from smoking is critical

## 2022-09-12 NOTE — PATIENT INSTRUCTIONS
Get mammogram and lung CT done   I will let you know about labs  Fill out a release of records from [facility] for the month of July and Aug 2022. Follow up in 3 months     What is lung cancer screening? Lung cancer screening is a way in which doctors check the lungs for early signs of cancer in people who have no symptoms of lung cancer. A low-dose CT scan uses much less radiation than a normal CT scan and shows a more detailed image of the lungs than a standard X-ray. The goal of lung cancer screening is to find cancer early, before it has a chance to grow, spread, or cause problems. One large study found that smokers who were screened with low-dose CT scans were less likely to die of lung cancer than those who were screened with standard X-ray. Below is a summary of the things you need to know regarding screening for lung cancer with low-dose computed tomography (LDCT). This is a screening program that involves routine annual screening with LDCT studies of the lung. The LDCTs are done using low-dose radiation that is not thought to increase your cancer risk. If you have other serious medical conditions (other cancers, congestive heart failure) that limit your life expectancy to less than 10 years, you should not undergo lung cancer screening with LDCT. The chance is 20%-60% that the LDCT result will show abnormalities. This would require additional testing which could include repeat imaging or even invasive procedures. Most (about 95%) of \"abnormal\" LDCT results are false in the sense that no lung cancer is ultimately found. Additionally, some (about 10%) of the cancers found would not affect your life expectancy, even if undetected and untreated. If you are still smoking, the single most important thing that you can do to reduce your risk of dying of lung cancer is to quit. For this screening to be covered by Medicare and most other insurers, strict criteria must be met.   If you do not meet these criteria, but still wish to undergo LDCT testing, you will be required to sign a waiver indicating your willingness to pay for the scan.

## 2022-09-15 ASSESSMENT — ENCOUNTER SYMPTOMS
NAUSEA: 0
COUGH: 0
WHEEZING: 0
ABDOMINAL PAIN: 0
SHORTNESS OF BREATH: 0
BACK PAIN: 1
CONSTIPATION: 0
DIARRHEA: 0
VOMITING: 0

## 2022-09-23 DIAGNOSIS — F33.1 MODERATE EPISODE OF RECURRENT MAJOR DEPRESSIVE DISORDER (HCC): ICD-10-CM

## 2022-09-23 DIAGNOSIS — G25.81 RESTLESS LEG SYNDROME: ICD-10-CM

## 2022-09-26 RX ORDER — ROPINIROLE 1 MG/1
TABLET, FILM COATED ORAL
Qty: 90 TABLET | Refills: 3 | OUTPATIENT
Start: 2022-09-26

## 2022-09-26 RX ORDER — DULOXETIN HYDROCHLORIDE 30 MG/1
CAPSULE, DELAYED RELEASE ORAL
Qty: 90 CAPSULE | Refills: 3 | OUTPATIENT
Start: 2022-09-26

## 2022-11-02 NOTE — TELEPHONE ENCOUNTER
Last Appointment:  9/12/2022  Future Appointments   Date Time Provider Daniela Adams   12/12/2022  1:20 PM MD Shazia Beard [facility]

## 2022-11-03 RX ORDER — FUROSEMIDE 20 MG/1
20 TABLET ORAL DAILY
Qty: 30 TABLET | Refills: 1 | Status: SHIPPED | OUTPATIENT
Start: 2022-11-03

## 2022-11-03 RX ORDER — ATORVASTATIN CALCIUM 40 MG/1
TABLET, FILM COATED ORAL
Qty: 30 TABLET | Refills: 1 | Status: SHIPPED | OUTPATIENT
Start: 2022-11-03

## 2022-12-12 RX ORDER — LOSARTAN POTASSIUM 100 MG/1
100 TABLET ORAL DAILY
COMMUNITY
Start: 2022-12-01

## 2022-12-12 RX ORDER — OXYCODONE 18 MG/1
18 CAPSULE, EXTENDED RELEASE ORAL EVERY 12 HOURS
COMMUNITY
Start: 2022-11-22

## 2022-12-12 RX ORDER — CLOPIDOGREL BISULFATE 75 MG/1
75 TABLET ORAL DAILY
COMMUNITY
Start: 2022-10-06

## 2022-12-21 ENCOUNTER — APPOINTMENT (OUTPATIENT)
Dept: GENERAL RADIOLOGY | Age: 77
DRG: 917 | End: 2022-12-21
Payer: MEDICARE

## 2022-12-21 ENCOUNTER — APPOINTMENT (OUTPATIENT)
Dept: CT IMAGING | Age: 77
DRG: 917 | End: 2022-12-21
Payer: MEDICARE

## 2022-12-21 ENCOUNTER — HOSPITAL ENCOUNTER (INPATIENT)
Age: 77
LOS: 5 days | Discharge: HOME | DRG: 917 | End: 2022-12-26
Attending: EMERGENCY MEDICINE | Admitting: FAMILY MEDICINE
Payer: MEDICARE

## 2022-12-21 ENCOUNTER — TELEPHONE (OUTPATIENT)
Dept: FAMILY MEDICINE CLINIC | Age: 77
End: 2022-12-21

## 2022-12-21 ENCOUNTER — OFFICE VISIT (OUTPATIENT)
Dept: FAMILY MEDICINE CLINIC | Age: 77
End: 2022-12-21
Payer: MEDICARE

## 2022-12-21 VITALS
WEIGHT: 153 LBS | HEART RATE: 95 BPM | BODY MASS INDEX: 30.04 KG/M2 | DIASTOLIC BLOOD PRESSURE: 59 MMHG | HEIGHT: 60 IN | SYSTOLIC BLOOD PRESSURE: 109 MMHG | TEMPERATURE: 96.8 F

## 2022-12-21 DIAGNOSIS — R09.89 RHONCHI: ICD-10-CM

## 2022-12-21 DIAGNOSIS — N39.0 URINARY TRACT INFECTION WITHOUT HEMATURIA, SITE UNSPECIFIED: ICD-10-CM

## 2022-12-21 DIAGNOSIS — R53.81 PHYSICAL DECONDITIONING: ICD-10-CM

## 2022-12-21 DIAGNOSIS — J96.01 ACUTE RESPIRATORY FAILURE WITH HYPOXIA (HCC): ICD-10-CM

## 2022-12-21 DIAGNOSIS — J96.01 ACUTE RESPIRATORY FAILURE WITH HYPOXIA AND HYPERCAPNIA (HCC): Primary | ICD-10-CM

## 2022-12-21 DIAGNOSIS — J43.9 PULMONARY EMPHYSEMA, UNSPECIFIED EMPHYSEMA TYPE (HCC): ICD-10-CM

## 2022-12-21 DIAGNOSIS — R29.6 RECURRENT FALLS: Primary | ICD-10-CM

## 2022-12-21 DIAGNOSIS — J96.02 ACUTE RESPIRATORY FAILURE WITH HYPOXIA AND HYPERCAPNIA (HCC): Primary | ICD-10-CM

## 2022-12-21 DIAGNOSIS — R26.2 UNABLE TO AMBULATE: ICD-10-CM

## 2022-12-21 DIAGNOSIS — J18.9 PNEUMONIA DUE TO INFECTIOUS ORGANISM, UNSPECIFIED LATERALITY, UNSPECIFIED PART OF LUNG: ICD-10-CM

## 2022-12-21 DIAGNOSIS — R53.1 GENERALIZED WEAKNESS: ICD-10-CM

## 2022-12-21 PROBLEM — J96.00 ACUTE RESPIRATORY FAILURE (HCC): Status: ACTIVE | Noted: 2022-12-21

## 2022-12-21 LAB
ADENOVIRUS BY PCR: NOT DETECTED
ALBUMIN SERPL-MCNC: 4.3 G/DL (ref 3.5–5.2)
ALP BLD-CCNC: 63 U/L (ref 35–104)
ALT SERPL-CCNC: 17 U/L (ref 0–32)
ANION GAP SERPL CALCULATED.3IONS-SCNC: 9 MMOL/L (ref 7–16)
AST SERPL-CCNC: 32 U/L (ref 0–31)
B.E.: 6.9 MMOL/L (ref -3–3)
B.E.: 7.2 MMOL/L (ref -3–3)
B.E.: 8.1 MMOL/L (ref -3–3)
B.E.: 8.4 MMOL/L (ref -3–3)
BACTERIA: ABNORMAL /HPF
BASOPHILS ABSOLUTE: 0.02 E9/L (ref 0–0.2)
BASOPHILS RELATIVE PERCENT: 0.3 % (ref 0–2)
BILIRUB SERPL-MCNC: 0.8 MG/DL (ref 0–1.2)
BILIRUBIN URINE: NEGATIVE
BLOOD, URINE: ABNORMAL
BORDETELLA PARAPERTUSSIS BY PCR: NOT DETECTED
BORDETELLA PERTUSSIS BY PCR: NOT DETECTED
BUN BLDV-MCNC: 20 MG/DL (ref 6–23)
CALCIUM SERPL-MCNC: 9.2 MG/DL (ref 8.6–10.2)
CHLAMYDOPHILIA PNEUMONIAE BY PCR: NOT DETECTED
CHLORIDE BLD-SCNC: 91 MMOL/L (ref 98–107)
CLARITY: CLEAR
CO2: 35 MMOL/L (ref 22–29)
COHB: 10.9 % (ref 0–1.5)
COHB: 5.8 % (ref 0–1.5)
COHB: 7 % (ref 0–1.5)
COHB: 9.4 % (ref 0–1.5)
COLOR: YELLOW
CORONAVIRUS 229E BY PCR: NOT DETECTED
CORONAVIRUS HKU1 BY PCR: NOT DETECTED
CORONAVIRUS NL63 BY PCR: NOT DETECTED
CORONAVIRUS OC43 BY PCR: NOT DETECTED
CREAT SERPL-MCNC: 1.1 MG/DL (ref 0.5–1)
CRITICAL: ABNORMAL
DATE ANALYZED: ABNORMAL
DATE OF COLLECTION: ABNORMAL
EOSINOPHILS ABSOLUTE: 0.07 E9/L (ref 0.05–0.5)
EOSINOPHILS RELATIVE PERCENT: 1.1 % (ref 0–6)
EPITHELIAL CELLS, UA: ABNORMAL /HPF
FIO2: 40 %
FIO2: 60 %
GFR SERPL CREATININE-BSD FRML MDRD: 52 ML/MIN/1.73
GLUCOSE BLD-MCNC: 128 MG/DL
GLUCOSE BLD-MCNC: 129 MG/DL (ref 74–99)
GLUCOSE URINE: NEGATIVE MG/DL
HCO3: 34.9 MMOL/L (ref 22–26)
HCO3: 35.7 MMOL/L (ref 22–26)
HCO3: 36.3 MMOL/L (ref 22–26)
HCO3: 38.2 MMOL/L (ref 22–26)
HCT VFR BLD CALC: 42.3 % (ref 34–48)
HEMOGLOBIN: 12.9 G/DL (ref 11.5–15.5)
HHB: 0.9 % (ref 0–5)
HHB: 1.5 % (ref 0–5)
HHB: 1.5 % (ref 0–5)
HHB: 2.7 % (ref 0–5)
HUMAN METAPNEUMOVIRUS BY PCR: NOT DETECTED
HUMAN RHINOVIRUS/ENTEROVIRUS BY PCR: NOT DETECTED
IMMATURE GRANULOCYTES #: 0.01 E9/L
IMMATURE GRANULOCYTES %: 0.2 % (ref 0–5)
INFLUENZA A BY PCR: NOT DETECTED
INFLUENZA B BY PCR: NOT DETECTED
KETONES, URINE: NEGATIVE MG/DL
LAB: ABNORMAL
LACTIC ACID: 1.7 MMOL/L (ref 0.5–2.2)
LEUKOCYTE ESTERASE, URINE: ABNORMAL
LYMPHOCYTES ABSOLUTE: 0.74 E9/L (ref 1.5–4)
LYMPHOCYTES RELATIVE PERCENT: 11.7 % (ref 20–42)
Lab: ABNORMAL
MCH RBC QN AUTO: 27.3 PG (ref 26–35)
MCHC RBC AUTO-ENTMCNC: 30.5 % (ref 32–34.5)
MCV RBC AUTO: 89.4 FL (ref 80–99.9)
METER GLUCOSE: 128 MG/DL (ref 74–99)
METHB: 0.2 % (ref 0–1.5)
METHB: 0.3 % (ref 0–1.5)
METHB: 0.3 % (ref 0–1.5)
METHB: 0.4 % (ref 0–1.5)
MODE: ABNORMAL
MONOCYTES ABSOLUTE: 0.77 E9/L (ref 0.1–0.95)
MONOCYTES RELATIVE PERCENT: 12.2 % (ref 2–12)
MYCOPLASMA PNEUMONIAE BY PCR: NOT DETECTED
NEUTROPHILS ABSOLUTE: 4.72 E9/L (ref 1.8–7.3)
NEUTROPHILS RELATIVE PERCENT: 74.5 % (ref 43–80)
NITRITE, URINE: POSITIVE
O2 CONTENT: 17.6 ML/DL
O2 CONTENT: 17.7 ML/DL
O2 CONTENT: 17.9 ML/DL
O2 CONTENT: 18.6 ML/DL
O2 SATURATION: 97.1 % (ref 92–98.5)
O2 SATURATION: 98.3 % (ref 92–98.5)
O2 SATURATION: 98.4 % (ref 92–98.5)
O2 SATURATION: 99 % (ref 92–98.5)
O2HB: 88 % (ref 94–97)
O2HB: 88.8 % (ref 94–97)
O2HB: 91.1 % (ref 94–97)
O2HB: 91.2 % (ref 94–97)
OPERATOR ID: 1115
OPERATOR ID: 1741
PARAINFLUENZA VIRUS 1 BY PCR: NOT DETECTED
PARAINFLUENZA VIRUS 2 BY PCR: NOT DETECTED
PARAINFLUENZA VIRUS 3 BY PCR: NOT DETECTED
PARAINFLUENZA VIRUS 4 BY PCR: NOT DETECTED
PATIENT TEMP: 37 C
PCO2: 62.7 MMHG (ref 35–45)
PCO2: 65.8 MMHG (ref 35–45)
PCO2: 73.7 MMHG (ref 35–45)
PCO2: 81.3 MMHG (ref 35–45)
PDW BLD-RTO: 15.2 FL (ref 11.5–15)
PEEP/CPAP: 5 CMH2O
PFO2: 2.24 MMHG/%
PFO2: 2.34 MMHG/%
PH BLOOD GAS: 7.29 (ref 7.35–7.45)
PH BLOOD GAS: 7.31 (ref 7.35–7.45)
PH BLOOD GAS: 7.34 (ref 7.35–7.45)
PH BLOOD GAS: 7.37 (ref 7.35–7.45)
PH UA: 6 (ref 5–9)
PLATELET # BLD: 196 E9/L (ref 130–450)
PMV BLD AUTO: 10.4 FL (ref 7–12)
PO2: 124.6 MMHG (ref 75–100)
PO2: 134.2 MMHG (ref 75–100)
PO2: 184.7 MMHG (ref 75–100)
PO2: 93.7 MMHG (ref 75–100)
POTASSIUM SERPL-SCNC: 3.5 MMOL/L (ref 3.5–5)
PRO-BNP: 791 PG/ML (ref 0–450)
PROTEIN UA: NEGATIVE MG/DL
PS: 10 CMH20
PS: 15 CMH20
PS: 15 CMH20
RBC # BLD: 4.73 E12/L (ref 3.5–5.5)
RBC UA: ABNORMAL /HPF (ref 0–2)
RESPIRATORY SYNCYTIAL VIRUS BY PCR: NOT DETECTED
RR MECHANICAL: 16 B/MIN
SARS-COV-2, PCR: NOT DETECTED
SODIUM BLD-SCNC: 135 MMOL/L (ref 132–146)
SOURCE, BLOOD GAS: ABNORMAL
SPECIFIC GRAVITY UA: 1.01 (ref 1–1.03)
THB: 13.9 G/DL (ref 11.5–16.5)
THB: 13.9 G/DL (ref 11.5–16.5)
THB: 14 G/DL (ref 11.5–16.5)
THB: 14.4 G/DL (ref 11.5–16.5)
TIME ANALYZED: 1513
TIME ANALYZED: 1623
TIME ANALYZED: 1750
TIME ANALYZED: 1952
TOTAL PROTEIN: 6.8 G/DL (ref 6.4–8.3)
TROPONIN, HIGH SENSITIVITY: 46 NG/L (ref 0–9)
UROBILINOGEN, URINE: 0.2 E.U./DL
WBC # BLD: 6.3 E9/L (ref 4.5–11.5)
WBC UA: ABNORMAL /HPF (ref 0–5)

## 2022-12-21 PROCEDURE — 99212 OFFICE O/P EST SF 10 MIN: CPT | Performed by: FAMILY MEDICINE

## 2022-12-21 PROCEDURE — 2580000003 HC RX 258: Performed by: EMERGENCY MEDICINE

## 2022-12-21 PROCEDURE — 99285 EMERGENCY DEPT VISIT HI MDM: CPT

## 2022-12-21 PROCEDURE — 84484 ASSAY OF TROPONIN QUANT: CPT

## 2022-12-21 PROCEDURE — 85025 COMPLETE CBC W/AUTO DIFF WBC: CPT

## 2022-12-21 PROCEDURE — G8400 PT W/DXA NO RESULTS DOC: HCPCS | Performed by: FAMILY MEDICINE

## 2022-12-21 PROCEDURE — 82805 BLOOD GASES W/O2 SATURATION: CPT

## 2022-12-21 PROCEDURE — 36415 COLL VENOUS BLD VENIPUNCTURE: CPT

## 2022-12-21 PROCEDURE — 94660 CPAP INITIATION&MGMT: CPT

## 2022-12-21 PROCEDURE — 6360000002 HC RX W HCPCS: Performed by: EMERGENCY MEDICINE

## 2022-12-21 PROCEDURE — G8417 CALC BMI ABV UP PARAM F/U: HCPCS | Performed by: FAMILY MEDICINE

## 2022-12-21 PROCEDURE — 96366 THER/PROPH/DIAG IV INF ADDON: CPT

## 2022-12-21 PROCEDURE — 71045 X-RAY EXAM CHEST 1 VIEW: CPT

## 2022-12-21 PROCEDURE — 2500000003 HC RX 250 WO HCPCS: Performed by: STUDENT IN AN ORGANIZED HEALTH CARE EDUCATION/TRAINING PROGRAM

## 2022-12-21 PROCEDURE — G8427 DOCREV CUR MEDS BY ELIG CLIN: HCPCS | Performed by: FAMILY MEDICINE

## 2022-12-21 PROCEDURE — 87449 NOS EACH ORGANISM AG IA: CPT

## 2022-12-21 PROCEDURE — 96375 TX/PRO/DX INJ NEW DRUG ADDON: CPT

## 2022-12-21 PROCEDURE — 83880 ASSAY OF NATRIURETIC PEPTIDE: CPT

## 2022-12-21 PROCEDURE — 6360000002 HC RX W HCPCS: Performed by: STUDENT IN AN ORGANIZED HEALTH CARE EDUCATION/TRAINING PROGRAM

## 2022-12-21 PROCEDURE — 94640 AIRWAY INHALATION TREATMENT: CPT

## 2022-12-21 PROCEDURE — 4004F PT TOBACCO SCREEN RCVD TLK: CPT | Performed by: FAMILY MEDICINE

## 2022-12-21 PROCEDURE — 6360000002 HC RX W HCPCS

## 2022-12-21 PROCEDURE — 6360000004 HC RX CONTRAST MEDICATION: Performed by: RADIOLOGY

## 2022-12-21 PROCEDURE — 87040 BLOOD CULTURE FOR BACTERIA: CPT

## 2022-12-21 PROCEDURE — 94664 DEMO&/EVAL PT USE INHALER: CPT

## 2022-12-21 PROCEDURE — 2000000000 HC ICU R&B

## 2022-12-21 PROCEDURE — 93005 ELECTROCARDIOGRAM TRACING: CPT | Performed by: NURSE PRACTITIONER

## 2022-12-21 PROCEDURE — 1090F PRES/ABSN URINE INCON ASSESS: CPT | Performed by: FAMILY MEDICINE

## 2022-12-21 PROCEDURE — 6370000000 HC RX 637 (ALT 250 FOR IP): Performed by: STUDENT IN AN ORGANIZED HEALTH CARE EDUCATION/TRAINING PROGRAM

## 2022-12-21 PROCEDURE — 83605 ASSAY OF LACTIC ACID: CPT

## 2022-12-21 PROCEDURE — G8484 FLU IMMUNIZE NO ADMIN: HCPCS | Performed by: FAMILY MEDICINE

## 2022-12-21 PROCEDURE — 72125 CT NECK SPINE W/O DYE: CPT

## 2022-12-21 PROCEDURE — 81001 URINALYSIS AUTO W/SCOPE: CPT

## 2022-12-21 PROCEDURE — 99213 OFFICE O/P EST LOW 20 MIN: CPT | Performed by: FAMILY MEDICINE

## 2022-12-21 PROCEDURE — 71275 CT ANGIOGRAPHY CHEST: CPT

## 2022-12-21 PROCEDURE — 96365 THER/PROPH/DIAG IV INF INIT: CPT

## 2022-12-21 PROCEDURE — 80053 COMPREHEN METABOLIC PANEL: CPT

## 2022-12-21 PROCEDURE — 2580000003 HC RX 258: Performed by: STUDENT IN AN ORGANIZED HEALTH CARE EDUCATION/TRAINING PROGRAM

## 2022-12-21 PROCEDURE — 0202U NFCT DS 22 TRGT SARS-COV-2: CPT

## 2022-12-21 PROCEDURE — 72170 X-RAY EXAM OF PELVIS: CPT

## 2022-12-21 PROCEDURE — 70450 CT HEAD/BRAIN W/O DYE: CPT

## 2022-12-21 PROCEDURE — 82962 GLUCOSE BLOOD TEST: CPT

## 2022-12-21 PROCEDURE — 1123F ACP DISCUSS/DSCN MKR DOCD: CPT | Performed by: FAMILY MEDICINE

## 2022-12-21 RX ORDER — 0.9 % SODIUM CHLORIDE 0.9 %
1000 INTRAVENOUS SOLUTION INTRAVENOUS ONCE
Status: COMPLETED | OUTPATIENT
Start: 2022-12-21 | End: 2022-12-21

## 2022-12-21 RX ORDER — NALOXONE HYDROCHLORIDE 1 MG/ML
INJECTION INTRAMUSCULAR; INTRAVENOUS; SUBCUTANEOUS
Status: COMPLETED
Start: 2022-12-21 | End: 2022-12-21

## 2022-12-21 RX ORDER — IPRATROPIUM BROMIDE AND ALBUTEROL SULFATE 2.5; .5 MG/3ML; MG/3ML
3 SOLUTION RESPIRATORY (INHALATION) ONCE
Status: COMPLETED | OUTPATIENT
Start: 2022-12-21 | End: 2022-12-21

## 2022-12-21 RX ORDER — DEXAMETHASONE SODIUM PHOSPHATE 10 MG/ML
10 INJECTION INTRAMUSCULAR; INTRAVENOUS ONCE
Status: COMPLETED | OUTPATIENT
Start: 2022-12-21 | End: 2022-12-21

## 2022-12-21 RX ORDER — ROPINIROLE 1 MG/1
1 TABLET, FILM COATED ORAL DAILY
Qty: 30 TABLET | Refills: 1 | Status: ON HOLD | OUTPATIENT
Start: 2022-12-21

## 2022-12-21 RX ORDER — NALOXONE HYDROCHLORIDE 0.4 MG/ML
0.4 INJECTION, SOLUTION INTRAMUSCULAR; INTRAVENOUS; SUBCUTANEOUS ONCE
Status: COMPLETED | OUTPATIENT
Start: 2022-12-21 | End: 2022-12-21

## 2022-12-21 RX ADMIN — SODIUM CHLORIDE 1000 ML: 9 INJECTION, SOLUTION INTRAVENOUS at 19:30

## 2022-12-21 RX ADMIN — NALOXONE HYDROCHLORIDE 0.4 MG: 0.4 INJECTION, SOLUTION INTRAMUSCULAR; INTRAVENOUS; SUBCUTANEOUS at 17:04

## 2022-12-21 RX ADMIN — NALOXONE HYDROCHLORIDE 2 MG: 1 INJECTION PARENTERAL at 17:04

## 2022-12-21 RX ADMIN — DEXAMETHASONE SODIUM PHOSPHATE 10 MG: 10 INJECTION INTRAMUSCULAR; INTRAVENOUS at 16:05

## 2022-12-21 RX ADMIN — IOPAMIDOL 75 ML: 755 INJECTION, SOLUTION INTRAVENOUS at 21:13

## 2022-12-21 RX ADMIN — NALOXONE HYDROCHLORIDE 0.4 MG/HR: 1 INJECTION PARENTERAL at 18:52

## 2022-12-21 RX ADMIN — IPRATROPIUM BROMIDE AND ALBUTEROL SULFATE 3 AMPULE: .5; 2.5 SOLUTION RESPIRATORY (INHALATION) at 15:27

## 2022-12-21 RX ADMIN — DOXYCYCLINE 100 MG: 100 INJECTION, POWDER, LYOPHILIZED, FOR SOLUTION INTRAVENOUS at 19:28

## 2022-12-21 RX ADMIN — CEFTRIAXONE 1000 MG: 1 INJECTION, POWDER, FOR SOLUTION INTRAMUSCULAR; INTRAVENOUS at 19:05

## 2022-12-21 ASSESSMENT — ENCOUNTER SYMPTOMS
BACK PAIN: 1
NAUSEA: 0
SHORTNESS OF BREATH: 0
ABDOMINAL PAIN: 0
WHEEZING: 0
CONSTIPATION: 0
VOMITING: 0
COUGH: 0
DIARRHEA: 0

## 2022-12-21 ASSESSMENT — LIFESTYLE VARIABLES: HOW OFTEN DO YOU HAVE A DRINK CONTAINING ALCOHOL: NEVER

## 2022-12-21 NOTE — TELEPHONE ENCOUNTER
Patient daughter called. Patient seems to be oriented per daughter but has been falling, last week and today. She is getting days and nights confused. Daughter is worried. I called patient back and she is agreeable to coming in for an appointment today. NEighbor will bring her.

## 2022-12-21 NOTE — ED NOTES
Per [facility] [place] POA), pt has a DNR and would not like to be put on a ventilatior.       Juan M Castro RN  12/21/22 [id number]

## 2022-12-21 NOTE — ED PROVIDER NOTES
HPI  69 y.o. female presenting for concern for pneumonia. Symptoms have been present for 1 day. They have been constant and severe in severity. They are worsened by nothing and relieved by nothing. Patient was seen at PCP today and was sent to ED due to concern for pneumonia. Patient reportedly 63% on room air, on nonrebreather on my examination. Patient is somnolent, and a poor historian. She is not all complaints. She states her PCP thought she had pneumonia because she thought she sounded bad. She does endorse falling on Saturday. She denies hitting her head. She does not know if she lost consciousness. Patient endorses fatigue but states it is from her pain medications for her back. She is otherwise denying all complaints.      --------------------------------------------- PAST HISTORY ---------------------------------------------  Past Medical History:  has a past medical history of Adverse effect of anesthetic, Arthritis, Bronchitis, Cancer ([facility] [address] Diverticula of colon, Hyperlipidemia, Hypertension, NSTEMI (non-ST elevated myocardial infarction) ([facility] [address] Pneumonia, Restless leg syndrome, Sleep apnea, and Thyroid disease. Past Surgical History:  has a past surgical history that includes Appendectomy; Hand surgery; Hysterectomy; Knee arthroscopy (Bilateral); Lap Band (2007); Ovary removal (1975); other surgical history; Cholecystectomy (2009); Colonoscopy (2011); Total hip arthroplasty (03/2012); Revision total hip arthroplasty (03/26/2012); Eye surgery (Bilateral); other surgical history (Right, 04/15/2015); Nerve Block (Left, 08/19/2015); Nerve Block (08/19/2015); Nerve Block (N/A, 09/09/2015); Nerve Block (N/A, 10/26/2015); Nerve Block (Bilateral, 11/02/2015); Nerve Block (Bilateral, 11/09/2015); other surgical history (Right, 12/30/2015); other surgical history (Left, 02/08/2016); Nerve Block (Bilateral, 04/13/2016); Nerve Block (Bilateral, 08/31/2016); Nerve Block (Bilateral, 10/03/2016);  Breast surgery (Left, 03/2016); other surgical history (Left, 11/21/2016); other surgical history (Right, 01/09/2017); other surgical history (Right, 02/08/2017); other surgical history (Left, 03/27/2017); other surgical history (Left, 05/24/2017); Cosmetic surgery (Bilateral); joint replacement (03/04/2007); other surgical history (Right, 01/22/2020); Nerve Surgery (Right, 01/22/2020); lumbar fusion (2019); and Nerve Surgery (Right, 7/7/2021). Social History:  reports that she has been smoking cigarettes. She has a 62.00 pack-year smoking history. She has never used smokeless tobacco. She reports that she does not currently use alcohol after a past usage of about 1.0 standard drink per week. She reports that she does not use drugs. Family History: family history includes Diabetes in her father; Heart Failure in her mother; Other in her brother; Parkinsonism in her father. The patients home medications have been reviewed. Allergies: Demerol [meperidine hcl], Meperidine, and Nabumetone      Review of Systems   Unable to perform ROS: Mental status change      Physical Exam  Constitutional:       Comments: Patient somnolent, but will awaken and answer questions/follow commands   HENT:      Head: Normocephalic. Eyes:      Extraocular Movements: Extraocular movements intact. Conjunctiva/sclera: Conjunctivae normal.      Pupils: Pupils are equal, round, and reactive to light. Cardiovascular:      Rate and Rhythm: Normal rate and regular rhythm. Pulmonary:      Effort: Pulmonary effort is normal.      Comments: Aeration decreased bilaterally, crackles bilateral bases  Abdominal:      Palpations: Abdomen is soft. Tenderness: There is no abdominal tenderness. Musculoskeletal:      Right lower leg: No edema. Left lower leg: No edema. Skin:     General: Skin is warm. Neurological:      Mental Status: She is alert.       Comments: Somnolent, but arousable to stimuli   Psychiatric:         Mood and Affect: Mood normal.        Procedures      ED Course as of 12/22/22 0030   Wed Dec 21, 2022   1508 EKG @ 1521: This EKG is signed and interpreted by me  Rate: 91  Rhythm: Sinus  Interpretation: no acute ST elevations or depressions, no acute T wave changes, Qtc 469  Comparison: stable as compared to patient's most recent EKG   [AP]   [address] Spoke to patient's daughter, Inder Yarbrough, and discussed with status. She believes that patient would want to be on a ventilator if there is a chance she could be turned around. I did discuss that I could not predict if a if she would ever come off the ventilator when she was put on it. Daughter expressed understanding. She will speak with brother and call back.  [AP]   [address] with daughter, Inder Yarbrough, family would like patient to be a full code at this time. [AP]      ED Course User Index  [AP] Anjel Handley MD         -------------------------------------------------- RESULTS -------------------------------------------------    Lab  Results for orders placed or performed during the hospital encounter of 12/21/22   Respiratory Panel, Molecular, with COVID-19 (Restricted: peds pts or suitable admitted adults)    Specimen: Nasopharyngeal   Result Value Ref Range    Adenovirus by PCR Not Detected Not Detected    Bordetella parapertussis by PCR Not Detected Not Detected    Bordetella pertussis by PCR Not Detected Not Detected    Chlamydophilia pneumoniae by PCR Not Detected Not Detected    Coronavirus 229E by PCR Not Detected Not Detected    Coronavirus HKU1 by PCR Not Detected Not Detected    Coronavirus NL63 by PCR Not Detected Not Detected    Coronavirus OC43 by PCR Not Detected Not Detected    SARS-CoV-2, PCR Not Detected Not Detected    Human Metapneumovirus by PCR Not Detected Not Detected    Human Rhinovirus/Enterovirus by PCR Not Detected Not Detected    Influenza A by PCR Not Detected Not Detected    Influenza B by PCR Not Detected Not Detected    Mycoplasma pneumoniae by PCR Not Detected Not Detected    Parainfluenza Virus 1 by PCR Not Detected Not Detected    Parainfluenza Virus 2 by PCR Not Detected Not Detected    Parainfluenza Virus 3 by PCR Not Detected Not Detected    Parainfluenza Virus 4 by PCR Not Detected Not Detected    Respiratory Syncytial Virus by PCR Not Detected Not Detected   Troponin   Result Value Ref Range    Troponin, High Sensitivity 46 (H) 0 - 9 ng/L   CBC with Auto Differential   Result Value Ref Range    WBC 6.3 4.5 - 11.5 E9/L    RBC 4.73 3.50 - 5.50 E12/L    Hemoglobin 12.9 11.5 - 15.5 g/dL    Hematocrit 42.3 34.0 - 48.0 %    MCV 89.4 80.0 - 99.9 fL    MCH 27.3 26.0 - 35.0 pg    MCHC 30.5 (L) 32.0 - 34.5 %    RDW 15.2 (H) 11.5 - 15.0 fL    Platelets 837 068 - 872 E9/L    MPV 10.4 7.0 - 12.0 fL    Neutrophils % 74.5 43.0 - 80.0 %    Immature Granulocytes % 0.2 0.0 - 5.0 %    Lymphocytes % 11.7 (L) 20.0 - 42.0 %    Monocytes % 12.2 (H) 2.0 - 12.0 %    Eosinophils % 1.1 0.0 - 6.0 %    Basophils % 0.3 0.0 - 2.0 %    Neutrophils Absolute 4.72 1.80 - 7.30 E9/L    Immature Granulocytes # 0.01 E9/L    Lymphocytes Absolute 0.74 (L) 1.50 - 4.00 E9/L    Monocytes Absolute 0.77 0.10 - 0.95 E9/L    Eosinophils Absolute 0.07 0.05 - 0.50 E9/L    Basophils Absolute 0.02 0.00 - 0.20 E9/L   Comprehensive Metabolic Panel   Result Value Ref Range    Sodium 135 132 - 146 mmol/L    Potassium 3.5 3.5 - 5.0 mmol/L    Chloride 91 (L) 98 - 107 mmol/L    CO2 35 (H) 22 - 29 mmol/L    Anion Gap 9 7 - 16 mmol/L    Glucose 129 (H) 74 - 99 mg/dL    BUN 20 6 - 23 mg/dL    Creatinine 1.1 (H) 0.5 - 1.0 mg/dL    Est, Glom Filt Rate 52 >=60 mL/min/1.73    Calcium 9.2 8.6 - 10.2 mg/dL    Total Protein 6.8 6.4 - 8.3 g/dL    Albumin 4.3 3.5 - 5.2 g/dL    Total Bilirubin 0.8 0.0 - 1.2 mg/dL    Alkaline Phosphatase 63 35 - 104 U/L    ALT 17 0 - 32 U/L    AST 32 (H) 0 - 31 U/L   Urinalysis   Result Value Ref Range    Color, UA Yellow Straw/Yellow    Clarity, UA Clear Clear    Glucose, Ur Negative Negative mg/dL    Bilirubin Urine Negative Negative    Ketones, Urine Negative Negative mg/dL    Specific Gravity, UA 1.010 1.005 - 1.030    Blood, Urine MODERATE (A) Negative    pH, UA 6.0 5.0 - 9.0    Protein, UA Negative Negative mg/dL    Urobilinogen, Urine 0.2 <2.0 E.U./dL    Nitrite, Urine POSITIVE (A) Negative    Leukocyte Esterase, Urine TRACE (A) Negative   Brain Natriuretic Peptide   Result Value Ref Range    Pro- (H) 0 - 450 pg/mL   Blood Gas, Arterial   Result Value Ref Range    Date Analyzed 20221221     Time Analyzed 1513     Source: Blood Arterial     pH, Blood Gas 7.310 (L) 7.350 - 7.450    PCO2 73.7 (HH) 35.0 - 45.0 mmHg    PO2 184.7 (H) 75.0 - 100.0 mmHg    HCO3 36.3 (H) 22.0 - 26.0 mmol/L    B.E. 7.2 (H) -3.0 - 3.0 mmol/L    O2 Sat 99.0 (H) 92.0 - 98.5 %    O2Hb 88.0 (L) 94.0 - 97.0 %    COHb 10.9 (H) 0.0 - 1.5 %    MetHb 0.2 0.0 - 1.5 %    O2 Content 17.7 mL/dL    HHb 0.9 0.0 - 5.0 %    tHb (est) 14.0 11.5 - 16.5 g/dL    Mode NRB 15L     Date Of Collection      Time Collected      Pt Temp 37.0 C     ID 1741     Lab [id number]     Critical(s) Notified Handed report to Dr/RN    Blood Gas, Arterial   Result Value Ref Range    Date Analyzed 20221221     Time Analyzed 1623     Source: Blood Arterial     pH, Blood Gas 7.290 (L) 7.350 - 7.450    PCO2 81.3 (HH) 35.0 - 45.0 mmHg    PO2 134.2 (H) 75.0 - 100.0 mmHg    HCO3 38.2 (H) 22.0 - 26.0 mmol/L    B.E. 8.4 (H) -3.0 - 3.0 mmol/L    O2 Sat 98.3 92.0 - 98.5 %    PO2/FIO2 2.24 mmHg/%    O2Hb 88.8 (L) 94.0 - 97.0 %    COHb 9.4 (H) 0.0 - 1.5 %    MetHb 0.3 0.0 - 1.5 %    O2 Content 17.6 mL/dL    HHb 1.5 0.0 - 5.0 %    tHb (est) 13.9 11.5 - 16.5 g/dL    Mode BILEVEL     FIO2 60.0 %    Peep/Cpap 5.0 cmH2O    PS 10 cmH20    Date Of Collection      Time Collected      Pt Temp 37.0 C     ID 1115     Lab [id number]     Critical(s) Notified Handed report to Dr/RN    Lactic Acid   Result Value Ref Range    Lactic Acid 1.7 0.5 - 2.2 mmol/L Blood Gas, Arterial   Result Value Ref Range    Date Analyzed 20221221     Time Analyzed 1750     Source: Blood Arterial     pH, Blood Gas 7.373 7.350 - 7.450    PCO2 62.7 (H) 35.0 - 45.0 mmHg    PO2 124.6 (H) 75.0 - 100.0 mmHg    HCO3 35.7 (H) 22.0 - 26.0 mmol/L    B.E. 8.1 (H) -3.0 - 3.0 mmol/L    O2 Sat 98.4 92.0 - 98.5 %    O2Hb 91.1 (L) 94.0 - 97.0 %    COHb 7.0 (H) 0.0 - 1.5 %    MetHb 0.4 0.0 - 1.5 %    O2 Content 18.6 mL/dL    HHb 1.5 0.0 - 5.0 %    tHb (est) 14.4 11.5 - 16.5 g/dL    Mode BILEVEL     Rr Mechanical 16.0 b/min    Peep/Cpap 5.0 cmH2O    PS 15 cmH20    Date Of Collection      Time Collected      Pt Temp 37.0 C     ID 1115     Lab [id number]     Critical(s) Notified . No Critical Values    Blood Gas, Arterial   Result Value Ref Range    Date Analyzed 20221221     Time Analyzed 1952     Source: Blood Arterial     pH, Blood Gas 7.343 (L) 7.350 - 7.450    PCO2 65.8 (H) 35.0 - 45.0 mmHg    PO2 93.7 75.0 - 100.0 mmHg    HCO3 34.9 (H) 22.0 - 26.0 mmol/L    B.E. 6.9 (H) -3.0 - 3.0 mmol/L    O2 Sat 97.1 92.0 - 98.5 %    PO2/FIO2 2.34 mmHg/%    O2Hb 91.2 (L) 94.0 - 97.0 %    COHb 5.8 (H) 0.0 - 1.5 %    MetHb 0.3 0.0 - 1.5 %    O2 Content 17.9 mL/dL    HHb 2.7 0.0 - 5.0 %    tHb (est) 13.9 11.5 - 16.5 g/dL    Mode BILEVEL     FIO2 40.0 %    Peep/Cpap 5.0 cmH2O    PS 15 cmH20    Date Of Collection      Time Collected      Pt Temp 37.0 C     ID 1115     Lab [id number]     Critical(s) Notified .  No Critical Values    Microscopic Urinalysis   Result Value Ref Range    WBC, UA 2-5 0 - 5 /HPF    RBC, UA 2-5 0 - 2 /HPF    Epithelial Cells, UA FEW /HPF    Bacteria, UA MANY (A) None Seen /HPF   POCT glucose   Result Value Ref Range    Glucose 128 mg/dL   POCT Glucose   Result Value Ref Range    Meter Glucose 128 (H) 74 - 99 mg/dL   EKG 12 Lead   Result Value Ref Range    Ventricular Rate 91 BPM    Atrial Rate 91 BPM    P-R Interval 184 ms    QRS Duration 96 ms    Q-T Interval 382 ms    QTc Calculation (Silviozepedro) 469 ms    P Axis 61 degrees    R Axis 11 degrees    T Axis 42 degrees       Radiology  CTA PULMONARY W CONTRAST   Final Result   1. No evidence of an acute pulmonary embolism. 2. Calcified chronic thrombus in the right upper lobe pulmonary artery. 3. Evidence of pulmonary arterial hypertension. 4. Prominent cardiomegaly. No evidence of CHF exacerbation. 5. Right lower lobe atelectasis overlying elevated hemidiaphragm. No   evidence of pneumonia. 0      RECOMMENDATIONS:   Unavailable         CT HEAD WO CONTRAST   Final Result   No acute intracranial abnormality. CT CERVICAL SPINE WO CONTRAST   Final Result   No acute abnormality of the cervical spine. XR PELVIS (1-2 VIEWS)   Final Result   No acute bony abnormalities or prosthetic complications. XR CHEST PORTABLE   Final Result   Hazy left mid lung airspace opacities, which could suggest pneumonia.             ------------------------- NURSING NOTES AND VITALS REVIEWED ---------------------------  Date / Time Roomed:  12/21/2022  2:59 PM  ED Bed Assignment:  06/06    The nursing notes within the ED encounter and vital signs as below have been reviewed.    Patient Vitals for the past 24 hrs:   BP Temp Temp src Pulse Resp SpO2   12/22/22 0000 116/65 -- -- -- -- 97 %   12/21/22 2330 (!) 141/80 -- -- -- -- 97 %   12/21/22 2300 131/81 -- -- -- -- 97 %   12/21/22 2230 119/75 -- -- -- -- 98 %   12/21/22 2130 -- 98.3 °F (36.8 °C) Axillary -- -- 98 %   12/21/22 2115 111/69 -- -- -- -- --   12/21/22 2046 -- -- -- -- 21 98 %   12/21/22 2030 134/87 -- -- 95 13 98 %   12/21/22 2000 123/71 -- -- 91 14 97 %   12/21/22 1930 92/77 -- -- 94 19 98 %   12/21/22 1918 -- -- -- 89 26 --   12/21/22 1900 99/61 -- -- 92 16 --   12/21/22 1632 -- -- -- -- 19 --   12/21/22 1529 -- -- -- 92 (!) 9 --   12/21/22 1528 -- -- -- 91 14 --   12/21/22 1527 -- -- -- 90 17 --   12/21/22 1453 -- -- -- -- -- 100 %   12/21/22 1449 100/65 -- -- 92 16 (!) 63 % 12/21/22 1438 -- 97.9 °F (36.6 °C) Temporal -- -- --       Oxygen Saturation Interpretation: Improved after treatment    MDM  Number of Diagnoses or Management Options  Acute respiratory failure with hypoxia and hypercapnia (HCC)  Pneumonia due to infectious organism, unspecified laterality, unspecified part of lung  Urinary tract infection without hematuria, site unspecified  Diagnosis management comments: 76-year-old female sent from PCPs office for shortness of breath, presumed pneumonia. Patient 63% on room air, placed on supplemental O2 on arrival.  ABG concerning for hypercapnic respiratory failure. Patient placed on BiPAP. Patient was persistently somnolent and there was concern that she may have been taking too many of her opiates, so she was placed on Narcan drip. CTA showed patchy infiltrates bilaterally, but no PE.  UA suggestive of UTI. Patient initially hypotensive on arrival, which improved with fluids. EKG and troponins reassuring. I discussed case with patient's family, who would like patient to remain a full code. Patient accepted to ICU by Dr. Jarek matthews. Patient accepted to family medicine by Dr. Doroteo Najera. I have spoken with the patient and discussed todays results, in addition to providing specific details for the plan of care and counseling regarding the diagnosis and prognosis. Their questions are answered at this time and they are agreeable with the plan.      --------------------------------- ADDITIONAL PROVIDER NOTES ---------------------------------    This patient's ED course included: a personal history and physicial examination, re-evaluation prior to disposition, multiple bedside re-evaluations, IV medications, cardiac monitoring, continuous pulse oximetry, and complex medical decision making and emergency management    This patient has improved during their ED course.     Please note that the withdrawal or failure to initiate urgent interventions for this patient would likely result in a life threatening deterioration or permanent disability. Accordingly this patient received 35 minutes of critical care time, excluding separately billable procedures. Clinical Impression  1. Acute respiratory failure with hypoxia and hypercapnia (HCC)    2. Pneumonia due to infectious organism, unspecified laterality, unspecified part of lung    3. Urinary tract infection without hematuria, site unspecified          Disposition  Patient's disposition: Admit to CCU/ICU  Patient's condition is stable.        Azucena Ledesma MD  Resident  12/22/22 0375

## 2022-12-21 NOTE — PROGRESS NOTES
Called patient she missed appointment on 12/12. Having some drainage down the back of her throat but denies issues with breathing. Is alert and oriented x 3. Denies any falls outside of 1 week ago. Went to pain doctor yesterday. Offered appointment this week. Does not feel that she needs it. Will see her on 1/3 at 10:20. Will call in if she needs to be seen before then.

## 2022-12-21 NOTE — PROGRESS NOTES
[facility]  [address], [address]  Daniela Pang MD     Patient: Jareth James  YOB: 1945  Visit Date: 12/21/22    Steff Rasmussen is a 69y.o. year old female here today for   Chief Complaint   Patient presents with    [facility]  [phone] [place]     Patient is a 69year old female with CAD, COPD , chronic lower back pain here due to concern for falls, confusion, weakness. In the past has had issues with confusion and falls that were brought on by underlying infection. Accompanied by neighbor today. Patient's daughter called in earlier today as patient seemed more confused . Was getting days and nights mixed up and was having falls. Had a fall today and right leg is bleeding. Did not tell me because she did not want to go to the hospital. Also had a fall last Friday. She denies shortness of breath but does have cough. No fevers, or chills. Does have weakness. Took a pain pill before coming into office. She takes xtampza that was recently decreased because it was making her too sleepy. Review of Systems   Constitutional:  Negative for chills and fever. Respiratory:  Negative for cough, shortness of breath (at baseline) and wheezing. Cardiovascular:  Negative for chest pain, palpitations and leg swelling. Gastrointestinal:  Negative for abdominal pain, constipation, diarrhea, nausea and vomiting. Musculoskeletal:  Positive for back pain and gait problem. Neurological:  Positive for weakness.      Current Outpatient Medications on File Prior to Visit   Medication Sig Dispense Refill    rOPINIRole (REQUIP) 1 MG tablet Take 1 tablet by mouth daily 30 tablet 1    clopidogrel (PLAVIX) 75 MG tablet Take 75 mg by mouth daily      losartan (COZAAR) 100 MG tablet Take 100 mg by mouth daily      furosemide (LASIX) 20 MG tablet Take 1 tablet by mouth daily 30 tablet 1    atorvastatin (LIPITOR) 40 MG tablet take 1 tablet by mouth once daily 30 tablet 1    celecoxib (CELEBREX) 200 MG capsule take 1 capsule by mouth twice a day 60 capsule 3    amLODIPine (NORVASC) 5 MG tablet Take 1 tablet by mouth daily 30 tablet 1    famotidine (PEPCID) 40 MG tablet take 1 tablet by mouth once daily 30 tablet 3    metoprolol succinate (TOPROL XL) 25 MG extended release tablet Take 1 tablet by mouth daily 0.5 tablets oral, once a day 30 tablet 1    ASPIRIN LOW DOSE 81 MG chewable tablet Take 1 tablet by mouth daily 30 tablet 1    DULoxetine (CYMBALTA) 60 MG extended release capsule TAKE 1 CAPSULE BY MOUTH AT  NIGHT 90 capsule 3    tiotropium (SPIRIVA RESPIMAT) 2.5 MCG/ACT AERS inhaler inhale 2 puffs by mouth once daily 4 g 3    XTAMPZA ER 18 MG C12A Take 18 mg by mouth every 12 hours. fluticasone (FLONASE) 50 MCG/ACT nasal spray 1 spray by Each Nostril route daily      oxyCODONE-acetaminophen (PERCOCET) 7.5-325 MG per tablet take 1 tablet by mouth every 6 hours if needed      methocarbamol (ROBAXIN) 750 MG tablet take 1 tablet by mouth every 12 hours if needed      lidocaine (XYLOCAINE) 5 % ointment Apply topically to hands and low back as needed. 1 Tube 3     No current facility-administered medications on file prior to visit. Allergies   Allergen Reactions    Demerol [Meperidine Hcl] Other (See Comments)     Hallucinations, anxiety        Meperidine Other (See Comments)    Nabumetone Hives and Rash       Past medical, surgical, socialand/or family history reviewed, updated as needed, and are non-contributory (unless otherwise stated). Medications, allergies, and problem list also reviewed and updated as needed in patient's record.     Wt Readings from Last 3 Encounters:   12/21/22 153 lb (69.4 kg)   09/12/22 153 lb (69.4 kg)   08/12/22 154 lb (69.9 kg)                   BP (!) 109/59 (Site: Right Upper Arm, Position: Sitting, Cuff Size: Medium Adult)   Pulse 95   Temp 96.8 °F (36 °C) (Temporal)   Ht 5' (1.524 m)   Wt 153 lb (69.4 kg)   BMI 29.88 kg/m²       Physical Exam  Vitals and nursing note reviewed. Constitutional:       General: She is not in acute distress. Appearance: She is well-developed. She is not diaphoretic. Comments: Alert and oriented but drowsy. In wheelchair    Cardiovascular:      Rate and Rhythm: Normal rate and regular rhythm. Heart sounds: Murmur heard. No friction rub. Pulmonary:      Effort: Pulmonary effort is normal. No respiratory distress. Breath sounds: Wheezing and rhonchi (diffuse) present. No rales. Abdominal:      General: Bowel sounds are normal. There is no distension. Palpations: Abdomen is soft. There is no mass. Tenderness: There is no abdominal tenderness. There is no guarding. Musculoskeletal:         General: Normal range of motion. Right lower leg: Edema (trace) present. Left lower leg: Edema (trace) present. Results for orders placed or performed in visit on 06/13/22   Hepatic Function Panel   Result Value Ref Range    Total Protein 6.8 6.4 - 8.3 g/dL    Albumin 4.5 3.5 - 5.2 g/dL    Alkaline Phosphatase 71 35 - 104 U/L    ALT 12 0 - 32 U/L    AST 22 0 - 31 U/L    Total Bilirubin 0.4 0.0 - 1.2 mg/dL    Bilirubin, Direct <0.2 0.0 - 0.3 mg/dL    Bilirubin, Indirect see below 0.0 - 1.0 mg/dL       ASSESSMENT/PLAN  Mary Bedoya was seen today for fall. Diagnoses and all orders for this visit:    Recurrent falls   - Concern for infection vs opiate overdose vs hypoxia. Unable to obtain pulse ox in office.    - Advised to go to ED and ED provider notified of patient being brought by private vehicle. Generalized weakness    Rhonchi          Phone/MyChart follow up if tests abnormal.    No follow-ups on file. or sooner if necessary. I have reviewed myfindings and recommendations with Melissa. Blair Fuentes.  Yunior Joaquin MD, M.D

## 2022-12-22 ENCOUNTER — APPOINTMENT (OUTPATIENT)
Dept: NUCLEAR MEDICINE | Age: 77
DRG: 917 | End: 2022-12-22
Payer: MEDICARE

## 2022-12-22 ENCOUNTER — APPOINTMENT (OUTPATIENT)
Dept: GENERAL RADIOLOGY | Age: 77
DRG: 917 | End: 2022-12-22
Payer: MEDICARE

## 2022-12-22 PROBLEM — N39.0 URINARY TRACT INFECTION WITHOUT HEMATURIA: Status: ACTIVE | Noted: 2022-12-22

## 2022-12-22 PROBLEM — T40.601A OPIATE OVERDOSE (HCC): Status: ACTIVE | Noted: 2022-12-22

## 2022-12-22 LAB
AADO2: 102.4 MMHG
ALBUMIN SERPL-MCNC: 3.9 G/DL (ref 3.5–5.2)
ALP BLD-CCNC: 60 U/L (ref 35–104)
ALT SERPL-CCNC: 15 U/L (ref 0–32)
AMPHETAMINE SCREEN, URINE: NOT DETECTED
ANION GAP SERPL CALCULATED.3IONS-SCNC: 7 MMOL/L (ref 7–16)
ANISOCYTOSIS: ABNORMAL
AST SERPL-CCNC: 29 U/L (ref 0–31)
B.E.: 10.3 MMOL/L (ref -3–3)
B.E.: 7.1 MMOL/L (ref -3–3)
B.E.: 8.1 MMOL/L (ref -3–3)
BARBITURATE SCREEN URINE: NOT DETECTED
BASOPHILS ABSOLUTE: 0 E9/L (ref 0–0.2)
BASOPHILS RELATIVE PERCENT: 0 % (ref 0–2)
BENZODIAZEPINE SCREEN, URINE: NOT DETECTED
BILIRUB SERPL-MCNC: 0.7 MG/DL (ref 0–1.2)
BUN BLDV-MCNC: 12 MG/DL (ref 6–23)
CALCIUM SERPL-MCNC: 8.7 MG/DL (ref 8.6–10.2)
CANNABINOID SCREEN URINE: NOT DETECTED
CHLORIDE BLD-SCNC: 94 MMOL/L (ref 98–107)
CHLORIDE URINE RANDOM: 98 MMOL/L
CO2: 36 MMOL/L (ref 22–29)
COCAINE METABOLITE SCREEN URINE: NOT DETECTED
COHB: 2.3 % (ref 0–1.5)
COHB: 4.1 % (ref 0–1.5)
COHB: 4.2 % (ref 0–1.5)
CREAT SERPL-MCNC: 0.6 MG/DL (ref 0.5–1)
CREATININE URINE: 16 MG/DL (ref 29–226)
CRITICAL: ABNORMAL
DATE ANALYZED: ABNORMAL
DATE OF COLLECTION: ABNORMAL
EKG ATRIAL RATE: 91 BPM
EKG ATRIAL RATE: 96 BPM
EKG P AXIS: 61 DEGREES
EKG P AXIS: 77 DEGREES
EKG P-R INTERVAL: 162 MS
EKG P-R INTERVAL: 184 MS
EKG Q-T INTERVAL: 374 MS
EKG Q-T INTERVAL: 382 MS
EKG QRS DURATION: 94 MS
EKG QRS DURATION: 96 MS
EKG QTC CALCULATION (BAZETT): 469 MS
EKG QTC CALCULATION (BAZETT): 472 MS
EKG R AXIS: 11 DEGREES
EKG R AXIS: 43 DEGREES
EKG T AXIS: 30 DEGREES
EKG T AXIS: 42 DEGREES
EKG VENTRICULAR RATE: 91 BPM
EKG VENTRICULAR RATE: 96 BPM
EOSINOPHILS ABSOLUTE: 0 E9/L (ref 0.05–0.5)
EOSINOPHILS RELATIVE PERCENT: 0 % (ref 0–6)
FENTANYL SCREEN, URINE: NOT DETECTED
FIO2: 40 %
FIO2: 40 %
GFR SERPL CREATININE-BSD FRML MDRD: >60 ML/MIN/1.73
GLUCOSE BLD-MCNC: 128 MG/DL (ref 74–99)
HCO3: 34.6 MMOL/L (ref 22–26)
HCO3: 37.2 MMOL/L (ref 22–26)
HCO3: 38.7 MMOL/L (ref 22–26)
HCT VFR BLD CALC: 44.7 % (ref 34–48)
HEMOGLOBIN: 13.4 G/DL (ref 11.5–15.5)
HHB: 3 % (ref 0–5)
HHB: 3.1 % (ref 0–5)
HHB: 4.1 % (ref 0–5)
IMMATURE GRANULOCYTES #: 0.01 E9/L
IMMATURE GRANULOCYTES %: 0.4 % (ref 0–5)
L. PNEUMOPHILA SEROGP 1 UR AG: NORMAL
LAB: ABNORMAL
LACTIC ACID: 1 MMOL/L (ref 0.5–2.2)
LYMPHOCYTES ABSOLUTE: 0.21 E9/L (ref 1.5–4)
LYMPHOCYTES RELATIVE PERCENT: 8.5 % (ref 20–42)
Lab: ABNORMAL
MAGNESIUM: 1.7 MG/DL (ref 1.6–2.6)
MCH RBC QN AUTO: 27.2 PG (ref 26–35)
MCHC RBC AUTO-ENTMCNC: 30 % (ref 32–34.5)
MCV RBC AUTO: 90.7 FL (ref 80–99.9)
METHADONE SCREEN, URINE: NOT DETECTED
METHB: 0.4 % (ref 0–1.5)
METHB: 0.4 % (ref 0–1.5)
METHB: 0.5 % (ref 0–1.5)
MODE: ABNORMAL
MONOCYTES ABSOLUTE: 0.12 E9/L (ref 0.1–0.95)
MONOCYTES RELATIVE PERCENT: 4.9 % (ref 2–12)
NEUTROPHILS ABSOLUTE: 2.13 E9/L (ref 1.8–7.3)
NEUTROPHILS RELATIVE PERCENT: 86.2 % (ref 43–80)
O2 CONTENT: 18.3 ML/DL
O2 SATURATION: 95.7 % (ref 92–98.5)
O2 SATURATION: 96.8 % (ref 92–98.5)
O2 SATURATION: 96.9 % (ref 92–98.5)
O2HB: 91.4 % (ref 94–97)
O2HB: 92.4 % (ref 94–97)
O2HB: 94.1 % (ref 94–97)
OPERATOR ID: 1632
OPERATOR ID: 2067
OPERATOR ID: 7874
OPIATE SCREEN URINE: NOT DETECTED
OXYCODONE URINE: POSITIVE
PATIENT TEMP: 37 C
PCO2: 61.7 MMHG (ref 35–45)
PCO2: 70.3 MMHG (ref 35–45)
PCO2: 74.8 MMHG (ref 35–45)
PDW BLD-RTO: 15 FL (ref 11.5–15)
PEEP/CPAP: 5 CMH2O
PFO2: 2.3 MMHG/%
PFO2: 2.46 MMHG/%
PH BLOOD GAS: 7.32 (ref 7.35–7.45)
PH BLOOD GAS: 7.36 (ref 7.35–7.45)
PH BLOOD GAS: 7.37 (ref 7.35–7.45)
PHENCYCLIDINE SCREEN URINE: NOT DETECTED
PHOSPHORUS: 4.4 MG/DL (ref 2.5–4.5)
PLATELET # BLD: 158 E9/L (ref 130–450)
PMV BLD AUTO: 10 FL (ref 7–12)
PO2: 82.4 MMHG (ref 75–100)
PO2: 92 MMHG (ref 75–100)
PO2: 98.3 MMHG (ref 75–100)
POIKILOCYTES: ABNORMAL
POLYCHROMASIA: ABNORMAL
POTASSIUM REFLEX MAGNESIUM: 3.7 MMOL/L (ref 3.5–5)
POTASSIUM, UR: 14.9 MMOL/L
PROCALCITONIN: 0.05 NG/ML (ref 0–0.08)
PS: 15 CMH20
RBC # BLD: 4.93 E12/L (ref 3.5–5.5)
RI(T): 1.11
SODIUM BLD-SCNC: 137 MMOL/L (ref 132–146)
SODIUM URINE: 93 MMOL/L
SOURCE, BLOOD GAS: ABNORMAL
STREP PNEUMONIAE ANTIGEN, URINE: NORMAL
TARGET CELLS: ABNORMAL
THB: 13.9 G/DL (ref 11.5–16.5)
THB: 14 G/DL (ref 11.5–16.5)
THB: 14.3 G/DL (ref 11.5–16.5)
TIME ANALYZED: 103
TIME ANALYZED: 1532
TIME ANALYZED: 408
TOTAL PROTEIN: 6.2 G/DL (ref 6.4–8.3)
TROPONIN, HIGH SENSITIVITY: 28 NG/L (ref 0–9)
TROPONIN, HIGH SENSITIVITY: 28 NG/L (ref 0–9)
TROPONIN, HIGH SENSITIVITY: 29 NG/L (ref 0–9)
UREA NITROGEN, UR: 151 MG/DL (ref 800–1666)
WBC # BLD: 2.5 E9/L (ref 4.5–11.5)

## 2022-12-22 PROCEDURE — 87088 URINE BACTERIA CULTURE: CPT

## 2022-12-22 PROCEDURE — 84133 ASSAY OF URINE POTASSIUM: CPT

## 2022-12-22 PROCEDURE — A9540 TC99M MAA: HCPCS | Performed by: RADIOLOGY

## 2022-12-22 PROCEDURE — 84484 ASSAY OF TROPONIN QUANT: CPT

## 2022-12-22 PROCEDURE — 80053 COMPREHEN METABOLIC PANEL: CPT

## 2022-12-22 PROCEDURE — 71045 X-RAY EXAM CHEST 1 VIEW: CPT

## 2022-12-22 PROCEDURE — 84300 ASSAY OF URINE SODIUM: CPT

## 2022-12-22 PROCEDURE — 99291 CRITICAL CARE FIRST HOUR: CPT | Performed by: INTERNAL MEDICINE

## 2022-12-22 PROCEDURE — 82436 ASSAY OF URINE CHLORIDE: CPT

## 2022-12-22 PROCEDURE — 93005 ELECTROCARDIOGRAM TRACING: CPT | Performed by: FAMILY MEDICINE

## 2022-12-22 PROCEDURE — 93010 ELECTROCARDIOGRAM REPORT: CPT | Performed by: INTERNAL MEDICINE

## 2022-12-22 PROCEDURE — 2700000000 HC OXYGEN THERAPY PER DAY

## 2022-12-22 PROCEDURE — 3430000000 HC RX DIAGNOSTIC RADIOPHARMACEUTICAL: Performed by: RADIOLOGY

## 2022-12-22 PROCEDURE — 78580 LUNG PERFUSION IMAGING: CPT

## 2022-12-22 PROCEDURE — 94660 CPAP INITIATION&MGMT: CPT

## 2022-12-22 PROCEDURE — 6360000002 HC RX W HCPCS: Performed by: FAMILY MEDICINE

## 2022-12-22 PROCEDURE — 99222 1ST HOSP IP/OBS MODERATE 55: CPT | Performed by: FAMILY MEDICINE

## 2022-12-22 PROCEDURE — 94640 AIRWAY INHALATION TREATMENT: CPT

## 2022-12-22 PROCEDURE — 6360000002 HC RX W HCPCS

## 2022-12-22 PROCEDURE — 2580000003 HC RX 258: Performed by: FAMILY MEDICINE

## 2022-12-22 PROCEDURE — 99292 CRITICAL CARE ADDL 30 MIN: CPT | Performed by: INTERNAL MEDICINE

## 2022-12-22 PROCEDURE — 6370000000 HC RX 637 (ALT 250 FOR IP): Performed by: FAMILY MEDICINE

## 2022-12-22 PROCEDURE — 83605 ASSAY OF LACTIC ACID: CPT

## 2022-12-22 PROCEDURE — 93306 TTE W/DOPPLER COMPLETE: CPT

## 2022-12-22 PROCEDURE — 6360000002 HC RX W HCPCS: Performed by: EMERGENCY MEDICINE

## 2022-12-22 PROCEDURE — 2580000003 HC RX 258: Performed by: EMERGENCY MEDICINE

## 2022-12-22 PROCEDURE — 84100 ASSAY OF PHOSPHORUS: CPT

## 2022-12-22 PROCEDURE — 85025 COMPLETE CBC W/AUTO DIFF WBC: CPT

## 2022-12-22 PROCEDURE — 87186 SC STD MICRODIL/AGAR DIL: CPT

## 2022-12-22 PROCEDURE — 82805 BLOOD GASES W/O2 SATURATION: CPT

## 2022-12-22 PROCEDURE — 84540 ASSAY OF URINE/UREA-N: CPT

## 2022-12-22 PROCEDURE — 82570 ASSAY OF URINE CREATININE: CPT

## 2022-12-22 PROCEDURE — 80307 DRUG TEST PRSMV CHEM ANLYZR: CPT

## 2022-12-22 PROCEDURE — 83735 ASSAY OF MAGNESIUM: CPT

## 2022-12-22 PROCEDURE — 84145 PROCALCITONIN (PCT): CPT

## 2022-12-22 PROCEDURE — 2000000000 HC ICU R&B

## 2022-12-22 PROCEDURE — 6360000004 HC RX CONTRAST MEDICATION

## 2022-12-22 RX ORDER — SODIUM CHLORIDE 0.9 % (FLUSH) 0.9 %
5-40 SYRINGE (ML) INJECTION EVERY 12 HOURS SCHEDULED
Status: DISCONTINUED | OUTPATIENT
Start: 2022-12-22 | End: 2022-12-26 | Stop reason: HOSPADM

## 2022-12-22 RX ORDER — ARFORMOTEROL TARTRATE 15 UG/2ML
15 SOLUTION RESPIRATORY (INHALATION) 2 TIMES DAILY
Status: DISCONTINUED | OUTPATIENT
Start: 2022-12-22 | End: 2022-12-26 | Stop reason: HOSPADM

## 2022-12-22 RX ORDER — MAGNESIUM SULFATE 1 G/100ML
1000 INJECTION INTRAVENOUS ONCE
Status: COMPLETED | OUTPATIENT
Start: 2022-12-22 | End: 2022-12-22

## 2022-12-22 RX ORDER — ONDANSETRON 4 MG/1
4 TABLET, ORALLY DISINTEGRATING ORAL EVERY 8 HOURS PRN
Status: DISCONTINUED | OUTPATIENT
Start: 2022-12-22 | End: 2022-12-22 | Stop reason: SDUPTHER

## 2022-12-22 RX ORDER — DEXTROSE MONOHYDRATE 100 MG/ML
INJECTION, SOLUTION INTRAVENOUS CONTINUOUS PRN
Status: DISCONTINUED | OUTPATIENT
Start: 2022-12-22 | End: 2022-12-26 | Stop reason: HOSPADM

## 2022-12-22 RX ORDER — ACETAMINOPHEN 650 MG/1
650 SUPPOSITORY RECTAL EVERY 6 HOURS PRN
Status: DISCONTINUED | OUTPATIENT
Start: 2022-12-22 | End: 2022-12-22 | Stop reason: ALTCHOICE

## 2022-12-22 RX ORDER — LOSARTAN POTASSIUM 50 MG/1
100 TABLET ORAL DAILY
Status: DISCONTINUED | OUTPATIENT
Start: 2022-12-22 | End: 2022-12-26 | Stop reason: HOSPADM

## 2022-12-22 RX ORDER — ONDANSETRON 4 MG/1
4 TABLET, ORALLY DISINTEGRATING ORAL EVERY 8 HOURS PRN
Status: DISCONTINUED | OUTPATIENT
Start: 2022-12-22 | End: 2022-12-26 | Stop reason: HOSPADM

## 2022-12-22 RX ORDER — LIDOCAINE 4 G/G
1 PATCH TOPICAL DAILY
Status: DISCONTINUED | OUTPATIENT
Start: 2022-12-22 | End: 2022-12-26 | Stop reason: HOSPADM

## 2022-12-22 RX ORDER — ASPIRIN 81 MG/1
81 TABLET, CHEWABLE ORAL DAILY
Status: DISCONTINUED | OUTPATIENT
Start: 2022-12-22 | End: 2022-12-26 | Stop reason: HOSPADM

## 2022-12-22 RX ORDER — SODIUM CHLORIDE 9 MG/ML
INJECTION, SOLUTION INTRAVENOUS PRN
Status: DISCONTINUED | OUTPATIENT
Start: 2022-12-22 | End: 2022-12-22 | Stop reason: SDUPTHER

## 2022-12-22 RX ORDER — ACETAMINOPHEN 325 MG/1
650 TABLET ORAL EVERY 6 HOURS PRN
Status: DISCONTINUED | OUTPATIENT
Start: 2022-12-22 | End: 2022-12-22 | Stop reason: ALTCHOICE

## 2022-12-22 RX ORDER — BUDESONIDE 0.5 MG/2ML
0.5 INHALANT ORAL 2 TIMES DAILY
Status: DISCONTINUED | OUTPATIENT
Start: 2022-12-22 | End: 2022-12-26 | Stop reason: HOSPADM

## 2022-12-22 RX ORDER — SODIUM CHLORIDE 0.9 % (FLUSH) 0.9 %
5-40 SYRINGE (ML) INJECTION PRN
Status: DISCONTINUED | OUTPATIENT
Start: 2022-12-22 | End: 2022-12-26 | Stop reason: HOSPADM

## 2022-12-22 RX ORDER — DIPHENHYDRAMINE HCL 25 MG
25 TABLET ORAL NIGHTLY PRN
Status: DISCONTINUED | OUTPATIENT
Start: 2022-12-22 | End: 2022-12-22

## 2022-12-22 RX ORDER — SODIUM CHLORIDE 9 MG/ML
INJECTION, SOLUTION INTRAVENOUS PRN
Status: DISCONTINUED | OUTPATIENT
Start: 2022-12-22 | End: 2022-12-26 | Stop reason: HOSPADM

## 2022-12-22 RX ORDER — IPRATROPIUM BROMIDE AND ALBUTEROL SULFATE 2.5; .5 MG/3ML; MG/3ML
1 SOLUTION RESPIRATORY (INHALATION)
Status: DISCONTINUED | OUTPATIENT
Start: 2022-12-22 | End: 2022-12-26 | Stop reason: HOSPADM

## 2022-12-22 RX ORDER — DULOXETIN HYDROCHLORIDE 30 MG/1
60 CAPSULE, DELAYED RELEASE ORAL DAILY
Status: DISCONTINUED | OUTPATIENT
Start: 2022-12-22 | End: 2022-12-26 | Stop reason: HOSPADM

## 2022-12-22 RX ORDER — METOPROLOL SUCCINATE 25 MG/1
25 TABLET, EXTENDED RELEASE ORAL DAILY
Status: DISCONTINUED | OUTPATIENT
Start: 2022-12-22 | End: 2022-12-26 | Stop reason: HOSPADM

## 2022-12-22 RX ORDER — ATORVASTATIN CALCIUM 40 MG/1
40 TABLET, FILM COATED ORAL DAILY
Status: DISCONTINUED | OUTPATIENT
Start: 2022-12-22 | End: 2022-12-26 | Stop reason: HOSPADM

## 2022-12-22 RX ORDER — HYDROXYZINE PAMOATE 50 MG/1
50 CAPSULE ORAL EVERY 8 HOURS PRN
Status: DISCONTINUED | OUTPATIENT
Start: 2022-12-22 | End: 2022-12-22

## 2022-12-22 RX ORDER — ACETAMINOPHEN 650 MG/1
650 SUPPOSITORY RECTAL EVERY 6 HOURS PRN
Status: DISCONTINUED | OUTPATIENT
Start: 2022-12-22 | End: 2022-12-26 | Stop reason: HOSPADM

## 2022-12-22 RX ORDER — FAMOTIDINE 20 MG/1
20 TABLET, FILM COATED ORAL DAILY
Status: DISCONTINUED | OUTPATIENT
Start: 2022-12-22 | End: 2022-12-26 | Stop reason: HOSPADM

## 2022-12-22 RX ORDER — ENOXAPARIN SODIUM 100 MG/ML
40 INJECTION SUBCUTANEOUS DAILY
Status: DISCONTINUED | OUTPATIENT
Start: 2022-12-22 | End: 2022-12-26 | Stop reason: HOSPADM

## 2022-12-22 RX ORDER — BUPRENORPHINE 2 MG/1
4 TABLET SUBLINGUAL PRN
Status: DISCONTINUED | OUTPATIENT
Start: 2022-12-22 | End: 2022-12-22

## 2022-12-22 RX ORDER — ACETAMINOPHEN 325 MG/1
650 TABLET ORAL EVERY 6 HOURS PRN
Status: DISCONTINUED | OUTPATIENT
Start: 2022-12-22 | End: 2022-12-26 | Stop reason: HOSPADM

## 2022-12-22 RX ORDER — POLYETHYLENE GLYCOL 3350 17 G/17G
17 POWDER, FOR SOLUTION ORAL DAILY PRN
Status: DISCONTINUED | OUTPATIENT
Start: 2022-12-22 | End: 2022-12-22 | Stop reason: SDUPTHER

## 2022-12-22 RX ORDER — ONDANSETRON 2 MG/ML
4 INJECTION INTRAMUSCULAR; INTRAVENOUS EVERY 6 HOURS PRN
Status: DISCONTINUED | OUTPATIENT
Start: 2022-12-22 | End: 2022-12-26 | Stop reason: HOSPADM

## 2022-12-22 RX ORDER — OXYCODONE HYDROCHLORIDE AND ACETAMINOPHEN 5; 325 MG/1; MG/1
1 TABLET ORAL EVERY 6 HOURS PRN
Status: DISCONTINUED | OUTPATIENT
Start: 2022-12-22 | End: 2022-12-22

## 2022-12-22 RX ORDER — AMLODIPINE BESYLATE 5 MG/1
5 TABLET ORAL DAILY
Status: DISCONTINUED | OUTPATIENT
Start: 2022-12-22 | End: 2022-12-26 | Stop reason: HOSPADM

## 2022-12-22 RX ORDER — POLYETHYLENE GLYCOL 3350 17 G/17G
17 POWDER, FOR SOLUTION ORAL DAILY PRN
Status: DISCONTINUED | OUTPATIENT
Start: 2022-12-22 | End: 2022-12-26 | Stop reason: HOSPADM

## 2022-12-22 RX ORDER — CLOPIDOGREL BISULFATE 75 MG/1
75 TABLET ORAL DAILY
Status: DISCONTINUED | OUTPATIENT
Start: 2022-12-22 | End: 2022-12-26 | Stop reason: HOSPADM

## 2022-12-22 RX ORDER — OXYCODONE HYDROCHLORIDE AND ACETAMINOPHEN 5; 325 MG/1; MG/1
1 TABLET ORAL EVERY 8 HOURS PRN
Status: DISCONTINUED | OUTPATIENT
Start: 2022-12-22 | End: 2022-12-26 | Stop reason: HOSPADM

## 2022-12-22 RX ORDER — DOXYCYCLINE HYCLATE 100 MG/1
100 CAPSULE ORAL EVERY 12 HOURS SCHEDULED
Status: DISCONTINUED | OUTPATIENT
Start: 2022-12-22 | End: 2022-12-22

## 2022-12-22 RX ADMIN — ENOXAPARIN SODIUM 40 MG: 100 INJECTION SUBCUTANEOUS at 10:34

## 2022-12-22 RX ADMIN — BUDESONIDE 500 MCG: 0.5 SUSPENSION RESPIRATORY (INHALATION) at 20:26

## 2022-12-22 RX ADMIN — Medication 5.3 MILLICURIE: at 13:46

## 2022-12-22 RX ADMIN — Medication 15 ML: at 21:45

## 2022-12-22 RX ADMIN — IPRATROPIUM BROMIDE AND ALBUTEROL SULFATE 1 AMPULE: .5; 2.5 SOLUTION RESPIRATORY (INHALATION) at 20:26

## 2022-12-22 RX ADMIN — PERFLUTREN 1.5 ML: 6.52 INJECTION, SUSPENSION INTRAVENOUS at 10:36

## 2022-12-22 RX ADMIN — ARFORMOTEROL TARTRATE 15 MCG: 15 SOLUTION RESPIRATORY (INHALATION) at 20:26

## 2022-12-22 RX ADMIN — IPRATROPIUM BROMIDE AND ALBUTEROL SULFATE 1 AMPULE: .5; 2.5 SOLUTION RESPIRATORY (INHALATION) at 17:06

## 2022-12-22 RX ADMIN — MAGNESIUM SULFATE IN DEXTROSE 1000 MG: 10 INJECTION, SOLUTION INTRAVENOUS at 10:36

## 2022-12-22 RX ADMIN — IPRATROPIUM BROMIDE AND ALBUTEROL SULFATE 1 AMPULE: .5; 2.5 SOLUTION RESPIRATORY (INHALATION) at 09:06

## 2022-12-22 RX ADMIN — NALOXONE HYDROCHLORIDE 0.4 MG/HR: 1 INJECTION PARENTERAL at 00:36

## 2022-12-22 RX ADMIN — IPRATROPIUM BROMIDE AND ALBUTEROL SULFATE 1 AMPULE: .5; 2.5 SOLUTION RESPIRATORY (INHALATION) at 11:17

## 2022-12-22 RX ADMIN — BUDESONIDE 500 MCG: 0.5 SUSPENSION RESPIRATORY (INHALATION) at 09:06

## 2022-12-22 RX ADMIN — ARFORMOTEROL TARTRATE 15 MCG: 15 SOLUTION RESPIRATORY (INHALATION) at 09:06

## 2022-12-22 ASSESSMENT — PAIN SCALES - GENERAL
PAINLEVEL_OUTOF10: 0
PAINLEVEL_OUTOF10: 4

## 2022-12-22 NOTE — H&P
[address]   Department of Family Medicine  Family Medicine Residency Program  History and Physical    Patient:  Nolon Harada 69 y.o. female MRN: [id number]     Date of Service: 12/21/2022      Chief complaint:   Chief Complaint   Patient presents with    Shortness of Breath     Was sent from doctor for evaluation for possible pneumonia, patient is 63% RA. History of Present Illness   The patient is a 69 y.o. female with a past medical history of spinal stenosis on chronic opioid medication, CAD and NSTEMI status post stent 7/22, history of breast cancer, hypertension, hyperlipidemia who presented to ED from PCP clinic after an inability to obtain an in office pulse ox with decreased mentation. Patient was recently down titrated on her chronic opiate medication due to increased drowsiness. She did have a fall last Saturday and today during which which she states she did not hit her head or lose consciousness. Patient was endorsing generalized weakness at PCPs office. Due to fall today, patient did take another pain pill prior to seeking care at PCPs office. Patient denies any increased shortness of breath, does have baseline chronic dyspnea. She is still very somnolent on repeat exam, but is still denying any chest pain, abdominal pain or dysuria. Had been admitted in June with UTI and AMS after which patient had NSTEMI. Per family, patient has been ANO x3, but has been confused about days and nights and having more frequent falls. POA's contact: [phone] Ann Marie Perez, daughter.   CODE STATUS: Full code      ED Course:   Patient arrived significantly hypoxic in the 60s, placed on BiPAP  CO2 was initially in the 70s, increased to 80s and then decreased back into the 60s after Narcan  Chest x-ray showing hazy left midlung airspace opacities concerning for pneumonia  CTA chest showing chronic calcified thrombus without evidence of pneumonia  CT head and CT C-spine within normal limits  Pelvic x-ray within normal limits  UA with positive leuk esterase and nitrates, minimal WBCs  Initial troponin elevated at 46, pending repeat  Lactic acid within normal limits  Patient admitted for acute hypercapnic and hypoxic respiratory failure secondary to pneumonia versus drug overdose    Past Medical History:       Diagnosis Date    Adverse effect of anesthetic     difficulty waking up if lying flat post op    Arthritis     Bronchitis     Cancer ([facility] [place] [address] 2016    breast left    Diverticula of colon 2011    Hyperlipidemia     Hypertension     NSTEMI (non-ST elevated myocardial infarction) ([facility] [place] [address] 07/2022    Had stress test that was normal, echo showed EF 65%. Had left heart cath with stenting of mid circumflex artery done. Pneumonia     Restless leg syndrome     Sleep apnea     CPAP    Thyroid disease        Past Surgical History:        Procedure Laterality Date    APPENDECTOMY      BREAST SURGERY Left 03/2016    lumpectomy for cancer with chemo & radiation last chemo in july     CHOLECYSTECTOMY  2009    lap    COLONOSCOPY  2011    diverticulosis    COSMETIC SURGERY Bilateral     blephoroplasty    EYE SURGERY Bilateral     cataract extraction    HAND SURGERY      CTS B/L    HYSTERECTOMY (CERVIX STATUS UNKNOWN)      JOINT REPLACEMENT  03/04/2007    [place] Knees. [place].  Hips    KNEE ARTHROSCOPY Bilateral     LAP BAND  2007    LUMBAR FUSION  2019    done at [address] Left 08/19/2015    sacroiliac joint #1    NERVE BLOCK  08/19/2015    lumbar epidural #1    NERVE BLOCK N/A 09/09/2015    lumbar epidural nerve block #3    NERVE BLOCK N/A 10/26/2015    paravertebral facet bilateral lumbar #1    NERVE BLOCK Bilateral 11/02/2015    lumb facet #2    NERVE BLOCK Bilateral 11/09/2015    paravertebral facet block lumbar #3    NERVE BLOCK Bilateral 04/13/2016    Bilateral sacroiliac joint injection #1    NERVE BLOCK Bilateral 08/31/2016    SI injection #2    NERVE BLOCK Bilateral 10/03/2016    si inj #3    NERVE SURGERY Right 01/22/2020    RADIOFREQUENCY, SACROILIAC JOINT RIGHT AT S1, S2, S3 performed by Evangelist Ramos DO at [address] [facility] Right 7/7/2021    STAGE 2 MED[employer] LUMBAR SPINAL CORD STIMULATOR IMPLANT (CPT 67374, 77912) ([employer]ON[employer]) (PT REQUESTS BATTERY IN RIGHT FLANK AREA) performed by Evangelist Ramos DO at [address]      upper and lower dental implants  except 6 teeth in  front    OTHER SURGICAL HISTORY Right 04/15/2015    right ethmoidectomymaaxillary antrostomies frontal recess exploration    OTHER SURGICAL HISTORY Right 12/30/2015    Right lumbar radiofrequency    OTHER SURGICAL HISTORY Left 02/08/2016    lumb radiofreq    OTHER SURGICAL HISTORY Left 11/21/2016    lateral sacral radiofrequency    OTHER SURGICAL HISTORY Right 01/09/2017    radiofrequency    OTHER SURGICAL HISTORY Right 02/08/2017    lumbar facet radiofreq    OTHER SURGICAL HISTORY Left 03/27/2017    lumbar radiofrequency     OTHER SURGICAL HISTORY Left 05/24/2017    SI joint radiofrequency    OTHER SURGICAL HISTORY Right 01/22/2020    si radiofrequency    OVARY REMOVAL  1975    right    REVISION TOTAL HIP ARTHROPLASTY  03/26/2012    orif left hip revision of femoral stem    TOTAL HIP ARTHROPLASTY  03/2012    TOTAL LEFT HIP ARTHROPLASTY WITH PLATELET GEL       Medications Prior to Admission:    Prior to Admission medications    Medication Sig Start Date End Date Taking? Authorizing Provider   rOPINIRole (REQUIP) 1 MG tablet Take 1 tablet by mouth daily 12/21/22   Lea Wang MD   tiotropium (SPIRIVA RESPIMAT) 2.5 MCG/ACT AERS inhaler inhale 2 puffs by mouth once daily 12/21/22   Lea Wang MD   clopidogrel (PLAVIX) 75 MG tablet Take 75 mg by mouth daily 10/6/22   Historical Provider, MD   losartan (COZAAR) 100 MG tablet Take 100 mg by mouth daily 12/1/22   Historical Provider, MD   XTAMPZA ER 18 MG C12A Take 18 mg by mouth every 12 hours.  11/22/22   Historical Provider, MD   furosemide (LASIX) 20 MG tablet Take 1 tablet by mouth daily 11/3/22   Karla Galeano MD   atorvastatin (LIPITOR) 40 MG tablet take 1 tablet by mouth once daily 11/3/22   Karla Galeano MD   celecoxib (CELEBREX) 200 MG capsule take 1 capsule by mouth twice a day 9/9/22   Karla Galeano MD   amLODIPine (NORVASC) 5 MG tablet Take 1 tablet by mouth daily 9/9/22   Karla Galeano MD   famotidine (PEPCID) 40 MG tablet take 1 tablet by mouth once daily 9/9/22   Karla Galeano MD   metoprolol succinate (TOPROL XL) 25 MG extended release tablet Take 1 tablet by mouth daily 0.5 tablets oral, once a day 9/9/22   Karla Galeano MD   ASPIRIN LOW DOSE 81 MG chewable tablet Take 1 tablet by mouth daily 9/9/22   Karla Galeano MD   DULoxetine (CYMBALTA) 60 MG extended release capsule TAKE 1 CAPSULE BY MOUTH AT  NIGHT 8/23/22   Destiny Whiting MD   fluticasone (FLONASE) 50 MCG/ACT nasal spray 1 spray by Each Nostril route daily    Historical Provider, MD   oxyCODONE-acetaminophen (PERCOCET) 7.5-325 MG per tablet take 1 tablet by mouth every 6 hours if needed 7/29/21   Historical Provider, MD   methocarbamol (ROBAXIN) 750 MG tablet take 1 tablet by mouth every 12 hours if needed 3/30/21   Historical Provider, MD   lidocaine (XYLOCAINE) 5 % ointment Apply topically to hands and low back as needed. 2/29/16   VAIBHAV Wokrman - CNP       Allergies:  Demerol [meperidine hcl], Meperidine, and Nabumetone    Family History:       Problem Relation Age of Onset    Heart Failure Mother     Parkinsonism Father     Diabetes Father     Other Brother         PVD       Social History:   TOBACCO:   reports that she has been smoking cigarettes. She has a 62.00 pack-year smoking history. She has never used smokeless tobacco.  ETOH:   reports that she does not currently use alcohol after a past usage of about 1.0 standard drink per week.   OCCUPATION:      REVIEW OF SYSTEMS: Patient still very drowsy and may be poor historian  Constitutional: No fever, no chill or change in weight; good appetite  HEENT: Denies congestion or vision changes  Respiratory: No cough, no sputum, no pleuritic chest pain, no shortness of breath  Cardiology: Denies chest pain or leg swelling  Gastroenterology: Denies nausea, vomiting or diarrhea  Genitourinary: No dysuria, no frequency  Musculoskeletal: no joint pain, no myalgia, no change in range of movement  Neurology: no focal weakness in extremities, no slurred speech, no double vision, no tingling or numbness sensation. Endocrinology: no polyphagia, polydipsia or polyuria  Hematology: no increased bleeding, no bruising  Skin: no skin change noticed by patient  Psychology: no depressed mood, no suicidal ideation    Physical Exam   Vitals: /65   Pulse 89   Temp 97.9 °F (36.6 °C) (Temporal)   Resp 26   SpO2 100%   General Appearance: Somnolent but arousable, cooperative, no acute distress. HEENT: Normocephalic, atraumatic. MYLES, conjunctiva/corneas clear, EOM's intact, no pallor  or icterus. On initial exam, pupils are constricted. Neck: Supple, symmetrical, trachea midline, no cervical LAD. No thyroid enlargement/tenderness/nodules. No carotid bruit or JVD  Chest wall: No tenderness or deformity, full & symmetric excursion  Lung: On BiPAP. Clear to auscultation bilaterally,  respirations unlabored. No rales/wheezing/rubs  Heart: Regular rate and rhythm, S1 and S2 normal, no murmur, rub or   gallop. no bruits (carotid/femoral/abd), pedal pulses 2+,  no edema  Abdomen: Soft, non-tender, bowel sounds active all four quadrants, no masses, no organomegaly  Genital and rectal exam: deferred  Extremities:  Extremities normal, no cyanosis or edema. Pulses equal bilaterally  Skin: Skin color, texture, turgor normal, no rashes. Abrasions to lower extremities  Musculokeletal: No joint swelling, no muscle tenderness. ROM normal in all joints of extremities.    Lymph nodes: no lymph node enlargement apprciated  Neurologic:   Somnolent, ANO x3, confused, still not entirely sure what brought her in     Labs and Imaging Studies   Basic Labs  Results for orders placed or performed during the hospital encounter of 12/21/22   Respiratory Panel, Molecular, with COVID-19 (Restricted: peds pts or suitable admitted adults)    Specimen: Nasopharyngeal   Result Value Ref Range    Adenovirus by PCR Not Detected Not Detected    Bordetella parapertussis by PCR Not Detected Not Detected    Bordetella pertussis by PCR Not Detected Not Detected    Chlamydophilia pneumoniae by PCR Not Detected Not Detected    Coronavirus 229E by PCR Not Detected Not Detected    Coronavirus HKU1 by PCR Not Detected Not Detected    Coronavirus NL63 by PCR Not Detected Not Detected    Coronavirus OC43 by PCR Not Detected Not Detected    SARS-CoV-2, PCR Not Detected Not Detected    Human Metapneumovirus by PCR Not Detected Not Detected    Human Rhinovirus/Enterovirus by PCR Not Detected Not Detected    Influenza A by PCR Not Detected Not Detected    Influenza B by PCR Not Detected Not Detected    Mycoplasma pneumoniae by PCR Not Detected Not Detected    Parainfluenza Virus 1 by PCR Not Detected Not Detected    Parainfluenza Virus 2 by PCR Not Detected Not Detected    Parainfluenza Virus 3 by PCR Not Detected Not Detected    Parainfluenza Virus 4 by PCR Not Detected Not Detected    Respiratory Syncytial Virus by PCR Not Detected Not Detected   Troponin   Result Value Ref Range    Troponin, High Sensitivity 46 (H) 0 - 9 ng/L   CBC with Auto Differential   Result Value Ref Range    WBC 6.3 4.5 - 11.5 E9/L    RBC 4.73 3.50 - 5.50 E12/L    Hemoglobin 12.9 11.5 - 15.5 g/dL    Hematocrit 42.3 34.0 - 48.0 %    MCV 89.4 80.0 - 99.9 fL    MCH 27.3 26.0 - 35.0 pg    MCHC 30.5 (L) 32.0 - 34.5 %    RDW 15.2 (H) 11.5 - 15.0 fL    Platelets 870 213 - 935 E9/L    MPV 10.4 7.0 - 12.0 fL    Neutrophils % 74.5 43.0 - 80.0 %    Immature Granulocytes % 0.2 0.0 - 5.0 %    Lymphocytes % 11.7 (L) 20.0 - 42.0 %    Monocytes % 12.2 (H) 2.0 - 12.0 %    Eosinophils % 1.1 0.0 - 6.0 %    Basophils % 0.3 0.0 - 2.0 %    Neutrophils Absolute 4.72 1.80 - 7.30 E9/L    Immature Granulocytes # 0.01 E9/L    Lymphocytes Absolute 0.74 (L) 1.50 - 4.00 E9/L    Monocytes Absolute 0.77 0.10 - 0.95 E9/L    Eosinophils Absolute 0.07 0.05 - 0.50 E9/L    Basophils Absolute 0.02 0.00 - 0.20 E9/L   Comprehensive Metabolic Panel   Result Value Ref Range    Sodium 135 132 - 146 mmol/L    Potassium 3.5 3.5 - 5.0 mmol/L    Chloride 91 (L) 98 - 107 mmol/L    CO2 35 (H) 22 - 29 mmol/L    Anion Gap 9 7 - 16 mmol/L    Glucose 129 (H) 74 - 99 mg/dL    BUN 20 6 - 23 mg/dL    Creatinine 1.1 (H) 0.5 - 1.0 mg/dL    Est, Glom Filt Rate 52 >=60 mL/min/1.73    Calcium 9.2 8.6 - 10.2 mg/dL    Total Protein 6.8 6.4 - 8.3 g/dL    Albumin 4.3 3.5 - 5.2 g/dL    Total Bilirubin 0.8 0.0 - 1.2 mg/dL    Alkaline Phosphatase 63 35 - 104 U/L    ALT 17 0 - 32 U/L    AST 32 (H) 0 - 31 U/L   Brain Natriuretic Peptide   Result Value Ref Range    Pro- (H) 0 - 450 pg/mL   Blood Gas, Arterial   Result Value Ref Range    Date Analyzed 20221221     Time Analyzed 1513     Source: Blood Arterial     pH, Blood Gas 7.310 (L) 7.350 - 7.450    PCO2 73.7 (HH) 35.0 - 45.0 mmHg    PO2 184.7 (H) 75.0 - 100.0 mmHg    HCO3 36.3 (H) 22.0 - 26.0 mmol/L    B.E. 7.2 (H) -3.0 - 3.0 mmol/L    O2 Sat 99.0 (H) 92.0 - 98.5 %    O2Hb 88.0 (L) 94.0 - 97.0 %    COHb 10.9 (H) 0.0 - 1.5 %    MetHb 0.2 0.0 - 1.5 %    O2 Content 17.7 mL/dL    HHb 0.9 0.0 - 5.0 %    tHb (est) 14.0 11.5 - 16.5 g/dL    Mode NRB 15L     Date Of Collection      Time Collected      Pt Temp 37.0 C     ID 1741     Lab [id number]     Critical(s) Notified Handed report to Dr/RN    Blood Gas, Arterial   Result Value Ref Range    Date Analyzed 20221221     Time Analyzed 1623     Source: Blood Arterial     pH, Blood Gas 7.290 (L) 7.350 - 7.450    PCO2 81.3 (HH) 35.0 - 45.0 mmHg PO2 134.2 (H) 75.0 - 100.0 mmHg    HCO3 38.2 (H) 22.0 - 26.0 mmol/L    B.E. 8.4 (H) -3.0 - 3.0 mmol/L    O2 Sat 98.3 92.0 - 98.5 %    PO2/FIO2 2.24 mmHg/%    O2Hb 88.8 (L) 94.0 - 97.0 %    COHb 9.4 (H) 0.0 - 1.5 %    MetHb 0.3 0.0 - 1.5 %    O2 Content 17.6 mL/dL    HHb 1.5 0.0 - 5.0 %    tHb (est) 13.9 11.5 - 16.5 g/dL    Mode BILEVEL     FIO2 60.0 %    Peep/Cpap 5.0 cmH2O    PS 10 cmH20    Date Of Collection      Time Collected      Pt Temp 37.0 C     ID 1115     Lab [id number]     Critical(s) Notified Handed report to Dr/RN    Lactic Acid   Result Value Ref Range    Lactic Acid 1.7 0.5 - 2.2 mmol/L   Blood Gas, Arterial   Result Value Ref Range    Date Analyzed 20221221     Time Analyzed 1750     Source: Blood Arterial     pH, Blood Gas 7.373 7.350 - 7.450    PCO2 62.7 (H) 35.0 - 45.0 mmHg    PO2 124.6 (H) 75.0 - 100.0 mmHg    HCO3 35.7 (H) 22.0 - 26.0 mmol/L    B.E. 8.1 (H) -3.0 - 3.0 mmol/L    O2 Sat 98.4 92.0 - 98.5 %    O2Hb 91.1 (L) 94.0 - 97.0 %    COHb 7.0 (H) 0.0 - 1.5 %    MetHb 0.4 0.0 - 1.5 %    O2 Content 18.6 mL/dL    HHb 1.5 0.0 - 5.0 %    tHb (est) 14.4 11.5 - 16.5 g/dL    Mode BILEVEL     Rr Mechanical 16.0 b/min    Peep/Cpap 5.0 cmH2O    PS 15 cmH20    Date Of Collection      Time Collected      Pt Temp 37.0 C     ID 1115     Lab [id number]     Critical(s) Notified . No Critical Values    POCT glucose   Result Value Ref Range    Glucose 128 mg/dL   POCT Glucose   Result Value Ref Range    Meter Glucose 128 (H) 74 - 99 mg/dL   EKG 12 Lead   Result Value Ref Range    Ventricular Rate 91 BPM    Atrial Rate 91 BPM    P-R Interval 184 ms    QRS Duration 96 ms    Q-T Interval 382 ms    QTc Calculation (Bazett) 469 ms    P Axis 61 degrees    R Axis 11 degrees    T Axis 42 degrees       Imaging Studies:  Radiology:   XR CHEST PORTABLE   Final Result   Hazy left mid lung airspace opacities, which could suggest pneumonia.          CTA PULMONARY W CONTRAST    (Results Pending)   CT HEAD WO CONTRAST (Results Pending)   CT CERVICAL SPINE WO CONTRAST    (Results Pending)   XR PELVIS (1-2 VIEWS)    (Results Pending)     EKG: Rhythm Strip: normal sinus rhythm, left atrial enlargement, suspicion of pacemaker failure? Natalie Rashid Resident's Assessment and PLan   Acute hypoxic and hypercapnic respiratory failure  AMS  Likely secondary to pneumonia versus opiate overdose versus sepsis  Initial ABG showing respiratory acidosis with CO2 in the 70s slowly improving  Chest x-ray showing left midlung opacity, CTA negative for pneumonia  No leukocytosis and lactic acid within normal limits  Pending blood cultures and urine cultures  UA showing possible UTI, pending culture  Admit to ICU  Check urine strep and Legionella  Would get sputum culture if able  Patient started on Rocephin and Doxy for CAP coverage  Check Pro-Srinivasan  Patient is full code, continue BiPAP for now as tolerated  Repeat ABG  Patient given 2 doses of Narcan with improvement in mentation in ER  Continue Narcan drip  Avoid all central depressant medications  Will need bedside swallow versus speech eval when mentation improves to assess ability to swallow    CAD status post HILDA [facility]2 7/22 at [facility]  Echo in 6/21 showing moderate to severe MR with EF of 73%  Troponin 46 on admission, pending repeat, had been 100 and September  EKG showing normal sinus rhythm, would repeat EKG in the morning  Low threshold for cardiology consult  Continue aspirin and Plavix  Continue atorvastatin 40  Would resume metoprolol when blood pressure tolerates  Check BNP  Strict I's and O's  Repeat EKG in a.m.     Hypertension  Hold hypertension medications for now due to hypotension  Status post 1 L bolus in ED  Home medications include losartan 100 mg daily, amlodipine 5 mg, metoprolol and Lasix    Spinal stenosis  Home medications include as needed Percocet and oxycodone ER, xtampza    Concern for UTI  UA with positive leuk esterase and nitrates but minimal WBCs  Given Rocephin and Doxy so far in ER for pneumonia coverage  Pending urine culture          DVT / GI prophylaxis: lovenox 40mg SC and GI prophylaxis not indicated    Electronically signed by Shakila Moser MD on 12/21/2022 at 7:37 PM  This case was discussed with attending physician: Dr. Fadia Lacy

## 2022-12-22 NOTE — CONSULTS
[facility]  Internal Medicine Residency Program  History and Physical  MICU    Patient:  Flaquita Guerra 69 y.o. female MRN: [id number]     Date of Service: 12/22/2022    Hospital Day: 2      Chief complaint: Altered mental status   History of Present Illness   The patient is a 69 y.o. female with PMHx of NSTEMI s/p stent in the mid circumflex artery, HFpEF with Ef 65%, COPD, AYESHA, lumbar spinal stenosis s/p lumbar spinal fusion, breast Ca s/p left lumpectomy presented with altered mental status. Daughter says, she has been on her usual health, but became more somnolent since last week. , and sent to her PCP by the help of neighbors, and then to the ED. Patient has been taking opiate medications due to her chronic back pain. Recently she down titrated due to increased drowsiness. She endorses fall history to PCP, but denies loss of consciousness, or any injury. Patient also complaints about generalized fatigue. Patient has been confused about days and nights, and having more frequent falls. Patient denies any SOB, chest pain, abdominal pain. Patient had recent hx of hospitalization at [facility] due to AMS and UTI, and later diagnosed with NSTEMI, and s/p stent in mid-circumflex artery. On arrival, patient is altered, but has been responsive and awaking up from sleep. In the ED, she received Narcan 2 mg, and then started on Narcan drip. ED Course: In the ED, patient's sat in the 60's, placed on BiPAP. ABG significant for PCO2 80's, then dropped to 60s after narcan. CTA chest negative for PE, pneumonia, but shows chronic thrombus in the right upper lobe pulmonary artery, dependent atelectasis is seen in the right lower lobe, pulmonary HTN. CT head negative for any acute abnormalities. CXR shows left mid lung opacities concerning for pneumonia. WBC, and pro-inflammatory markers are normal. LA normal. Pro-, troponin 36. UDS positive for oxycodone.    ED Meds: Patient was given Narcan, 1 dose of Ceftriaxone and Doxycycline, Decadron 10 mg once. Received 1 L of NS. Past Medical History:      Diagnosis Date    Adverse effect of anesthetic     difficulty waking up if lying flat post op    Arthritis     Bronchitis     Cancer ([facility] [address] 2016    breast left    Diverticula of colon 2011    Hyperlipidemia     Hypertension     NSTEMI (non-ST elevated myocardial infarction) ([facility] 07/2022    Had stress test that was normal, echo showed EF 65%. Had left heart cath with stenting of mid circumflex artery done. Pneumonia     Restless leg syndrome     Sleep apnea     CPAP    Thyroid disease        Past Surgical History:        Medications Prior to Admission:        Allergies:  Demerol [meperidine hcl], Meperidine, and Nabumetone    Social History:   TOBACCO:   reports that she has been smoking cigarettes. She has a 62.00 pack-year smoking history. She has never used smokeless tobacco.  ETOH:   reports that she does not currently use alcohol after a past usage of about 1.0 standard drink per week. OCCUPATION:      Family History:       Problem Relation Age of Onset    Heart Failure Mother     Parkinsonism Father     Diabetes Father     Other Brother         PVD       REVIEW OF SYSTEMS: couldn't obtain, patient is altered    Constitutional: No fever, no chills, no change in weight; good appetite  HEENT: No blurred vision, no ear problems, no sore throat, no rhinorrhea. Respiratory: No cough, no sputum production, no pleuritic chest pain, no shortness of breath  Cardiology: No angina, no dyspnea on exertion, no paroxysmal nocturnal dyspnea, no orthopnea, no palpitation, no leg swelling. Gastroenterology: No dysphagia, no reflux; no abdominal pain, no nausea or vomiting; no constipation or diarrhea.  No hematochezia   Genitourinary: No dysuria, no frequency, hesitancy; no hematuria  Musculoskeletal: no joint pain, no myalgia, no change in range of movement  Neurology: no focal weakness in extremities, no slurred speech, no double vision, no tingling or numbness sensation  Endocrinology: no temperature intolerance, no polyphagia, polydipsia or polyuria  Hematology: no increased bleeding, no bruising, no lymphadenopathy  Skin: no skin changes noticed by patient  Psychology: no depressed mood, no suicidal ideation    Physical Exam   Vitals: /65   Pulse 95   Temp 98.3 °F (36.8 °C) (Axillary)   Resp 19   SpO2 97%           General Appearance: disoriented and in moderate distress    Pulmonary/Chest: clear to auscultation bilaterally- no wheezes, rales or rhonchi,  Cardiovascular: normal rate, normal S1 and S2, and intact distal pulses  Abdomen: soft, non-tender, non-distended, normal bowel sounds, no masses or organomegaly  Extremities: no cyanosis and no clubbing  Neurologic: Patient is altered, not able to follow commands properly; Pupils are reactive to light, not pin point. Lines     site day    Art line   None    TLC None    PICC None    Hemoaccess None    ABG:     Lab Results   Component Value Date/Time    PH 7.343 12/21/2022 07:52 PM    PCO2 65.8 12/21/2022 07:52 PM    PO2 93.7 12/21/2022 07:52 PM    HCO3 34.9 12/21/2022 07:52 PM    BE 6.9 12/21/2022 07:52 PM    THB 13.9 12/21/2022 07:52 PM    O2SAT 97.1 12/21/2022 07:52 PM     Labs and Imaging Studies   Basic Labs    Imaging Studies:     XR PELVIS (1-2 VIEWS)    Result Date: 12/21/2022  d. No acute bony abnormalities or prosthetic complications. CT HEAD WO CONTRAST    Result Date: 12/21/2022    No acute intracranial abnormality. CT CERVICAL SPINE WO CONTRAST    Result Date: 12/21/2022      No acute abnormality of the cervical spine.      XR CHEST PORTABLE    Result Date: 12/21/2022  EXAMINATION: ONE XRAY VIEW OF THE CHEST 12/21/2022 5:12 pm COMPARISON: 09/01/2021 HISTORY: ORDERING SYSTEM PROVIDED HISTORY: cough TECHNOLOGIST PROVIDED HISTORY: Reason for exam:->cough What reading provider will be dictating this exam?->CRC FINDINGS: The cardiomediastinal silhouette is stable. There is bibasilar atelectasis. Hazy left mid lung airspace opacities. No pneumothorax or pleural effusion. Hazy left mid lung airspace opacities, which could suggest pneumonia. CTA PULMONARY W CONTRAST    Result Date: 2022  1. No evidence of an acute pulmonary embolism. 2. Calcified chronic thrombus in the right upper lobe pulmonary artery. 3. Evidence of pulmonary arterial hypertension. 4. Prominent cardiomegaly. No evidence of CHF exacerbation. 5. Right lower lobe atelectasis overlying elevated hemidiaphragm. No evidence of pneumonia. 0 RECOMMENDATIONS: Unavailable       EKG: normal sinus rhythm. Resident's Assessment and Plan     Adalberto Boothe is a 69 y.o. female    Neurology: Altered mental status likely 2/2 acute hypercapnic hypoxic respiratory failure 2/2 COPD excerebration 2/2 pneumonia vs chronic opioids use vs UTI  -Admission AB.34, 65.8, 93.7, 34.9; repeat AB.31, 74.8, 98.3, 37.2  -On admission saturation 60%  -CT chest negative for PE, no pneumonia, chronic thrombus in the right upper lobe pulmonary artery, CT head negative for acute abnormalities  -CXR positive mid lung haziness, respiratory panel negative  -History of COPD, unspecified type  -Patient is on Percocet and xtampza to control her lumbar spinal stenosis pain  -Pro-Srinivasan, WBC within normal limit  -Monitor mentation, follow-up , ammonia, TSH    Pulmonary:   Acute hypercapnic hypoxic respiratory failure 2/2 ?? Pneumonia vs COPD excerebration  -Received Rocephin, doxycycline, Decadron  -Follow-up repeat chest x-ray  -Continue antibiotics;  Ceftriaxone 1 gm daily and doxycycline 100 mg q12h  -On BiPAP, wean off as tolerated    Hx of COPD   -PFT done on 2020, unspecified type, couldn't find obstructive pattern, some restrictive   -Home medication: fluticasone, Spiriva  -Continue breathing treatment (Brovana, Pulmicort, DuoNeb)    AYESHA with obesity hypoventilation syndrome -CPAP at home? ?  -Sleep study done 2017     Cardiology:  Hx of NSTEMI s/p stent mid-circumflex [facility] 07[facility]  -Aspirin 81 mg, Lipitor 40, and Plavix    2. Hx of HFpEF  -Echo on 7/22  with normal LV function  -Follow up Echo  -Metoprolol XL, Losartan 100 mg, lasix 20 mg: home medication, on hold    3. Hx HTN  -Amlodipine 5 mg daily, losartan 100 mg daily    Nephrology (Fluids/ Electrolytes & Nutrition): RICHARDSON likely pre-renal 2/2 hypovolemia  -baseline 0.7, admission 1.1  -Monitor CMP, and urine electrolytes    Genitourinary  Urinary tract infection  -UA positive for nitrite and trace amount of leukocyte esterase with many bacteria  -Continue Ceftriaxone 1 gm     Musculoskeletal:   Spinal stenosis of lumbar region with neurologic claudication s/p lumbar spinal fusion  -Two times lumbar spine operations  -On Percocet, and xtampza, Robaxin 750 mg, Cymbalta 60 mg daily    2. OA s/p bilateral hip and knee replacement    3.   History of restless leg syndrome  -Ropinirole 1 mg daily, home medication    Heme/Onc  Hx of breast Ca s/p left lumpectomy     PT/OT: Not required at this time  DVT ppx:   GI ppx:       Ish Pimentel MD, PGY-1   Attending physician: Dr. Iggy Masters  Case discussed with Dr. Rohan Jose

## 2022-12-22 NOTE — ED NOTES
Dia Agarwal  [phone]    Patient's daughter Andre Kaplan called, given update. Patient requesting for her to be called for any future updates and information. The daughter also states that this is the third time this year that something like this has happened to her mother. Patient is usually A&Ox4 and walkie talkie at home.       Karyle Lins, RN  12/21/22 7823

## 2022-12-22 NOTE — ACP (ADVANCE CARE PLANNING)
Advance Care Planning   Healthcare Decision Maker:    Primary Decision Maker: [place] Child - [phone]    Secondary Decision Maker: Ricardo Abbasi - Child - [phone]    Click here to complete Healthcare Decision Makers including selection of the Healthcare Decision Maker Relationship (ie \"Primary\").

## 2022-12-22 NOTE — ED NOTES
Pharmacy called for another narcan drip. This RN to go get it.       Genoveva Merino, FLYNN  12/22/22 0876

## 2022-12-22 NOTE — PLAN OF CARE
Called cardiac cath lab and patient is on the schedule for right heart cath tomorrow with Dr. Aleksandra Soriano. NPO at midnight.      Electronically signed by Amanuel Pal MD on 12/22/2022 at 3:21 PM

## 2022-12-22 NOTE — ED NOTES
Care of patient taken over at this time. Patient on BiPap and not opening eyes to respond to this RN.       Zain Saleem, FLYNN  12/21/22 1392

## 2022-12-22 NOTE — CARE COORDINATION
Care Coordination: per chart review; hx of chronic opioid/ spinal stenosis. Pt falling at home with confusion and brought to [facility] practice for visit. Unable to obtain pulse ox in office, sent to Ed and hypoxic in 60's/ placed on bipap/  Narcan gtt/ tox screen positive for oxycodone and noted to be A&O x 4 this am. admitted to ICU. Currently a full code. But per note on 12/21/22 at 4:10 pm, pt is a DNR and would not like to be placed on ventilator. Pt currently off bipap and on 3 ltr nc per flow sheet. Call to Daughter Ananth Rosa who is listed as primary decision maker. She states pt has been completely independent at home. Drives, has a walker. Follows with family practice, uses [facility]. Has a pain stimulator and has her battery pack with her. Follows with pain management. Has a Cpap at home and daughter states she uses at night and has been using it throughout the day which makes her suspicious that she may need home 02. She is uncertain of DME company. If 02 is needed, she will check to find which company, but if not, has no preference. No hx of hhc or manolo. Would like a referral made to [place] as she feels she may need a short term stay prior to returning home. ACP document completed. Discussed ER statement regarding DNR status, Ananth Rosa states she is to remain a full code until she can find papers and confirm otherwise. Family will transport at discharge. Call to Diego Simpson at [facility] and left  for referral. Awaiting return call    Miguel Angel Colon  The Plan for Transition of Care is related to the following treatment goals: dc plan    The Patient and/or patient representative catrachita Rosa was provided with a choice of provider and agrees   with the discharge plan. [x] Yes [] No    Freedom of choice list was provided with basic dialogue that supports the patient's individualized plan of care/goals, treatment preferences and shares the quality data associated with the providers.  [x] Yes [] No

## 2022-12-22 NOTE — PROGRESS NOTES
Date: 12/22/2022    Time: 3:30 AM    Patient Placed On BIPAP/CPAP/ Non-Invasive Ventilation? Remains on from previous shift    If no must comment. Facial area red/color change? Yes           If YES are Blister/Lesion present? No   If yes must notify nursing staff  BIPAP/CPAP skin barrier?   Yes    Skin barrier type:mepilexlite       Comments:        Barby Velasquez RCP

## 2022-12-22 NOTE — PROGRESS NOTES
Date: 12/22/2022    Time: 5:12 PM    Patient Placed On BIPAP/CPAP/ Non-Invasive Ventilation? No and pt remains on BIPAP    If no must comment. Facial area red/color change? Some redness on the middle of the nose           If YES are Blister/Lesion present? No   If yes must notify nursing staff  BIPAP/CPAP skin barrier?   yes   Skin barrier type:mepilexlite       Comments:        Montse Fernandez RCP

## 2022-12-22 NOTE — PROGRESS NOTES
[facility]  Family Medicine Attending    S: 69 y.o. female with history of CAD with recent NSTEMI s/p stent placement July 2022, COPD, history of breast cancer, htn, chronic back pain on opiates from pain management presented to PCP office due to daughter's concern for recurrent falls and increasing confusion over the past week. Had a fall on Friday and was on the floor until Saturday and fell on day of admission. Patient came into office and was drowsy but alert and reported that she had taken a pain pill prior to coming in. She was feeling weak and was brought in by neighbor in a wheelchair. She denied shortness of breath or chest pain. Pulse ox was not able to obtained in office. Concern for opiate overdose was sent to ED and was found to have hypercapnic hypoxic respiratory failure to the 60s and required narcan drip. Patient was admitted to the ICU. This morning she is feeling weak but improved. Denies chest pain or shortness of breath. I had discussion with her that opiate pain medication should be discontinued. I also called and spoke to patient's daughter, Tereasa Severin. O: VS- Blood pressure 125/75, pulse 95, temperature 97.4 °F (36.3 °C), temperature source Temporal, resp. rate 19, height 5' (1.524 m), weight 153 lb (69.4 kg), SpO2 95 %, not currently breastfeeding. Exam is as noted by resident with the following changes, additions or corrections:  Gen lying in bed, on BiPap  Cardio - RRR, no murmur   Lungs - CTAB, no wheeze   Ext- no peripheral edema     Impressions:   Principal Problem:    Acute respiratory failure ([address]  Resolved Problems:    * No resolved hospital problems. *      Plan:   patient with hypercapnic hypoxic respiratory failure 2/2 accidental opiate overdose. Narcan drip discontinued. BiPap prn   ICU management appreciated. Plan for right heart cath tomorrow with Dr. Finesse Boland for pulmonary htn. VQ scan obtained showed low probability for pulmonary embolism.    CTA showed Calcified chronic thrombus in the right upper lobe pulmonary artery. Abx discontinued as no evidence of infectious process. Holding bp meds for hypotension. Attending Physician Statement  I have reviewed the chart, including any radiology or labs, and seen the patient with the resident(s). I personally reviewed and performed key elements of the history and exam.  I agree with the assessment, plan and orders as documented by the resident. Please refer to the resident note for additional information. Serena Kendall.  Tommy Mancini MD

## 2022-12-22 NOTE — PROGRESS NOTES
[facility]   Resident Progress Note    S:  Hospital day: 1   Brief Synopsis: Dafne Romero is a 69 y.o. female with a PMH of spinal stenosis on chronic opioid medication, CAD and NSTEMI status post stent 7/22, history of breast cancer, hypertension, hyperlipidemia who presented to ED from PCP clinic after an inability to obtain an in office pulse ox with decreased mentation. In the ED she was hypoxic in the 60's and placed on BiPAP. Narcan drip started, but did not help much. Imaging revealed a calcified thrombus and hazy left mid lung airspace. No pneumonia. Patient admitted for acute hypercapnic and hypoxic respiratory failure secondary to pneumonia versus drug overdose. Transferred to the ICU for further care. Overnight/interim:   In the ICU she is laying down supine with BiPAP on. Patient is alert and able to converse. Denies any concerns or pains right now. Critical care team managing. ECHO and various labs pending.      Cont meds:    sodium chloride      dextrose       Scheduled meds:    sodium chloride flush  5-40 mL IntraVENous 2 times per day    enoxaparin  40 mg SubCUTAneous Daily    aspirin  81 mg Oral Daily    atorvastatin  40 mg Oral Daily    clopidogrel  75 mg Oral Daily    famotidine  20 mg Oral Daily    [Held by provider] losartan  100 mg Oral Daily    [Held by provider] metoprolol succinate  25 mg Oral Daily    ipratropium-albuterol  1 ampule Inhalation Q4H WA    [Held by provider] DULoxetine  60 mg Oral Daily    [Held by provider] amLODIPine  5 mg Oral Daily    sodium chloride flush  5-40 mL IntraVENous 2 times per day    Arformoterol Tartrate  15 mcg Nebulization BID    budesonide  0.5 mg Nebulization BID    lidocaine  1 patch TransDERmal Daily     PRN meds: sodium chloride flush, [DISCONTINUED] ondansetron **OR** ondansetron, sodium chloride flush, sodium chloride, ondansetron, polyethylene glycol, acetaminophen **OR** acetaminophen, glucose, dextrose bolus **OR** dextrose bolus, glucagon (rDNA), dextrose, buprenorphine, hydrOXYzine pamoate, diphenhydramine     I reviewed the patient's past medical and surgical history, Medications and Allergies. O:  /79   Pulse 93   Temp 97.5 °F (36.4 °C) (Temporal)   Resp 18   Ht 5' (1.524 m)   Wt 153 lb (69.4 kg)   SpO2 100%   BMI 29.88 kg/m²   24 hour I&O: No intake/output data recorded. No intake/output data recorded. Physical Exam  HENT:      Head: Normocephalic. Eyes:      Conjunctiva/sclera: Conjunctivae normal.   Cardiovascular:      Rate and Rhythm: Normal rate and regular rhythm. Pulses: Normal pulses. Heart sounds: Normal heart sounds. Pulmonary:      Effort: Pulmonary effort is normal. No respiratory distress. Breath sounds: Normal breath sounds. No stridor. No wheezing, rhonchi or rales. Comments: BiPAP  Chest:      Chest wall: No tenderness. Skin:     General: Skin is warm. Neurological:      Mental Status: She is alert and oriented to person, place, and time. Cranial Nerves: No cranial nerve deficit. Sensory: No sensory deficit. Psychiatric:         Mood and Affect: Mood normal.         Behavior: Behavior normal.         Thought Content: Thought content normal.         Judgment: Judgment normal.     Labs:  Na/K/Cl/CO2:  137/3.7/94/36 (12/22 0405)  BUN/Cr/glu/ALT/AST/amyl/lip:  12/0.6/--/15/29/--/-- (12/22 0405)  WBC/Hgb/Hct/Plts:  2.5/13.4/44.7/158 (12/22 0405)  estimated creatinine clearance is 69 mL/min (based on SCr of 0.6 mg/dL). Other pertinent labs as noted below    Radiology:  XR CHEST PORTABLE   Final Result   Improved aeration of the left lung compared to prior study         CTA PULMONARY W CONTRAST   Final Result   1. No evidence of an acute pulmonary embolism. 2. Calcified chronic thrombus in the right upper lobe pulmonary artery. 3. Evidence of pulmonary arterial hypertension. 4. Prominent cardiomegaly. No evidence of CHF exacerbation.    5. Right lower lobe atelectasis overlying elevated hemidiaphragm. No   evidence of pneumonia. 0      RECOMMENDATIONS:   Unavailable         CT HEAD WO CONTRAST   Final Result   No acute intracranial abnormality. CT CERVICAL SPINE WO CONTRAST   Final Result   No acute abnormality of the cervical spine. XR PELVIS (1-2 VIEWS)   Final Result   No acute bony abnormalities or prosthetic complications. XR CHEST PORTABLE   Final Result   Hazy left mid lung airspace opacities, which could suggest pneumonia.          XR CHEST PORTABLE    (Results Pending)   NM LUNG VENT/PERFUSION (VQ)    (Results Pending)     Resident Assessment and Plan     Acute hypoxic and hypercapnic respiratory failure  AMS  Likely secondary to pneumonia versus opiate overdose versus sepsis  Initial ABG showing respiratory acidosis with CO2 in the 70s slowly improving  Chest x-ray showing left midlung opacity, CTA negative for pneumonia  No leukocytosis and lactic acid within normal limits  Pending blood cultures and urine cultures  UA showing possible UTI, pending culture  Admit to ICU  Check urine strep and Legionella  Would get sputum culture if able  Patient started on Rocephin and Doxy for CAP coverage  Check Pro-Srinivasan  Patient is full code, continue BiPAP for now as tolerated  Repeat ABG  Patient given 2 doses of Narcan with improvement in mentation in ER  Continue Narcan drip  Avoid all central depressant medications  Will need bedside swallow versus speech eval when mentation improves to assess ability to swallow     CAD status post HILDA x2 7/22 at [facility]  Echo in 6/21 showing moderate to severe MR with EF of 73%  Troponin 46 on admission, pending repeat, had been 100 and September  EKG in ED: NSR   Low threshold for cardiology consult  Continue aspirin and Plavix  Continue atorvastatin 40  Resume metoprolol when blood pressure tolerates  BNP: 791   Strict I's and O's  Repeat EKG: NSR and Left atrial enlargement   ECHO: pending      HTN  Hold meds for now due to hypotension (losartan 100 mg daily, amlodipine 5 mg, metoprolol and Lasix)  Status post 1 L bolus in ED     Spinal stenosis  Home medications include as needed Percocet and oxycodone ER, xtampza     UTI  UA: +LE and Nitrates.  Minimal WBC  Rocephin and Doxy in ER for pneumonia coverage  Pending UC    Electronically signed by Abiola Mohamud MD on 12/22/2022 at 11:41 AM  Attending physician: Dr. Martha Pettit

## 2022-12-23 LAB
ALBUMIN SERPL-MCNC: 4 G/DL (ref 3.5–5.2)
ALP BLD-CCNC: 57 U/L (ref 35–104)
ALT SERPL-CCNC: 13 U/L (ref 0–32)
ANION GAP SERPL CALCULATED.3IONS-SCNC: 8 MMOL/L (ref 7–16)
ANISOCYTOSIS: ABNORMAL
AST SERPL-CCNC: 20 U/L (ref 0–31)
ATYPICAL LYMPHOCYTE RELATIVE PERCENT: 5.2 % (ref 0–4)
BASOPHILS ABSOLUTE: 0 E9/L (ref 0–0.2)
BASOPHILS RELATIVE PERCENT: 0 % (ref 0–2)
BILIRUB SERPL-MCNC: 0.8 MG/DL (ref 0–1.2)
BUN BLDV-MCNC: 9 MG/DL (ref 6–23)
CALCIUM SERPL-MCNC: 8.9 MG/DL (ref 8.6–10.2)
CHLORIDE BLD-SCNC: 93 MMOL/L (ref 98–107)
CO2: 38 MMOL/L (ref 22–29)
CREAT SERPL-MCNC: 0.4 MG/DL (ref 0.5–1)
EOSINOPHILS ABSOLUTE: 0 E9/L (ref 0.05–0.5)
EOSINOPHILS RELATIVE PERCENT: 0 % (ref 0–6)
GFR SERPL CREATININE-BSD FRML MDRD: >60 ML/MIN/1.73
GLUCOSE BLD-MCNC: 102 MG/DL (ref 74–99)
HCT VFR BLD CALC: 42.4 % (ref 34–48)
HEMOGLOBIN: 12.8 G/DL (ref 11.5–15.5)
HYPOCHROMIA: ABNORMAL
LYMPHOCYTES ABSOLUTE: 0.55 E9/L (ref 1.5–4)
LYMPHOCYTES RELATIVE PERCENT: 4.3 % (ref 20–42)
MAGNESIUM: 1.9 MG/DL (ref 1.6–2.6)
MCH RBC QN AUTO: 27.1 PG (ref 26–35)
MCHC RBC AUTO-ENTMCNC: 30.2 % (ref 32–34.5)
MCV RBC AUTO: 89.8 FL (ref 80–99.9)
METAMYELOCYTES RELATIVE PERCENT: 0.9 % (ref 0–1)
MONOCYTES ABSOLUTE: 0.28 E9/L (ref 0.1–0.95)
MONOCYTES RELATIVE PERCENT: 5.2 % (ref 2–12)
NEUTROPHILS ABSOLUTE: 4.68 E9/L (ref 1.8–7.3)
NEUTROPHILS RELATIVE PERCENT: 84.4 % (ref 43–80)
PDW BLD-RTO: 15.4 FL (ref 11.5–15)
PHOSPHORUS: 2.6 MG/DL (ref 2.5–4.5)
PLATELET # BLD: 176 E9/L (ref 130–450)
PMV BLD AUTO: 9.3 FL (ref 7–12)
POLYCHROMASIA: ABNORMAL
POTASSIUM REFLEX MAGNESIUM: 3.2 MMOL/L (ref 3.5–5)
RBC # BLD: 4.72 E12/L (ref 3.5–5.5)
SODIUM BLD-SCNC: 139 MMOL/L (ref 132–146)
TOTAL PROTEIN: 6.3 G/DL (ref 6.4–8.3)
WBC # BLD: 5.5 E9/L (ref 4.5–11.5)

## 2022-12-23 PROCEDURE — 6370000000 HC RX 637 (ALT 250 FOR IP): Performed by: INTERNAL MEDICINE

## 2022-12-23 PROCEDURE — 2580000003 HC RX 258: Performed by: FAMILY MEDICINE

## 2022-12-23 PROCEDURE — 94660 CPAP INITIATION&MGMT: CPT

## 2022-12-23 PROCEDURE — 2580000003 HC RX 258: Performed by: INTERNAL MEDICINE

## 2022-12-23 PROCEDURE — 85025 COMPLETE CBC W/AUTO DIFF WBC: CPT

## 2022-12-23 PROCEDURE — 83735 ASSAY OF MAGNESIUM: CPT

## 2022-12-23 PROCEDURE — 6370000000 HC RX 637 (ALT 250 FOR IP): Performed by: FAMILY MEDICINE

## 2022-12-23 PROCEDURE — 80053 COMPREHEN METABOLIC PANEL: CPT

## 2022-12-23 PROCEDURE — 84100 ASSAY OF PHOSPHORUS: CPT

## 2022-12-23 PROCEDURE — 2060000000 HC ICU INTERMEDIATE R&B

## 2022-12-23 PROCEDURE — 99291 CRITICAL CARE FIRST HOUR: CPT | Performed by: INTERNAL MEDICINE

## 2022-12-23 PROCEDURE — 6360000002 HC RX W HCPCS: Performed by: INTERNAL MEDICINE

## 2022-12-23 PROCEDURE — 6360000002 HC RX W HCPCS: Performed by: FAMILY MEDICINE

## 2022-12-23 PROCEDURE — 94640 AIRWAY INHALATION TREATMENT: CPT

## 2022-12-23 PROCEDURE — 6360000002 HC RX W HCPCS

## 2022-12-23 PROCEDURE — 99232 SBSQ HOSP IP/OBS MODERATE 35: CPT | Performed by: FAMILY MEDICINE

## 2022-12-23 PROCEDURE — 2580000003 HC RX 258

## 2022-12-23 PROCEDURE — 2700000000 HC OXYGEN THERAPY PER DAY

## 2022-12-23 PROCEDURE — 2500000003 HC RX 250 WO HCPCS: Performed by: INTERNAL MEDICINE

## 2022-12-23 RX ORDER — ROPINIROLE 1 MG/1
1 TABLET, FILM COATED ORAL DAILY
Status: DISCONTINUED | OUTPATIENT
Start: 2022-12-24 | End: 2022-12-26 | Stop reason: HOSPADM

## 2022-12-23 RX ORDER — POTASSIUM CHLORIDE 7.45 MG/ML
10 INJECTION INTRAVENOUS
Status: COMPLETED | OUTPATIENT
Start: 2022-12-23 | End: 2022-12-23

## 2022-12-23 RX ORDER — SENNA PLUS 8.6 MG/1
1 TABLET ORAL NIGHTLY
Status: DISCONTINUED | OUTPATIENT
Start: 2022-12-23 | End: 2022-12-26 | Stop reason: HOSPADM

## 2022-12-23 RX ORDER — MAGNESIUM SULFATE 1 G/100ML
1000 INJECTION INTRAVENOUS ONCE
Status: COMPLETED | OUTPATIENT
Start: 2022-12-23 | End: 2022-12-23

## 2022-12-23 RX ADMIN — SODIUM CHLORIDE, PRESERVATIVE FREE 10 ML: 5 INJECTION INTRAVENOUS at 07:57

## 2022-12-23 RX ADMIN — BUDESONIDE 500 MCG: 0.5 SUSPENSION RESPIRATORY (INHALATION) at 09:05

## 2022-12-23 RX ADMIN — ARFORMOTEROL TARTRATE 15 MCG: 15 SOLUTION RESPIRATORY (INHALATION) at 20:53

## 2022-12-23 RX ADMIN — POTASSIUM CHLORIDE 10 MEQ: 7.46 INJECTION, SOLUTION INTRAVENOUS at 08:41

## 2022-12-23 RX ADMIN — ASPIRIN 81 MG CHEWABLE TABLET 81 MG: 81 TABLET CHEWABLE at 12:33

## 2022-12-23 RX ADMIN — SODIUM CHLORIDE, PRESERVATIVE FREE 10 ML: 5 INJECTION INTRAVENOUS at 20:38

## 2022-12-23 RX ADMIN — IPRATROPIUM BROMIDE AND ALBUTEROL SULFATE 1 AMPULE: .5; 2.5 SOLUTION RESPIRATORY (INHALATION) at 16:46

## 2022-12-23 RX ADMIN — ENOXAPARIN SODIUM 40 MG: 100 INJECTION SUBCUTANEOUS at 07:58

## 2022-12-23 RX ADMIN — Medication 10 ML: at 09:30

## 2022-12-23 RX ADMIN — FAMOTIDINE 20 MG: 20 TABLET, FILM COATED ORAL at 12:33

## 2022-12-23 RX ADMIN — MAGNESIUM SULFATE IN DEXTROSE 1000 MG: 10 INJECTION, SOLUTION INTRAVENOUS at 07:49

## 2022-12-23 RX ADMIN — CLOPIDOGREL BISULFATE 75 MG: 75 TABLET ORAL at 12:34

## 2022-12-23 RX ADMIN — IPRATROPIUM BROMIDE AND ALBUTEROL SULFATE 1 AMPULE: .5; 2.5 SOLUTION RESPIRATORY (INHALATION) at 09:05

## 2022-12-23 RX ADMIN — DULOXETINE HYDROCHLORIDE 60 MG: 30 CAPSULE, DELAYED RELEASE ORAL at 12:32

## 2022-12-23 RX ADMIN — IPRATROPIUM BROMIDE AND ALBUTEROL SULFATE 1 AMPULE: .5; 2.5 SOLUTION RESPIRATORY (INHALATION) at 12:08

## 2022-12-23 RX ADMIN — BUDESONIDE 500 MCG: 0.5 SUSPENSION RESPIRATORY (INHALATION) at 20:53

## 2022-12-23 RX ADMIN — POTASSIUM CHLORIDE 10 MEQ: 7.46 INJECTION, SOLUTION INTRAVENOUS at 09:54

## 2022-12-23 RX ADMIN — CEFTRIAXONE 1000 MG: 1 INJECTION, POWDER, FOR SOLUTION INTRAMUSCULAR; INTRAVENOUS at 12:00

## 2022-12-23 RX ADMIN — IPRATROPIUM BROMIDE AND ALBUTEROL SULFATE 1 AMPULE: .5; 2.5 SOLUTION RESPIRATORY (INHALATION) at 20:53

## 2022-12-23 RX ADMIN — Medication 10 ML: at 20:39

## 2022-12-23 RX ADMIN — ATORVASTATIN CALCIUM 40 MG: 40 TABLET, FILM COATED ORAL at 12:33

## 2022-12-23 RX ADMIN — SENNOSIDES 8.6 MG: 8.6 TABLET, FILM COATED ORAL at 20:38

## 2022-12-23 RX ADMIN — POTASSIUM PHOSPHATE, MONOBASIC AND POTASSIUM PHOSPHATE, DIBASIC 10 MMOL: 224; 236 INJECTION, SOLUTION, CONCENTRATE INTRAVENOUS at 09:57

## 2022-12-23 RX ADMIN — POTASSIUM CHLORIDE 10 MEQ: 7.46 INJECTION, SOLUTION INTRAVENOUS at 07:47

## 2022-12-23 RX ADMIN — OXYCODONE AND ACETAMINOPHEN 1 TABLET: 5; 325 TABLET ORAL at 20:38

## 2022-12-23 RX ADMIN — ARFORMOTEROL TARTRATE 15 MCG: 15 SOLUTION RESPIRATORY (INHALATION) at 09:05

## 2022-12-23 ASSESSMENT — PAIN SCALES - GENERAL
PAINLEVEL_OUTOF10: 0
PAINLEVEL_OUTOF10: 0
PAINLEVEL_OUTOF10: 1
PAINLEVEL_OUTOF10: 0

## 2022-12-23 NOTE — PROGRESS NOTES
[facility]patient   Resident Progress Note    S:  Hospital day: 2   Brief Synopsis: Matty Hunter is a 69 y.o. female with a PMH of spinal stenosis on chronic opioid medication, CAD and NSTEMI status post stent 7/22, history of breast cancer, hypertension, hyperlipidemia who presented to ED from PCP clinic after an inability to obtain an in office pulse ox with decreased mentation. In the ED she was hypoxic in the 60's and placed on BiPAP. Narcan drip started, but did not help much. Imaging revealed a calcified thrombus and hazy left mid lung airspace. No pneumonia. Patient admitted for acute hypercapnic and hypoxic respiratory failure secondary to pneumonia versus drug overdose. Transferred to the ICU for further care. Overnight/interim:   In the ICU she is laying down supine with BiPAP on. Narco discontinued. Patient is alert and able to converse; however, continues to go to sleep intermittently. May likely be due to a UTI. UC pending. Antibiotics (ceftriaxone) to start. Denies any concerns or pains right now. Low potassium. Being Repleted this AM.   Scheduled for a Right heart cath this AM. NPO since midnight. A swallow study to be done to give oral meds.      Cont meds:    sodium chloride      dextrose       Scheduled meds:    senna  1 tablet Oral Nightly    potassium phosphate IVPB  10 mmol IntraVENous Once    cefTRIAXone (ROCEPHIN) IV  1,000 mg IntraVENous Q24H    sodium chloride flush  5-40 mL IntraVENous 2 times per day    enoxaparin  40 mg SubCUTAneous Daily    aspirin  81 mg Oral Daily    atorvastatin  40 mg Oral Daily    clopidogrel  75 mg Oral Daily    famotidine  20 mg Oral Daily    [Held by provider] losartan  100 mg Oral Daily    [Held by provider] metoprolol succinate  25 mg Oral Daily    ipratropium-albuterol  1 ampule Inhalation Q4H WA    DULoxetine  60 mg Oral Daily    [Held by provider] amLODIPine  5 mg Oral Daily    sodium chloride flush  5-40 mL IntraVENous 2 times per day    Arformoterol Tartrate  15 mcg Nebulization BID    budesonide  0.5 mg Nebulization BID    lidocaine  1 patch TransDERmal Daily     PRN meds: sodium chloride flush, [DISCONTINUED] ondansetron **OR** ondansetron, sodium chloride flush, sodium chloride, ondansetron, polyethylene glycol, acetaminophen **OR** acetaminophen, glucose, dextrose bolus **OR** dextrose bolus, glucagon (rDNA), dextrose, oxyCODONE-acetaminophen     I reviewed the patient's past medical and surgical history, Medications and Allergies. O:  /71   Pulse 94   Temp 98.3 °F (36.8 °C) (Axillary)   Resp 15   Ht 5' (1.524 m)   Wt 153 lb (69.4 kg)   SpO2 98%   BMI 29.88 kg/m²   24 hour I&O: I/O last 3 completed shifts:  In: -   Out: 800 [Urine:800]  I/O this shift:  In: 100 [I.V.:100]  Out: -      Physical Exam  HENT:      Head: Normocephalic. Eyes:      Conjunctiva/sclera: Conjunctivae normal.   Cardiovascular:      Rate and Rhythm: Normal rate and regular rhythm. Pulses: Normal pulses. Heart sounds: Normal heart sounds. Pulmonary:      Effort: Pulmonary effort is normal. No respiratory distress. Breath sounds: Normal breath sounds. No stridor. No wheezing, rhonchi or rales. Comments: BiPAP  Chest:      Chest wall: No tenderness. Skin:     General: Skin is warm. Neurological:      Mental Status: She is alert and oriented to person, place, and time. Cranial Nerves: No cranial nerve deficit. Sensory: No sensory deficit. Psychiatric:         Mood and Affect: Mood normal.         Behavior: Behavior normal.         Thought Content: Thought content normal.         Judgment: Judgment normal.     Labs:  Na/K/Cl/CO2:  139/3.2/93/38 (12/23 0408)  BUN/Cr/glu/ALT/AST/amyl/lip:  9/0.4/--/13/20/--/-- (12/23 0408)  WBC/Hgb/Hct/Plts:  5.5/12.8/42.4/176 (12/23 0408)  estimated creatinine clearance is 104 mL/min (A) (based on SCr of 0.4 mg/dL (L)).   Other pertinent labs as noted below    Radiology:  [facility] PERFUSION ONLY   Final Result   Low Probability for Pulmonary Embolus. XR CHEST PORTABLE   Final Result   Improved aeration of the left lung compared to prior study         CTA PULMONARY W CONTRAST   Final Result   1. No evidence of an acute pulmonary embolism. 2. Calcified chronic thrombus in the right upper lobe pulmonary artery. 3. Evidence of pulmonary arterial hypertension. 4. Prominent cardiomegaly. No evidence of CHF exacerbation. 5. Right lower lobe atelectasis overlying elevated hemidiaphragm. No   evidence of pneumonia. 0      RECOMMENDATIONS:   Unavailable         CT HEAD WO CONTRAST   Final Result   No acute intracranial abnormality. CT CERVICAL SPINE WO CONTRAST   Final Result   No acute abnormality of the cervical spine. XR PELVIS (1-2 VIEWS)   Final Result   No acute bony abnormalities or prosthetic complications. XR CHEST PORTABLE   Final Result   Hazy left mid lung airspace opacities, which could suggest pneumonia. Resident Assessment and Plan     Acute hypoxic and hypercapnic respiratory failure  Secondary pneumonia or opiate overdose  Initial ABG: respiratory acidosis with CO2 in the 70s  ED Chest x-ray: left midlung opacity, CTA negative for pneumonia  UA showing possible UTI, pending culture. Pending BC.   Admit to ICU  Check urine strep and Legionella  Would get sputum culture if able  Check Pro-Srinivasan  Patient is full code, continue BiPAP for now as tolerated  Narcan drip discontinued   Avoid all central depressant medications  Transfer out ICU today      CAD status post HILDA x2 7/22 at [facility] in 6/21 showing moderate to severe MR with EF of 73%  Troponin 46 on admission, pending repeat, had been 100 and September  EKG in ED: NSR   Low threshold for cardiology consult  Continue aspirin and Plavix  Continue atorvastatin 40  Resume metoprolol when blood pressure tolerates  BNP: 791   Strict I's and O's  Repeat EKG: NSR and Left atrial enlargement   ECHO: EF 65/70 %. Severe mitral annular calcification. V/Q Scan: No pulm embolisms   RHC scheduled this AM      HTN  Hold meds for now due to hypotension (losartan 100 mg daily, amlodipine 5 mg, metoprolol and Lasix)  Status post 1 L bolus in ED     Spinal stenosis  Home medications include as needed Percocet and oxycodone ER, xtampza     UTI  UA: +LE and Nitrates. Minimal WBC  Rocephin and Doxy in ER for pneumonia coverage  Pending UC  Starting rocephin.      Electronically signed by Emeka Basurto MD on 12/23/2022 at 10:51 AM  Attending physician: Dr. Eri Peters

## 2022-12-23 NOTE — PROGRESS NOTES
Date: 12/22/2022    Time: 11:42 PM    Patient Placed On BIPAP/CPAP/ Non-Invasive Ventilation? Remains on      If no must comment. Facial area red/color change? Yes           If YES are Blister/Lesion present? No   If yes must notify nursing staff  BIPAP/CPAP skin barrier?   Yes    Skin barrier type:mepilexlite       Comments:        Kenan Gallegos RCP

## 2022-12-23 NOTE — PROGRESS NOTES
Called cath lab to inform patient is currently off of Bipap and on 4L nasal canula. They will inform Dr. Yo Speaks.

## 2022-12-23 NOTE — PROGRESS NOTES
[address] [facility]   Department of Internal Medicine   Internal Medicine Residency  MICU Progress Note    Patient:  Jareth James 69 y.o. female   MRN: [id number]       Date of Service: 2022    Allergy: Demerol [meperidine hcl], Meperidine, and Nabumetone    Subjective     Patient was seen and examined this morning at bedside in no acute distress. AAO x3, answers questions and follows commands. Overnight, no acute event. ABG showed pH 7.359, pCO2 70.3, pO2 92, bicarb 38.7. Lung perfusion scan showed low probability for PE. Echo showed EF 65-70%, indeterminate diastolic function, mildly dilated RV with normal function, RVSP 34 mmHg, mild basal septal hypertrophy, LA severely dilated, severe mitral annular calcification. Blood culture 24 hours no growth. K 3.2, Mg 1.9, Phos 2.6, replaced. Objective     TEMPERATURE:  Current - Temp: 97.8 °F (36.6 °C); Max - Temp  Av.5 °F (36.4 °C)  Min: 97.3 °F (36.3 °C)  Max: 97.8 °F (36.6 °C)  RESPIRATIONS RANGE: Resp  Av.7  Min: 14  Max: 33  PULSE RANGE: Pulse  Av.5  Min: 88  Max: 107  BLOOD PRESSURE RANGE:  Systolic (75GIH), LDV:438 , Min:93 , DVM:635   ; Diastolic (23WPA), GJL:80, Min:58, Max:99    PULSE OXIMETRY RANGE: SpO2  Av.9 %  Min: 82 %  Max: 100 %    I & O - 24hr:    Intake/Output Summary (Last 24 hours) at 2022 7256  Last data filed at 2022 0630  Gross per 24 hour   Intake --   Output 0 ml   Net 0 ml     No intake/output data recorded. No intake/output data recorded. Weight change:     Physical Exam:   General Appearance:    Alert, cooperative, no acute distress. HEENT:    NC/AT, mucous membranes are moist   Neck:   Supple, no jugular venous distention. Resp:     CTAB, No wheezes, No rhonchi, no use of accessory muscles   Heart:    RRR, S1 and S2 normal, no murmur, rub or gallop.     Abdomen:     Soft, non-tender, non-distended with normal bowel sounds   Extremities:   Atraumatic, no cyanosis or edema   Pulses:  Radial and pedal pulses are intact bilaterally   Neurologic:   AAOx3, No focal motor deficit       Medications     Continuous Infusions:   sodium chloride      dextrose       Scheduled Meds:   potassium chloride  10 mEq IntraVENous Q1H    sodium chloride flush  5-40 mL IntraVENous 2 times per day    enoxaparin  40 mg SubCUTAneous Daily    aspirin  81 mg Oral Daily    atorvastatin  40 mg Oral Daily    clopidogrel  75 mg Oral Daily    famotidine  20 mg Oral Daily    [Held by provider] losartan  100 mg Oral Daily    [Held by provider] metoprolol succinate  25 mg Oral Daily    ipratropium-albuterol  1 ampule Inhalation Q4H WA    DULoxetine  60 mg Oral Daily    [Held by provider] amLODIPine  5 mg Oral Daily    sodium chloride flush  5-40 mL IntraVENous 2 times per day    Arformoterol Tartrate  15 mcg Nebulization BID    budesonide  0.5 mg Nebulization BID    lidocaine  1 patch TransDERmal Daily     PRN Meds: sodium chloride flush, [DISCONTINUED] ondansetron **OR** ondansetron, sodium chloride flush, sodium chloride, ondansetron, polyethylene glycol, acetaminophen **OR** acetaminophen, glucose, dextrose bolus **OR** dextrose bolus, glucagon (rDNA), dextrose, oxyCODONE-acetaminophen  Nutrition:   Diet NPO    Labs and Imaging Studies     CBC:   Recent Labs     12/21/22  1515 12/22/22  0405 12/23/22  0408   WBC 6.3 2.5* 5.5   HGB 12.9 13.4 12.8   HCT 42.3 44.7 42.4   MCV 89.4 90.7 89.8    158 176       BMP:    Recent Labs     12/21/22  1515 12/21/22  1556 12/22/22  0405 12/23/22  0408     --  137 139   K 3.5  --  3.7 3.2*   CL 91*  --  94* 93*   CO2 35*  --  36* 38*   BUN 20  --  12 9   CREATININE 1.1*  --  0.6 0.4*   GLUCOSE 129* 128 128* 102*       LIVER PROFILE:   Recent Labs     12/21/22  1515 12/22/22  0405 12/23/22  0408   AST 32* 29 20   ALT 17 15 13   BILITOT 0.8 0.7 0.8   ALKPHOS 63 60 57       PT/INR:   No results for input(s): PROTIME, INR in the last 72 hours.     APTT:   No results for input(s): APTT in the last 72 hours. Fasting Lipid Panel:    Lab Results   Component Value Date/Time    CHOL 175 05/11/2020 09:53 AM    TRIG 105 05/11/2020 09:53 AM    HDL 42 05/11/2020 09:53 AM       Cardiac Enzymes:    Lab Results   Component Value Date    CKTOTAL 520 (H) 10/28/2019    TROPONINI 0.03 08/06/2020       Notable Cultures:      Blood cultures   Blood Culture, Routine   Date Value Ref Range Status   12/21/2022 24 Hours no growth  Preliminary     Respiratory cultures No results found for: RESPCULTURE No results found for: LABGRAM  Urine   Urine Culture, Routine   Date Value Ref Range Status   09/01/2021   Final    <10,000 CFU/mL  Gram negative rods  Mixed gram positive organisms       Legionella No results found for: LABLEGI  C Diff PCR No results found for: CDIFPCR  Wound culture/abscess: No results for input(s): WNDABS in the last 72 hours. Tip culture:No results for input(s): CXCATHTIP in the last 72 hours. Oxygen:     Vent Information  Ventilator ID: [id number]  Additional Respiratory Assessments  Heart Rate: (!) 102  Resp: 17  SpO2: 100 %            Imaging Studies:  NM LUNG SCAN PERFUSION ONLY   Final Result   Low Probability for Pulmonary Embolus. XR CHEST PORTABLE   Final Result   Improved aeration of the left lung compared to prior study         CTA PULMONARY W CONTRAST   Final Result   1. No evidence of an acute pulmonary embolism. 2. Calcified chronic thrombus in the right upper lobe pulmonary artery. 3. Evidence of pulmonary arterial hypertension. 4. Prominent cardiomegaly. No evidence of CHF exacerbation. 5. Right lower lobe atelectasis overlying elevated hemidiaphragm. No   evidence of pneumonia. 0      RECOMMENDATIONS:   Unavailable         CT HEAD WO CONTRAST   Final Result   No acute intracranial abnormality. CT CERVICAL SPINE WO CONTRAST   Final Result   No acute abnormality of the cervical spine.          XR PELVIS (1-2 VIEWS)   Final Result   No acute bony abnormalities or prosthetic complications. XR CHEST PORTABLE   Final Result   Hazy left mid lung airspace opacities, which could suggest pneumonia. Resident's Assessment and Plan   69 y.o. female with PMHx of CAD s/p DESx2, HTN,HLD,COPD, chronic low back pain (spinal stenosis) on oxycodone presented from PCP with CC of confusion, falls, gen weakness. She was hypoxic in 60's and was placed on narcan drip. She was on Abx (ceftriaxone and doxy) for concerns of PNA. Assessment:  Acute encephalopathy likely 2/2 opioid overdose vs acute hypercapnic hypoxic respiratory failure   Acute hypercapnic hypoxic respiratory failure likely 2/2 opioid overdose, requiring NIPPV and narcan drip   Pulmonary HTN, aneurysmal dilation of R and L main pulmonary artery with evidence of tortuosity, questionable calcified chronic thrombus at the level of RUL PA. RSVP 34 mmHg on echo this admission  RICHARDSON likely prerenal, baseline 0.7, 1.1 on admission, resolved  Asymptomatic bacteruria vs UTI  Hx of COPD  Hx of AYESHA on CPAP  Hx of NSTEMI s/p PCI with stent to mid-circumflex artery on 7/16  Hx of HFpEF with EF 65-70% this admission, LA severely dilated  MR on echo (12/2022) with moderate MR, severe mitral annular calcification  Hx of HTN  Hx of spinal stenosis s/p lumbar spinal fusion  Hx of OA s/p b/l hip and knee replacement  Hx of breast cancer s/p left lumpectomy  Hx of restless leg syndrome, on ropinirole 1 mg daily    Plan:  Plan for right heart cath today  Monitor mentation and respiratory status. Off narcan drip   Continue to wean O2  Monitor opioid withdrawal  Continue breathing treatment with brovana, pulmicort and duoneb  Percocet Q6h PRN  Follow blood, urine and respiratory culture.  Continue to monitor off antibiotics  Continue to hold BP meds (amlodipine, losartan and toprol XL)  Continue home meds (ASA, plavix, lipitor, cymbalta)      Last BM charted:  Antibiotics: no  Cultures: blood culture 24 hours no growth  Lines: peripheral  Intubated: no  Diet: currently NPO for right heart cath. Will resume diet after procedure.          # Peptic ulcer prophylaxis: pepcid  # DVT Prophylaxis: lovenox   # Disposition: Cont current care    Bere Maldonado MD, PGY-3    Attending Physician: Dr. Zach Barrera

## 2022-12-23 NOTE — PROGRESS NOTES
[facility]  Family Medicine Attending    S: 69 y.o. female with history of CAD with recent NSTEMI s/p stent placement July 2022, COPD, history of breast cancer, htn, chronic back pain on opiates from pain management presented to PCP office due to daughter's concern for recurrent falls and increasing confusion over the past week. Had a fall on Friday and was on the floor until Saturday and fell on day of admission. Patient came into office and was drowsy but alert and reported that she had taken a pain pill prior to coming in. She was feeling weak and was brought in by neighbor in a wheelchair. She denied shortness of breath or chest pain. Pulse ox was not able to obtained in office. Concern for opiate overdose was sent to ED and was found to have hypercapnic hypoxic respiratory failure to the 60s and required narcan drip. Patient was admitted to the ICU. This morning she is drowsy but awakens to voice. She is oriented x 4. She denies any chest pain or shortness of breath. Reports she did not sleep well last night 2/2 restless leg syndrome. O: VS- Blood pressure 115/71, pulse 94, temperature 98.3 °F (36.8 °C), temperature source Axillary, resp. rate 15, height 5' (1.524 m), weight 153 lb (69.4 kg), SpO2 98 %, not currently breastfeeding. Exam is as noted by resident with the following changes, additions or corrections:  Gen lying in bed, on BiPap  Cardio - RRR, no murmur   Lungs - CTAB, no wheeze   Ext- no peripheral edema     Impressions:   Principal Problem:    Acute respiratory failure (HCC)  Active Problems:    Urinary tract infection without hematuria    Opiate overdose ([address]  Resolved Problems:    * No resolved hospital problems. *      Plan:   patient with hypercapnic hypoxic respiratory failure 2/2 accidental opiate overdose. Narcan drip discontinued. BiPap prn   ICU management appreciated. Plan for right heart cath today for pulmonary htn.    VQ scan obtained showed low probability for pulmonary embolism. CTA showed Calcified chronic thrombus in the right upper lobe pulmonary artery. Abx discontinued as no evidence of infectious process in lungs. Holding bp meds for hypotension. Replete electrolytes   Concern for UTI given urinalysis showing +nitrites . add rocephin. Urine culture pending. Attending Physician Statement  I have reviewed the chart, including any radiology or labs, and seen the patient with the resident(s). I personally reviewed and performed key elements of the history and exam.  I agree with the assessment, plan and orders as documented by the resident. Please refer to the resident note for additional information. Cornel Mckinnon.  Yohana Carr MD

## 2022-12-23 NOTE — PROGRESS NOTES
Physician Progress Note      PATIENT:               Lito Mcgregor  CSN #:                  [id number]  :                       1945  ADMIT DATE:       2022 2:59 PM  [address] DATE:  RESPONDING  PROVIDER #:        yKung Abbasi          QUERY TEXT:    Dear Provider,    Pt presented with AMS . Pt noted to have acute hypoxic and hypercapnic   respiratory failure. If possible, please document in the progress notes and   discharge summary if you are evaluating and / or treating any of the   following: The medical record reflects the following:  Risk Factors: Acute hypoxic and hypercapnic respiratory failure, possible UTI   and/or PNA, opiate overdose  Clinical Indicators: AMS-Acute hypoxic and hypercapnic respiratory   failure-Likely secondary to pneumonia versus opiate  overdose versus sepsis  Initial ABG showing respiratory acidosis with CO2 in the 70s slowly improving  Patient given 2 doses of Narcan with improvement in mentation in ER. Continue   Narcan drip.  Avoid all central depressant medications  Head CT: negative for acute findings  UA: Positive nitrates, Trace leukocyte esterase; Urine culture: pending   Family medicine note: Alert and oriented to person, place, and time  Treatment: IVF, Labs, Imaging,Narcan drip, Avoid all central nervous system   depressant medications, Continuous neurological assessments    Thank you,  Inocencio Chairez RN  Clinical Documentation Improvement  [phone]  Options provided:  -- Metabolic encephalopathy, resolved/resolving  -- Toxic metabolic encephalopathy resolved/resolving  -- Other - I will add my own diagnosis  -- Disagree - Not applicable / Not valid  -- Disagree - Clinically unable to determine / Unknown  -- Refer to Clinical Documentation Reviewer    PROVIDER RESPONSE TEXT:    Opioid overdose that resulted in Acute hypercapnic respiratory insufficiency   secondary to central hypoventilation    Query created by: Kacie Cooper on 2022 1:50 PM      QUERY TEXT:    Dear Provider,    Patient admitted with acute hypoxic and hypercapnic respiratory failure, noted   to have opiate overdose documented. If possible, please document in progress   notes and discharge summary if you are evaluating and/or treating any of the   following: The medical record reflects the following:  Risk Factors: Percocet prn for chronic pain,  Hx: Adverse effect of anesthetic   with difficulty waking up post  op  Clinical Indicators:  H & P:  Patient admitted for acute hypercapnic and   hypoxic respiratory failure secondary  to pneumonia versus drug overdose  12/22 Critical care note: Acute hypercapnic respiratory insufficieny secondary   to central hypoventilation  from Opioid overdose - requiring NIPPV support and Narcan gtt. - Now   discontinued this morning .  - Continue and wean NIPPV as tolerated  Head CT: negative for acute findings  Toxicology/urine: positive for oxycodone  Treatment: Urine toxicology, Narcan drip, Avoid all central depressant   medications    Thank you,  Marky Zelaya RN  Clinical Documentation Improvement  [phone]  Options provided:  -- Accidental overdose of Percocet  -- Adverse effect of Percocet , properly administered  -- Other - I will add my own diagnosis  -- Disagree - Not applicable / Not valid  -- Disagree - Clinically unable to determine / Unknown  -- Refer to Clinical Documentation Reviewer    PROVIDER RESPONSE TEXT:    This patient had an accidental overdose of Percocet.     Query created by: Kong Sandoval on 12/22/2022 2:24 PM      Electronically signed by:  Yohan Hernandez 12/23/2022 7:01 AM

## 2022-12-23 NOTE — PLAN OF CARE
Problem: Discharge Planning  Goal: Discharge to home or other facility with appropriate resources  12/23/2022 0901 by Claire Comer RN  Outcome: Progressing     Problem: Pain  Goal: Verbalizes/displays adequate comfort level or baseline comfort level  12/23/2022 0901 by Claire Comer RN  Outcome: Progressing     Problem: Skin/Tissue Integrity  Goal: Absence of new skin breakdown  Description: 1. Monitor for areas of redness and/or skin breakdown  2. Assess vascular access sites hourly  3. Every 4-6 hours minimum:  Change oxygen saturation probe site  4. Every 4-6 hours:  If on nasal continuous positive airway pressure, respiratory therapy assess nares and determine need for appliance change or resting period. 12/23/2022 0901 by Claire Comer RN  Outcome: Progressing     Problem: ABCDS Injury Assessment  Goal: Absence of physical injury  12/23/2022 0901 by Claire Comer RN  Outcome: Progressing     Problem: Safety - Adult  Goal: Free from fall injury  12/23/2022 0901 by Claire Comer RN  Outcome: Progressing     Problem: Confusion  Goal: Confusion, delirium, dementia, or psychosis is improved or at baseline  Description: INTERVENTIONS:  1. Assess for possible contributors to thought disturbance, including medications, impaired vision or hearing, underlying metabolic abnormalities, dehydration, psychiatric diagnoses, and notify attending LIP  2. [place] high risk fall precautions, as indicated  3. Provide frequent short contacts to provide reality reorientation, refocusing and direction  4. Decrease environmental stimuli, including noise as appropriate  5. Monitor and intervene to maintain adequate nutrition, hydration, elimination, sleep and activity  6. If unable to ensure safety without constant attention obtain sitter and review sitter guidelines with assigned personnel  7.  Initiate Psychosocial CNS and Spiritual Care consult, as indicated  12/23/2022 0901 by Claire Comer RN  Outcome: Progressing

## 2022-12-23 NOTE — PLAN OF CARE
Problem: Discharge Planning  Goal: Discharge to home or other facility with appropriate resources  12/23/2022 1835 by Cris Alejandra RN  Outcome: Progressing     Problem: Pain  Goal: Verbalizes/displays adequate comfort level or baseline comfort level  12/23/2022 1835 by Cris Alejandra RN  Outcome: Progressing     Problem: Skin/Tissue Integrity  Goal: Absence of new skin breakdown  Description: 1. Monitor for areas of redness and/or skin breakdown  2. Assess vascular access sites hourly  3. Every 4-6 hours minimum:  Change oxygen saturation probe site  4. Every 4-6 hours:  If on nasal continuous positive airway pressure, respiratory therapy assess nares and determine need for appliance change or resting period. 12/23/2022 1835 by Cris Alejandra RN  Outcome: Progressing     Problem: ABCDS Injury Assessment  Goal: Absence of physical injury  12/23/2022 1835 by Cris Alejandra RN  Outcome: Progressing     Problem: Safety - Adult  Goal: Free from fall injury  12/23/2022 1835 by Cris Alejandra RN  Outcome: Progressing     Problem: Confusion  Goal: Confusion, delirium, dementia, or psychosis is improved or at baseline  Description: INTERVENTIONS:  1. Assess for possible contributors to thought disturbance, including medications, impaired vision or hearing, underlying metabolic abnormalities, dehydration, psychiatric diagnoses, and notify attending LIP  2. [place] high risk fall precautions, as indicated  3. Provide frequent short contacts to provide reality reorientation, refocusing and direction  4. Decrease environmental stimuli, including noise as appropriate  5. Monitor and intervene to maintain adequate nutrition, hydration, elimination, sleep and activity  6. If unable to ensure safety without constant attention obtain sitter and review sitter guidelines with assigned personnel  7.  Initiate Psychosocial CNS and Spiritual Care consult, as indicated  12/23/2022 1835 by Cris Alejandra RN  Outcome: Progressing

## 2022-12-23 NOTE — PROGRESS NOTES
Date: 12/23/2022    Time: 3:48 AM    Patient Placed On BIPAP/CPAP/ Non-Invasive Ventilation? Remains on    If no must comment. Facial area red/color change? Yes          If YES are Blister/Lesion present?no     If yes must notify nursing staff  BIPAP/CPAP skin barrier?   No    Skin barrier type:mepilexlite       Comments:        Araceli Osorio RCP

## 2022-12-23 NOTE — PLAN OF CARE
Problem: Discharge Planning  Goal: Discharge to home or other facility with appropriate resources  Outcome: Progressing     Problem: Pain  Goal: Verbalizes/displays adequate comfort level or baseline comfort level  Outcome: Progressing  Flowsheets (Taken 12/23/2022 0000)  Verbalizes/displays adequate comfort level or baseline comfort level:   Assess pain using appropriate pain scale   Encourage patient to monitor pain and request assistance     Problem: Skin/Tissue Integrity  Goal: Absence of new skin breakdown  Description: 1. Monitor for areas of redness and/or skin breakdown  2. Assess vascular access sites hourly  3. Every 4-6 hours minimum:  Change oxygen saturation probe site  4. Every 4-6 hours:  If on nasal continuous positive airway pressure, respiratory therapy assess nares and determine need for appliance change or resting period. Outcome: Progressing     Problem: ABCDS Injury Assessment  Goal: Absence of physical injury  Outcome: Progressing     Problem: Safety - Adult  Goal: Free from fall injury  Outcome: Progressing     Problem: Confusion  Goal: Confusion, delirium, dementia, or psychosis is improved or at baseline  Description: INTERVENTIONS:  1. Assess for possible contributors to thought disturbance, including medications, impaired vision or hearing, underlying metabolic abnormalities, dehydration, psychiatric diagnoses, and notify attending LIP  2. [place] high risk fall precautions, as indicated  3. Provide frequent short contacts to provide reality reorientation, refocusing and direction  4. Decrease environmental stimuli, including noise as appropriate  5. Monitor and intervene to maintain adequate nutrition, hydration, elimination, sleep and activity  6. If unable to ensure safety without constant attention obtain sitter and review sitter guidelines with assigned personnel  7.  Initiate Psychosocial CNS and Spiritual Care consult, as indicated  Outcome: Progressing  Flowsheets (Taken 12/22/2022 2000)  Effect of thought disturbance (confusion, delirium, dementia, or psychosis) are managed with adequate functional status:   Assess for contributors to thought disturbance, including medications, impaired vision or hearing, underlying metabolic abnormalities, dehydration, psychiatric diagnoses, notify [place] high risk fall precautions, as indicated   Provide frequent short contacts to provide reality reorientation, refocusing and direction   Decrease environmental stimuli, including noise as appropriate

## 2022-12-23 NOTE — CARE COORDINATION
Care Coordination: did not receive a call back from Diego Simpson at [facility]. I have left another message today requesting a return call as pt is a downgrade transfer and on 4 ltr. I have placed therapy orders on chart.  Addendum: I called [facility] at [phone], spoke to milady, she connected me to [facility] and I left additional message and Nupur Reynolds is emailing her the referral as well to follow    Miguel Angel Colon

## 2022-12-23 NOTE — CARE COORDINATION
Notified of scheduled right heart catheterization per CVL. This was discussed with Dr. Harpreet Drummond. Per Dr. Harpreet Drummond noted mild PHTN on recent echocardiogram.  Will not perform right-sided heart catheterization with patient on BiPAP.   Electronically signed by VAIBHAV Montgomery CNP on 12/23/22 at 9:15 AM EST

## 2022-12-24 LAB
ALBUMIN SERPL-MCNC: 3.8 G/DL (ref 3.5–5.2)
ALP BLD-CCNC: 52 U/L (ref 35–104)
ALT SERPL-CCNC: 11 U/L (ref 0–32)
ANION GAP SERPL CALCULATED.3IONS-SCNC: 8 MMOL/L (ref 7–16)
AST SERPL-CCNC: 17 U/L (ref 0–31)
BASOPHILS ABSOLUTE: 0.02 E9/L (ref 0–0.2)
BASOPHILS RELATIVE PERCENT: 0.3 % (ref 0–2)
BILIRUB SERPL-MCNC: 0.8 MG/DL (ref 0–1.2)
BUN BLDV-MCNC: 9 MG/DL (ref 6–23)
CALCIUM SERPL-MCNC: 9 MG/DL (ref 8.6–10.2)
CHLORIDE BLD-SCNC: 94 MMOL/L (ref 98–107)
CO2: 37 MMOL/L (ref 22–29)
CREAT SERPL-MCNC: 0.5 MG/DL (ref 0.5–1)
EOSINOPHILS ABSOLUTE: 0.06 E9/L (ref 0.05–0.5)
EOSINOPHILS RELATIVE PERCENT: 1 % (ref 0–6)
GFR SERPL CREATININE-BSD FRML MDRD: >60 ML/MIN/1.73
GLUCOSE BLD-MCNC: 86 MG/DL (ref 74–99)
HCT VFR BLD CALC: 42.1 % (ref 34–48)
HEMOGLOBIN: 12.7 G/DL (ref 11.5–15.5)
IMMATURE GRANULOCYTES #: 0.02 E9/L
IMMATURE GRANULOCYTES %: 0.3 % (ref 0–5)
LYMPHOCYTES ABSOLUTE: 1.03 E9/L (ref 1.5–4)
LYMPHOCYTES RELATIVE PERCENT: 17.6 % (ref 20–42)
MAGNESIUM: 2 MG/DL (ref 1.6–2.6)
MCH RBC QN AUTO: 26.8 PG (ref 26–35)
MCHC RBC AUTO-ENTMCNC: 30.2 % (ref 32–34.5)
MCV RBC AUTO: 88.8 FL (ref 80–99.9)
METER GLUCOSE: 139 MG/DL (ref 74–99)
MONOCYTES ABSOLUTE: 0.68 E9/L (ref 0.1–0.95)
MONOCYTES RELATIVE PERCENT: 11.6 % (ref 2–12)
NEUTROPHILS ABSOLUTE: 4.05 E9/L (ref 1.8–7.3)
NEUTROPHILS RELATIVE PERCENT: 69.2 % (ref 43–80)
ORGANISM: ABNORMAL
ORGANISM: ABNORMAL
PDW BLD-RTO: 15.3 FL (ref 11.5–15)
PHOSPHORUS: 2.8 MG/DL (ref 2.5–4.5)
PLATELET # BLD: 195 E9/L (ref 130–450)
PMV BLD AUTO: 10.2 FL (ref 7–12)
POTASSIUM REFLEX MAGNESIUM: 3.5 MMOL/L (ref 3.5–5)
PROCALCITONIN: 0.02 NG/ML (ref 0–0.08)
RBC # BLD: 4.74 E12/L (ref 3.5–5.5)
SODIUM BLD-SCNC: 139 MMOL/L (ref 132–146)
TOTAL PROTEIN: 6 G/DL (ref 6.4–8.3)
URINE CULTURE, ROUTINE: ABNORMAL
URINE CULTURE, ROUTINE: ABNORMAL
WBC # BLD: 5.9 E9/L (ref 4.5–11.5)

## 2022-12-24 PROCEDURE — 84145 PROCALCITONIN (PCT): CPT

## 2022-12-24 PROCEDURE — 6360000002 HC RX W HCPCS: Performed by: INTERNAL MEDICINE

## 2022-12-24 PROCEDURE — 36415 COLL VENOUS BLD VENIPUNCTURE: CPT

## 2022-12-24 PROCEDURE — 6370000000 HC RX 637 (ALT 250 FOR IP): Performed by: INTERNAL MEDICINE

## 2022-12-24 PROCEDURE — 82962 GLUCOSE BLOOD TEST: CPT

## 2022-12-24 PROCEDURE — 94660 CPAP INITIATION&MGMT: CPT

## 2022-12-24 PROCEDURE — 2580000003 HC RX 258: Performed by: INTERNAL MEDICINE

## 2022-12-24 PROCEDURE — 6370000000 HC RX 637 (ALT 250 FOR IP): Performed by: STUDENT IN AN ORGANIZED HEALTH CARE EDUCATION/TRAINING PROGRAM

## 2022-12-24 PROCEDURE — 85025 COMPLETE CBC W/AUTO DIFF WBC: CPT

## 2022-12-24 PROCEDURE — 80053 COMPREHEN METABOLIC PANEL: CPT

## 2022-12-24 PROCEDURE — 99232 SBSQ HOSP IP/OBS MODERATE 35: CPT | Performed by: FAMILY MEDICINE

## 2022-12-24 PROCEDURE — 94640 AIRWAY INHALATION TREATMENT: CPT

## 2022-12-24 PROCEDURE — 2700000000 HC OXYGEN THERAPY PER DAY

## 2022-12-24 PROCEDURE — 83735 ASSAY OF MAGNESIUM: CPT

## 2022-12-24 PROCEDURE — 2060000000 HC ICU INTERMEDIATE R&B

## 2022-12-24 PROCEDURE — 84100 ASSAY OF PHOSPHORUS: CPT

## 2022-12-24 RX ADMIN — BUDESONIDE 500 MCG: 0.5 SUSPENSION RESPIRATORY (INHALATION) at 13:00

## 2022-12-24 RX ADMIN — DULOXETINE HYDROCHLORIDE 60 MG: 30 CAPSULE, DELAYED RELEASE ORAL at 21:10

## 2022-12-24 RX ADMIN — ARFORMOTEROL TARTRATE 15 MCG: 15 SOLUTION RESPIRATORY (INHALATION) at 13:00

## 2022-12-24 RX ADMIN — Medication 30 ML: at 09:00

## 2022-12-24 RX ADMIN — ROPINIROLE HYDROCHLORIDE 1 MG: 1 TABLET, FILM COATED ORAL at 21:10

## 2022-12-24 RX ADMIN — ROPINIROLE HYDROCHLORIDE 1 MG: 1 TABLET, FILM COATED ORAL at 00:27

## 2022-12-24 RX ADMIN — CEFTRIAXONE 1000 MG: 1 INJECTION, POWDER, FOR SOLUTION INTRAMUSCULAR; INTRAVENOUS at 11:15

## 2022-12-24 RX ADMIN — IPRATROPIUM BROMIDE AND ALBUTEROL SULFATE 1 AMPULE: .5; 2.5 SOLUTION RESPIRATORY (INHALATION) at 20:37

## 2022-12-24 RX ADMIN — OXYCODONE AND ACETAMINOPHEN 1 TABLET: 5; 325 TABLET ORAL at 08:27

## 2022-12-24 RX ADMIN — BUDESONIDE 500 MCG: 0.5 SUSPENSION RESPIRATORY (INHALATION) at 20:37

## 2022-12-24 RX ADMIN — ENOXAPARIN SODIUM 40 MG: 100 INJECTION SUBCUTANEOUS at 08:24

## 2022-12-24 RX ADMIN — IPRATROPIUM BROMIDE AND ALBUTEROL SULFATE 1 AMPULE: .5; 2.5 SOLUTION RESPIRATORY (INHALATION) at 13:01

## 2022-12-24 RX ADMIN — IPRATROPIUM BROMIDE AND ALBUTEROL SULFATE 1 AMPULE: .5; 2.5 SOLUTION RESPIRATORY (INHALATION) at 16:36

## 2022-12-24 RX ADMIN — ASPIRIN 81 MG CHEWABLE TABLET 81 MG: 81 TABLET CHEWABLE at 08:24

## 2022-12-24 RX ADMIN — SODIUM CHLORIDE, PRESERVATIVE FREE 10 ML: 5 INJECTION INTRAVENOUS at 08:25

## 2022-12-24 RX ADMIN — ATORVASTATIN CALCIUM 40 MG: 40 TABLET, FILM COATED ORAL at 08:24

## 2022-12-24 RX ADMIN — SENNOSIDES 8.6 MG: 8.6 TABLET, FILM COATED ORAL at 21:10

## 2022-12-24 RX ADMIN — CLOPIDOGREL BISULFATE 75 MG: 75 TABLET ORAL at 08:24

## 2022-12-24 RX ADMIN — OXYCODONE AND ACETAMINOPHEN 1 TABLET: 5; 325 TABLET ORAL at 17:12

## 2022-12-24 RX ADMIN — ARFORMOTEROL TARTRATE 15 MCG: 15 SOLUTION RESPIRATORY (INHALATION) at 20:37

## 2022-12-24 RX ADMIN — FAMOTIDINE 20 MG: 20 TABLET, FILM COATED ORAL at 08:24

## 2022-12-24 RX ADMIN — SODIUM CHLORIDE, PRESERVATIVE FREE 10 ML: 5 INJECTION INTRAVENOUS at 21:10

## 2022-12-24 RX ADMIN — DULOXETINE HYDROCHLORIDE 60 MG: 30 CAPSULE, DELAYED RELEASE ORAL at 08:24

## 2022-12-24 ASSESSMENT — PAIN SCALES - GENERAL: PAINLEVEL_OUTOF10: 10

## 2022-12-24 ASSESSMENT — PAIN DESCRIPTION - LOCATION: LOCATION: BACK

## 2022-12-24 NOTE — PLAN OF CARE
Problem: Discharge Planning  Goal: Discharge to home or other facility with appropriate resources  12/23/2022 1835 by Sadaf Anderson RN  Outcome: Progressing  12/23/2022 1834 by Sadaf Anderson RN  Outcome: Progressing     Problem: Pain  Goal: Verbalizes/displays adequate comfort level or baseline comfort level  12/24/2022 0507 by Paz Stephen RN  Outcome: Progressing  12/23/2022 1835 by Sadaf Anderson RN  Outcome: Progressing  12/23/2022 1834 by Sadaf Anderson RN  Outcome: Progressing     Problem: Skin/Tissue Integrity  Goal: Absence of new skin breakdown  Description: 1. Monitor for areas of redness and/or skin breakdown  2. Assess vascular access sites hourly  3. Every 4-6 hours minimum:  Change oxygen saturation probe site  4. Every 4-6 hours:  If on nasal continuous positive airway pressure, respiratory therapy assess nares and determine need for appliance change or resting period. 12/24/2022 0507 by Paz Stephen RN  Outcome: Progressing  12/23/2022 1835 by Sadaf Anderson RN  Outcome: Progressing  12/23/2022 1834 by Sadaf Anderson RN  Outcome: Progressing     Problem: ABCDS Injury Assessment  Goal: Absence of physical injury  12/24/2022 0507 by Paz Stephen RN  Outcome: Progressing  12/23/2022 1835 by Sadaf Anderson RN  Outcome: Progressing     Problem: Safety - Adult  Goal: Free from fall injury  12/23/2022 1835 by Sadaf Anderson RN  Outcome: Progressing     Problem: Confusion  Goal: Confusion, delirium, dementia, or psychosis is improved or at baseline  Description: INTERVENTIONS:  1. Assess for possible contributors to thought disturbance, including medications, impaired vision or hearing, underlying metabolic abnormalities, dehydration, psychiatric diagnoses, and notify attending LIP  2. [place] high risk fall precautions, as indicated  3. Provide frequent short contacts to provide reality reorientation, refocusing and direction  4.  Decrease environmental stimuli, including noise as appropriate  5. Monitor and intervene to maintain adequate nutrition, hydration, elimination, sleep and activity  6. If unable to ensure safety without constant attention obtain sitter and review sitter guidelines with assigned personnel  7.  Initiate Psychosocial CNS and Spiritual Care consult, as indicated  12/23/2022 1835 by Sadaf Anderson RN  Outcome: Progressing

## 2022-12-24 NOTE — PLAN OF CARE
Problem: Pain  Goal: Verbalizes/displays adequate comfort level or baseline comfort level  12/24/2022 1727 by Ayala Julian RN  Outcome: Progressing  12/24/2022 0507 by Paz Stephen RN  Outcome: Progressing     Problem: Skin/Tissue Integrity  Goal: Absence of new skin breakdown  Description: 1. Monitor for areas of redness and/or skin breakdown  2. Assess vascular access sites hourly  3. Every 4-6 hours minimum:  Change oxygen saturation probe site  4. Every 4-6 hours:  If on nasal continuous positive airway pressure, respiratory therapy assess nares and determine need for appliance change or resting period.   12/24/2022 0507 by Paz Stephen RN  Outcome: Progressing     Problem: ABCDS Injury Assessment  Goal: Absence of physical injury  Recent Flowsheet Documentation  Taken 12/24/2022 0730 by Ayala Julian RN  Absence of Physical Injury: Implement safety measures based on patient assessment  12/24/2022 0507 by Paz Setphen RN  Outcome: Progressing

## 2022-12-24 NOTE — PROGRESS NOTES
[facility] Inpatient   Resident Progress Note    S:  Hospital day: 3   Brief Synopsis: Tierra Haynes is a 69 y.o. female with a PMH of spinal stenosis on chronic opioid medication, CAD and NSTEMI status post stent 7/22, history of breast cancer, hypertension, hyperlipidemia who presented to ED from PCP clinic after an inability to obtain an in office pulse ox with decreased mentation. In the ED she was hypoxic in the 60's and placed on BiPAP. Narcan drip started, but did not help much. Imaging revealed a calcified thrombus and hazy left mid lung airspace. No pneumonia. Patient admitted for acute hypercapnic and hypoxic respiratory failure secondary to pneumonia versus drug overdose. Transferred to the ICU for further care. Overnight/interim:   No acute event overnight. Patient was seen and examined at bedside. She used BiPap last night to sleep. She's currently on 3L O2 sating 94%. She otherwise feeling well. Patient denies headache, lightheadedness, visual changes, appetite changes, chest pain, shortness of breath, nausea, vomiting, abdominal pain, bowel or genitourinary symptoms. No cath is done till she's on BiPap.      Cont meds:    sodium chloride      dextrose       Scheduled meds:    senna  1 tablet Oral Nightly    cefTRIAXone (ROCEPHIN) IV  1,000 mg IntraVENous Q24H    rOPINIRole  1 mg Oral Daily    sodium chloride flush  5-40 mL IntraVENous 2 times per day    enoxaparin  40 mg SubCUTAneous Daily    aspirin  81 mg Oral Daily    atorvastatin  40 mg Oral Daily    clopidogrel  75 mg Oral Daily    famotidine  20 mg Oral Daily    [Held by provider] losartan  100 mg Oral Daily    [Held by provider] metoprolol succinate  25 mg Oral Daily    ipratropium-albuterol  1 ampule Inhalation Q4H WA    DULoxetine  60 mg Oral Daily    [Held by provider] amLODIPine  5 mg Oral Daily    sodium chloride flush  5-40 mL IntraVENous 2 times per day    Arformoterol Tartrate  15 mcg Nebulization BID    budesonide  0.5 mg Nebulization BID    lidocaine  1 patch TransDERmal Daily     PRN meds: sodium chloride flush, [DISCONTINUED] ondansetron **OR** ondansetron, sodium chloride flush, sodium chloride, ondansetron, polyethylene glycol, acetaminophen **OR** acetaminophen, glucose, dextrose bolus **OR** dextrose bolus, glucagon (rDNA), dextrose, oxyCODONE-acetaminophen     I reviewed the patient's past medical and surgical history, Medications and Allergies. O:  /87   Pulse 95   Temp 98.5 °F (36.9 °C) (Oral)   Resp 19   Ht 5' (1.524 m)   Wt 153 lb (69.4 kg)   SpO2 98%   BMI 29.88 kg/m²   24 hour I&O: I/O last 3 completed shifts: In: 907.7 [P.O.:240; I.V.:100; IV Piggyback:567.7]  Out: 2450 [Urine:2450]  No intake/output data recorded. Physical Exam  HENT:      Head: Normocephalic. Eyes:      Conjunctiva/sclera: Conjunctivae normal.   Cardiovascular:      Rate and Rhythm: Normal rate and regular rhythm. Pulses: Normal pulses. Heart sounds: Normal heart sounds. Pulmonary:      Effort: Pulmonary effort is normal. No respiratory distress. Breath sounds: Normal breath sounds. No stridor. No wheezing, rhonchi or rales. Comments: BiPAP  Chest:      Chest wall: No tenderness. Skin:     General: Skin is warm. Neurological:      Mental Status: She is alert and oriented to person, place, and time. Cranial Nerves: No cranial nerve deficit. Sensory: No sensory deficit. Psychiatric:         Mood and Affect: Mood normal.         Behavior: Behavior normal.         Thought Content: Thought content normal.         Judgment: Judgment normal.     Labs:  Na/K/Cl/CO2:  139/3.5/94/37 (12/24 3430)  BUN/Cr/glu/ALT/AST/amyl/lip:  9/0.5/--/11/17/--/-- (12/24 4491)  WBC/Hgb/Hct/Plts:  5.9/12.7/42.1/195 (12/24 8889)  estimated creatinine clearance is 83 mL/min (based on SCr of 0.5 mg/dL).   Other pertinent labs as noted below    Radiology:  [facility]   Final Result   Low Probability for Pulmonary Embolus. XR CHEST PORTABLE   Final Result   Improved aeration of the left lung compared to prior study         CTA PULMONARY W CONTRAST   Final Result   1. No evidence of an acute pulmonary embolism. 2. Calcified chronic thrombus in the right upper lobe pulmonary artery. 3. Evidence of pulmonary arterial hypertension. 4. Prominent cardiomegaly. No evidence of CHF exacerbation. 5. Right lower lobe atelectasis overlying elevated hemidiaphragm. No   evidence of pneumonia. 0      RECOMMENDATIONS:   Unavailable         CT HEAD WO CONTRAST   Final Result   No acute intracranial abnormality. CT CERVICAL SPINE WO CONTRAST   Final Result   No acute abnormality of the cervical spine. XR PELVIS (1-2 VIEWS)   Final Result   No acute bony abnormalities or prosthetic complications. XR CHEST PORTABLE   Final Result   Hazy left mid lung airspace opacities, which could suggest pneumonia. Resident Assessment and Plan     Acute hypoxic and hypercapnic respiratory failure  Secondary pneumonia or opiate overdose  Initial ABG: respiratory acidosis with CO2 in the 70s  ED Chest x-ray: left midlung opacity, CTA negative for pneumonia  UA showing possible UTI, pending culture. Pending BC.   Admit to ICU, back to floor  Check urine strep and Legionella, culture positive for e-coli  Check Pro-Srinivasan, was 0.02  Patient is full code, continue BiPAP for now as tolerated  Narcan drip, discontinued   Avoid all central depressant medications, placed parameters on PRN percocet       CAD status post HILDA x2 7/22 at [place]  Echo in 6/21 showing moderate to severe MR with EF of 73%  Troponin 46 on admission, pending repeat, had been 100 and September  EKG in ED: NSR   Low threshold for cardiology consult  Continue aspirin and Plavix  Continue atorvastatin 40  Resume metoprolol when blood pressure tolerates  BNP: 791   Strict I's and O's  Repeat EKG: NSR and Left atrial enlargement   ECHO: EF 65/70 %. Severe mitral annular calcification. V/Q Scan: No pulm embolisms   RHC won't be done if she continues to be on BiPap     HTN  Hold meds for now due to hypotension (losartan 100 mg daily, amlodipine 5 mg, metoprolol and Lasix)  Status post 1 L bolus in ED     Spinal stenosis  Home medications include as needed Percocet and oxycodone ER, xtampza     UTI  UA: +LE and Nitrates.  Minimal WBC, culture positive for e-coli  Rocephin and Doxy in ER for pneumonia coverage  Started rocephin    Electronically signed by Shyam Mary MD on 12/24/2022 at 7:26 AM  Attending physician: Dr. rTacey Frost

## 2022-12-24 NOTE — PROGRESS NOTES
[address] [facility]  Family Medicine Attending    S: 69 y.o. female with history of CAD with recent NSTEMI s/p stent placement July 2022, COPD, history of breast cancer, htn, chronic back pain on opiates from pain management presented to PCP office due to daughter's concern for recurrent falls and increasing confusion over the past week. Had a fall on Friday and was on the floor until Saturday and fell on day of admission. Patient came into office and was drowsy but alert and reported that she had taken a pain pill prior to coming in. She was feeling weak and was brought in by neighbor in a wheelchair. She denied shortness of breath or chest pain. Pulse ox was not able to obtained in office. Concern for opiate overdose was sent to ED and was found to have hypercapnic hypoxic respiratory failure to the 60s and required narcan drip. Patient was admitted to the ICU & subsequently transferred to the floor off bipap on NC. This morning she is alert and talkative with good insight. She is interested in going home. She reports no home O2 use. O: VS- Blood pressure (!) 146/97, pulse 98, temperature 98.7 °F (37.1 °C), temperature source Oral, resp. rate 18, height 5' (1.524 m), weight 153 lb (69.4 kg), SpO2 94 %, not currently breastfeeding. Exam is as noted by resident with the following changes, additions or corrections:  Gen lying in bed, on NC  Cardio - RRR, no murmur   Lungs - CTAB, no wheeze, slight coarse at bases. Abd: soft nt nd. Ext- no peripheral edema     Impressions:   Principal Problem:    Acute respiratory failure (HCC)  Active Problems:    Urinary tract infection without hematuria    Opiate overdose ([address]  Resolved Problems:    * No resolved hospital problems. *      Plan:   patient with hypercapnic hypoxic respiratory failure 2/2 accidental opiate overdose. Narcan drip discontinued. BiPap prn   Plan for right heart cath pending OK by cardio for pulmonary htn.    VQ scan obtained showed low probability for pulmonary embolism. CTA showed Calcified chronic thrombus in the right upper lobe pulmonary artery. Abx discontinued as no evidence of infectious process in lungs. Replete electrolytes prn  Concern for UTI given urinalysis showing +nitrites . add rocephin. Urine culture pending. Resume BP meds as BP allows. Space out percocet use and remind patient to be cautious with use. Attending Physician Statement  I have reviewed the chart, including any radiology or labs, and seen the patient with the resident(s). I personally reviewed and performed key elements of the history and exam.  I agree with the assessment, plan and orders as documented by the resident. Please refer to the resident note for additional information.       Leonid Rodriguez MD

## 2022-12-25 ENCOUNTER — APPOINTMENT (OUTPATIENT)
Dept: GENERAL RADIOLOGY | Age: 77
DRG: 917 | End: 2022-12-25
Payer: MEDICARE

## 2022-12-25 LAB
ALBUMIN SERPL-MCNC: 3.6 G/DL (ref 3.5–5.2)
ALP BLD-CCNC: 49 U/L (ref 35–104)
ALT SERPL-CCNC: 11 U/L (ref 0–32)
ANION GAP SERPL CALCULATED.3IONS-SCNC: 14 MMOL/L (ref 7–16)
AST SERPL-CCNC: 18 U/L (ref 0–31)
BASOPHILS ABSOLUTE: 0.01 E9/L (ref 0–0.2)
BASOPHILS RELATIVE PERCENT: 0.2 % (ref 0–2)
BILIRUB SERPL-MCNC: 0.9 MG/DL (ref 0–1.2)
BUN BLDV-MCNC: 10 MG/DL (ref 6–23)
CALCIUM SERPL-MCNC: 8.9 MG/DL (ref 8.6–10.2)
CHLORIDE BLD-SCNC: 93 MMOL/L (ref 98–107)
CO2: 34 MMOL/L (ref 22–29)
CREAT SERPL-MCNC: 0.5 MG/DL (ref 0.5–1)
EOSINOPHILS ABSOLUTE: 0.11 E9/L (ref 0.05–0.5)
EOSINOPHILS RELATIVE PERCENT: 2 % (ref 0–6)
GFR SERPL CREATININE-BSD FRML MDRD: >60 ML/MIN/1.73
GLUCOSE BLD-MCNC: 90 MG/DL (ref 74–99)
HCT VFR BLD CALC: 39.5 % (ref 34–48)
HEMOGLOBIN: 12.6 G/DL (ref 11.5–15.5)
IMMATURE GRANULOCYTES #: 0.01 E9/L
IMMATURE GRANULOCYTES %: 0.2 % (ref 0–5)
LYMPHOCYTES ABSOLUTE: 1.21 E9/L (ref 1.5–4)
LYMPHOCYTES RELATIVE PERCENT: 21.8 % (ref 20–42)
MAGNESIUM: 1.9 MG/DL (ref 1.6–2.6)
MCH RBC QN AUTO: 27.3 PG (ref 26–35)
MCHC RBC AUTO-ENTMCNC: 31.9 % (ref 32–34.5)
MCV RBC AUTO: 85.7 FL (ref 80–99.9)
METER GLUCOSE: 124 MG/DL (ref 74–99)
MONOCYTES ABSOLUTE: 0.67 E9/L (ref 0.1–0.95)
MONOCYTES RELATIVE PERCENT: 12.1 % (ref 2–12)
NEUTROPHILS ABSOLUTE: 3.54 E9/L (ref 1.8–7.3)
NEUTROPHILS RELATIVE PERCENT: 63.7 % (ref 43–80)
PDW BLD-RTO: 15.2 FL (ref 11.5–15)
PHOSPHORUS: 3.4 MG/DL (ref 2.5–4.5)
PLATELET # BLD: 179 E9/L (ref 130–450)
PMV BLD AUTO: 8.6 FL (ref 7–12)
POTASSIUM REFLEX MAGNESIUM: 3.5 MMOL/L (ref 3.5–5)
RBC # BLD: 4.61 E12/L (ref 3.5–5.5)
SODIUM BLD-SCNC: 141 MMOL/L (ref 132–146)
TOTAL PROTEIN: 5.8 G/DL (ref 6.4–8.3)
WBC # BLD: 5.6 E9/L (ref 4.5–11.5)

## 2022-12-25 PROCEDURE — 6370000000 HC RX 637 (ALT 250 FOR IP): Performed by: INTERNAL MEDICINE

## 2022-12-25 PROCEDURE — 6360000002 HC RX W HCPCS: Performed by: INTERNAL MEDICINE

## 2022-12-25 PROCEDURE — 2700000000 HC OXYGEN THERAPY PER DAY

## 2022-12-25 PROCEDURE — 71045 X-RAY EXAM CHEST 1 VIEW: CPT

## 2022-12-25 PROCEDURE — 83735 ASSAY OF MAGNESIUM: CPT

## 2022-12-25 PROCEDURE — 6370000000 HC RX 637 (ALT 250 FOR IP): Performed by: STUDENT IN AN ORGANIZED HEALTH CARE EDUCATION/TRAINING PROGRAM

## 2022-12-25 PROCEDURE — 94640 AIRWAY INHALATION TREATMENT: CPT

## 2022-12-25 PROCEDURE — 2580000003 HC RX 258: Performed by: INTERNAL MEDICINE

## 2022-12-25 PROCEDURE — 85025 COMPLETE CBC W/AUTO DIFF WBC: CPT

## 2022-12-25 PROCEDURE — 84100 ASSAY OF PHOSPHORUS: CPT

## 2022-12-25 PROCEDURE — 82962 GLUCOSE BLOOD TEST: CPT

## 2022-12-25 PROCEDURE — 99232 SBSQ HOSP IP/OBS MODERATE 35: CPT | Performed by: FAMILY MEDICINE

## 2022-12-25 PROCEDURE — 2060000000 HC ICU INTERMEDIATE R&B

## 2022-12-25 PROCEDURE — 36415 COLL VENOUS BLD VENIPUNCTURE: CPT

## 2022-12-25 PROCEDURE — 94660 CPAP INITIATION&MGMT: CPT

## 2022-12-25 PROCEDURE — 6370000000 HC RX 637 (ALT 250 FOR IP)

## 2022-12-25 PROCEDURE — 80053 COMPREHEN METABOLIC PANEL: CPT

## 2022-12-25 RX ADMIN — ROPINIROLE HYDROCHLORIDE 1 MG: 1 TABLET, FILM COATED ORAL at 20:49

## 2022-12-25 RX ADMIN — ATORVASTATIN CALCIUM 40 MG: 40 TABLET, FILM COATED ORAL at 09:07

## 2022-12-25 RX ADMIN — OXYCODONE AND ACETAMINOPHEN 1 TABLET: 5; 325 TABLET ORAL at 09:22

## 2022-12-25 RX ADMIN — FAMOTIDINE 20 MG: 20 TABLET, FILM COATED ORAL at 09:08

## 2022-12-25 RX ADMIN — ARFORMOTEROL TARTRATE 15 MCG: 15 SOLUTION RESPIRATORY (INHALATION) at 10:00

## 2022-12-25 RX ADMIN — BUDESONIDE 500 MCG: 0.5 SUSPENSION RESPIRATORY (INHALATION) at 21:45

## 2022-12-25 RX ADMIN — BUDESONIDE 500 MCG: 0.5 SUSPENSION RESPIRATORY (INHALATION) at 10:00

## 2022-12-25 RX ADMIN — SODIUM CHLORIDE, PRESERVATIVE FREE 10 ML: 5 INJECTION INTRAVENOUS at 09:09

## 2022-12-25 RX ADMIN — CEFTRIAXONE 1000 MG: 1 INJECTION, POWDER, FOR SOLUTION INTRAMUSCULAR; INTRAVENOUS at 11:59

## 2022-12-25 RX ADMIN — DULOXETINE HYDROCHLORIDE 60 MG: 30 CAPSULE, DELAYED RELEASE ORAL at 20:49

## 2022-12-25 RX ADMIN — ENOXAPARIN SODIUM 40 MG: 100 INJECTION SUBCUTANEOUS at 09:08

## 2022-12-25 RX ADMIN — IPRATROPIUM BROMIDE AND ALBUTEROL SULFATE 1 AMPULE: .5; 2.5 SOLUTION RESPIRATORY (INHALATION) at 18:05

## 2022-12-25 RX ADMIN — ASPIRIN 81 MG CHEWABLE TABLET 81 MG: 81 TABLET CHEWABLE at 09:07

## 2022-12-25 RX ADMIN — IPRATROPIUM BROMIDE AND ALBUTEROL SULFATE 1 AMPULE: .5; 2.5 SOLUTION RESPIRATORY (INHALATION) at 09:45

## 2022-12-25 RX ADMIN — SODIUM CHLORIDE, PRESERVATIVE FREE 10 ML: 5 INJECTION INTRAVENOUS at 20:49

## 2022-12-25 RX ADMIN — ARFORMOTEROL TARTRATE 15 MCG: 15 SOLUTION RESPIRATORY (INHALATION) at 21:45

## 2022-12-25 RX ADMIN — IPRATROPIUM BROMIDE AND ALBUTEROL SULFATE 1 AMPULE: .5; 2.5 SOLUTION RESPIRATORY (INHALATION) at 21:45

## 2022-12-25 RX ADMIN — OXYCODONE AND ACETAMINOPHEN 1 TABLET: 5; 325 TABLET ORAL at 01:00

## 2022-12-25 RX ADMIN — OXYCODONE AND ACETAMINOPHEN 1 TABLET: 5; 325 TABLET ORAL at 20:49

## 2022-12-25 RX ADMIN — CLOPIDOGREL BISULFATE 75 MG: 75 TABLET ORAL at 09:07

## 2022-12-25 RX ADMIN — SENNOSIDES 8.6 MG: 8.6 TABLET, FILM COATED ORAL at 20:49

## 2022-12-25 RX ADMIN — Medication 10 ML: at 09:09

## 2022-12-25 ASSESSMENT — PAIN DESCRIPTION - ORIENTATION: ORIENTATION: OTHER (COMMENT)

## 2022-12-25 ASSESSMENT — PAIN DESCRIPTION - LOCATION
LOCATION: BACK

## 2022-12-25 ASSESSMENT — PAIN DESCRIPTION - DESCRIPTORS: DESCRIPTORS: ACHING;DISCOMFORT

## 2022-12-25 ASSESSMENT — PAIN SCALES - GENERAL
PAINLEVEL_OUTOF10: 0
PAINLEVEL_OUTOF10: 10
PAINLEVEL_OUTOF10: 7
PAINLEVEL_OUTOF10: 7

## 2022-12-25 ASSESSMENT — PAIN DESCRIPTION - PAIN TYPE: TYPE: CHRONIC PAIN

## 2022-12-25 ASSESSMENT — PAIN DESCRIPTION - ONSET: ONSET: ON-GOING

## 2022-12-25 ASSESSMENT — PAIN - FUNCTIONAL ASSESSMENT: PAIN_FUNCTIONAL_ASSESSMENT: PREVENTS OR INTERFERES SOME ACTIVE ACTIVITIES AND ADLS

## 2022-12-25 ASSESSMENT — PAIN DESCRIPTION - FREQUENCY: FREQUENCY: CONTINUOUS

## 2022-12-25 NOTE — PROGRESS NOTES
[facility] - [facility] Inpatient   Resident Progress Note    S:  Hospital day: 4   Brief Synopsis: Tiffanie Rousseau is a 69 y.o. female with a PMH of spinal stenosis on chronic opioid medication, CAD and NSTEMI status post stent 7/22, history of breast cancer, hypertension, hyperlipidemia who presented to ED from PCP clinic after an inability to obtain an in office pulse ox with decreased mentation. In the ED she was hypoxic in the 60's and placed on BiPAP. Narcan drip started, but did not help much. Imaging revealed a calcified thrombus and hazy left mid lung airspace. No pneumonia. Patient admitted for acute hypercapnic and hypoxic respiratory failure secondary to pneumonia versus drug overdose. Transferred to the ICU for further care. Overnight/interim:   No acute event overnight. Patient was seen and examined at bedside. She used BiPap last night to sleep. She's currently on 3L O2 sating 99%. She otherwise feeling well. Patient O2 sat drops to 87% on RA. Patient denies headache, lightheadedness, visual changes, appetite changes, chest pain, shortness of breath, nausea, vomiting, abdominal pain, bowel or genitourinary symptoms. No cath is done till she's on BiPap. Will try to reach cardiology on the status of heart cath.     Cont meds:    sodium chloride      dextrose       Scheduled meds:    nicotine  1 patch TransDERmal Daily    senna  1 tablet Oral Nightly    cefTRIAXone (ROCEPHIN) IV  1,000 mg IntraVENous Q24H    rOPINIRole  1 mg Oral Daily    sodium chloride flush  5-40 mL IntraVENous 2 times per day    enoxaparin  40 mg SubCUTAneous Daily    aspirin  81 mg Oral Daily    atorvastatin  40 mg Oral Daily    clopidogrel  75 mg Oral Daily    famotidine  20 mg Oral Daily    [Held by provider] losartan  100 mg Oral Daily    [Held by provider] metoprolol succinate  25 mg Oral Daily    ipratropium-albuterol  1 ampule Inhalation Q4H WA    DULoxetine  60 mg Oral Daily    [Held by provider] amLODIPine  5 mg Oral Daily sodium chloride flush  5-40 mL IntraVENous 2 times per day    Arformoterol Tartrate  15 mcg Nebulization BID    budesonide  0.5 mg Nebulization BID    lidocaine  1 patch TransDERmal Daily     PRN meds: sodium chloride flush, [DISCONTINUED] ondansetron **OR** ondansetron, sodium chloride flush, sodium chloride, ondansetron, polyethylene glycol, acetaminophen **OR** acetaminophen, glucose, dextrose bolus **OR** dextrose bolus, glucagon (rDNA), dextrose, oxyCODONE-acetaminophen     I reviewed the patient's past medical and surgical history, Medications and Allergies. O:  /88   Pulse 65   Temp 98.4 °F (36.9 °C) (Oral)   Resp 20   Ht 5' (1.524 m)   Wt 153 lb (69.4 kg)   SpO2 99%   BMI 29.88 kg/m²   24 hour I&O: I/O last 3 completed shifts: In: 540 [P.O.:540]  Out: 1750 [Urine:1750]  No intake/output data recorded. Physical Exam  HENT:      Head: Normocephalic. Eyes:      Conjunctiva/sclera: Conjunctivae normal.   Cardiovascular:      Rate and Rhythm: Normal rate and regular rhythm. Pulses: Normal pulses. Heart sounds: Normal heart sounds. Pulmonary:      Effort: Pulmonary effort is normal. No respiratory distress. Breath sounds: Normal breath sounds. No stridor. No wheezing, rhonchi or rales. Comments: BiPAP, nights only  Chest:      Chest wall: No tenderness. Skin:     General: Skin is warm. Neurological:      Mental Status: She is alert and oriented to person, place, and time. Cranial Nerves: No cranial nerve deficit. Sensory: No sensory deficit. Psychiatric:         Mood and Affect: Mood normal.         Behavior: Behavior normal.         Thought Content:  Thought content normal.         Judgment: Judgment normal.     Labs:  Na/K/Cl/CO2:  141/3.5/93/34 (12/25 5508)  BUN/Cr/glu/ALT/AST/amyl/lip:  10/0.5/--/11/18/--/-- (12/25 1088)  WBC/Hgb/Hct/Plts:  5.6/12.6/39.5/179 (12/25 9796)  estimated creatinine clearance is 83 mL/min (based on SCr of 0.5 mg/dL). Other pertinent labs as noted below    Radiology:  NM LUNG SCAN PERFUSION ONLY   Final Result   Low Probability for Pulmonary Embolus. XR CHEST PORTABLE   Final Result   Improved aeration of the left lung compared to prior study         CTA PULMONARY W CONTRAST   Final Result   1. No evidence of an acute pulmonary embolism. 2. Calcified chronic thrombus in the right upper lobe pulmonary artery. 3. Evidence of pulmonary arterial hypertension. 4. Prominent cardiomegaly. No evidence of CHF exacerbation. 5. Right lower lobe atelectasis overlying elevated hemidiaphragm. No   evidence of pneumonia. 0      RECOMMENDATIONS:   Unavailable         CT HEAD WO CONTRAST   Final Result   No acute intracranial abnormality. CT CERVICAL SPINE WO CONTRAST   Final Result   No acute abnormality of the cervical spine. XR PELVIS (1-2 VIEWS)   Final Result   No acute bony abnormalities or prosthetic complications. XR CHEST PORTABLE   Final Result   Hazy left mid lung airspace opacities, which could suggest pneumonia. XR CHEST PORTABLE    (Results Pending)     Resident Assessment and Plan     Acute hypoxic and hypercapnic respiratory failure  Secondary pneumonia or opiate overdose  Initial ABG: respiratory acidosis with CO2 in the 70s  ED Chest x-ray: left midlung opacity, CTA negative for pneumonia  UA showing possible UTI, pending culture. Pending BC.   Admit to ICU, back to floor  Check urine strep and Legionella, culture positive for e-coli  Check Pro-Srinivasan, was 0.02  Patient is full code, continue to use BiPAP at nights  Narcan drip, discontinued   Avoid all central depressant medications, placed parameters on PRN percocet  Hypoxic on room air, dropping to 87%, repeat x-ray     CAD status post HILDA x2 7/22 at [place]  Echo in 6/21 showing moderate to severe MR with EF of 73%  Troponin 46 on admission, pending repeat, had been 100 and September  EKG in ED: NSR   Low threshold for cardiology consult  Continue aspirin and Plavix  Continue atorvastatin 40  Resume metoprolol when blood pressure tolerates  BNP: 791   Strict I's and O's  Repeat EKG: NSR and Left atrial enlargement   ECHO: EF 65/70 %. Severe mitral annular calcification. V/Q Scan: No pulm embolisms   RHC won't be done if she continues to be on BiPap     HTN  Hold meds for now due to hypotension (losartan 100 mg daily, amlodipine 5 mg, metoprolol and Lasix)  Status post 1 L bolus in ED     Spinal stenosis  Home medications include as needed Percocet and oxycodone ER, xtampza     UTI  UA: +LE and Nitrates.  Minimal WBC, culture positive for e-coli  Rocephin and Doxy in ER for pneumonia coverage  Started rocephin    Electronically signed by More Allen MD on 12/25/2022 at 9:30 AM  Attending physician: Dr. Amos Cid

## 2022-12-25 NOTE — PROGRESS NOTES
[address] [facility]  Family Medicine Attending    SHEREE BOWLES Course: 69 y.o. female with history of CAD with recent NSTEMI s/p stent placement July 2022, COPD, history of breast cancer, htn, chronic back pain on opiates from pain management presented to PCP office due to daughter's concern for recurrent falls and increasing confusion over the past week. Had a fall on Friday and was on the floor until Saturday and fell on day of admission. Patient came into office and was drowsy but alert and reported that she had taken a pain pill prior to coming in. She was feeling weak and was brought in by neighbor in a wheelchair. She denied shortness of breath or chest pain. Pulse ox was not able to obtained in office. Concern for opiate overdose was sent to ED and was found to have hypercapnic hypoxic respiratory failure to the 60s and required narcan drip. Patient was admitted to the ICU & subsequently transferred to the floor off bipap on NC.     S: Feels much better and interested in going home. Reports breathing well and no CP/SOB/F/C/S. +PO/UOP/BM. Lives home alone. O2 dropped to 87% on RA.     O: VS- Blood pressure (!) 96/58, pulse 63, temperature 98.2 °F (36.8 °C), temperature source Oral, resp. rate 18, height 5' (1.524 m), weight 153 lb (69.4 kg), SpO2 99 %, not currently breastfeeding. Exam is as noted by resident with the following changes, additions or corrections:  Gen lying in bed, on NC 3L  Cardio - RRR, no murmur   Lungs - CTAB, no wheeze, diminished, slight coarse at bases. Abd: soft nt nd. Ext- no peripheral edema     Impressions:   Principal Problem:    Acute respiratory failure (HCC)  Active Problems:    Urinary tract infection without hematuria    Opiate overdose ([address]  Resolved Problems:    * No resolved hospital problems. *      Plan:   patient with hypercapnic hypoxic respiratory failure 2/2 accidental opiate overdose. Narcan drip discontinued. BiPap prn, Supplemental O2.  She is on room air at home. Team to clarify with cardiology on [address] inpatient vs outpatient. CTA showed Calcified chronic thrombus in the right upper lobe pulmonary artery. Abx discontinued as no evidence of infectious process in lungs. Replete electrolytes prn    Concern for UTI given urinalysis showing +nitrites . Rocephin day 3/5. E coli >100k cfu. Pending sens. Resume BP meds as BP allows. Space out percocet use and remind patient to be cautious with use. Dispo: Nearing ready for discharge aside from O2 requirement, would prefer assessment from PT/OT re home going needs. Attending Physician Statement  I have reviewed the chart, including any radiology or labs, and seen the patient with the resident(s). I personally reviewed and performed key elements of the history and exam.  I agree with the assessment, plan and orders as documented by the resident. Please refer to the resident note for additional information.       Heather Coffey MD TOKEN:'[id number]',FREE:[LAST:[your primary care provider],PHONE:[(   )    -],FAX:[(   )    -]]

## 2022-12-26 VITALS
HEART RATE: 107 BPM | WEIGHT: 141.56 LBS | DIASTOLIC BLOOD PRESSURE: 87 MMHG | HEIGHT: 60 IN | BODY MASS INDEX: 27.79 KG/M2 | TEMPERATURE: 98.6 F | OXYGEN SATURATION: 94 % | RESPIRATION RATE: 18 BRPM | SYSTOLIC BLOOD PRESSURE: 132 MMHG

## 2022-12-26 LAB
ALBUMIN SERPL-MCNC: 3.9 G/DL (ref 3.5–5.2)
ALP BLD-CCNC: 51 U/L (ref 35–104)
ALT SERPL-CCNC: 12 U/L (ref 0–32)
ANION GAP SERPL CALCULATED.3IONS-SCNC: 7 MMOL/L (ref 7–16)
AST SERPL-CCNC: 19 U/L (ref 0–31)
BASOPHILS ABSOLUTE: 0.03 E9/L (ref 0–0.2)
BASOPHILS RELATIVE PERCENT: 0.5 % (ref 0–2)
BILIRUB SERPL-MCNC: 0.7 MG/DL (ref 0–1.2)
BUN BLDV-MCNC: 11 MG/DL (ref 6–23)
CALCIUM SERPL-MCNC: 9.1 MG/DL (ref 8.6–10.2)
CHLORIDE BLD-SCNC: 96 MMOL/L (ref 98–107)
CO2: 37 MMOL/L (ref 22–29)
CREAT SERPL-MCNC: 0.5 MG/DL (ref 0.5–1)
CULTURE, BLOOD 2: NORMAL
EOSINOPHILS ABSOLUTE: 0.22 E9/L (ref 0.05–0.5)
EOSINOPHILS RELATIVE PERCENT: 4 % (ref 0–6)
GFR SERPL CREATININE-BSD FRML MDRD: >60 ML/MIN/1.73
GLUCOSE BLD-MCNC: 88 MG/DL (ref 74–99)
HCT VFR BLD CALC: 41.1 % (ref 34–48)
HEMOGLOBIN: 12.3 G/DL (ref 11.5–15.5)
IMMATURE GRANULOCYTES #: 0.01 E9/L
IMMATURE GRANULOCYTES %: 0.2 % (ref 0–5)
LYMPHOCYTES ABSOLUTE: 1.24 E9/L (ref 1.5–4)
LYMPHOCYTES RELATIVE PERCENT: 22.7 % (ref 20–42)
MAGNESIUM: 1.9 MG/DL (ref 1.6–2.6)
MCH RBC QN AUTO: 26.6 PG (ref 26–35)
MCHC RBC AUTO-ENTMCNC: 29.9 % (ref 32–34.5)
MCV RBC AUTO: 88.8 FL (ref 80–99.9)
MONOCYTES ABSOLUTE: 0.62 E9/L (ref 0.1–0.95)
MONOCYTES RELATIVE PERCENT: 11.3 % (ref 2–12)
NEUTROPHILS ABSOLUTE: 3.35 E9/L (ref 1.8–7.3)
NEUTROPHILS RELATIVE PERCENT: 61.3 % (ref 43–80)
PDW BLD-RTO: 15.4 FL (ref 11.5–15)
PHOSPHORUS: 4.1 MG/DL (ref 2.5–4.5)
PLATELET # BLD: 182 E9/L (ref 130–450)
PMV BLD AUTO: 9.4 FL (ref 7–12)
POTASSIUM REFLEX MAGNESIUM: 3.8 MMOL/L (ref 3.5–5)
RBC # BLD: 4.63 E12/L (ref 3.5–5.5)
SODIUM BLD-SCNC: 140 MMOL/L (ref 132–146)
TOTAL PROTEIN: 5.9 G/DL (ref 6.4–8.3)
WBC # BLD: 5.5 E9/L (ref 4.5–11.5)

## 2022-12-26 PROCEDURE — 6370000000 HC RX 637 (ALT 250 FOR IP): Performed by: INTERNAL MEDICINE

## 2022-12-26 PROCEDURE — 2580000003 HC RX 258: Performed by: INTERNAL MEDICINE

## 2022-12-26 PROCEDURE — 94660 CPAP INITIATION&MGMT: CPT

## 2022-12-26 PROCEDURE — 85025 COMPLETE CBC W/AUTO DIFF WBC: CPT

## 2022-12-26 PROCEDURE — 80053 COMPREHEN METABOLIC PANEL: CPT

## 2022-12-26 PROCEDURE — 99232 SBSQ HOSP IP/OBS MODERATE 35: CPT | Performed by: FAMILY MEDICINE

## 2022-12-26 PROCEDURE — 94640 AIRWAY INHALATION TREATMENT: CPT

## 2022-12-26 PROCEDURE — 6370000000 HC RX 637 (ALT 250 FOR IP): Performed by: STUDENT IN AN ORGANIZED HEALTH CARE EDUCATION/TRAINING PROGRAM

## 2022-12-26 PROCEDURE — 84100 ASSAY OF PHOSPHORUS: CPT

## 2022-12-26 PROCEDURE — 6360000002 HC RX W HCPCS: Performed by: INTERNAL MEDICINE

## 2022-12-26 PROCEDURE — 6370000000 HC RX 637 (ALT 250 FOR IP)

## 2022-12-26 PROCEDURE — 83735 ASSAY OF MAGNESIUM: CPT

## 2022-12-26 PROCEDURE — 36415 COLL VENOUS BLD VENIPUNCTURE: CPT

## 2022-12-26 PROCEDURE — 2700000000 HC OXYGEN THERAPY PER DAY

## 2022-12-26 RX ADMIN — CLOPIDOGREL BISULFATE 75 MG: 75 TABLET ORAL at 09:15

## 2022-12-26 RX ADMIN — ENOXAPARIN SODIUM 40 MG: 100 INJECTION SUBCUTANEOUS at 09:14

## 2022-12-26 RX ADMIN — ARFORMOTEROL TARTRATE 15 MCG: 15 SOLUTION RESPIRATORY (INHALATION) at 07:50

## 2022-12-26 RX ADMIN — FAMOTIDINE 20 MG: 20 TABLET, FILM COATED ORAL at 09:15

## 2022-12-26 RX ADMIN — SODIUM CHLORIDE, PRESERVATIVE FREE 10 ML: 5 INJECTION INTRAVENOUS at 09:15

## 2022-12-26 RX ADMIN — ASPIRIN 81 MG CHEWABLE TABLET 81 MG: 81 TABLET CHEWABLE at 09:15

## 2022-12-26 RX ADMIN — IPRATROPIUM BROMIDE AND ALBUTEROL SULFATE 1 AMPULE: .5; 2.5 SOLUTION RESPIRATORY (INHALATION) at 11:41

## 2022-12-26 RX ADMIN — OXYCODONE AND ACETAMINOPHEN 1 TABLET: 5; 325 TABLET ORAL at 05:42

## 2022-12-26 RX ADMIN — ATORVASTATIN CALCIUM 40 MG: 40 TABLET, FILM COATED ORAL at 09:15

## 2022-12-26 RX ADMIN — CEFTRIAXONE 1000 MG: 1 INJECTION, POWDER, FOR SOLUTION INTRAMUSCULAR; INTRAVENOUS at 11:59

## 2022-12-26 RX ADMIN — BUDESONIDE 500 MCG: 0.5 SUSPENSION RESPIRATORY (INHALATION) at 07:50

## 2022-12-26 RX ADMIN — IPRATROPIUM BROMIDE AND ALBUTEROL SULFATE 1 AMPULE: .5; 2.5 SOLUTION RESPIRATORY (INHALATION) at 07:50

## 2022-12-26 ASSESSMENT — PAIN DESCRIPTION - LOCATION
LOCATION: BACK
LOCATION: BACK

## 2022-12-26 ASSESSMENT — PAIN SCALES - GENERAL
PAINLEVEL_OUTOF10: 8
PAINLEVEL_OUTOF10: 9

## 2022-12-26 NOTE — PLAN OF CARE
Problem: Discharge Planning  Goal: Discharge to home or other facility with appropriate resources  Outcome: Progressing  Flowsheets (Taken 12/25/2022 2000)  Discharge to home or other facility with appropriate resources: Identify barriers to discharge with patient and caregiver     Problem: Pain  Goal: Verbalizes/displays adequate comfort level or baseline comfort level  12/26/2022 0548 by Obdulio Schreiber RN  Outcome: Not Progressing  12/25/2022 1819 by Geo Rodriguez RN  Outcome: Progressing     Problem: Skin/Tissue Integrity  Goal: Absence of new skin breakdown  Description: 1. Monitor for areas of redness and/or skin breakdown  2. Assess vascular access sites hourly  3. Every 4-6 hours minimum:  Change oxygen saturation probe site  4. Every 4-6 hours:  If on nasal continuous positive airway pressure, respiratory therapy assess nares and determine need for appliance change or resting period.   Outcome: Progressing     Problem: Pain  Goal: Verbalizes/displays adequate comfort level or baseline comfort level  12/26/2022 0548 by Obdulio Schreiber RN  Outcome: Not Progressing  12/25/2022 1819 by Geo Rodriguez RN  Outcome: Progressing

## 2022-12-26 NOTE — PROGRESS NOTES
Pulsox was 86% on room air at rest.   Oxygen applied. Sat on 2L at rest 96%  Recovery pulsox was 94% on 2L of oxygen while ambulating.

## 2022-12-26 NOTE — CARE COORDINATION
This patient is requesting to return home today, walked independently with nursing today. She does need home oxygen, currently has a concentrator at home that she bleed into her cpap. Cpap is through [facility]. Referral made to [facility] for home oxygen since she has likely their concentrator in the home already. Their oncall  is in [place], [place] but will bring portable tank to the room today. I did also let the oncall worker know that patient will likely need assistance with her home concentrator on how to wear just oxygen at home. Orders faxed to [facility]. Contact for Vin[facility] added to AVS for follow up once patient home. For questions I can be reached at [phone].  Anny Turpin [facility]

## 2022-12-26 NOTE — PROGRESS NOTES
Explained to patient we are waiting on pt/ot to work with her and they are unable to see her until tomorrow. Patient upset stating she would like to discharge today and would walk out later if she is not discharged. Ambulatory pulse ox completed and patient able to ambulate lower half of unit independently with Elizabeth. Dr. Edson Wagner with family medicine updated via perfect serve.

## 2022-12-26 NOTE — PROGRESS NOTES
[facility] - [facility] Inpatient   Resident Progress Note    S:  Hospital day: 5   Brief Synopsis: Joceline Sterling is a 69 y.o. female with a PMH of spinal stenosis on chronic opioid medication, CAD and NSTEMI status post stent 7/22, history of breast cancer, hypertension, hyperlipidemia who presented to ED from PCP clinic after an inability to obtain an in office pulse ox with decreased mentation. In the ED she was hypoxic in the 60's and placed on BiPAP. Narcan drip started, but did not help much. Imaging revealed a calcified thrombus and hazy left mid lung airspace. No pneumonia. Patient admitted for acute hypercapnic and hypoxic respiratory failure secondary to pneumonia versus drug overdose. Transferred to the ICU for further care. Overnight/interim:   No acute event overnight. Patient was seen and examined at bedside. She used BiPap last night to sleep. She's currently on 1L O2 sating 96%. She otherwise feeling well. Patient O2 sat drops to 94% on RA which is improved from yesterday. Patient denies headache, lightheadedness, visual changes, appetite changes, chest pain, shortness of breath, nausea, vomiting, abdominal pain, bowel or genitourinary symptoms. Cardiology will not do cath. They defer to intensivist for right heart cath. Blood cultures showed no growth but one showed G+ alex on gram stain likely due to contamination. Patient may be able to go home/UZMA today if passes ambulatory pulse-ox challenge.       Cont meds:    sodium chloride      dextrose       Scheduled meds:    nicotine  1 patch TransDERmal Daily    senna  1 tablet Oral Nightly    cefTRIAXone (ROCEPHIN) IV  1,000 mg IntraVENous Q24H    rOPINIRole  1 mg Oral Daily    sodium chloride flush  5-40 mL IntraVENous 2 times per day    enoxaparin  40 mg SubCUTAneous Daily    aspirin  81 mg Oral Daily    atorvastatin  40 mg Oral Daily    clopidogrel  75 mg Oral Daily    famotidine  20 mg Oral Daily    [Held by provider] losartan  100 mg Oral Daily 5996)  BUN/Cr/glu/ALT/AST/amyl/lip:  11/0.5/--/12/19/--/-- (12/26 6739)  WBC/Hgb/Hct/Plts:  5.5/12.3/41.1/182 (12/26 0186)  estimated creatinine clearance is 80 mL/min (based on SCr of 0.5 mg/dL). Other pertinent labs as noted below    Radiology:  XR CHEST PORTABLE   Final Result   1. Minimal residual basilar atelectasis. No other acute process. NM LUNG SCAN PERFUSION ONLY   Final Result   Low Probability for Pulmonary Embolus. XR CHEST PORTABLE   Final Result   Improved aeration of the left lung compared to prior study         CTA PULMONARY W CONTRAST   Final Result   1. No evidence of an acute pulmonary embolism. 2. Calcified chronic thrombus in the right upper lobe pulmonary artery. 3. Evidence of pulmonary arterial hypertension. 4. Prominent cardiomegaly. No evidence of CHF exacerbation. 5. Right lower lobe atelectasis overlying elevated hemidiaphragm. No   evidence of pneumonia. 0      RECOMMENDATIONS:   Unavailable         CT HEAD WO CONTRAST   Final Result   No acute intracranial abnormality. CT CERVICAL SPINE WO CONTRAST   Final Result   No acute abnormality of the cervical spine. XR PELVIS (1-2 VIEWS)   Final Result   No acute bony abnormalities or prosthetic complications. XR CHEST PORTABLE   Final Result   Hazy left mid lung airspace opacities, which could suggest pneumonia. Resident Assessment and Plan     Acute hypoxic and hypercapnic respiratory failure  Secondary pneumonia or opiate overdose  Initial ABG: respiratory acidosis with CO2 in the 70s  ED Chest x-ray: left midlung opacity, CTA negative for pneumonia  UA showing possible UTI, pending culture. Pending BC.   Admit to ICU, back to floor  Check urine strep and Legionella, culture positive for e-coli  Check Pro-Srinivasan, was 0.02  Patient is full code, continue to use BiPAP at nights  Narcan drip, discontinued   Avoid all central depressant medications, placed parameters on PRN percocet  No longer hypoxic on room air, sating 94%, repeat x-ray was shows more improvement      CAD status post HILDA x2 7/22 at [facility]  Echo in 6/21 showing moderate to severe MR with EF of 73%  Troponin 46 on admission, pending repeat, had been 100 and September  EKG in ED: NSR   Low threshold for cardiology consult  Continue aspirin and Plavix  Continue atorvastatin 40  Resume metoprolol when blood pressure tolerates  BNP: 791   Strict I's and O's  Repeat EKG: NSR and Left atrial enlargement   ECHO: EF 65/70 %. Severe mitral annular calcification. V/Q Scan: No pulm embolisms   RHC won't be done if she continues to be on BiPap     HTN  Hold meds for now due to hypotension (losartan 100 mg daily, amlodipine 5 mg, metoprolol and Lasix)  Status post 1 L bolus in ED     Spinal stenosis  Home medications include as needed Percocet and oxycodone ER, xtampza     UTI  UA: +LE and Nitrates.  Minimal WBC, culture positive for e-coli  Rocephin and Doxy in ER for pneumonia coverage  Started rocephin, to complete before discharge    Electronically signed by Lillie Horton MD on 12/26/2022 at 2:41 PM  Attending physician: Dr. Ayleen Carter

## 2022-12-26 NOTE — CARE COORDINATION
Spoke with Dr. Ray Osuna, patient stable for discharge once arrangements made. Unclear if [facility] has bed available. Called and requested therapy treat today if possible or tomorrow morning. Spoke with [facility] they do not have beds available. Spoke with therapy, they will see tomorrow. For questions I can be reached at [phone].  Juan Carlos Rene, [place]

## 2022-12-26 NOTE — DISCHARGE INSTRUCTIONS
=====================================  [facility], [address]  =====================================    Take your medications as directed in this summary    Future scheduled appointments are listed below or recommended appointments are mentioned above. Future Appointments   Date Time Provider Daniela Adams   1/3/2023 10:20 AM Bere Mccartney MD [address] [facility]       Call [unfilled] to confirm or schedule your appointment with Dr. Keiry Bautista,  as soon as possible and/or if there are any appointment changes or other issues. It is important that you follow up with  @D@ for better monitoring of the reason of your hospitalization. Follow up with the specialists that saw you during your stay as well. If you have any questions call your PCP at [phone]. Once discharged from [address] [place] [address], you can :     Return to work : Yes, you may return to work  Activity : As tolerated  Stairs : As tolerated  Exercise : As tolerated  Lifting : As tolerated   Sexual activity : Yes  Driving : With seat belt on. NO driving on narcotic pain medication if prescribed   Medications : Always take your medications as prescribed  Wound Care: none needed  Diet : You are asked to make an attempt to improve diet and exercise patterns to aid in medical management of your medical condition/problem. Call [facility] with any further questions. Return to Emergency Department with any worsening of your condition and/or fever greater than 101 degrees, new weakness, shortness of breath or chest pain.

## 2022-12-26 NOTE — PROGRESS NOTES
[address] [facility]  Family Medicine Attending    Stephen Braun Course: 69 y.o. female with history of CAD with recent NSTEMI s/p stent placement July 2022, COPD, history of breast cancer, htn, chronic back pain on opiates from pain management presented to PCP office due to daughter's concern for recurrent falls and increasing confusion over the past week. Had a fall on Friday and was on the floor until Saturday and fell on day of admission. Patient came into office and was drowsy but alert and reported that she had taken a pain pill prior to coming in. She was feeling weak and was brought in by neighbor in a wheelchair. She denied shortness of breath or chest pain. Pulse ox was not able to obtained in office. Concern for opiate overdose was sent to ED and was found to have hypercapnic hypoxic respiratory failure to the 60s and required narcan drip. Patient was admitted to the ICU & subsequently transferred to the floor off bipap on NC.     S: Feels consistently better and interested in going home. Reports breathing well and no CP/SOB/F/C/S. +PO/UOP/BM. Lives home alone. O2 dropped to 86% on RA. 94% on 2L. O: VS- Blood pressure 132/87, pulse (!) 107, temperature 98.6 °F (37 °C), temperature source Axillary, resp. rate 18, height 5' (1.524 m), weight 141 lb 9 oz (64.2 kg), SpO2 94 %, not currently breastfeeding. Exam is as noted by resident with the following changes, additions or corrections:  Gen lying in bed, on NC 3L  Cardio - RRR, no murmur   Lungs - CTAB, no wheeze, diminished, slight coarse at bases. Abd: soft nt nd. Ext- no peripheral edema. Able to get up with walker and ambulate to restroom and return to bed on our rounds and transition independently. Component 12/21/22 1900    Blood Culture, Routine  Abnormal   24 Hours no growth   Gram stain performed from blood culture bottle media   Gram positive rods Diphtheroid-like       CXR 12/25  Impression   1.   Minimal residual basilar atelectasis. No other acute process. Impressions:   Principal Problem:    Acute respiratory failure ([address]  Active Problems:    Urinary tract infection without hematuria    Opiate overdose ([address]  Resolved Problems:    * No resolved hospital problems. *      Plan:   patient with hypercapnic hypoxic respiratory failure 2/2 accidental opiate overdose. Narcan drip discontinued. BiPap prn, Supplemental O2. She is on room air at home. Reassuring CXR 12/25. She can go home on oxygen given good improvement overall. Right Heart Cath deferred for now and can consider as outpatient if situation permits. CTA showed Calcified chronic thrombus in the right upper lobe pulmonary artery. Replete electrolytes prn    UTI  E coli >100k cfu.pan-sens. Rocephin    Delayed return of gram stain of above blood culture showing GP rods but nothing grown on culture yet. Patient clinically well appearing. Dr Olivares Sparrow to clarify with lab why the delayed gram stain prior to updating out antibiotic plans. Resume BP meds as BP allows. Space out percocet use and remind patient to be cautious with use. Dispo: Again nearing ready for discharge aside from O2 requirement, would prefer assessment from PT/OT re home going needs. She has family that can check in on her and help daily. Has HHA that is there 2x a week. Attending Physician Statement  I have reviewed the chart, including any radiology or labs, and seen the patient with the resident(s). I personally reviewed and performed key elements of the history and exam.  I agree with the assessment, plan and orders as documented by the resident. Please refer to the resident note for additional information.       Ismael Orta MD

## 2022-12-27 ENCOUNTER — TELEPHONE (OUTPATIENT)
Dept: FAMILY MEDICINE CLINIC | Age: 77
End: 2022-12-27

## 2022-12-27 LAB
BLOOD CULTURE, ROUTINE: ABNORMAL
ORGANISM: ABNORMAL

## 2022-12-27 NOTE — TELEPHONE ENCOUNTER
Care Transitions Initial Follow Up Call    Outreach made within 2 business days of discharge: Yes    Patient: Pina Hernandez Patient : 1945   MRN: [id number]  Reason for Admission: Acute respiratory failure  Discharge Date: 22       Spoke with:Message left on patient's voice mail requesting return call.   To schedule appointment         Follow Up  Future Appointments   Date Time Provider Daniela Adams   1/3/2023 10:20 AM MD [facility]       Khai Casiano RN

## 2022-12-28 ENCOUNTER — TELEPHONE (OUTPATIENT)
Dept: FAMILY MEDICINE CLINIC | Age: 77
End: 2022-12-28

## 2022-12-28 NOTE — TELEPHONE ENCOUNTER
Care Transitions Initial Follow Up Call    Outreach made within 2 business days of discharge: Yes    Patient: Henry Bowels Patient : 1945   MRN: [id number]  Reason for Admission: Acute respiratory  Discharge Date: 22       Spoke with: Spoke with patient ' did not have time to talk or schedule appointment , will call back.        Follow Up  Future Appointments   Date Time Provider Daniela Adams   1/3/2023 10:20 AM MD Jose Palencia [facility]       Angelina Palomino RN

## 2022-12-31 NOTE — DISCHARGE SUMMARY
Discharge Summary    Jorge Nguyen  :  1945  MRN:  [id number]    Admit date:  2022  Discharge date:  2022    Admitting Physician:  Mary Alegre MD    Discharge Diagnoses:    Patient Active Problem List   Diagnosis    Osteoarthritis of hip    Hip pain    Periprosthetic fracture around internal prosthetic left hip joint (HCC)    Aseptic loosening of prosthetic hip (HCC)    Sleep disorder    Restless legs syndrome (RLS)    Numbness and tingling    Trigger point of extremity    Primary osteoarthritis of right foot    Right foot pain    Shoulder contusion    Shoulder sprain    Shoulder pain    Spondylolisthesis, grade 1    Neuroforaminal stenosis of spine    Lumbar spinal stenosis    Neuropathy involving both lower extremities    Protruded lumbar disc    Lumbar radiculopathy    Facet syndrome, lumbar (HCC)    Status post total hip replacement, left    Sacroiliitis (HCC)    Malignant neoplasm of central portion of left female breast (HCC)    Midline low back pain with bilateral sciatica    Chronic pain syndrome    Class 3 severe obesity due to excess calories with serious comorbidity and body mass index (BMI) of 45.0 to 49.9 in adult (HCC)    Dyspnea on exertion    Tobacco abuse    Pure hypercholesterolemia    Bunionette of right foot    Difficulty walking    Morbid obesity with BMI of 45.0-49.9, adult (HCC)    Pulmonary emphysema (HCC)    AYESHA (obstructive sleep apnea)    Respiratory failure (HCC)    COPD exacerbation (HCC)    Nonrheumatic mitral valve regurgitation    Pulmonary hypertension (HCC)    Pain in both lower extremities    Moderate episode of recurrent major depressive disorder ([address]    Unable to ambulate    NSTEMI (non-ST elevated myocardial infarction) ([address]    Acute respiratory failure (HCC)    Urinary tract infection without hematuria    Opiate overdose ([address]       Admission Condition:  poor    Discharged Condition:  good    Hospital Course:    Patient is a 69 y.o. female with history of CAD with recent NSTEMI s/p stent placement July 2022, COPD, history of breast cancer, htn, chronic back pain on opiates from pain management presented to PCP office due to daughter's concern for recurrent falls and increasing confusion over the past week. Had a fall on previous Friday and was on the floor until Saturday and fell on day of admission. Patient came into office and was drowsy but alert and reported that she had taken a pain pill prior to coming in. She was feeling weak and was brought in by neighbor in a wheelchair. She denied shortness of breath or chest pain. Pulse ox was not able to obtained in office. Concern for opiate overdose was sent to ED and was found to have hypercapnic hypoxic respiratory failure to the 60s and required narcan drip. Patient was admitted to the ICU & subsequently transferred to the floor off bipap on NC. She continued to get stronger on the floor and eventually was able to maintain good O2 saturation on room air. Her inpatient cardiac echo showed pulmonary hypertension. Cardiology recommended right-side cath, but defer the procedure to intensivist. Lab was not able to provide explanations for late report of gram-positive rods in patient's blood culture. Likely, it was due to contamination since only 1 of blood culture was positive. Patient was clinically asymptomatic and well-appearing. After a few days of monitoring patient's condition, at her insistence, patient was discharged home after passing ambulatory pulse-ox challenge.     Discharge plan:  Close follow-up with PCP  May need UA to confirm resolution of UTI  May need monitoring/checking for any sign of blood infections  Close follow-up with cardiology  Close follow-up/consult with pulmonology for right side cath  Outpatient physical therapy and occupational therapy  Home health establishment  Strict adherence to medications      Discharge Medications:         Medication List        CONTINUE taking these medications amLODIPine 5 MG tablet  Commonly known as: NORVASC  Take 1 tablet by mouth daily     Aspirin Low Dose 81 MG chewable tablet  Generic drug: aspirin  Take 1 tablet by mouth daily     atorvastatin 40 MG tablet  Commonly known as: LIPITOR  take 1 tablet by mouth once daily     celecoxib 200 MG capsule  Commonly known as: CELEBREX  take 1 capsule by mouth twice a day     clopidogrel 75 MG tablet  Commonly known as: PLAVIX     DULoxetine 60 MG extended release capsule  Commonly known as: CYMBALTA  TAKE 1 CAPSULE BY MOUTH AT  NIGHT     famotidine 40 MG tablet  Commonly known as: PEPCID  take 1 tablet by mouth once daily     fluticasone 50 MCG/ACT nasal spray  Commonly known as: FLONASE     furosemide 20 MG tablet  Commonly known as: LASIX  Take 1 tablet by mouth daily     lidocaine 5 % ointment  Commonly known as: XYLOCAINE  Apply topically to hands and low back as needed.      losartan 100 MG tablet  Commonly known as: COZAAR     methocarbamol 750 MG tablet  Commonly known as: ROBAXIN     metoprolol succinate 25 MG extended release tablet  Commonly known as: TOPROL XL  Take 1 tablet by mouth daily 0.5 tablets oral, once a day     oxyCODONE-acetaminophen 7.5-325 MG per tablet  Commonly known as: PERCOCET     rOPINIRole 1 MG tablet  Commonly known as: REQUIP  Take 1 tablet by mouth daily     tiotropium 2.5 MCG/ACT Aers inhaler  Commonly known as: Spiriva Respimat  inhale 2 puffs by mouth once daily     Xtampza ER 18 MG C12a  Generic drug: oxyCODONE ER              Consults:  pulmonary/intensive care    Significant Diagnostic Studies:  labs, microbiology, radiology, nuclear medicine, cardiac graphics: See below    Labs:  Lab Results   Component Value Date    WBC 5.5 12/26/2022    HGB 12.3 12/26/2022    HCT 41.1 12/26/2022     12/26/2022    CHOL 175 05/11/2020    TRIG 105 05/11/2020    HDL 42 05/11/2020    ALT 12 12/26/2022    AST 19 12/26/2022     12/26/2022    K 3.8 12/26/2022    CL 96 (L) 12/26/2022 CREATININE 0.5 12/26/2022    BUN 11 12/26/2022    CO2 37 (H) 12/26/2022    TSH 1.750 10/28/2019    INR 1.0 06/29/2021    LABA1C 5.5 02/23/2022      Lab Results   Component Value Date/Time    COLORU Yellow 12/21/2022 07:08 PM    NITRU POSITIVE 12/21/2022 07:08 PM    GLUCOSEU Negative 12/21/2022 07:08 PM    GLUCOSEU NEGATIVE 02/24/2012 02:05 PM    KETUA Negative 12/21/2022 07:08 PM    UROBILINOGEN 0.2 12/21/2022 07:08 PM    BILIRUBINUR Negative 12/21/2022 07:08 PM    BILIRUBINUR SMALL 02/24/2012 02:05 PM      Lab Results   Component Value Date    INR 1.0 06/29/2021    INR 1.1 08/06/2020    INR 1.1 04/16/2012    PROTIME 11.0 06/29/2021    PROTIME 12.0 08/06/2020    PROTIME 10.8 04/16/2012       BP Readings from Last 3 Encounters:   12/26/22 132/87   12/21/22 (!) 109/59   09/12/22 124/83         New Imaging:  XR CHEST PORTABLE   Final Result   1. Minimal residual basilar atelectasis. No other acute process. NM LUNG SCAN PERFUSION ONLY   Final Result   Low Probability for Pulmonary Embolus. XR CHEST PORTABLE   Final Result   Improved aeration of the left lung compared to prior study         CTA PULMONARY W CONTRAST   Final Result   1. No evidence of an acute pulmonary embolism. 2. Calcified chronic thrombus in the right upper lobe pulmonary artery. 3. Evidence of pulmonary arterial hypertension. 4. Prominent cardiomegaly. No evidence of CHF exacerbation. 5. Right lower lobe atelectasis overlying elevated hemidiaphragm. No   evidence of pneumonia. 0      RECOMMENDATIONS:   Unavailable         CT HEAD WO CONTRAST   Final Result   No acute intracranial abnormality. CT CERVICAL SPINE WO CONTRAST   Final Result   No acute abnormality of the cervical spine. XR PELVIS (1-2 VIEWS)   Final Result   No acute bony abnormalities or prosthetic complications. XR CHEST PORTABLE   Final Result   Hazy left mid lung airspace opacities, which could suggest pneumonia. Treatments/Interventions /Studies:  Orders Placed This Encounter   Procedures    Respiratory Panel, Molecular, with COVID-19 (Restricted: peds pts or suitable admitted adults)    Culture, Blood 1    Culture, Blood 2    Strep Pneumoniae Antigen    LEGIONELLA ANTIGEN, URINE    Culture, Urine    Culture, Respiratory    Culture, Urine    XR CHEST PORTABLE    CTA PULMONARY W CONTRAST    CT HEAD WO CONTRAST    CT CERVICAL SPINE WO CONTRAST    XR PELVIS (1-2 VIEWS)    NM LUNG SCAN PERFUSION ONLY    XR CHEST PORTABLE    Troponin    CBC with Auto Differential    Comprehensive Metabolic Panel    Urinalysis    Brain Natriuretic Peptide    Blood Gas, Arterial    Blood Gas, Arterial    Lactic Acid    Blood Gas, Arterial    Blood Gas, Arterial    Microscopic Urinalysis    Troponin    Procalcitonin    Troponin    Blood Gas, Arterial    URINE DRUG SCREEN    Arterial Blood Gas, Respiratory Only    Urine electrolytes    Urea nitrogen, urine    Creatinine, Random Urine    Arterial Blood Gas, Respiratory Only    Blood Gas, Arterial    Troponin    Lactic Acid    Arterial Blood Gas, Respiratory Only    Blood Gas, Arterial    Procalcitonin    [id number] [id number]    Respiratory care evaluation only    POCT glucose    POCT Glucose    POCT Glucose    POCT Glucose    EKG 12 Lead    EKG 12 Lead    Echo Complete    ADMIT TO INPATIENT    Transfer Patient    Discharge patient       Discharge Exam:  /87   Pulse (!) 107   Temp 98.6 °F (37 °C) (Axillary)   Resp 18   Ht 5' (1.524 m)   Wt 141 lb 9 oz (64.2 kg)   SpO2 94%   BMI 27.65 kg/m²   24 hour I&O: I/O last 3 completed shifts: In: 600 [P.O.:600]  Out: 0   No intake/output data recorded. HENT:      Head: Normocephalic. Eyes:      Conjunctiva/sclera: Conjunctivae normal.   Cardiovascular:      Rate and Rhythm: Normal rate and regular rhythm. Pulses: Normal pulses. Heart sounds: Normal heart sounds.    Pulmonary:      Effort: Pulmonary effort is normal. No respiratory distress. Breath sounds: Normal breath sounds. No stridor. No wheezing, rhonchi or rales. Comments: BiPAP, nights only  Chest:      Chest wall: No tenderness. Skin:     General: Skin is warm. Neurological:      Mental Status: She is alert and oriented to person, place, and time. Cranial Nerves: No cranial nerve deficit. Sensory: No sensory deficit. Psychiatric:         Mood and Affect: Mood normal.         Behavior: Behavior normal.         Thought Content: Thought content normal.         Judgment: Judgment normal.        Disposition:   home    Future Appointments   Date Time Provider Daniela Adams   1/3/2023 10:20 AM Annalise Hargrove MD [facility]       More than 30 minutes was spent in preparation of this patient's discharge including, but not limited to, examination, preparation of documents, prescription preparation, counseling and coordination.     Signed:  Minnie Davis MD  1/1/2023, 1:26 PM

## 2023-01-03 ENCOUNTER — TELEPHONE (OUTPATIENT)
Dept: FAMILY MEDICINE CLINIC | Age: 78
End: 2023-01-03

## 2023-01-03 ENCOUNTER — OFFICE VISIT (OUTPATIENT)
Dept: FAMILY MEDICINE CLINIC | Age: 78
End: 2023-01-03
Payer: MEDICARE

## 2023-01-03 VITALS
TEMPERATURE: 98.5 F | WEIGHT: 146 LBS | OXYGEN SATURATION: 93 % | HEART RATE: 104 BPM | SYSTOLIC BLOOD PRESSURE: 121 MMHG | DIASTOLIC BLOOD PRESSURE: 72 MMHG | BODY MASS INDEX: 28.66 KG/M2 | RESPIRATION RATE: 17 BRPM | HEIGHT: 60 IN

## 2023-01-03 DIAGNOSIS — J96.11 CHRONIC RESPIRATORY FAILURE WITH HYPOXIA (HCC): Primary | ICD-10-CM

## 2023-01-03 DIAGNOSIS — J43.9 PULMONARY EMPHYSEMA, UNSPECIFIED EMPHYSEMA TYPE (HCC): ICD-10-CM

## 2023-01-03 DIAGNOSIS — R09.89 BIBASILAR CRACKLES: ICD-10-CM

## 2023-01-03 DIAGNOSIS — F33.1 MODERATE EPISODE OF RECURRENT MAJOR DEPRESSIVE DISORDER (HCC): ICD-10-CM

## 2023-01-03 DIAGNOSIS — T40.601A: ICD-10-CM

## 2023-01-03 DIAGNOSIS — I27.20 PULMONARY HYPERTENSION (HCC): ICD-10-CM

## 2023-01-03 DIAGNOSIS — R00.0 TACHYCARDIA: ICD-10-CM

## 2023-01-03 PROCEDURE — 86580 TB INTRADERMAL TEST: CPT | Performed by: FAMILY MEDICINE

## 2023-01-03 PROCEDURE — 99214 OFFICE O/P EST MOD 30 MIN: CPT | Performed by: FAMILY MEDICINE

## 2023-01-03 PROCEDURE — 1090F PRES/ABSN URINE INCON ASSESS: CPT | Performed by: FAMILY MEDICINE

## 2023-01-03 PROCEDURE — 3023F SPIROM DOC REV: CPT | Performed by: FAMILY MEDICINE

## 2023-01-03 PROCEDURE — G8427 DOCREV CUR MEDS BY ELIG CLIN: HCPCS | Performed by: FAMILY MEDICINE

## 2023-01-03 PROCEDURE — 99213 OFFICE O/P EST LOW 20 MIN: CPT | Performed by: FAMILY MEDICINE

## 2023-01-03 PROCEDURE — G8484 FLU IMMUNIZE NO ADMIN: HCPCS | Performed by: FAMILY MEDICINE

## 2023-01-03 PROCEDURE — 4004F PT TOBACCO SCREEN RCVD TLK: CPT | Performed by: FAMILY MEDICINE

## 2023-01-03 PROCEDURE — 1111F DSCHRG MED/CURRENT MED MERGE: CPT | Performed by: FAMILY MEDICINE

## 2023-01-03 PROCEDURE — G8417 CALC BMI ABV UP PARAM F/U: HCPCS | Performed by: FAMILY MEDICINE

## 2023-01-03 PROCEDURE — 1123F ACP DISCUSS/DSCN MKR DOCD: CPT | Performed by: FAMILY MEDICINE

## 2023-01-03 PROCEDURE — G8400 PT W/DXA NO RESULTS DOC: HCPCS | Performed by: FAMILY MEDICINE

## 2023-01-03 RX ORDER — OXYCODONE 9 MG/1
9 CAPSULE, EXTENDED RELEASE ORAL 2 TIMES DAILY
COMMUNITY

## 2023-01-03 RX ORDER — ASPIRIN 81 MG
TABLET,CHEWABLE ORAL
Qty: 90 TABLET | Refills: 1 | Status: SHIPPED
Start: 2023-01-03 | End: 2023-01-04 | Stop reason: SDUPTHER

## 2023-01-03 RX ORDER — METOPROLOL SUCCINATE 25 MG/1
25 TABLET, EXTENDED RELEASE ORAL DAILY
Qty: 30 TABLET | Refills: 1 | Status: CANCELLED | OUTPATIENT
Start: 2023-01-03

## 2023-01-03 NOTE — PATIENT INSTRUCTIONS
In [place] you will need a primary care doctor, a lung doctor , a cardiologist and an oncologist, and a pain doctor. I will talk to Dr. Nathalie Fatima about best options for travel   Please call the airline and ask about traveling with oxygen.      Come back in 2 days for your TB test     Get the xray

## 2023-01-03 NOTE — TELEPHONE ENCOUNTER
She has been using her oxygen. Trying to keep her seated as much as possible. Have sent order to [employer] [address]. Will not be able to get her to [place] this week. Will be calling to see if they can rent an FAA device. To see if they can get it earlier. Was not able to get in touch with Dr. Lacie Isabel. Will be working on getting the oxygen . Does not have any other needs at this time. Sister is coming in for the next week.

## 2023-01-03 NOTE — TELEPHONE ENCOUNTER
Called son. Oxygen was sent through [facility] from the hospital. He will call [facility] to see what can be done. He reports that he called airline and they do not provide oxygen in flight.

## 2023-01-03 NOTE — PROGRESS NOTES
[facility]  [address], [address]  Adarsh Boyd MD     Patient: Rolando Cantu  YOB: 1945  Visit Date: 1/3/23    Deon Cobian is a 69y.o. year old female here today for   Chief Complaint   Patient presents with    2 Week Follow-Up     Follow Up Falls    Medication Refill     Patient needs medication refills due to moving until she can establish care. HPI  Patient is a 69year old female here for hospital follow up . She has a history of CAD with recent NSTEMI s/p stent placement July 2022, COPD, history of breast cancer, htn, chronic back pain on opiates from pain management presented to PCP on 9/15 to daughter's concern for recurrent falls and increasing confusion over the preceding week. She was found to have acute hypercapnic respiratory failure secondary to central hypoventilation from opioid overdose requiring BiPap and narcan drip. She was discharged home on 12/26. She was also treated for a UTI. During the hospitalization patient was persistently hypoxic. She had an ambulatory pulse ox done on 12/26 that showed an oxygenation of  86% on room air at rest, oxygen at 2L was applied . At rest recovered to 96% on 2L oxygen . While ambulating pulse ox was 94% on 2L of oxygen. Is moving back to [facility] son lives there. Wanted to move Friday morning via airplane but does not have oxygen that can be taken on the airplane. Will be closer to her grandchildren and son  Will be living in independent living , check on her daily. If she needs assisted living will transition her. She has been using her cpap has not been using home oxygen but oxygen at home because she does not know how to use it when not attached to her BiPap. At  home patient's oxygenation has been in low 90s at rest and goes below 88% with ambulation.      In the office patient was ambulated on room air , initial oxygenation on room air was 93%, with ambulation oxygenation fell to 85% on room air. Improved to 90% at rest.     Denies chest pain. Does have shortness of breath   Mostly in the evening after being active. Has more a pressure recently. Chest pressure like a band under her breast .   Has not paid attention to how long it last but uses her BiPap and improved. Has some swelling in legs. Urinating ok. No dysuria. No fevers or chills. No nausea or vomiting. Some nausea     She needs 90 day supply of medications. She is now taking lower dose of opiate. Alvarez Hooker. Review of Systems   Constitutional:  Negative for chills and fever. Respiratory:  Positive for chest tightness and shortness of breath (at baseline). Negative for cough and wheezing. Cardiovascular:  Negative for chest pain, palpitations and leg swelling. Gastrointestinal:  Negative for abdominal pain, constipation, diarrhea, nausea and vomiting. Musculoskeletal:  Positive for back pain and gait problem. Neurological:  Positive for weakness. Current Outpatient Medications on File Prior to Visit   Medication Sig Dispense Refill    oxyCODONE ER (XTAMPZA ER) 9 MG C12A Take 9 mg by mouth in the morning and at bedtime. fluticasone (FLONASE) 50 MCG/ACT nasal spray 1 spray by Each Nostril route daily      methocarbamol (ROBAXIN) 750 MG tablet take 1 tablet by mouth every 12 hours if needed      lidocaine (XYLOCAINE) 5 % ointment Apply topically to hands and low back as needed. 1 Tube 3     No current facility-administered medications on file prior to visit. Allergies   Allergen Reactions    Demerol [Meperidine Hcl] Other (See Comments)     Hallucinations, anxiety        Meperidine Other (See Comments)    Nabumetone Hives and Rash       Past medical, surgical, socialand/or family history reviewed, updated as needed, and are non-contributory (unless otherwise stated). Medications, allergies, and problem list also reviewed and updated as needed in patient's record.     Wt Readings from Last 3 Encounters:   01/03/23 146 lb (66.2 kg)   12/26/22 141 lb 9 oz (64.2 kg)   12/21/22 153 lb (69.4 kg)                   /72 (Site: Right Upper Arm, Position: Sitting, Cuff Size: Medium Adult)   Pulse (!) 104   Temp 98.5 °F (36.9 °C) (Temporal)   Resp 17   Ht 5' (1.524 m)   Wt 146 lb (66.2 kg)   SpO2 93%   BMI 28.51 kg/m²       Physical Exam  Vitals and nursing note reviewed.   Constitutional:       General: She is not in acute distress.     Appearance: She is well-developed. She is not diaphoretic.      Comments: Alert and oriented but drowsy. In wheelchair    Cardiovascular:      Rate and Rhythm: Normal rate and regular rhythm.      Heart sounds: Murmur heard.     No friction rub.   Pulmonary:      Effort: Pulmonary effort is normal. No respiratory distress.      Breath sounds: No wheezing.      Comments: + bibasilar crackles   Abdominal:      General: Bowel sounds are normal. There is no distension.      Palpations: Abdomen is soft. There is no mass.      Tenderness: There is no abdominal tenderness. There is no guarding.   Musculoskeletal:         General: Normal range of motion.      Right lower leg: Edema (trace) present.      Left lower leg: Edema (trace) present.         ASSESSMENT/PLAN  Melissa was seen today for 2 week follow-up and medication refill.    Diagnoses and all orders for this visit:    Chronic respiratory failure with hypoxia (HCC)   - Patient will need home oxygen. To travel will need a portable oxygen concentrator. I spoke with Dr. Tinsley on 1/3/2022. Regarding recommendations for air travel. Patient is on 2L on land. While flying should increase oxygen flow to 4L to account for change in pressure at high altitude.     Poisoning by unspecified narcotics, accidental (unintentional), initial encounter (HCC)   - Overall improved. Patient now on lower dose of opiate. I recommended using the lowest possible dose and to monitor mental status changes as well as oxygenation.  Bibasilar crackles  -     XR CHEST (2 VW); Future  - Most likely atelectasis. Will check xray to rule out new pneumonia or pulmonary edema    Pulmonary emphysema, unspecified emphysema type ([address]   - Patient on oxygen and spiriva. Consider adding symbicort or changed to trelegy. Patient will need follow up with pulm. Pulmonary hypertension ([address]   - Will need follow up with cardiology and pulmonology   Other orders  -     Mantoux testing    Tachycardia - will increase metoprolol to 25mg . Patient had been taking 1/2 tablet. In [facility] patient will need new PCP, pain medicine, pulm, cardiology and oncology. Will send 3 month supply of meds to facilitate       Phone/MyChart follow up if tests abnormal.    No follow-ups on file. or sooner if necessary. I have reviewed myfindings and recommendations with Bridget Dixon.  Tatyana Townsend MD, M.D

## 2023-01-03 NOTE — TELEPHONE ENCOUNTER
Ann Wolf called in and is asking if you could please write out a continuous oxygen use order to send over to [facility] so that she may get a concentrator to fly with.     Please advise    Thank you

## 2023-01-04 RX ORDER — FUROSEMIDE 20 MG/1
20 TABLET ORAL DAILY
Qty: 90 TABLET | Refills: 1 | Status: SHIPPED | OUTPATIENT
Start: 2023-01-04

## 2023-01-04 RX ORDER — ATORVASTATIN CALCIUM 40 MG/1
TABLET, FILM COATED ORAL
Qty: 90 TABLET | Refills: 1 | Status: SHIPPED | OUTPATIENT
Start: 2023-01-04

## 2023-01-04 RX ORDER — DULOXETIN HYDROCHLORIDE 60 MG/1
CAPSULE, DELAYED RELEASE ORAL
Qty: 90 CAPSULE | Refills: 1 | Status: SHIPPED | OUTPATIENT
Start: 2023-01-04

## 2023-01-04 RX ORDER — ASPIRIN 81 MG/1
TABLET, CHEWABLE ORAL
Qty: 90 TABLET | Refills: 1 | Status: SHIPPED | OUTPATIENT
Start: 2023-01-04

## 2023-01-04 RX ORDER — CELECOXIB 200 MG/1
CAPSULE ORAL
Qty: 180 CAPSULE | Refills: 1 | Status: SHIPPED | OUTPATIENT
Start: 2023-01-04

## 2023-01-04 RX ORDER — METOPROLOL SUCCINATE 25 MG/1
25 TABLET, EXTENDED RELEASE ORAL DAILY
Qty: 90 TABLET | Refills: 1 | Status: SHIPPED | OUTPATIENT
Start: 2023-01-04

## 2023-01-04 RX ORDER — FAMOTIDINE 40 MG/1
TABLET, FILM COATED ORAL
Qty: 90 TABLET | Refills: 1 | Status: SHIPPED | OUTPATIENT
Start: 2023-01-04

## 2023-01-04 RX ORDER — ROPINIROLE 1 MG/1
1 TABLET, FILM COATED ORAL DAILY
Qty: 90 TABLET | Refills: 1 | Status: SHIPPED | OUTPATIENT
Start: 2023-01-04

## 2023-01-04 RX ORDER — LOSARTAN POTASSIUM 100 MG/1
100 TABLET ORAL DAILY
Qty: 90 TABLET | Refills: 1 | Status: SHIPPED | OUTPATIENT
Start: 2023-01-04

## 2023-01-04 RX ORDER — CLOPIDOGREL BISULFATE 75 MG/1
75 TABLET ORAL DAILY
Qty: 90 TABLET | Refills: 1 | Status: SHIPPED | OUTPATIENT
Start: 2023-01-04

## 2023-01-04 RX ORDER — AMLODIPINE BESYLATE 5 MG/1
5 TABLET ORAL DAILY
Qty: 90 TABLET | Refills: 1 | Status: SHIPPED | OUTPATIENT
Start: 2023-01-04

## 2023-01-04 ASSESSMENT — ENCOUNTER SYMPTOMS
CONSTIPATION: 0
VOMITING: 0
WHEEZING: 0
DIARRHEA: 0
CHEST TIGHTNESS: 1
BACK PAIN: 1
ABDOMINAL PAIN: 0
SHORTNESS OF BREATH: 1
COUGH: 0
NAUSEA: 0

## 2023-01-05 ENCOUNTER — NURSE ONLY (OUTPATIENT)
Dept: FAMILY MEDICINE CLINIC | Age: 78
End: 2023-01-05
Payer: MEDICARE

## 2023-01-05 VITALS — OXYGEN SATURATION: 93 %

## 2023-01-05 DIAGNOSIS — Z11.1 VISIT FOR MANTOUX TEST: Primary | ICD-10-CM

## 2023-01-05 PROCEDURE — 99211 OFF/OP EST MAY X REQ PHY/QHP: CPT

## 2023-08-10 DIAGNOSIS — F33.1 MODERATE EPISODE OF RECURRENT MAJOR DEPRESSIVE DISORDER (HCC): ICD-10-CM

## 2023-08-10 RX ORDER — DULOXETIN HYDROCHLORIDE 60 MG/1
CAPSULE, DELAYED RELEASE ORAL
Qty: 90 CAPSULE | Refills: 3 | Status: SHIPPED | OUTPATIENT
Start: 2023-08-10

## (undated) DEVICE — BINDER ABD M/L H9IN FOR 46-62IN WHT 3 SLD PNL DSGN HOOP AND

## (undated) DEVICE — BLADE CLIPPER GEN PURP NS

## (undated) DEVICE — STERILE POLYISOPRENE POWDER-FREE SURGICAL GLOVES: Brand: PROTEXIS

## (undated) DEVICE — HALF SHEET: Brand: CONVERTORS

## (undated) DEVICE — DRAPE SHEET, X-LARGE: Brand: CONVERTORS

## (undated) DEVICE — COATED BRAIDED POLYESTER: Brand: TI-CRON

## (undated) DEVICE — CONTROL SYRINGE LUER-LOCK TIP: Brand: MONOJECT

## (undated) DEVICE — GARMENT COMPR STD FOR 17IN CALF UNIF THER FLOTRN

## (undated) DEVICE — DRESSING FOAM W3.5XL6IN POSTOP STRP GENTLE OPTIFOAM AG

## (undated) DEVICE — GOWN SURG XL SMS FAB NONREINFORCED RAGLAN SLV HK LOOP CLSR

## (undated) DEVICE — DOUBLE BASIN SET: Brand: MEDLINE INDUSTRIES, INC.

## (undated) DEVICE — SUTURE PERMAHAND SZ 0 L30IN NONABSORBABLE BLK L26MM SH 1/2 K834H

## (undated) DEVICE — GAUZE,SPONGE,4"X4",16PLY,XRAY,STRL,LF: Brand: MEDLINE

## (undated) DEVICE — 3M™ STERI-STRIP™ REINFORCED ADHESIVE SKIN CLOSURES, R1547, 1/2 IN X 4 IN (12 MM X 100 MM), 6 STRIPS/ENVELOPE: Brand: 3M™ STERI-STRIP™

## (undated) DEVICE — ELECTRODE SURG MPLR NEUT SELF ADH PT PLT MULTIGEN

## (undated) DEVICE — NEEDLE HYPO 25GA L1.5IN BLU POLYPR HUB S STL REG BVL STR

## (undated) DEVICE — Z DISCONTINUED APPLICATOR SURG PREP 0.35OZ 2% CHG 70% ISO ALC W/ HI LT

## (undated) DEVICE — SUTURE PERMA-HAND SZ 0 L30IN NONABSORBABLE BLK L36MM CT-1 424H

## (undated) DEVICE — GAUZE,SPONGE,4"X4",16PLY,STRL,LF,10/TRAY: Brand: MEDLINE

## (undated) DEVICE — PENCIL ELECSURG 9.5 MMX3 M 22 MM MOLD PLUMEPEN ELITE

## (undated) DEVICE — Z DUP USE 2257490 ADHESIVE SKIN CLSRE 036ML TPCL 2CTL CNCRLTE HIGH VSCSTY DRMB

## (undated) DEVICE — PAD ABD CURITY TENDERSORB 5X9IN

## (undated) DEVICE — UNIVERSAL PACK: Brand: CONVERTORS

## (undated) DEVICE — TOWEL OR BLUEE 16X26IN ST 8 PACK ORB08 16X26ORTWL

## (undated) DEVICE — CVD CANNULA

## (undated) DEVICE — CONTROLLER NEUROSTIMULATOR HND HELD PRGMR WIRELESS PTM FOR

## (undated) DEVICE — GAUZE,SPONGE,4"X4",12PLY,STERILE,LF,2'S: Brand: MEDLINE

## (undated) DEVICE — MEDI-VAC NON-CONDUCTIVE SUCTION TUBING: Brand: CARDINAL HEALTH

## (undated) DEVICE — BANDAGE ADH W1XL3IN NAT FAB WVN FLX DURABLE N ADH PD SEAL

## (undated) DEVICE — SYSTEM RECHARGING NEUROSTIMULATOR RECHRG BTTRY PK PWR SUPL

## (undated) DEVICE — GLOVE RAD REDUC 8.5 IN

## (undated) DEVICE — DEVICE NEUROSTIMULATOR W2.9XL3.1IN THK0.8IN 71GM

## (undated) DEVICE — ELECTRODE PT RET AD L9FT HI MOIST COND ADH HYDRGEL CORDED

## (undated) DEVICE — GLOVE ORANGE PI 8 1/2   MSG9085

## (undated) DEVICE — HANDLE CVR PATENTED RETENTION DISC STRL LIGHT SHLD

## (undated) DEVICE — CHLORAPREP 26ML ORANGE

## (undated) DEVICE — SPONGE,LAP,12"X12",XR,ST,5/PK,40PK/CS: Brand: MEDLINE

## (undated) DEVICE — NEEDLE HYPO 18GA L1.5IN PNK POLYPR HUB S STL THN WALL FILL

## (undated) DEVICE — 12 ML SYRINGE,LUER-LOCK TIP: Brand: MONOJECT

## (undated) DEVICE — INTENDED FOR TISSUE SEPARATION, AND OTHER PROCEDURES THAT REQUIRE A SHARP SURGICAL BLADE TO PUNCTURE OR CUT.: Brand: BARD-PARKER ® STAINLESS STEEL BLADES

## (undated) DEVICE — SYRINGE BLB 50CC IRRIG PLIABLE FNGR FLNG GRAD FLSK DISP

## (undated) DEVICE — PEN: MARKING STD 100/CS: Brand: MEDICAL ACTION INDUSTRIES

## (undated) DEVICE — DRAPE 64X41IN RADIOLOGY C ARM EQUIP STER

## (undated) DEVICE — STRIP,CLOSURE,WOUND,MEDI-STRIP,1/2X4: Brand: MEDLINE

## (undated) DEVICE — Z CONVERTED USE 2275207 CLOTH PREP W7.5XL7.5IN 2% CHG SKIN ALC AND RNS FREE

## (undated) DEVICE — E-Z CLEAN, NON-STICK, PTFE COATED, ELECTROSURGICAL BLADE ELECTRODE, 2.75 INCH (7 CM): Brand: MEGADYNE

## (undated) DEVICE — MEDI-VAC YANKAUER SUCTION HANDLE W/BULBOUS TIP: Brand: CARDINAL HEALTH

## (undated) DEVICE — 1810 FOAM BLOCK NEEDLE COUNTER: Brand: DEVON

## (undated) DEVICE — 3M™ MEDIPORE™ SOFT CLOTH TAPE, 4 INCH X 10 YARDS, 12 ROLLS/CASE, 2964: Brand: 3M™ MEDIPORE™